# Patient Record
Sex: FEMALE | Race: WHITE | NOT HISPANIC OR LATINO | Employment: FULL TIME | URBAN - METROPOLITAN AREA
[De-identification: names, ages, dates, MRNs, and addresses within clinical notes are randomized per-mention and may not be internally consistent; named-entity substitution may affect disease eponyms.]

---

## 2019-06-04 ENCOUNTER — RECORDS - HEALTHEAST (OUTPATIENT)
Dept: LAB | Facility: CLINIC | Age: 54
End: 2019-06-04

## 2019-06-04 LAB
25(OH)D3 SERPL-MCNC: 36.1 NG/ML (ref 30–80)
ANION GAP SERPL CALCULATED.3IONS-SCNC: 7 MMOL/L (ref 5–18)
BUN SERPL-MCNC: 15 MG/DL (ref 8–22)
CALCIUM SERPL-MCNC: 8.9 MG/DL (ref 8.5–10.5)
CHLORIDE BLD-SCNC: 110 MMOL/L (ref 98–107)
CO2 SERPL-SCNC: 25 MMOL/L (ref 22–31)
CREAT SERPL-MCNC: 0.93 MG/DL (ref 0.6–1.1)
GFR SERPL CREATININE-BSD FRML MDRD: >60 ML/MIN/1.73M2
GLUCOSE BLD-MCNC: 200 MG/DL (ref 70–125)
POTASSIUM BLD-SCNC: 3.9 MMOL/L (ref 3.5–5)
SODIUM SERPL-SCNC: 142 MMOL/L (ref 136–145)
TSH SERPL DL<=0.005 MIU/L-ACNC: 1.97 UIU/ML (ref 0.3–5)

## 2019-07-18 ENCOUNTER — RECORDS - HEALTHEAST (OUTPATIENT)
Dept: LAB | Facility: CLINIC | Age: 54
End: 2019-07-18

## 2019-07-18 LAB
ALBUMIN SERPL-MCNC: 3.4 G/DL (ref 3.5–5)
ALP SERPL-CCNC: 82 U/L (ref 45–120)
ALT SERPL W P-5'-P-CCNC: <9 U/L (ref 0–45)
AST SERPL W P-5'-P-CCNC: 11 U/L (ref 0–40)
BASOPHILS # BLD AUTO: 0.1 THOU/UL (ref 0–0.2)
BASOPHILS NFR BLD AUTO: 1 % (ref 0–2)
BILIRUB DIRECT SERPL-MCNC: 0.1 MG/DL
BILIRUB SERPL-MCNC: 0.3 MG/DL (ref 0–1)
CHOLEST SERPL-MCNC: 119 MG/DL
EOSINOPHIL # BLD AUTO: 0.2 THOU/UL (ref 0–0.4)
EOSINOPHIL NFR BLD AUTO: 3 % (ref 0–6)
ERYTHROCYTE [DISTWIDTH] IN BLOOD BY AUTOMATED COUNT: 14.4 % (ref 11–14.5)
FASTING STATUS PATIENT QL REPORTED: NORMAL
HCT VFR BLD AUTO: 36.2 % (ref 35–47)
HDLC SERPL-MCNC: 53 MG/DL
HGB BLD-MCNC: 11 G/DL (ref 12–16)
LDLC SERPL CALC-MCNC: 45 MG/DL
LYMPHOCYTES # BLD AUTO: 2.3 THOU/UL (ref 0.8–4.4)
LYMPHOCYTES NFR BLD AUTO: 36 % (ref 20–40)
MCH RBC QN AUTO: 25.7 PG (ref 27–34)
MCHC RBC AUTO-ENTMCNC: 30.4 G/DL (ref 32–36)
MCV RBC AUTO: 85 FL (ref 80–100)
MONOCYTES # BLD AUTO: 0.5 THOU/UL (ref 0–0.9)
MONOCYTES NFR BLD AUTO: 7 % (ref 2–10)
NEUTROPHILS # BLD AUTO: 3.3 THOU/UL (ref 2–7.7)
NEUTROPHILS NFR BLD AUTO: 53 % (ref 50–70)
PLATELET # BLD AUTO: 226 THOU/UL (ref 140–440)
PMV BLD AUTO: 9.6 FL (ref 8.5–12.5)
PROT SERPL-MCNC: 6.5 G/DL (ref 6–8)
RBC # BLD AUTO: 4.28 MILL/UL (ref 3.8–5.4)
TRIGL SERPL-MCNC: 106 MG/DL
WBC: 6.3 THOU/UL (ref 4–11)

## 2019-07-19 LAB — HBA1C MFR BLD: 8.9 % (ref 4.2–6.1)

## 2019-08-30 ENCOUNTER — RECORDS - HEALTHEAST (OUTPATIENT)
Dept: LAB | Facility: CLINIC | Age: 54
End: 2019-08-30

## 2019-08-30 LAB
ANION GAP SERPL CALCULATED.3IONS-SCNC: 9 MMOL/L (ref 5–18)
BUN SERPL-MCNC: 14 MG/DL (ref 8–22)
CALCIUM SERPL-MCNC: 9.2 MG/DL (ref 8.5–10.5)
CHLORIDE BLD-SCNC: 105 MMOL/L (ref 98–107)
CO2 SERPL-SCNC: 27 MMOL/L (ref 22–31)
CREAT SERPL-MCNC: 0.96 MG/DL (ref 0.6–1.1)
GFR SERPL CREATININE-BSD FRML MDRD: 60 ML/MIN/1.73M2
GLUCOSE BLD-MCNC: 170 MG/DL (ref 70–125)
POTASSIUM BLD-SCNC: 3.9 MMOL/L (ref 3.5–5)
SODIUM SERPL-SCNC: 141 MMOL/L (ref 136–145)

## 2019-09-14 ENCOUNTER — RECORDS - HEALTHEAST (OUTPATIENT)
Dept: LAB | Facility: CLINIC | Age: 54
End: 2019-09-14

## 2019-09-14 LAB
ANION GAP SERPL CALCULATED.3IONS-SCNC: 6 MMOL/L (ref 5–18)
BUN SERPL-MCNC: 13 MG/DL (ref 8–22)
CALCIUM SERPL-MCNC: 8.9 MG/DL (ref 8.5–10.5)
CHLORIDE BLD-SCNC: 107 MMOL/L (ref 98–107)
CO2 SERPL-SCNC: 26 MMOL/L (ref 22–31)
CREAT SERPL-MCNC: 1.05 MG/DL (ref 0.6–1.1)
GFR SERPL CREATININE-BSD FRML MDRD: 54 ML/MIN/1.73M2
GLUCOSE BLD-MCNC: 221 MG/DL (ref 70–125)
POTASSIUM BLD-SCNC: 3.8 MMOL/L (ref 3.5–5)
SODIUM SERPL-SCNC: 139 MMOL/L (ref 136–145)

## 2021-10-23 ENCOUNTER — LAB REQUISITION (OUTPATIENT)
Dept: LAB | Facility: CLINIC | Age: 56
End: 2021-10-23
Payer: MEDICARE

## 2021-10-23 DIAGNOSIS — E78.2 MIXED HYPERLIPIDEMIA: ICD-10-CM

## 2021-10-23 DIAGNOSIS — E11.65 TYPE 2 DIABETES MELLITUS WITH HYPERGLYCEMIA (H): ICD-10-CM

## 2021-10-23 DIAGNOSIS — E55.9 VITAMIN D DEFICIENCY, UNSPECIFIED: ICD-10-CM

## 2021-10-23 DIAGNOSIS — I10 ESSENTIAL (PRIMARY) HYPERTENSION: ICD-10-CM

## 2021-10-23 DIAGNOSIS — E03.9 HYPOTHYROIDISM, UNSPECIFIED: ICD-10-CM

## 2021-10-25 LAB
ALBUMIN SERPL-MCNC: 3.6 G/DL (ref 3.5–5)
ALP SERPL-CCNC: 74 U/L (ref 45–120)
ALT SERPL W P-5'-P-CCNC: <9 U/L (ref 0–45)
ANION GAP SERPL CALCULATED.3IONS-SCNC: 10 MMOL/L (ref 5–18)
AST SERPL W P-5'-P-CCNC: 16 U/L (ref 0–40)
BILIRUB SERPL-MCNC: 0.6 MG/DL (ref 0–1)
BUN SERPL-MCNC: 14 MG/DL (ref 8–22)
CALCIUM SERPL-MCNC: 9.9 MG/DL (ref 8.5–10.5)
CHLORIDE BLD-SCNC: 101 MMOL/L (ref 98–107)
CHOLEST SERPL-MCNC: 128 MG/DL
CO2 SERPL-SCNC: 30 MMOL/L (ref 22–31)
CREAT SERPL-MCNC: 0.86 MG/DL (ref 0.6–1.1)
ERYTHROCYTE [DISTWIDTH] IN BLOOD BY AUTOMATED COUNT: 13.6 % (ref 10–15)
FASTING STATUS PATIENT QL REPORTED: NORMAL
GFR SERPL CREATININE-BSD FRML MDRD: 75 ML/MIN/1.73M2
GLUCOSE BLD-MCNC: 131 MG/DL (ref 70–125)
HBA1C MFR BLD: 9.5 %
HCT VFR BLD AUTO: 38 % (ref 35–47)
HDLC SERPL-MCNC: 53 MG/DL
HGB BLD-MCNC: 12 G/DL (ref 11.7–15.7)
LDLC SERPL CALC-MCNC: 61 MG/DL
MAGNESIUM SERPL-MCNC: 2.2 MG/DL (ref 1.8–2.6)
MCH RBC QN AUTO: 26.7 PG (ref 26.5–33)
MCHC RBC AUTO-ENTMCNC: 31.6 G/DL (ref 31.5–36.5)
MCV RBC AUTO: 84 FL (ref 78–100)
PLATELET # BLD AUTO: 249 10E3/UL (ref 150–450)
POTASSIUM BLD-SCNC: 4.3 MMOL/L (ref 3.5–5)
PROT SERPL-MCNC: 6.7 G/DL (ref 6–8)
RBC # BLD AUTO: 4.5 10E6/UL (ref 3.8–5.2)
SODIUM SERPL-SCNC: 141 MMOL/L (ref 136–145)
TRIGL SERPL-MCNC: 72 MG/DL
TSH SERPL DL<=0.005 MIU/L-ACNC: 3.54 UIU/ML (ref 0.3–5)
WBC # BLD AUTO: 8.4 10E3/UL (ref 4–11)

## 2021-10-25 PROCEDURE — 83735 ASSAY OF MAGNESIUM: CPT | Mod: ORL | Performed by: PHYSICIAN ASSISTANT

## 2021-10-25 PROCEDURE — 36415 COLL VENOUS BLD VENIPUNCTURE: CPT | Mod: ORL | Performed by: PHYSICIAN ASSISTANT

## 2021-10-25 PROCEDURE — 80053 COMPREHEN METABOLIC PANEL: CPT | Mod: ORL | Performed by: PHYSICIAN ASSISTANT

## 2021-10-25 PROCEDURE — 80061 LIPID PANEL: CPT | Mod: ORL | Performed by: PHYSICIAN ASSISTANT

## 2021-10-25 PROCEDURE — 85027 COMPLETE CBC AUTOMATED: CPT | Mod: ORL | Performed by: PHYSICIAN ASSISTANT

## 2021-10-25 PROCEDURE — 84443 ASSAY THYROID STIM HORMONE: CPT | Mod: ORL | Performed by: PHYSICIAN ASSISTANT

## 2021-10-25 PROCEDURE — 83036 HEMOGLOBIN GLYCOSYLATED A1C: CPT | Mod: ORL | Performed by: PHYSICIAN ASSISTANT

## 2021-10-25 PROCEDURE — 82306 VITAMIN D 25 HYDROXY: CPT | Mod: ORL | Performed by: PHYSICIAN ASSISTANT

## 2021-10-26 LAB — DEPRECATED CALCIDIOL+CALCIFEROL SERPL-MC: 42 UG/L (ref 30–80)

## 2021-11-12 ENCOUNTER — TRANSFERRED RECORDS (OUTPATIENT)
Dept: HEALTH INFORMATION MANAGEMENT | Facility: CLINIC | Age: 56
End: 2021-11-12
Payer: MEDICARE

## 2021-11-22 ENCOUNTER — HOSPITAL ENCOUNTER (INPATIENT)
Facility: HOSPITAL | Age: 56
LOS: 9 days | Discharge: NURSING, REHAB OR LONG-TERM CARE | DRG: 603 | End: 2021-12-01
Attending: EMERGENCY MEDICINE | Admitting: FAMILY MEDICINE
Payer: MEDICARE

## 2021-11-22 ENCOUNTER — MEDICAL CORRESPONDENCE (OUTPATIENT)
Dept: HEALTH INFORMATION MANAGEMENT | Facility: CLINIC | Age: 56
End: 2021-11-22

## 2021-11-22 DIAGNOSIS — Z78.9 FAILURE OF OUTPATIENT TREATMENT: ICD-10-CM

## 2021-11-22 DIAGNOSIS — L03.115 CELLULITIS OF RIGHT LOWER EXTREMITY: ICD-10-CM

## 2021-11-22 DIAGNOSIS — Z79.4 TYPE 2 DIABETES MELLITUS WITH HYPERGLYCEMIA, WITH LONG-TERM CURRENT USE OF INSULIN (H): Primary | ICD-10-CM

## 2021-11-22 DIAGNOSIS — E11.65 TYPE 2 DIABETES MELLITUS WITH HYPERGLYCEMIA, WITH LONG-TERM CURRENT USE OF INSULIN (H): Primary | ICD-10-CM

## 2021-11-22 LAB
ALBUMIN SERPL-MCNC: 4.1 G/DL (ref 3.5–5)
ALP SERPL-CCNC: 87 U/L (ref 45–120)
ALT SERPL W P-5'-P-CCNC: <9 U/L (ref 0–45)
ANION GAP SERPL CALCULATED.3IONS-SCNC: 8 MMOL/L (ref 5–18)
AST SERPL W P-5'-P-CCNC: 12 U/L (ref 0–40)
BASOPHILS # BLD AUTO: 0.1 10E3/UL (ref 0–0.2)
BASOPHILS NFR BLD AUTO: 1 %
BILIRUB DIRECT SERPL-MCNC: 0.3 MG/DL
BILIRUB SERPL-MCNC: 0.6 MG/DL (ref 0–1)
BUN SERPL-MCNC: 20 MG/DL (ref 8–22)
C REACTIVE PROTEIN LHE: 1.3 MG/DL (ref 0–0.8)
CALCIUM SERPL-MCNC: 9.2 MG/DL (ref 8.5–10.5)
CHLORIDE BLD-SCNC: 94 MMOL/L (ref 98–107)
CO2 SERPL-SCNC: 33 MMOL/L (ref 22–31)
CREAT SERPL-MCNC: 1.06 MG/DL (ref 0.6–1.1)
EOSINOPHIL # BLD AUTO: 0.1 10E3/UL (ref 0–0.7)
EOSINOPHIL NFR BLD AUTO: 1 %
ERYTHROCYTE [DISTWIDTH] IN BLOOD BY AUTOMATED COUNT: 13.4 % (ref 10–15)
GFR SERPL CREATININE-BSD FRML MDRD: 58 ML/MIN/1.73M2
GLUCOSE BLD-MCNC: 268 MG/DL (ref 70–125)
GLUCOSE BLDC GLUCOMTR-MCNC: 156 MG/DL (ref 70–99)
HCT VFR BLD AUTO: 40.8 % (ref 35–47)
HGB BLD-MCNC: 12.9 G/DL (ref 11.7–15.7)
IMM GRANULOCYTES # BLD: 0 10E3/UL
IMM GRANULOCYTES NFR BLD: 0 %
INR PPP: 2.31 (ref 0.9–1.15)
LACTATE SERPL-SCNC: 1.6 MMOL/L (ref 0.7–2)
LYMPHOCYTES # BLD AUTO: 2.5 10E3/UL (ref 0.8–5.3)
LYMPHOCYTES NFR BLD AUTO: 29 %
MAGNESIUM SERPL-MCNC: 1.9 MG/DL (ref 1.8–2.6)
MCH RBC QN AUTO: 26.2 PG (ref 26.5–33)
MCHC RBC AUTO-ENTMCNC: 31.6 G/DL (ref 31.5–36.5)
MCV RBC AUTO: 83 FL (ref 78–100)
MONOCYTES # BLD AUTO: 0.5 10E3/UL (ref 0–1.3)
MONOCYTES NFR BLD AUTO: 6 %
NEUTROPHILS # BLD AUTO: 5.3 10E3/UL (ref 1.6–8.3)
NEUTROPHILS NFR BLD AUTO: 63 %
NRBC # BLD AUTO: 0 10E3/UL
NRBC BLD AUTO-RTO: 0 /100
PLATELET # BLD AUTO: 285 10E3/UL (ref 150–450)
POTASSIUM BLD-SCNC: 4 MMOL/L (ref 3.5–5)
PROCALCITONIN SERPL-MCNC: <0.02 NG/ML (ref 0–0.49)
PROT SERPL-MCNC: 7.4 G/DL (ref 6–8)
RBC # BLD AUTO: 4.92 10E6/UL (ref 3.8–5.2)
SARS-COV-2 RNA RESP QL NAA+PROBE: NEGATIVE
SODIUM SERPL-SCNC: 135 MMOL/L (ref 136–145)
WBC # BLD AUTO: 8.5 10E3/UL (ref 4–11)

## 2021-11-22 PROCEDURE — 87040 BLOOD CULTURE FOR BACTERIA: CPT | Performed by: EMERGENCY MEDICINE

## 2021-11-22 PROCEDURE — 85610 PROTHROMBIN TIME: CPT | Performed by: EMERGENCY MEDICINE

## 2021-11-22 PROCEDURE — 86141 C-REACTIVE PROTEIN HS: CPT | Performed by: EMERGENCY MEDICINE

## 2021-11-22 PROCEDURE — 258N000003 HC RX IP 258 OP 636: Performed by: EMERGENCY MEDICINE

## 2021-11-22 PROCEDURE — 250N000013 HC RX MED GY IP 250 OP 250 PS 637: Performed by: FAMILY MEDICINE

## 2021-11-22 PROCEDURE — 99285 EMERGENCY DEPT VISIT HI MDM: CPT | Mod: 25

## 2021-11-22 PROCEDURE — 96365 THER/PROPH/DIAG IV INF INIT: CPT

## 2021-11-22 PROCEDURE — 87205 SMEAR GRAM STAIN: CPT | Performed by: FAMILY MEDICINE

## 2021-11-22 PROCEDURE — 250N000012 HC RX MED GY IP 250 OP 636 PS 637: Performed by: FAMILY MEDICINE

## 2021-11-22 PROCEDURE — 83735 ASSAY OF MAGNESIUM: CPT | Performed by: EMERGENCY MEDICINE

## 2021-11-22 PROCEDURE — 80053 COMPREHEN METABOLIC PANEL: CPT | Performed by: EMERGENCY MEDICINE

## 2021-11-22 PROCEDURE — 99223 1ST HOSP IP/OBS HIGH 75: CPT | Performed by: FAMILY MEDICINE

## 2021-11-22 PROCEDURE — 82248 BILIRUBIN DIRECT: CPT | Performed by: EMERGENCY MEDICINE

## 2021-11-22 PROCEDURE — 96366 THER/PROPH/DIAG IV INF ADDON: CPT

## 2021-11-22 PROCEDURE — 93005 ELECTROCARDIOGRAM TRACING: CPT | Performed by: EMERGENCY MEDICINE

## 2021-11-22 PROCEDURE — 87635 SARS-COV-2 COVID-19 AMP PRB: CPT | Performed by: EMERGENCY MEDICINE

## 2021-11-22 PROCEDURE — 250N000011 HC RX IP 250 OP 636: Performed by: EMERGENCY MEDICINE

## 2021-11-22 PROCEDURE — 250N000011 HC RX IP 250 OP 636: Performed by: FAMILY MEDICINE

## 2021-11-22 PROCEDURE — 258N000003 HC RX IP 258 OP 636: Performed by: FAMILY MEDICINE

## 2021-11-22 PROCEDURE — 96361 HYDRATE IV INFUSION ADD-ON: CPT

## 2021-11-22 PROCEDURE — 36415 COLL VENOUS BLD VENIPUNCTURE: CPT | Performed by: EMERGENCY MEDICINE

## 2021-11-22 PROCEDURE — 84145 PROCALCITONIN (PCT): CPT | Performed by: EMERGENCY MEDICINE

## 2021-11-22 PROCEDURE — C9803 HOPD COVID-19 SPEC COLLECT: HCPCS

## 2021-11-22 PROCEDURE — 96367 TX/PROPH/DG ADDL SEQ IV INF: CPT

## 2021-11-22 PROCEDURE — 85025 COMPLETE CBC W/AUTO DIFF WBC: CPT | Performed by: EMERGENCY MEDICINE

## 2021-11-22 PROCEDURE — 120N000001 HC R&B MED SURG/OB

## 2021-11-22 PROCEDURE — 83605 ASSAY OF LACTIC ACID: CPT | Performed by: EMERGENCY MEDICINE

## 2021-11-22 RX ORDER — DEXTROSE MONOHYDRATE 25 G/50ML
25-50 INJECTION, SOLUTION INTRAVENOUS
Status: DISCONTINUED | OUTPATIENT
Start: 2021-11-22 | End: 2021-12-01 | Stop reason: HOSPADM

## 2021-11-22 RX ORDER — CEFDINIR 300 MG/1
300 CAPSULE ORAL 2 TIMES DAILY
Status: ON HOLD | COMMUNITY
Start: 2021-11-17 | End: 2021-12-01

## 2021-11-22 RX ORDER — TORSEMIDE 20 MG/1
20 TABLET ORAL DAILY
Status: ON HOLD | COMMUNITY
Start: 2021-11-20 | End: 2021-12-01

## 2021-11-22 RX ORDER — INSULIN ASPART 100 [IU]/ML
1-85 INJECTION, SOLUTION INTRAVENOUS; SUBCUTANEOUS
Status: ON HOLD | COMMUNITY
End: 2021-12-01

## 2021-11-22 RX ORDER — BUPROPION HYDROCHLORIDE 150 MG/1
150 TABLET, EXTENDED RELEASE ORAL DAILY
Status: DISCONTINUED | OUTPATIENT
Start: 2021-11-23 | End: 2021-12-01 | Stop reason: HOSPADM

## 2021-11-22 RX ORDER — PANTOPRAZOLE SODIUM 40 MG/1
40 TABLET, DELAYED RELEASE ORAL
Status: DISCONTINUED | OUTPATIENT
Start: 2021-11-23 | End: 2021-12-01 | Stop reason: HOSPADM

## 2021-11-22 RX ORDER — MORPHINE SULFATE 15 MG/1
7.5 TABLET ORAL EVERY 8 HOURS PRN
Status: DISCONTINUED | OUTPATIENT
Start: 2021-11-22 | End: 2021-12-01 | Stop reason: HOSPADM

## 2021-11-22 RX ORDER — MORPHINE SULFATE 30 MG/1
30 TABLET, FILM COATED, EXTENDED RELEASE ORAL
COMMUNITY
End: 2022-08-18

## 2021-11-22 RX ORDER — PIPERACILLIN SODIUM, TAZOBACTAM SODIUM 3; .375 G/15ML; G/15ML
3.38 INJECTION, POWDER, LYOPHILIZED, FOR SOLUTION INTRAVENOUS ONCE
Status: COMPLETED | OUTPATIENT
Start: 2021-11-22 | End: 2021-11-22

## 2021-11-22 RX ORDER — ASPIRIN 81 MG/1
81 TABLET, CHEWABLE ORAL DAILY
Status: DISCONTINUED | OUTPATIENT
Start: 2021-11-23 | End: 2021-12-01 | Stop reason: HOSPADM

## 2021-11-22 RX ORDER — ASPIRIN 81 MG/1
81 TABLET, CHEWABLE ORAL DAILY
COMMUNITY

## 2021-11-22 RX ORDER — ONDANSETRON 2 MG/ML
4 INJECTION INTRAMUSCULAR; INTRAVENOUS EVERY 6 HOURS PRN
Status: DISCONTINUED | OUTPATIENT
Start: 2021-11-22 | End: 2021-12-01 | Stop reason: HOSPADM

## 2021-11-22 RX ORDER — NICOTINE POLACRILEX 4 MG
15-30 LOZENGE BUCCAL
Status: DISCONTINUED | OUTPATIENT
Start: 2021-11-22 | End: 2021-12-01 | Stop reason: HOSPADM

## 2021-11-22 RX ORDER — NALOXONE HYDROCHLORIDE 0.4 MG/ML
0.4 INJECTION, SOLUTION INTRAMUSCULAR; INTRAVENOUS; SUBCUTANEOUS
Status: DISCONTINUED | OUTPATIENT
Start: 2021-11-22 | End: 2021-12-01 | Stop reason: HOSPADM

## 2021-11-22 RX ORDER — MORPHINE SULFATE 30 MG/1
30 TABLET, FILM COATED, EXTENDED RELEASE ORAL
Status: DISCONTINUED | OUTPATIENT
Start: 2021-11-22 | End: 2021-12-01 | Stop reason: HOSPADM

## 2021-11-22 RX ORDER — CALCIUM CITRATE/VITAMIN D3 200MG-6.25
1 TABLET ORAL DAILY
COMMUNITY

## 2021-11-22 RX ORDER — BISACODYL 10 MG
10 SUPPOSITORY, RECTAL RECTAL DAILY PRN
COMMUNITY

## 2021-11-22 RX ORDER — BUPROPION HYDROCHLORIDE 150 MG/1
150 TABLET, EXTENDED RELEASE ORAL DAILY
COMMUNITY

## 2021-11-22 RX ORDER — VENLAFAXINE HYDROCHLORIDE 150 MG/1
150 CAPSULE, EXTENDED RELEASE ORAL DAILY
Status: DISCONTINUED | OUTPATIENT
Start: 2021-11-23 | End: 2021-12-01 | Stop reason: HOSPADM

## 2021-11-22 RX ORDER — LOPERAMIDE HYDROCHLORIDE 2 MG/1
2 TABLET ORAL 3 TIMES DAILY PRN
COMMUNITY
End: 2022-08-30

## 2021-11-22 RX ORDER — POLYETHYLENE GLYCOL 3350 17 G/17G
1 POWDER, FOR SOLUTION ORAL EVERY OTHER DAY
COMMUNITY

## 2021-11-22 RX ORDER — HYDROXYZINE HYDROCHLORIDE 25 MG/1
50 TABLET, FILM COATED ORAL EVERY 8 HOURS PRN
Status: DISCONTINUED | OUTPATIENT
Start: 2021-11-22 | End: 2021-12-01 | Stop reason: HOSPADM

## 2021-11-22 RX ORDER — TORSEMIDE 20 MG/1
20 TABLET ORAL DAILY
Status: DISCONTINUED | OUTPATIENT
Start: 2021-11-23 | End: 2021-12-01 | Stop reason: HOSPADM

## 2021-11-22 RX ORDER — LISINOPRIL 2.5 MG/1
2.5 TABLET ORAL DAILY
Status: DISCONTINUED | OUTPATIENT
Start: 2021-11-23 | End: 2021-12-01 | Stop reason: HOSPADM

## 2021-11-22 RX ORDER — BENZOCAINE/MENTHOL 6 MG-10 MG
LOZENGE MUCOUS MEMBRANE 2 TIMES DAILY PRN
COMMUNITY
End: 2022-08-30

## 2021-11-22 RX ORDER — LEVOTHYROXINE SODIUM 125 UG/1
187.5 TABLET ORAL SEE ADMIN INSTRUCTIONS
COMMUNITY

## 2021-11-22 RX ORDER — GINSENG 100 MG
CAPSULE ORAL EVERY 8 HOURS PRN
COMMUNITY

## 2021-11-22 RX ORDER — ASPIRIN 81 MG
200 TABLET, DELAYED RELEASE (ENTERIC COATED) ORAL 2 TIMES DAILY
COMMUNITY
End: 2022-08-30

## 2021-11-22 RX ORDER — LIDOCAINE 40 MG/G
CREAM TOPICAL
Status: DISCONTINUED | OUTPATIENT
Start: 2021-11-22 | End: 2021-12-01 | Stop reason: HOSPADM

## 2021-11-22 RX ORDER — POLYETHYLENE GLYCOL 3350 17 G/17G
17 POWDER, FOR SOLUTION ORAL EVERY OTHER DAY
Status: DISCONTINUED | OUTPATIENT
Start: 2021-11-23 | End: 2021-12-01 | Stop reason: HOSPADM

## 2021-11-22 RX ORDER — VENLAFAXINE HYDROCHLORIDE 150 MG/1
150 CAPSULE, EXTENDED RELEASE ORAL DAILY
COMMUNITY

## 2021-11-22 RX ORDER — CALCIUM CARBONATE 500 MG/1
2 TABLET, CHEWABLE ORAL
COMMUNITY

## 2021-11-22 RX ORDER — ROSUVASTATIN CALCIUM 10 MG/1
20 TABLET, COATED ORAL AT BEDTIME
Status: DISCONTINUED | OUTPATIENT
Start: 2021-11-22 | End: 2021-12-01 | Stop reason: HOSPADM

## 2021-11-22 RX ORDER — NALOXONE HYDROCHLORIDE 0.4 MG/ML
0.2 INJECTION, SOLUTION INTRAMUSCULAR; INTRAVENOUS; SUBCUTANEOUS
Status: DISCONTINUED | OUTPATIENT
Start: 2021-11-22 | End: 2021-12-01 | Stop reason: HOSPADM

## 2021-11-22 RX ORDER — LEVOTHYROXINE SODIUM 125 UG/1
125 TABLET ORAL SEE ADMIN INSTRUCTIONS
COMMUNITY

## 2021-11-22 RX ORDER — POLYETHYLENE GLYCOL 3350 17 G/17G
17 POWDER, FOR SOLUTION ORAL DAILY PRN
Status: DISCONTINUED | OUTPATIENT
Start: 2021-11-22 | End: 2021-12-01 | Stop reason: HOSPADM

## 2021-11-22 RX ORDER — ACETAMINOPHEN 325 MG/1
650 TABLET ORAL EVERY 4 HOURS PRN
COMMUNITY

## 2021-11-22 RX ORDER — ONDANSETRON 4 MG/1
4 TABLET, ORALLY DISINTEGRATING ORAL EVERY 6 HOURS PRN
Status: DISCONTINUED | OUTPATIENT
Start: 2021-11-22 | End: 2021-12-01 | Stop reason: HOSPADM

## 2021-11-22 RX ORDER — SODIUM CHLORIDE 9 MG/ML
INJECTION, SOLUTION INTRAVENOUS CONTINUOUS
Status: ACTIVE | OUTPATIENT
Start: 2021-11-22 | End: 2021-11-23

## 2021-11-22 RX ORDER — CLOTRIMAZOLE 1 %
CREAM (GRAM) TOPICAL 2 TIMES DAILY
COMMUNITY

## 2021-11-22 RX ORDER — ACETAMINOPHEN 325 MG/1
975 TABLET ORAL EVERY 8 HOURS
Status: DISCONTINUED | OUTPATIENT
Start: 2021-11-22 | End: 2021-12-01 | Stop reason: HOSPADM

## 2021-11-22 RX ORDER — LISINOPRIL 2.5 MG/1
2.5 TABLET ORAL DAILY
COMMUNITY

## 2021-11-22 RX ORDER — VENLAFAXINE HYDROCHLORIDE 75 MG/1
75 CAPSULE, EXTENDED RELEASE ORAL DAILY
Status: DISCONTINUED | OUTPATIENT
Start: 2021-11-23 | End: 2021-12-01 | Stop reason: HOSPADM

## 2021-11-22 RX ORDER — SENNOSIDES 8.6 MG
1 TABLET ORAL DAILY
COMMUNITY
End: 2022-08-30

## 2021-11-22 RX ORDER — MULTIVITAMIN WITH IRON
1 TABLET ORAL DAILY
COMMUNITY

## 2021-11-22 RX ORDER — MORPHINE SULFATE 15 MG/1
7.5 TABLET ORAL EVERY 8 HOURS PRN
COMMUNITY
End: 2022-08-18

## 2021-11-22 RX ORDER — PIPERACILLIN SODIUM, TAZOBACTAM SODIUM 3; .375 G/15ML; G/15ML
3.38 INJECTION, POWDER, LYOPHILIZED, FOR SOLUTION INTRAVENOUS EVERY 8 HOURS
Status: DISCONTINUED | OUTPATIENT
Start: 2021-11-22 | End: 2021-11-25

## 2021-11-22 RX ORDER — LORATADINE 10 MG/1
10 TABLET ORAL DAILY PRN
COMMUNITY

## 2021-11-22 RX ORDER — BACLOFEN 20 MG/1
20 TABLET ORAL 3 TIMES DAILY
Status: ON HOLD | COMMUNITY
End: 2022-08-18

## 2021-11-22 RX ORDER — VENLAFAXINE HYDROCHLORIDE 75 MG/1
75 CAPSULE, EXTENDED RELEASE ORAL DAILY
COMMUNITY

## 2021-11-22 RX ORDER — BACLOFEN 10 MG/1
10 TABLET ORAL 3 TIMES DAILY
Status: DISCONTINUED | OUTPATIENT
Start: 2021-11-22 | End: 2021-12-01 | Stop reason: HOSPADM

## 2021-11-22 RX ORDER — SUMATRIPTAN 50 MG/1
100 TABLET, FILM COATED ORAL EVERY 12 HOURS PRN
Status: DISCONTINUED | OUTPATIENT
Start: 2021-11-22 | End: 2021-12-01 | Stop reason: HOSPADM

## 2021-11-22 RX ORDER — HYDROXYZINE HYDROCHLORIDE 50 MG/1
50 TABLET, FILM COATED ORAL 2 TIMES DAILY
Status: ON HOLD | COMMUNITY
End: 2022-08-18

## 2021-11-22 RX ORDER — BISACODYL 10 MG
10 SUPPOSITORY, RECTAL RECTAL DAILY PRN
Status: DISCONTINUED | OUTPATIENT
Start: 2021-11-22 | End: 2021-12-01 | Stop reason: HOSPADM

## 2021-11-22 RX ORDER — ROSUVASTATIN CALCIUM 20 MG/1
20 TABLET, COATED ORAL AT BEDTIME
COMMUNITY

## 2021-11-22 RX ORDER — DOCUSATE SODIUM 100 MG/1
100 CAPSULE, LIQUID FILLED ORAL 2 TIMES DAILY
Status: DISCONTINUED | OUTPATIENT
Start: 2021-11-22 | End: 2021-12-01 | Stop reason: HOSPADM

## 2021-11-22 RX ORDER — SUMATRIPTAN 100 MG/1
100 TABLET, FILM COATED ORAL EVERY 12 HOURS PRN
COMMUNITY

## 2021-11-22 RX ORDER — NEOMYCIN/BACITRACIN/POLYMYXINB 3.5-400-5K
OINTMENT (GRAM) TOPICAL EVERY 6 HOURS PRN
COMMUNITY
End: 2022-08-30

## 2021-11-22 RX ADMIN — PIPERACILLIN AND TAZOBACTAM 3.38 G: 3; .375 INJECTION, POWDER, LYOPHILIZED, FOR SOLUTION INTRAVENOUS at 16:05

## 2021-11-22 RX ADMIN — BACLOFEN 10 MG: 10 TABLET ORAL at 21:59

## 2021-11-22 RX ADMIN — VANCOMYCIN HYDROCHLORIDE 1500 MG: 5 INJECTION, POWDER, LYOPHILIZED, FOR SOLUTION INTRAVENOUS at 16:55

## 2021-11-22 RX ADMIN — SODIUM CHLORIDE: 9 INJECTION, SOLUTION INTRAVENOUS at 21:06

## 2021-11-22 RX ADMIN — PIPERACILLIN SODIUM AND TAZOBACTAM SODIUM 3.38 G: 3; .375 INJECTION, POWDER, LYOPHILIZED, FOR SOLUTION INTRAVENOUS at 23:06

## 2021-11-22 RX ADMIN — RIVAROXABAN 15 MG: 15 TABLET, FILM COATED ORAL at 22:00

## 2021-11-22 RX ADMIN — SODIUM CHLORIDE 1224 ML: 9 INJECTION, SOLUTION INTRAVENOUS at 15:08

## 2021-11-22 RX ADMIN — VANCOMYCIN HYDROCHLORIDE 1500 MG: 5 INJECTION, POWDER, LYOPHILIZED, FOR SOLUTION INTRAVENOUS at 21:11

## 2021-11-22 RX ADMIN — MORPHINE SULFATE 30 MG: 30 TABLET, FILM COATED, EXTENDED RELEASE ORAL at 21:59

## 2021-11-22 RX ADMIN — ROSUVASTATIN CALCIUM 20 MG: 10 TABLET, FILM COATED ORAL at 22:00

## 2021-11-22 RX ADMIN — ACETAMINOPHEN 975 MG: 325 TABLET ORAL at 21:58

## 2021-11-22 RX ADMIN — INSULIN GLARGINE 30 UNITS: 100 INJECTION, SOLUTION SUBCUTANEOUS at 23:59

## 2021-11-22 RX ADMIN — DOCUSATE SODIUM 100 MG: 100 CAPSULE, LIQUID FILLED ORAL at 21:59

## 2021-11-22 RX ADMIN — INSULIN ASPART 2 UNITS: 100 INJECTION, SOLUTION INTRAVENOUS; SUBCUTANEOUS at 21:09

## 2021-11-22 ASSESSMENT — ENCOUNTER SYMPTOMS
NAUSEA: 0
CONSTIPATION: 1
CONFUSION: 0
WOUND: 1
JOINT SWELLING: 0
DIZZINESS: 0
MYALGIAS: 1
HEMATURIA: 0
ABDOMINAL PAIN: 0
SORE THROAT: 0
FEVER: 0
CHILLS: 0
VOMITING: 0
DIARRHEA: 0
SHORTNESS OF BREATH: 0
DYSURIA: 0

## 2021-11-22 ASSESSMENT — ACTIVITIES OF DAILY LIVING (ADL)
ADLS_ACUITY_SCORE: 8
ADLS_ACUITY_SCORE: 9
TOILETING_ISSUES: YES
ADLS_ACUITY_SCORE: 8
DIFFICULTY_EATING/SWALLOWING: NO
ADLS_ACUITY_SCORE: 12
DIFFICULTY_COMMUNICATING: NO
DRESSING/BATHING_DIFFICULTY: YES
ADLS_ACUITY_SCORE: 9
DRESSING/BATHING: BATHING DIFFICULTY, ASSISTANCE 1 PERSON
DEPENDENT_IADLS:: MEDICATION MANAGEMENT;MEAL PREPARATION;TRANSPORTATION
ADLS_ACUITY_SCORE: 9

## 2021-11-22 ASSESSMENT — MIFFLIN-ST. JEOR
SCORE: 1374.23
SCORE: 1370.6

## 2021-11-22 NOTE — ED TRIAGE NOTES
Pt arrives via [facility] from a nursing home due to cellulitis on her right lower leg. Pt can stand and take a couple steps with assist. Pt takes morphine regularly and last dose was around 11 am today 30 mg ER. Pt takes warfarin regularly. Pt is taking oral doxycycline and right leg continues to be painful, red and weepy. [place] recently transferred to [place] and is not happy with her care there

## 2021-11-22 NOTE — PHARMACY-ADMISSION MEDICATION HISTORY
Pharmacy Note - Admission Medication History    Pertinent Provider Information: Previously on doxycycline 100 mg BID 11/10/21-11/16/21, then switched to cefdinir.     ______________________________________________________________________    Prior To Admission (PTA) med list completed and updated in EMR.       PTA Med List   Medication Sig Note Last Dose     acetaminophen (TYLENOL) 325 MG tablet Take 650 mg by mouth every 4 hours as needed for mild pain       Ascorbic Acid (VITAMIN C GUMMIES PO) Take 1 chew tab by mouth daily  11/22/2021 at am     aspirin (ASA) 81 MG chewable tablet Take 81 mg by mouth daily  11/22/2021 at am     bacitracin 500 UNIT/GM OINT Apply topically every 8 hours as needed for wound care       baclofen (LIORESAL) 10 MG tablet Take 10 mg by mouth 3 times daily  11/22/2021 at x1     bisacodyl (DULCOLAX) 10 MG suppository Place 10 mg rectally daily as needed for constipation       buPROPion (WELLBUTRIN SR) 150 MG 12 hr tablet Take 150 mg by mouth daily  11/22/2021 at am     calcium carbonate (TUMS) 500 MG chewable tablet Take 2 chew tab by mouth every hour as needed for heartburn       calcium carbonate 600 mg-vitamin D 400 units (CALTRATE) 600-400 MG-UNIT per tablet Take 1 tablet by mouth daily  11/22/2021 at am     cefdinir (OMNICEF) 300 MG capsule Take 300 mg by mouth 2 times daily X 8 days 11/22/2021: Last scheduled dose is 11/24 pm. 11/22/2021 at am     Cholecalciferol (VITAMIN D3 GUMMIES PO) Take 1 chew tab by mouth daily  11/22/2021 at am     clotrimazole (LOTRIMIN) 1 % external cream Apply topically 2 times daily  Unknown at Unknown time     docusate sodium (COLACE) 100 MG tablet Take 100 mg by mouth 2 times daily  11/22/2021 at am     guaiFENesin (ROBITUSSIN) 20 mg/mL SOLN solution Take 10 mLs by mouth every 4 hours as needed for cough       hydrocortisone (CORTAID) 1 % external cream Apply topically 2 times daily as needed for rash  Unknown at Unknown time     hydrOXYzine (ATARAX) 50 MG  tablet Take 50 mg by mouth every 8 hours as needed for itching  11/11/2021     insulin aspart (NOVOLOG FLEXPEN) 100 UNIT/ML pen Inject 1-85 Units Subcutaneous 3 times daily (with meals) Sliding scale.       insulin degludec (TRESIBA) 100 UNIT/ML pen Inject 30 Units Subcutaneous At Bedtime  11/21/2021 at pm     levothyroxine (SYNTHROID/LEVOTHROID) 125 MCG tablet Take 125 mcg by mouth See Admin Instructions Daily on Tuesday, Wednesday, Thursday, Saturday, and Sunday 11/21/2021 at am     levothyroxine (SYNTHROID/LEVOTHROID) 125 MCG tablet Take 187.5 mcg by mouth See Admin Instructions On Mondays, Fridays 11/22/2021 at am     lisinopril (ZESTRIL) 2.5 MG tablet Take 2.5 mg by mouth daily  11/22/2021 at am     loperamide (IMODIUM A-D) 2 MG tablet Take 2 mg by mouth 3 times daily as needed for diarrhea       loratadine (CLARITIN) 10 MG tablet Take 10 mg by mouth daily as needed for allergies       magnesium 250 MG tablet Take 1 tablet by mouth daily  11/22/2021 at am     magnesium hydroxide (MILK OF MAGNESIA) 400 MG/5ML suspension Take 30 mLs by mouth daily as needed for constipation       menthol (COUGH DROP) 7 MG LOZG Take 1 lozenge by mouth every hour as needed for cough       morphine (MS CONTIN) 30 MG CR tablet Take 30 mg by mouth 3 times daily  11/22/2021 at 0800     morphine (MSIR) 15 MG IR tablet Take 7.5 mg by mouth every 8 hours as needed for pain  11/15/2021     Multiple Vitamins-Minerals (MULTIVITAMIN GUMMIES WOMENS) CHEW Take 1 chew tab by mouth daily  11/22/2021 at am     neomycin-bacitracin-polymyxin (NEOSPORIN) 5-400-5000 ointment Apply topically every 6 hours as needed (minor scrapes, cuts, burns)       omeprazole (PRILOSEC) 20 MG DR capsule Take 20 mg by mouth daily  11/22/2021 at am     polyethylene glycol (MIRALAX) 17 GM/Dose powder Take 1 capful by mouth every other day  11/21/2021 at am     rivaroxaban ANTICOAGULANT (XARELTO) 15 MG TABS tablet Take by mouth 2 times daily (with meals) 11/22/2021: X21  days (started on 11/15/21) 11/22/2021 at am     rosuvastatin (CRESTOR) 20 MG tablet Take 20 mg by mouth At Bedtime  11/21/2021 at pm     sennosides (SENOKOT) 8.6 MG tablet Take 1 tablet by mouth daily  11/22/2021 at am     SUMAtriptan (IMITREX) 100 MG tablet Take 100 mg by mouth every 12 hours as needed for migraine       torsemide (DEMADEX) 20 MG tablet Take 20 mg by mouth daily x3 days. 11/22/2021: Took last dose of schedule on 11/22/21. 11/22/2021 at am     venlafaxine (EFFEXOR-XR) 150 MG 24 hr capsule Take 150 mg by mouth daily Take with 75 mg capsule for a total dose of 225 mg.  11/22/2021 at am     venlafaxine (EFFEXOR-XR) 75 MG 24 hr capsule Take 75 mg by mouth daily Take with 150 mg capsule for a total dose of 225 mg.  11/22/2021 at am     Zinc Oxide-Petrolatum 20-80 % CREA Externally apply topically every 8 hours as needed (incontinence)         Information source(s): Facility ([facility]/) medication list/MAR  Method of interview communication: N/A    Summary of Changes to PTA Med List  New: all medications  Discontinued: none  Changed: none    Patient was asked about OTC/herbal products specifically.  PTA med list reflects this.    In the past week, patient estimated taking medication this percent of the time:  greater than 90%.    Allergies were reviewed, assessed, and updated with the patient.      Patient did not bring any medications to the hospital and can't retrieve from home. No multi-dose medications are available for use during hospital stay.     The information provided in this note is only as accurate as the sources available at the time of the update(s).    Thank you for the opportunity to participate in the care of this patient.    Karen Velasco [facility]  11/22/2021 5:59 PM

## 2021-11-22 NOTE — ED PROVIDER NOTES
Emergency Department Encounter     Evaluation Date & Time:   11/22/2021  3:55 PM    CHIEF COMPLAINT:  Cellulitis      Triage Note:Pt arrives via [facility] from a nursing home due to cellulitis on her right lower leg. Pt can stand and take a couple steps with assist. Pt takes morphine regularly and last dose was around 11 am today 30 mg ER. Pt takes warfarin regularly. Pt is taking oral doxycycline and right leg continues to be painful, red and weepy. [place] recently transferred to [place] and is not happy with her care there        ED COURSE & MEDICAL DECISION MAKING:     ED Course as of 11/22/21 2123 Mon Nov 22, 2021   1626 Call from outpatient primary clinic regarding pt's recent course.  Started on Doxy 11/9, received IM rocephin at [facility] 11/12, continued on doxy.  Outpatient Ultrasound showed DVT right leg, started on Xarelto.  Pt changed to cefdinir on 11/19/21 and moved to SNF for wound care.  Pt sent in for worsening cellulitis findings with need for hospitalization.     1655 Labs overall reassuring.  Nevertheless, pt would benefit from IV antibx for further evaluation and care as she's failing outpatient therapy at this point.     Pt here for failure of outpatient therapy regarding cellulitis of right lower leg that has been ongoing for the past 13 days, initially put on doxy only, then changed to cefdinir 2 days ago, but not improving and even worsening.  She does have a recent DVT of her right leg diagnosed during all of this as well and put on Xarelto. She has open wound to right lower leg from swelling that likely was entry point for infection.  Arrives tachy, but improved with IvF here in ED.  Started on Zosyn/Vanco for cellulitis after evaluation with triage physician. Will hospitalize.      4:08 PM I met with the patient, obtained history, performed an initial exam, and discussed options and plan for diagnostics and treatment here in the ED.   6:02 PM I spoke with Dr. Styles, hospitalist, who accepts  for hospitalization.  HR improved after IVF to around 100.      At the conclusion of the encounter I discussed the results of all the tests and the disposition. The questions were answered. The patient or family acknowledged understanding and was agreeable with the care plan.      MEDICATIONS GIVEN IN THE EMERGENCY DEPARTMENT:  Medications   lidocaine 1 % 0.1-1 mL (has no administration in time range)   lidocaine (LMX4) cream (has no administration in time range)   sodium chloride (PF) 0.9% PF flush 3 mL (3 mLs Intracatheter Given 11/22/21 2106)   sodium chloride (PF) 0.9% PF flush 3 mL (has no administration in time range)   melatonin tablet 1 mg (has no administration in time range)   Patient is already receiving anticoagulation with heparin, enoxaparin (LOVENOX), warfarin (COUMADIN)  or other anticoagulant medication (has no administration in time range)   sodium chloride 0.9% infusion ( Intravenous New Bag 11/22/21 2106)   acetaminophen (TYLENOL) tablet 975 mg (has no administration in time range)   docusate sodium (COLACE) capsule 100 mg (has no administration in time range)   polyethylene glycol (MIRALAX) Packet 17 g (has no administration in time range)   ondansetron (ZOFRAN-ODT) ODT tab 4 mg (has no administration in time range)     Or   ondansetron (ZOFRAN) injection 4 mg (has no administration in time range)   glucose gel 15-30 g (has no administration in time range)     Or   dextrose 50 % injection 25-50 mL (has no administration in time range)     Or   glucagon injection 1 mg (has no administration in time range)   insulin glargine (LANTUS PEN) injection 30 Units (has no administration in time range)   insulin aspart (NovoLOG) injection (RAPID ACTING) (2 Units Subcutaneous Given 11/22/21 2109)   insulin aspart (NovoLOG) injection (RAPID ACTING) (1 Units Subcutaneous Given 11/22/21 2108)   insulin aspart (NovoLOG) injection (RAPID ACTING) (1 Units Subcutaneous Not Given 11/22/21 2109)   aspirin (ASA)  chewable tablet 81 mg (has no administration in time range)   baclofen (LIORESAL) tablet 10 mg (has no administration in time range)   bisacodyl (DULCOLAX) Suppository 10 mg (has no administration in time range)   buPROPion (WELLBUTRIN SR) 12 hr tablet 150 mg (has no administration in time range)   calcium carbonate-vitamin D (OS-MALI with D) per tablet 1 tablet (has no administration in time range)   hydrOXYzine (ATARAX) tablet 50 mg (has no administration in time range)   levothyroxine (SYNTHROID/LEVOTHROID) tablet 125 mcg (has no administration in time range)   levothyroxine (SYNTHROID/LEVOTHROID) half-tab 187.5 mcg (has no administration in time range)   lisinopril (ZESTRIL) tablet 2.5 mg (has no administration in time range)   morphine (MS CONTIN) 12 hr tablet 30 mg (has no administration in time range)   morphine (MSIR) IR half-tab 7.5 mg (has no administration in time range)   polyethylene glycol (MIRALAX) Packet 17 g (has no administration in time range)   rosuvastatin (CRESTOR) tablet 20 mg (has no administration in time range)   torsemide (DEMADEX) tablet 20 mg (has no administration in time range)   venlafaxine (EFFEXOR-XR) 24 hr capsule 150 mg (has no administration in time range)   venlafaxine (EFFEXOR-XR) 24 hr capsule 75 mg (has no administration in time range)   SUMAtriptan (IMITREX) tablet 100 mg (has no administration in time range)   rivaroxaban ANTICOAGULANT (XARELTO) tablet 15 mg (has no administration in time range)   piperacillin-tazobactam (ZOSYN) 3.375 g vial to attach to  mL bag (has no administration in time range)   vancomycin (VANCOCIN) 1,500 mg in sodium chloride 0.9 % 250 mL intermittent infusion (1,500 mg Intravenous New Bag 11/22/21 2111)   naloxone (NARCAN) injection 0.2 mg (has no administration in time range)     Or   naloxone (NARCAN) injection 0.4 mg (has no administration in time range)     Or   naloxone (NARCAN) injection 0.2 mg (has no administration in time range)      Or   naloxone (NARCAN) injection 0.4 mg (has no administration in time range)   pantoprazole (PROTONIX) EC tablet 40 mg (has no administration in time range)   piperacillin-tazobactam (ZOSYN) 3.375 g vial to attach to  mL bag (0 g Intravenous Stopped 11/22/21 1654)   0.9% sodium chloride BOLUS (0 mL/kg × 81.6 kg Intravenous Stopped 11/22/21 1654)   vancomycin (VANCOCIN) 1,500 mg in sodium chloride 0.9 % 250 mL intermittent infusion (1,500 mg Intravenous New Bag 11/22/21 1655)       NEW PRESCRIPTIONS STARTED AT TODAY'S ED VISIT:  Current Discharge Medication List          HPI   The history is provided by the patient.        Tonya Valera is a 56 year old female with a pertinent history of diabetes (insulin) who presents to this ED via EMS for evaluation of cellulitis.    Patient presents with cellulitis of right lower extremity that started a week and a half ago, her leg has gotten bigger and worse. She reports the skin became swollen enough that it created a blister and popped on its own, creating an open wound. Patient reports she had an ultrasound that confirmed she has DVT in her right leg, she was put on Xarelto. Patient reports that she does not walk, she uses a wheel chair and occasionally a walker as she has balance issues. Patient denies a history of blood clots. She reports that a week and a half ago she was started on doxycycline, which she is still taking. She was at [facility] and given a shot for Rocephin, and notes that yesterday she was started on a new, unknown antibiotic. Patient denies fevers or diarrhea. She endorses constipation. Patient denies any other current complaints.    REVIEW OF SYSTEMS:  Review of Systems   Constitutional: Negative for chills and fever.   HENT: Negative for sore throat.    Eyes: Negative for visual disturbance.   Respiratory: Negative for shortness of breath.    Cardiovascular: Positive for leg swelling. Negative for chest pain.   Gastrointestinal: Positive for  "constipation. Negative for abdominal pain, diarrhea, nausea and vomiting.   Endocrine: Negative for polyuria.   Genitourinary: Negative for dysuria and hematuria.        - urinary changes     Musculoskeletal: Negative for joint swelling.   Skin: Positive for wound. Negative for rash.   Neurological: Negative for dizziness.   Psychiatric/Behavioral: Negative for confusion.   All other systems reviewed and are negative.        Medical History   No past medical history on file.    No past surgical history on file.    No family history on file.    Social History     Tobacco Use     Smoking status: Not on file     Smokeless tobacco: Not on file   Substance Use Topics     Alcohol use: Not on file     Drug use: Not on file       No current outpatient medications on file.      Physical Exam     Triage Vitals:  ED Triage Vitals [11/22/21 1447]   Enc Vitals Group      BP (!) 155/88      Pulse (!) 121      Resp 20      Temp 99.6  F (37.6  C)      Temp src Oral      SpO2 96 %      Weight 81.6 kg (180 lb)      Height 1.6 m (5' 3\")      Head Circumference       Peak Flow       Pain Score       Pain Loc       Pain Edu?       Excl. in GC?         Vitals:  /64 (BP Location: Left arm)   Pulse 92   Temp 98.7  F (37.1  C) (Oral)   Resp 16   Ht 1.6 m (5' 3\")   Wt 82 kg (180 lb 12.8 oz)   SpO2 94%   BMI 32.03 kg/m      PHYSICAL EXAM:   Physical Exam  Vitals and nursing note reviewed.   Constitutional:       General: She is not in acute distress.     Appearance: Normal appearance.   HENT:      Head: Normocephalic and atraumatic.      Nose: Nose normal.      Mouth/Throat:      Mouth: Mucous membranes are moist.   Eyes:      Extraocular Movements: Extraocular movements intact.   Cardiovascular:      Rate and Rhythm: Normal rate and regular rhythm.      Pulses: Normal pulses.           Radial pulses are 2+ on the right side and 2+ on the left side.        Dorsalis pedis pulses are 2+ on the right side and 2+ on the left side. "   Pulmonary:      Effort: Pulmonary effort is normal.   Musculoskeletal:      Right lower leg: Tenderness (minimal) present. Edema (and erythema extending from proximal foot to knee) present.   Skin:     Findings: Wound (Open weeping wound medial ankle) present. No abscess.   Neurological:      General: No focal deficit present.      Mental Status: She is alert. Mental status is at baseline.      Comments: Fluent speech  Intact neurovascular status   Psychiatric:         Mood and Affect: Mood normal.         Behavior: Behavior normal.           Results     LAB:  All pertinent labs reviewed and interpreted  Labs Ordered and Resulted from Time of ED Arrival to Time of ED Departure   CRP INFLAMMATION - Abnormal       Result Value    CRP 1.3 (*)    BASIC METABOLIC PANEL - Abnormal    Sodium 135 (*)     Potassium 4.0      Chloride 94 (*)     Carbon Dioxide (CO2) 33 (*)     Anion Gap 8      Urea Nitrogen 20      Creatinine 1.06      Calcium 9.2      Glucose 268 (*)     GFR Estimate 58 (*)    INR - Abnormal    INR 2.31 (*)    CBC WITH PLATELETS AND DIFFERENTIAL - Abnormal    WBC Count 8.5      RBC Count 4.92      Hemoglobin 12.9      Hematocrit 40.8      MCV 83      MCH 26.2 (*)     MCHC 31.6      RDW 13.4      Platelet Count 285      % Neutrophils 63      % Lymphocytes 29      % Monocytes 6      % Eosinophils 1      % Basophils 1      % Immature Granulocytes 0      NRBCs per 100 WBC 0      Absolute Neutrophils 5.3      Absolute Lymphocytes 2.5      Absolute Monocytes 0.5      Absolute Eosinophils 0.1      Absolute Basophils 0.1      Absolute Immature Granulocytes 0.0      Absolute NRBCs 0.0     PROCALCITONIN - Normal    Procalcitonin <0.02     MAGNESIUM - Normal    Magnesium 1.9     HEPATIC FUNCTION PANEL - Normal    Bilirubin Total 0.6      Bilirubin Direct 0.3      Protein Total 7.4      Albumin 4.1      Alkaline Phosphatase 87      AST 12      ALT <9     LACTIC ACID WHOLE BLOOD - Normal    Lactic Acid 1.6     COVID-19  VIRUS (CORONAVIRUS) BY PCR - Normal    SARS CoV2 PCR Negative     GLUCOSE MONITOR NURSING POCT   GLUCOSE MONITOR NURSING POCT   BLOOD CULTURE   BLOOD CULTURE   AEROBIC BACTERIAL CULTURE ROUTINE       RADIOLOGY:  No orders to display                ECG:  Sinus tach, rate 101, no acute ischemia    I have independently reviewed and interpreted the EKG(s) documented above     PROCEDURES:  Procedures:  none      FINAL IMPRESSION:    ICD-10-CM    1. Cellulitis of right lower extremity  L03.115    2. Failure of outpatient treatment  Z78.9        0 minutes of critical care time      I, Nandini Leyva, am serving as a scribe to document services personally performed by Dr. Horacio Capellan, based on my observations and the provider's statements to me. I, Hroacio Capellan, DO attest that Nandini Leyva is acting in a scribe capacity, has observed my performance of the services and has documented them in accordance with my direction.      Horacio Capellan DO  Emergency Medicine  [facility] EMERGENCY DEPARTMENT  11/22/2021  4:16 PM        Horacio Capellan MD  11/22/21 212

## 2021-11-22 NOTE — PHARMACY-VANCOMYCIN DOSING SERVICE
Pharmacy Vancomycin Initial Note  Date of Service 2021  Patient's  1965  56 year old, female    Indication: Sepsis and Skin and Soft Tissue Infection    Current estimated CrCl = Estimated Creatinine Clearance: 73 mL/min (based on SCr of 0.86 mg/dL).    Creatinine for last 3 days  No results found for requested labs within last 72 hours.    Recent Vancomycin Level(s) for last 3 days  No results found for requested labs within last 72 hours.      Vancomycin IV Administrations (past 72 hours)      No vancomycin orders with administrations in past 72 hours.                Nephrotoxins and other renal medications (From now, onward)    Start     Dose/Rate Route Frequency Ordered Stop    21 1500  piperacillin-tazobactam (ZOSYN) 3.375 g vial to attach to  mL bag         3.375 g  over 30 Minutes Intravenous ONCE 21 1451            Contrast Orders - past 72 hours (72h ago, onward)            None              Plan:  1. Start vancomycin  1500 mg IV once.   2. Re-consult pharmacy if therapy to continue inpatient.    Candice Patel, [facility]

## 2021-11-22 NOTE — ED PROVIDER NOTES
"ED Triage Provider Note  [facility]  Encounter Date: Nov 22, 2021      The patient was seen in triage to expedite ED workup.     History:  Chief Complaint   Patient presents with     Cellulitis     Tonya Valera is a 56 year old female who presents to the ED by EMS with RLE cellulitis.     Per EMS, patient reports from an assisted living facility. Patient has been experiencing cellulitis for the past 1.5 weeks and is currently on doxycycline. Patient vitally stable. No COVID-19 symptoms. Patient is ambulatory with assistance. Patient is on Eliquis.    Patient has cellulitis on her right lower leg with associated redness. She reports she has already been on 2 different oral antibiotics and had a shot of rocephin. No cellulitis on her left leg. Reports a previous history of cellulitis, but not as bad. Reports current 5/10 leg pain and regularly takes morphine. She took 30 mg extended release morphine around 11 AM. She takes 15 mg morphine immediate release when she has breakthrough pain.    Reports x2 spinal surgeries in 2017, had E. 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 Reports she has been having issues with infection with her right leg since. Patient has been using unna boots since.     She was recently placed on warfarin. Prior to her warfarin prescription, she reports getting an ultrasound scan from the knee up and told she had 3 blood clots in her right leg. Patient does not believe she has blood clots as she claims they did not adequately scan her leg.    Patient is allergic to compazine, sulfa drugs, walnuts and pecans, latex sensitive.    I, Filipe Tanner, am serving as a scribe to document services personally performed by Marino Car MD, based on my observations and the provider's statements to me.  I, Marino Car MD, attest that Filipe Tanner is " "acting in a scribe capacity, has observed my performance of the services and has documented them in accordance with my direction.     Review of Systems:  Review of Systems   Musculoskeletal: Positive for myalgias (right leg pain).   Skin: Positive for rash (right leg).         Vitals:  BP (!) 155/88   Pulse (!) 121   Temp 99.6  F (37.6  C) (Oral)   Resp 20   Ht 1.6 m (5' 3\")   Wt 81.6 kg (180 lb)   SpO2 96%   BMI 31.89 kg/m      Brief Exam:  Sitting calmly on exam, very talkative, normal work of breathing & phonation, RLE with bright erythema to upper shin with about 3cm diameter ulceration to medial distal right foreleg (chronic per patient) with distal CMS intact to toes, no pain with ROM of joints    Medical Decision Making:  Patient arriving to the ED with problem as above. A medical screening exam was performed. Orders initiated from triage (blood, IVF, antibiotics, admission COVID swab) to expedite ED workup.    ED Course as of 11/22/21 1455   Mon Nov 22, 2021   1442 56yoF with no currently-accessible medical history in EMR of presenting per EMS from assisted living for evaluation of cellulitis. Reports she has been taking doxycycline for cellulitis but it is not getting better; thus came to the ED today. States she takes warfarin for recently-diagnosed DVT to RLE.    Vitals notable for HR 120s on presentation. Sitting calmly on exam, very talkative, normal work of breathing & phonation, RLE with bright erythema to upper shin with about 3cm diameter ulceration to medial distal right foreleg (chronic per patient) with distal CMS intact to toes, no pain with ROM of joints. 2+ pitting edema to mid thigh of RLE with minimal edema to LLE. Sepsis certainly on the differential with HR 120s with clear cellulitis; no hypotension to suggest shock at this time----15mL/kg IVF & IVF antibiotics ordered along with blood work from triage to expedite ED workup. No clinical concern for necrotizing fasciitis, septic " joint, compartment syndrome at this time.       The patient is appropriate to wait in triage until ED room available.    Marino Car MD  11/22/21  Emergency Medicine  [facility] EMERGENCY DEPARTMENT  [address] [place] [address] [phone]  Dept: [phone]     Marino Car MD  11/22/21 1735

## 2021-11-23 ENCOUNTER — APPOINTMENT (OUTPATIENT)
Dept: OCCUPATIONAL THERAPY | Facility: HOSPITAL | Age: 56
DRG: 603 | End: 2021-11-23
Attending: FAMILY MEDICINE
Payer: MEDICARE

## 2021-11-23 ENCOUNTER — APPOINTMENT (OUTPATIENT)
Dept: PHYSICAL THERAPY | Facility: HOSPITAL | Age: 56
DRG: 603 | End: 2021-11-23
Attending: FAMILY MEDICINE
Payer: MEDICARE

## 2021-11-23 LAB
ALBUMIN SERPL-MCNC: 3.2 G/DL (ref 3.5–5)
ALP SERPL-CCNC: 63 U/L (ref 45–120)
ALT SERPL W P-5'-P-CCNC: <9 U/L (ref 0–45)
ANION GAP SERPL CALCULATED.3IONS-SCNC: 7 MMOL/L (ref 5–18)
AST SERPL W P-5'-P-CCNC: 14 U/L (ref 0–40)
ATRIAL RATE - MUSE: 101 BPM
BASOPHILS # BLD AUTO: 0.1 10E3/UL (ref 0–0.2)
BASOPHILS NFR BLD AUTO: 1 %
BILIRUB SERPL-MCNC: 0.3 MG/DL (ref 0–1)
BUN SERPL-MCNC: 16 MG/DL (ref 8–22)
C REACTIVE PROTEIN LHE: 1 MG/DL (ref 0–0.8)
CALCIUM SERPL-MCNC: 8.5 MG/DL (ref 8.5–10.5)
CHLORIDE BLD-SCNC: 106 MMOL/L (ref 98–107)
CO2 SERPL-SCNC: 29 MMOL/L (ref 22–31)
CREAT SERPL-MCNC: 0.91 MG/DL (ref 0.6–1.1)
DIASTOLIC BLOOD PRESSURE - MUSE: NORMAL MMHG
EOSINOPHIL # BLD AUTO: 0.2 10E3/UL (ref 0–0.7)
EOSINOPHIL NFR BLD AUTO: 3 %
ERYTHROCYTE [DISTWIDTH] IN BLOOD BY AUTOMATED COUNT: 13.3 % (ref 10–15)
GFR SERPL CREATININE-BSD FRML MDRD: 70 ML/MIN/1.73M2
GLUCOSE BLD-MCNC: 121 MG/DL (ref 70–125)
GLUCOSE BLDC GLUCOMTR-MCNC: 144 MG/DL (ref 70–99)
GLUCOSE BLDC GLUCOMTR-MCNC: 166 MG/DL (ref 70–99)
GLUCOSE BLDC GLUCOMTR-MCNC: 295 MG/DL (ref 70–99)
GLUCOSE BLDC GLUCOMTR-MCNC: 76 MG/DL (ref 70–99)
GLUCOSE BLDC GLUCOMTR-MCNC: 92 MG/DL (ref 70–99)
HCT VFR BLD AUTO: 34.7 % (ref 35–47)
HGB BLD-MCNC: 10.6 G/DL (ref 11.7–15.7)
IMM GRANULOCYTES # BLD: 0 10E3/UL
IMM GRANULOCYTES NFR BLD: 1 %
INTERPRETATION ECG - MUSE: NORMAL
LYMPHOCYTES # BLD AUTO: 2.5 10E3/UL (ref 0.8–5.3)
LYMPHOCYTES NFR BLD AUTO: 39 %
MAGNESIUM SERPL-MCNC: 2.1 MG/DL (ref 1.8–2.6)
MCH RBC QN AUTO: 26.5 PG (ref 26.5–33)
MCHC RBC AUTO-ENTMCNC: 30.5 G/DL (ref 31.5–36.5)
MCV RBC AUTO: 87 FL (ref 78–100)
MONOCYTES # BLD AUTO: 0.5 10E3/UL (ref 0–1.3)
MONOCYTES NFR BLD AUTO: 8 %
NEUTROPHILS # BLD AUTO: 3.1 10E3/UL (ref 1.6–8.3)
NEUTROPHILS NFR BLD AUTO: 48 %
NRBC # BLD AUTO: 0 10E3/UL
NRBC BLD AUTO-RTO: 0 /100
P AXIS - MUSE: 34 DEGREES
PLATELET # BLD AUTO: 216 10E3/UL (ref 150–450)
POTASSIUM BLD-SCNC: 4.4 MMOL/L (ref 3.5–5)
PR INTERVAL - MUSE: 180 MS
PROT SERPL-MCNC: 6 G/DL (ref 6–8)
QRS DURATION - MUSE: 96 MS
QT - MUSE: 372 MS
QTC - MUSE: 482 MS
R AXIS - MUSE: -11 DEGREES
RBC # BLD AUTO: 4 10E6/UL (ref 3.8–5.2)
SODIUM SERPL-SCNC: 142 MMOL/L (ref 136–145)
SYSTOLIC BLOOD PRESSURE - MUSE: NORMAL MMHG
T AXIS - MUSE: 46 DEGREES
VENTRICULAR RATE- MUSE: 101 BPM
WBC # BLD AUTO: 6.4 10E3/UL (ref 4–11)

## 2021-11-23 PROCEDURE — 83735 ASSAY OF MAGNESIUM: CPT | Performed by: FAMILY MEDICINE

## 2021-11-23 PROCEDURE — 97110 THERAPEUTIC EXERCISES: CPT | Mod: GP

## 2021-11-23 PROCEDURE — 99232 SBSQ HOSP IP/OBS MODERATE 35: CPT | Performed by: STUDENT IN AN ORGANIZED HEALTH CARE EDUCATION/TRAINING PROGRAM

## 2021-11-23 PROCEDURE — 258N000003 HC RX IP 258 OP 636: Performed by: FAMILY MEDICINE

## 2021-11-23 PROCEDURE — 86141 C-REACTIVE PROTEIN HS: CPT | Performed by: FAMILY MEDICINE

## 2021-11-23 PROCEDURE — 80053 COMPREHEN METABOLIC PANEL: CPT | Performed by: FAMILY MEDICINE

## 2021-11-23 PROCEDURE — 97166 OT EVAL MOD COMPLEX 45 MIN: CPT | Mod: GO

## 2021-11-23 PROCEDURE — 36415 COLL VENOUS BLD VENIPUNCTURE: CPT | Performed by: FAMILY MEDICINE

## 2021-11-23 PROCEDURE — 97530 THERAPEUTIC ACTIVITIES: CPT | Mod: GP

## 2021-11-23 PROCEDURE — 97162 PT EVAL MOD COMPLEX 30 MIN: CPT | Mod: GP

## 2021-11-23 PROCEDURE — 120N000001 HC R&B MED SURG/OB

## 2021-11-23 PROCEDURE — 250N000013 HC RX MED GY IP 250 OP 250 PS 637: Performed by: FAMILY MEDICINE

## 2021-11-23 PROCEDURE — 250N000011 HC RX IP 250 OP 636: Performed by: FAMILY MEDICINE

## 2021-11-23 PROCEDURE — 85025 COMPLETE CBC W/AUTO DIFF WBC: CPT | Performed by: FAMILY MEDICINE

## 2021-11-23 PROCEDURE — 97535 SELF CARE MNGMENT TRAINING: CPT | Mod: GO

## 2021-11-23 RX ADMIN — ASPIRIN 81 MG CHEWABLE TABLET 81 MG: 81 TABLET CHEWABLE at 08:49

## 2021-11-23 RX ADMIN — DOCUSATE SODIUM 100 MG: 100 CAPSULE, LIQUID FILLED ORAL at 20:25

## 2021-11-23 RX ADMIN — Medication 1 TABLET: at 08:49

## 2021-11-23 RX ADMIN — MORPHINE SULFATE 30 MG: 30 TABLET, FILM COATED, EXTENDED RELEASE ORAL at 14:13

## 2021-11-23 RX ADMIN — LISINOPRIL 2.5 MG: 2.5 TABLET ORAL at 08:48

## 2021-11-23 RX ADMIN — ACETAMINOPHEN 975 MG: 325 TABLET ORAL at 20:25

## 2021-11-23 RX ADMIN — TORSEMIDE 20 MG: 20 TABLET ORAL at 08:50

## 2021-11-23 RX ADMIN — BACLOFEN 10 MG: 10 TABLET ORAL at 08:49

## 2021-11-23 RX ADMIN — ACETAMINOPHEN 975 MG: 325 TABLET ORAL at 11:37

## 2021-11-23 RX ADMIN — PIPERACILLIN SODIUM AND TAZOBACTAM SODIUM 3.38 G: 3; .375 INJECTION, POWDER, LYOPHILIZED, FOR SOLUTION INTRAVENOUS at 22:29

## 2021-11-23 RX ADMIN — DOCUSATE SODIUM 100 MG: 100 CAPSULE, LIQUID FILLED ORAL at 08:49

## 2021-11-23 RX ADMIN — INSULIN ASPART 2 UNITS: 100 INJECTION, SOLUTION INTRAVENOUS; SUBCUTANEOUS at 10:13

## 2021-11-23 RX ADMIN — MORPHINE SULFATE 30 MG: 30 TABLET, FILM COATED, EXTENDED RELEASE ORAL at 20:25

## 2021-11-23 RX ADMIN — VENLAFAXINE HYDROCHLORIDE 75 MG: 75 CAPSULE, EXTENDED RELEASE ORAL at 08:48

## 2021-11-23 RX ADMIN — ROSUVASTATIN CALCIUM 20 MG: 10 TABLET, FILM COATED ORAL at 20:25

## 2021-11-23 RX ADMIN — BUPROPION HYDROCHLORIDE 150 MG: 150 TABLET, FILM COATED, EXTENDED RELEASE ORAL at 08:48

## 2021-11-23 RX ADMIN — PANTOPRAZOLE SODIUM 40 MG: 40 TABLET, DELAYED RELEASE ORAL at 08:49

## 2021-11-23 RX ADMIN — RIVAROXABAN 15 MG: 15 TABLET, FILM COATED ORAL at 08:50

## 2021-11-23 RX ADMIN — VENLAFAXINE HYDROCHLORIDE 150 MG: 150 CAPSULE, EXTENDED RELEASE ORAL at 08:50

## 2021-11-23 RX ADMIN — LEVOTHYROXINE SODIUM 125 MCG: 0.03 TABLET ORAL at 05:13

## 2021-11-23 RX ADMIN — RIVAROXABAN 15 MG: 15 TABLET, FILM COATED ORAL at 16:46

## 2021-11-23 RX ADMIN — VANCOMYCIN HYDROCHLORIDE 1500 MG: 5 INJECTION, POWDER, LYOPHILIZED, FOR SOLUTION INTRAVENOUS at 20:25

## 2021-11-23 RX ADMIN — ACETAMINOPHEN 975 MG: 325 TABLET ORAL at 05:12

## 2021-11-23 RX ADMIN — BACLOFEN 10 MG: 10 TABLET ORAL at 20:25

## 2021-11-23 RX ADMIN — BACLOFEN 10 MG: 10 TABLET ORAL at 13:48

## 2021-11-23 RX ADMIN — POLYETHYLENE GLYCOL 3350 17 G: 17 POWDER, FOR SOLUTION ORAL at 08:47

## 2021-11-23 RX ADMIN — MORPHINE SULFATE 30 MG: 30 TABLET, FILM COATED, EXTENDED RELEASE ORAL at 08:49

## 2021-11-23 RX ADMIN — PIPERACILLIN SODIUM AND TAZOBACTAM SODIUM 3.38 G: 3; .375 INJECTION, POWDER, LYOPHILIZED, FOR SOLUTION INTRAVENOUS at 13:47

## 2021-11-23 RX ADMIN — PIPERACILLIN SODIUM AND TAZOBACTAM SODIUM 3.38 G: 3; .375 INJECTION, POWDER, LYOPHILIZED, FOR SOLUTION INTRAVENOUS at 05:12

## 2021-11-23 ASSESSMENT — ACTIVITIES OF DAILY LIVING (ADL)
ADLS_ACUITY_SCORE: 11
ADLS_ACUITY_SCORE: 13
ADLS_ACUITY_SCORE: 13
ADLS_ACUITY_SCORE: 12
ADLS_ACUITY_SCORE: 12
ADLS_ACUITY_SCORE: 11
ADLS_ACUITY_SCORE: 12
ADLS_ACUITY_SCORE: 11
ADLS_ACUITY_SCORE: 12
ADLS_ACUITY_SCORE: 11
ADLS_ACUITY_SCORE: 12
ADLS_ACUITY_SCORE: 11
ADLS_ACUITY_SCORE: 11
ADLS_ACUITY_SCORE: 12

## 2021-11-23 ASSESSMENT — MIFFLIN-ST. JEOR: SCORE: 1378.32

## 2021-11-23 NOTE — PROGRESS NOTES
Progress Note    Date of admission: 11/22/2021    Assessment/Plan    Moraima MONSALVE is a 56-year-old female who lives in a nursing home with pmhx of T2DM on insulin, hypothyroidism, GERD, hyperlipidemia, migraine, chronic pain,?  Spinal cord injury presents from SNF with right lower extremity cellulitis and right leg DVT after failing outpatient therapy.        RLE cellulitis, bilateral chronic venous insufficiency   -was started on doxy 11/9, received IM Rocephin at [facility] on 11/12 continue on Doxy then switched to cefdinir on 11/19 then moved to SNF for wound care.  -Likely needs extended course due to poorly controlled DM.   -has DVT on LE US.  -trend CRP, CBC  -continue  Vancomycin and zosyn,   - wound care      Right lower extremity DVT  - Continue  Xarelto   -Chronic venous insufficiency on bilateral legs     T2DM with hyperglycemia  -Glucose 268, a1c 9.5 10/25/21 not well controlled. Needs better control for wound healing  -lantus 30 untis at bedtime  -carbs 1:10  -medium sliding scale  -Glucometer  - hypoglycemia protocol  -DM diet       Elevated serum creatinine  -coming down , today os 0.91  --Renal dosing of vancomycin   -Monitor inputs and outputs  -Avoid nephrotoxic medications    Hx spinal cord injury  -sounds like spinal cord abscess with E coli per her story.  -has been in NH for 1 month, was living with family but strained relationship with one of her sons ended up with her in facility. Reports LE spasticity     Hypothyroidism  -continue home Synthroid    Hyperlipidemia  --continue statin    GERD  -continue PPI    Migraines  -continue sumatriptan.    DVT PPX : xarelto    Barriers to discharge: IV abx    Anticipated Discharge date  : 3-5 days      Subjective  States that she feels better today.  His pain on right lower extremity is 3-4 out of 10.  Cardiorespiratory distress noted.  management plan is explained to the patient.    Objective  Vital signs in last 24 hours  Temp:  [97.5  F (36.4  C)-99.6   F (37.6  C)] 97.5  F (36.4  C)  Pulse:  [] 72  Resp:  [14-20] 18  BP: (101-155)/(56-88) 112/57  SpO2:  [94 %-100 %] 100 %    Input and Output in 24 hrs     Intake/Output Summary (Last 24 hours) at 11/23/2021 1122  Last data filed at 11/23/2021 1100  Gross per 24 hour   Intake 1100 ml   Output 600 ml   Net 500 ml       Physical Exam:  GEN: Alert and oriented. Not in acute distress  HEENT: Atraumatic    Pupils- round and reactive to light bilaterally   Neck- supple, no JVP elevation, no lymphadenopathy or thyromegaly   Sclera- anicteric   Mucous membrane- moist and pink  CHEST: Clear to auscultation bilaterally  HEART: S1S2 regular. No murmurs, rubs or gallops  ABDOMEN: Soft. Non-tender, non-distended. No organomegaly. No guarding or rigidity. Bowel sounds- active  Extremities: right LE ulcer with yellowish scanty discharge , surrounding skin is hyperemic, and slightly tender. Both LE below knee have chronic skin changes of hyperpigmentation and induration.   CNS: No focal neurological deficit. No involuntary movements  SKIN: No skin rash, no cyanosis or clubbing      Pertinent Labs   Lab Results: Personally Reviewed    Recent Results (from the past 24 hour(s))   Procalcitonin    Collection Time: 11/22/21  3:00 PM   Result Value Ref Range    Procalcitonin <0.02 0.00 - 0.49 ng/mL   INR    Collection Time: 11/22/21  3:00 PM   Result Value Ref Range    INR 2.31 (H) 0.90 - 1.15   CRP inflammation    Collection Time: 11/22/21  3:01 PM   Result Value Ref Range    CRP 1.3 (H) 0.0-<0.8 mg/dL   Magnesium    Collection Time: 11/22/21  3:01 PM   Result Value Ref Range    Magnesium 1.9 1.8 - 2.6 mg/dL   Basic metabolic panel    Collection Time: 11/22/21  3:01 PM   Result Value Ref Range    Sodium 135 (L) 136 - 145 mmol/L    Potassium 4.0 3.5 - 5.0 mmol/L    Chloride 94 (L) 98 - 107 mmol/L    Carbon Dioxide (CO2) 33 (H) 22 - 31 mmol/L    Anion Gap 8 5 - 18 mmol/L    Urea Nitrogen 20 8 - 22 mg/dL    Creatinine 1.06 0.60 -  1.10 mg/dL    Calcium 9.2 8.5 - 10.5 mg/dL    Glucose 268 (H) 70 - 125 mg/dL    GFR Estimate 58 (L) >60 mL/min/1.73m2   Hepatic function panel    Collection Time: 11/22/21  3:01 PM   Result Value Ref Range    Bilirubin Total 0.6 0.0 - 1.0 mg/dL    Bilirubin Direct 0.3 <=0.5 mg/dL    Protein Total 7.4 6.0 - 8.0 g/dL    Albumin 4.1 3.5 - 5.0 g/dL    Alkaline Phosphatase 87 45 - 120 U/L    AST 12 0 - 40 U/L    ALT <9 0 - 45 U/L   Lactic acid whole blood    Collection Time: 11/22/21  3:01 PM   Result Value Ref Range    Lactic Acid 1.6 0.7 - 2.0 mmol/L   Blood Culture Peripheral Blood    Collection Time: 11/22/21  3:01 PM    Specimen: Peripheral Blood   Result Value Ref Range    Culture No growth after 12 hours    CBC with platelets and differential    Collection Time: 11/22/21  3:01 PM   Result Value Ref Range    WBC Count 8.5 4.0 - 11.0 10e3/uL    RBC Count 4.92 3.80 - 5.20 10e6/uL    Hemoglobin 12.9 11.7 - 15.7 g/dL    Hematocrit 40.8 35.0 - 47.0 %    MCV 83 78 - 100 fL    MCH 26.2 (L) 26.5 - 33.0 pg    MCHC 31.6 31.5 - 36.5 g/dL    RDW 13.4 10.0 - 15.0 %    Platelet Count 285 150 - 450 10e3/uL    % Neutrophils 63 %    % Lymphocytes 29 %    % Monocytes 6 %    % Eosinophils 1 %    % Basophils 1 %    % Immature Granulocytes 0 %    NRBCs per 100 WBC 0 <1 /100    Absolute Neutrophils 5.3 1.6 - 8.3 10e3/uL    Absolute Lymphocytes 2.5 0.8 - 5.3 10e3/uL    Absolute Monocytes 0.5 0.0 - 1.3 10e3/uL    Absolute Eosinophils 0.1 0.0 - 0.7 10e3/uL    Absolute Basophils 0.1 0.0 - 0.2 10e3/uL    Absolute Immature Granulocytes 0.0 <=0.0 10e3/uL    Absolute NRBCs 0.0 10e3/uL   Asymptomatic COVID-19 Virus (Coronavirus) by PCR Nasopharyngeal    Collection Time: 11/22/21  3:03 PM    Specimen: Nasopharyngeal; Swab   Result Value Ref Range    SARS CoV2 PCR Negative Negative   Blood Culture Peripheral Blood    Collection Time: 11/22/21  3:31 PM    Specimen: Peripheral Blood   Result Value Ref Range    Culture No growth after 12 hours    ECG  12-LEAD WITH MUSE ([facility])    Collection Time: 11/22/21  5:11 PM   Result Value Ref Range    Systolic Blood Pressure  mmHg    Diastolic Blood Pressure  mmHg    Ventricular Rate 101 BPM    Atrial Rate 101 BPM    MA Interval 180 ms    QRS Duration 96 ms     ms    QTc 482 ms    P Axis 34 degrees    R AXIS -11 degrees    T Axis 46 degrees    Interpretation ECG       Sinus tachycardia  Low voltage QRS  Inferior infarct , age undetermined  Abnormal ECG  No previous ECGs available  Confirmed by SEE ED PROVIDER NOTE FOR, ECG INTERPRETATION ([id number]),  JUAN ALBERTO BLANKENSHIP ([id number]) on 11/23/2021 7:00:20 AM     Swab Aerobic Bacterial Culture Routine with Gram Stain    Collection Time: 11/22/21  6:44 PM    Specimen: Leg, Right; Swab   Result Value Ref Range    Gram Stain Result No organisms seen     Gram Stain Result No white blood cells seen    Glucose by meter    Collection Time: 11/22/21  7:36 PM   Result Value Ref Range    GLUCOSE BY METER POCT 156 (H) 70 - 99 mg/dL   Glucose by meter    Collection Time: 11/23/21  2:13 AM   Result Value Ref Range    GLUCOSE BY METER POCT 166 (H) 70 - 99 mg/dL   Comprehensive metabolic panel    Collection Time: 11/23/21  7:14 AM   Result Value Ref Range    Sodium 142 136 - 145 mmol/L    Potassium 4.4 3.5 - 5.0 mmol/L    Chloride 106 98 - 107 mmol/L    Carbon Dioxide (CO2) 29 22 - 31 mmol/L    Anion Gap 7 5 - 18 mmol/L    Urea Nitrogen 16 8 - 22 mg/dL    Creatinine 0.91 0.60 - 1.10 mg/dL    Calcium 8.5 8.5 - 10.5 mg/dL    Glucose 121 70 - 125 mg/dL    Alkaline Phosphatase 63 45 - 120 U/L    AST 14 0 - 40 U/L    ALT <9 0 - 45 U/L    Protein Total 6.0 6.0 - 8.0 g/dL    Albumin 3.2 (L) 3.5 - 5.0 g/dL    Bilirubin Total 0.3 0.0 - 1.0 mg/dL    GFR Estimate 70 >60 mL/min/1.73m2   Magnesium    Collection Time: 11/23/21  7:14 AM   Result Value Ref Range    Magnesium 2.1 1.8 - 2.6 mg/dL   CRP inflammation    Collection Time: 11/23/21  7:14 AM   Result Value Ref Range    CRP 1.0 (H) 0.0-<0.8 mg/dL    CBC with platelets and differential    Collection Time: 11/23/21  7:14 AM   Result Value Ref Range    WBC Count 6.4 4.0 - 11.0 10e3/uL    RBC Count 4.00 3.80 - 5.20 10e6/uL    Hemoglobin 10.6 (L) 11.7 - 15.7 g/dL    Hematocrit 34.7 (L) 35.0 - 47.0 %    MCV 87 78 - 100 fL    MCH 26.5 26.5 - 33.0 pg    MCHC 30.5 (L) 31.5 - 36.5 g/dL    RDW 13.3 10.0 - 15.0 %    Platelet Count 216 150 - 450 10e3/uL    % Neutrophils 48 %    % Lymphocytes 39 %    % Monocytes 8 %    % Eosinophils 3 %    % Basophils 1 %    % Immature Granulocytes 1 %    NRBCs per 100 WBC 0 <1 /100    Absolute Neutrophils 3.1 1.6 - 8.3 10e3/uL    Absolute Lymphocytes 2.5 0.8 - 5.3 10e3/uL    Absolute Monocytes 0.5 0.0 - 1.3 10e3/uL    Absolute Eosinophils 0.2 0.0 - 0.7 10e3/uL    Absolute Basophils 0.1 0.0 - 0.2 10e3/uL    Absolute Immature Granulocytes 0.0 <=0.0 10e3/uL    Absolute NRBCs 0.0 10e3/uL   Glucose by meter    Collection Time: 11/23/21  7:56 AM   Result Value Ref Range    GLUCOSE BY METER POCT 92 70 - 99 mg/dL       Pertinent Radiology   Radiology Results reviewed      EKG Results reviewed       Advanced Care Planning      Joanie Nam MD   [facility]

## 2021-11-23 NOTE — PHARMACY-VANCOMYCIN DOSING SERVICE
"Pharmacy Vancomycin Initial Note  Date of Service 2021  Patient's  1965  56 year old, female    Indication: Skin and Soft Tissue Infection    Current estimated CrCl = Estimated Creatinine Clearance: 59.3 mL/min (based on SCr of 1.06 mg/dL).    Creatinine for last 3 days  2021:  3:01 PM Creatinine 1.06 mg/dL    Recent Vancomycin Level(s) for last 3 days  No results found for requested labs within last 72 hours.      Vancomycin IV Administrations (past 72 hours)                   vancomycin (VANCOCIN) 1,500 mg in sodium chloride 0.9 % 250 mL intermittent infusion (mg) 1,500 mg New Bag 21 1655                Nephrotoxins and other renal medications (From now, onward)    Start     Dose/Rate Route Frequency Ordered Stop    21 0900  torsemide (DEMADEX) tablet 20 mg         20 mg Oral DAILY 21 1913      11/22/21 2200  piperacillin-tazobactam (ZOSYN) 3.375 g vial to attach to  mL bag        Note to Pharmacy: For [facility], [facility] and [facility]: For Zosyn-naive patients, use the \"Zosyn initial dose + extended infusion\" order panel.    3.375 g  over 240 Minutes Intravenous EVERY 8 HOURS 21 19021 1930  lisinopril (ZESTRIL) tablet 2.5 mg         2.5 mg Oral DAILY 21 1913            Contrast Orders - past 72 hours (72h ago, onward)            None          [employer] Prediction of Planned Initial Vancomycin Regimen  Loading dose: N/A  Regimen: 1500 mg IV every 24 hours.  Start time: 21:00 on 2021  Exposure target: AUC24 (range)400-600 mg/L.hr   AUC24,ss: 449 mg/L.hr  Probability of AUC24 > 400: 64 %  Ctrough,ss: 12.5 mg/L  Probability of Ctrough,ss > 20: 11 %  Probability of nephrotoxicity (Lodise LOUIS ): 8 %          Plan:  1. Start vancomycin  1500 mg IV q24h.   2. Vancomycin monitoring method: AUC  3. Vancomycin therapeutic monitoring goal: 400-600 mg*h/L  4. Pharmacy will check vancomycin levels as appropriate in 1-3 Days.    5. Serum creatinine levels will " be ordered daily for the first week of therapy and at least twice weekly for subsequent weeks.      Renu Chacon RPH

## 2021-11-23 NOTE — ED NOTES
"[facility] ED Handoff Report    ED Chief Complaint: cellullits    ED Diagnosis:  (L03.115) Cellulitis of right lower extremity  Plan: antibiotics and fluids    (Z78.9) Failure of outpatient treatment  Plan: admission       PMH:  No past medical history on file.     Code Status:  No Order     Falls Risk: Yes Band: Applied    Current Living Situation/Residence: lives alone     Elimination Status: Continent: Yes     Activity Level: SBA w/ walker    Patients Preferred Language:  English     Needed: No    Vital Signs:  /82   Pulse 102   Temp 99.1  F (37.3  C) (Oral)   Resp 14   Ht 1.6 m (5' 3\")   Wt 81.6 kg (180 lb)   SpO2 96%   BMI 31.89 kg/m         Pain Score: 3/10    Is the Patient Confused:  No    Last Food or Drink: 11/22/21    Focused Assessment:    Call from outpatient primary clinic regarding pt's recent course.  Started on Doxy 11/9, received IM rocephin at [facility] 11/12, continued on doxy.  Outpatient Ultrasound showed DVT right leg, started on Xarelto.  Pt changed to cefdinir on 11/19/21 and moved to SNF for wound care.  Pt sent in for worsening cellulitis findings with need for hospitalization.      1655 Labs overall reassuring.  Nevertheless, pt would benefit from IV antibx for further evaluation and care as she's failing outpatient therapy at this point.         Tests Performed: Done: Labs and Imaging    Treatments Provided:  Fluids, antiboitcs    Family Dynamics/Concerns: No    Family Updated On Visitor Policy: No    Plan of Care Communicated to Family: No    Who Was Updated about Plan of Care: no one present    Belongings Checklist Done and Signed by Patient: Yes    Covid: asymptomatic , negative    Additional Information: none    RN: Cristina Fuentes   11/22/2021 6:06 PM         "

## 2021-11-23 NOTE — H&P
[facility]    History and Physical - Hospitalist Service       Date of Admission:  11/22/2021    Assessment & Plan      Patient is a 56-year-old female that lives in a nursing home that has a history of T2DM on insulin, hypothyroidism, GERD, hyperlipidemia, migraine, chronic pain,?  Spinal cord injury presents from SNF with right lower extremity cellulitis and right leg DVT after failing outpatient therapy.      RLE cellulitis  -was started on doxy 11/9, received IM Rocephin at [facility] on 11/12 continue on Doxy then switched to cefdinir on 11/19 then moved to SNF for wound care. Likely needs extended course due to poorly controlled DM.   -Found to have DVT on LE US.  -failed lottery of outpatient antibiotics  -trend CRP, CBC  -was started on Vancomycin and zosyn, continue for now (has sulfa allergy)  -gentle IVF for sirs tachycardia    Right lower extremity DVT  -Continue Xarelto per pharmacy. Patient is convinced she is on warfarin..INR is 2.3    T2DM with hyperglycemia  -Glucose 268, a1c 9.5 10/25/21 not well controlled. Needs better control for wound healing  -lantus 30 untis at bedtime  -carbs 1:10  -medium sliding scale  -DM diet    Elevated serum creatinine  -Serum creatinine 1.06, GFR 58, if chronic would be CKD 3  -Renally dose medications  -Vancomycin and Zosyn have a potential for GERMAINE    Hx spinal cord injury  -sounds like spinal cord abscess with E coli per her story.  -has been in NH for 1 month, was living with family but strained relationship with one of her sons ended up with her in facility. Reports LE spasticity    Hypothyroidism-continue home Synthroid  Hyperlipidemia-continue statin  GERD-continue PPI  Migraines-continue sumatriptan.    Follow-up care everywhere       Diet: Moderate Consistent Carb (60 g CHO per Meal) Diet  DVT Prophylaxis: DOAC  Townsend Catheter: Not present  Central Lines: None  Code Status: Full Code      Disposition Plan   Expected discharge: 11/24/2021       The patient's care was discussed with the Patient.    Fredrick Styles MD  [facility]  Securely message with the [employer] Web Console (learn more here)  Text page via [employer] Paging/Directory        ______________________________________________________________________    Chief Complaint     RT lower extremity cellulitis    History of Present Illness   Tonya Valera is a 56-year-old female that lives in a nursing home that has a history of T2DM on insulin, hypothyroidism, GERD, hyperlipidemia, migraine, chronic pain,?  Spinal cord injury presents from SNF with right lower extremity cellulitis and right leg DVT after failing outpatient therapy.  She reports a merry-go-round of antibiotics.  Says vancomycin is the fifth when she is taken for the cellulitis.  She reports having strained relationship with her son causing her to be living in assisted living for the past 1 month.  She says after a spinal cord injury she was in SNF then nursing home for about a year and a half.  Says this E. coli bacteremia with spinal abscess was back in 2017.  She says she has been on warfarin however pharmacy says Xarelto.  Patient reports that the right lower extremity was double the size about 1 week ago.  Does report there is an open ulcer.  Denies any fever or other prodromal symptoms.    Review of Systems    The 10 point Review of Systems is negative other than noted in the HPI or here.     Past Medical History      T2DM on insulin, hypothyroidism, GERD, hyperlipidemia, migraine, chronic pain,?  Spinal cord injury, ecoli bacteremia    Past Surgical History     Tonsillectomy    Social History     Denies alcohol, tobacco and drug use.  History and relationship with one of her sons causing her to live in nursing home for the past 1 month.    Family History     Not clinically significant    Prior to Admission Medications   Prior to Admission Medications   Prescriptions Last Dose Informant Patient  Reported? Taking?   Ascorbic Acid (VITAMIN C GUMMIES PO) 11/22/2021 at am  Yes Yes   Sig: Take 1 chew tab by mouth daily   Cholecalciferol (VITAMIN D3 GUMMIES PO) 11/22/2021 at am  Yes Yes   Sig: Take 1 chew tab by mouth daily   Multiple Vitamins-Minerals (MULTIVITAMIN GUMMIES WOMENS) CHEW 11/22/2021 at am  Yes Yes   Sig: Take 1 chew tab by mouth daily   SUMAtriptan (IMITREX) 100 MG tablet   Yes Yes   Sig: Take 100 mg by mouth every 12 hours as needed for migraine   Zinc Oxide-Petrolatum 20-80 % CREA   Yes Yes   Sig: Externally apply topically every 8 hours as needed (incontinence)   acetaminophen (TYLENOL) 325 MG tablet   Yes Yes   Sig: Take 650 mg by mouth every 4 hours as needed for mild pain   aspirin (ASA) 81 MG chewable tablet 11/22/2021 at am  Yes Yes   Sig: Take 81 mg by mouth daily   bacitracin 500 UNIT/GM OINT   Yes Yes   Sig: Apply topically every 8 hours as needed for wound care   baclofen (LIORESAL) 10 MG tablet 11/22/2021 at x1  Yes Yes   Sig: Take 10 mg by mouth 3 times daily   bisacodyl (DULCOLAX) 10 MG suppository   Yes Yes   Sig: Place 10 mg rectally daily as needed for constipation   buPROPion (WELLBUTRIN SR) 150 MG 12 hr tablet 11/22/2021 at am  Yes Yes   Sig: Take 150 mg by mouth daily   calcium carbonate (TUMS) 500 MG chewable tablet   Yes Yes   Sig: Take 2 chew tab by mouth every hour as needed for heartburn   calcium carbonate 600 mg-vitamin D 400 units (CALTRATE) 600-400 MG-UNIT per tablet 11/22/2021 at am  Yes Yes   Sig: Take 1 tablet by mouth daily   cefdinir (OMNICEF) 300 MG capsule 11/22/2021 at am  Yes Yes   Sig: Take 300 mg by mouth 2 times daily X 8 days   clotrimazole (LOTRIMIN) 1 % external cream Unknown at Unknown time  Yes Yes   Sig: Apply topically 2 times daily   docusate sodium (COLACE) 100 MG tablet 11/22/2021 at am  Yes Yes   Sig: Take 100 mg by mouth 2 times daily   guaiFENesin (ROBITUSSIN) 20 mg/mL SOLN solution   Yes Yes   Sig: Take 10 mLs by mouth every 4 hours as needed  for cough   hydrOXYzine (ATARAX) 50 MG tablet 11/11/2021  Yes Yes   Sig: Take 50 mg by mouth every 8 hours as needed for itching   hydrocortisone (CORTAID) 1 % external cream Unknown at Unknown time  Yes Yes   Sig: Apply topically 2 times daily as needed for rash   insulin aspart (NOVOLOG FLEXPEN) 100 UNIT/ML pen   Yes Yes   Sig: Inject 1-85 Units Subcutaneous 3 times daily (with meals) Sliding scale.   insulin degludec (TRESIBA) 100 UNIT/ML pen 11/21/2021 at pm  Yes Yes   Sig: Inject 30 Units Subcutaneous At Bedtime   levothyroxine (SYNTHROID/LEVOTHROID) 125 MCG tablet 11/21/2021 at am  Yes Yes   Sig: Take 125 mcg by mouth See Admin Instructions Daily on Tuesday, Wednesday, Thursday, Saturday, and Sunday   levothyroxine (SYNTHROID/LEVOTHROID) 125 MCG tablet 11/22/2021 at am  Yes Yes   Sig: Take 187.5 mcg by mouth See Admin Instructions On Mondays, Fridays   lisinopril (ZESTRIL) 2.5 MG tablet 11/22/2021 at am  Yes Yes   Sig: Take 2.5 mg by mouth daily   loperamide (IMODIUM A-D) 2 MG tablet   Yes Yes   Sig: Take 2 mg by mouth 3 times daily as needed for diarrhea   loratadine (CLARITIN) 10 MG tablet   Yes Yes   Sig: Take 10 mg by mouth daily as needed for allergies   magnesium 250 MG tablet 11/22/2021 at am  Yes Yes   Sig: Take 1 tablet by mouth daily   magnesium hydroxide (MILK OF MAGNESIA) 400 MG/5ML suspension   Yes Yes   Sig: Take 30 mLs by mouth daily as needed for constipation   menthol (COUGH DROP) 7 MG LOZG   Yes Yes   Sig: Take 1 lozenge by mouth every hour as needed for cough   morphine (MS CONTIN) 30 MG CR tablet 11/22/2021 at 0800  Yes Yes   Sig: Take 30 mg by mouth 3 times daily   morphine (MSIR) 15 MG IR tablet 11/15/2021  Yes Yes   Sig: Take 7.5 mg by mouth every 8 hours as needed for pain   neomycin-bacitracin-polymyxin (NEOSPORIN) 5-400-5000 ointment   Yes Yes   Sig: Apply topically every 6 hours as needed (minor scrapes, cuts, burns)   omeprazole (PRILOSEC) 20 MG DR capsule 11/22/2021 at am  Yes Yes    Sig: Take 20 mg by mouth daily   polyethylene glycol (MIRALAX) 17 GM/Dose powder 11/21/2021 at am  Yes Yes   Sig: Take 1 capful by mouth every other day   rivaroxaban ANTICOAGULANT (XARELTO) 15 MG TABS tablet 11/22/2021 at am  Yes Yes   Sig: Take by mouth 2 times daily (with meals)   rosuvastatin (CRESTOR) 20 MG tablet 11/21/2021 at pm  Yes Yes   Sig: Take 20 mg by mouth At Bedtime   sennosides (SENOKOT) 8.6 MG tablet 11/22/2021 at am  Yes Yes   Sig: Take 1 tablet by mouth daily   torsemide (DEMADEX) 20 MG tablet 11/22/2021 at am  Yes Yes   Sig: Take 20 mg by mouth daily x3 days.   venlafaxine (EFFEXOR-XR) 150 MG 24 hr capsule 11/22/2021 at am  Yes Yes   Sig: Take 150 mg by mouth daily Take with 75 mg capsule for a total dose of 225 mg.   venlafaxine (EFFEXOR-XR) 75 MG 24 hr capsule 11/22/2021 at am  Yes Yes   Sig: Take 75 mg by mouth daily Take with 150 mg capsule for a total dose of 225 mg.      Facility-Administered Medications: None     Allergies   Allergies   Allergen Reactions     Compazine [Prochlorperazine]      Latex      Latex sensitive      Metformin      Statins [Hmg-Coa-R Inhibitors]      Sulfa Drugs Itching       Physical Exam   Vital Signs: Temp: 98.7  F (37.1  C) Temp src: Oral BP: 119/64 Pulse: 92   Resp: 16 SpO2: 94 % O2 Device: None (Room air)    Weight: 180 lbs 12.8 oz    Physical Examination:   General appearance - alert, well appearing, and in no distress  Mental status - alert, oriented to person, place, and time, normal mood, behavior, speech, dress, motor activity, and thought processes  HEENT - sclera anicteric, left eye normal, right eye normal, nares normal and patent, no erythema, discharge or polyps and sinuses normal and nontender, mucous membranes moist, pharynx normal without lesions, dental hygiene good and tongue normal  Neck - supple, no significant adenopathy  Respiratory - clear to auscultation, no wheezes, rales or rhonchi, symmetric air entry  Cardiac - normal rate, regular  rhythm, normal S1, S2, no murmurs  Abdomen - soft, nontender, nondistended,   Neurological - alert, oriented, normal speech, no focal findings or movement disorder noted  Musculoskeletal - no joint tenderness, deformity or swelling, full range of motion without pain  Extremities - peripheral pulses normal, no pedal edema, no clubbing or cyanosis, no pedal edema  , feet normal, good pulses, normal color, temperature and sensation  Skin - RLE ulcer approx 3-4cm in diameter on the medial leg, redness warmth and swelling. Has edema 5cm above the right knee    Data   Data reviewed today: I reviewed all medications, new labs and imaging results over the last 24 hours.     Recent Labs   Lab 11/22/21  1936 11/22/21  1501 11/22/21  1500   WBC  --  8.5  --    HGB  --  12.9  --    MCV  --  83  --    PLT  --  285  --    INR  --   --  2.31*   NA  --  135*  --    POTASSIUM  --  4.0  --    CHLORIDE  --  94*  --    CO2  --  33*  --    BUN  --  20  --    CR  --  1.06  --    ANIONGAP  --  8  --    MALI  --  9.2  --    * 268*  --    ALBUMIN  --  4.1  --    PROTTOTAL  --  7.4  --    BILITOTAL  --  0.6  --    ALKPHOS  --  87  --    ALT  --  <9  --    AST  --  12  --      No results found for this or any previous visit (from the past 24 hour(s)).

## 2021-11-23 NOTE — PROGRESS NOTES
Right leg red/swollen/painful. Pt requesting scheduled MS Contin for pain. IV Vanco and Zosyn infusing. VSS, will continue to monitor.

## 2021-11-23 NOTE — CONSULTS
Care Management Initial Consult    General Information  Assessment completed with: Patient, pt  Type of CM/SW Visit: Initial Assessment    Primary Care Provider verified and updated as needed: Yes   Readmission within the last 30 days: no previous admission in last 30 days   Return Category: Progression of disease  Reason for Consult: discharge planning  Advance Care Planning: Advance Care Planning Reviewed: no concerns identified  declined       Communication Assessment  Patient's communication style: spoken language (English or Bilingual)    Hearing Difficulty or Deaf: no   Wear Glasses or Blind: no    Cognitive  Cognitive/Neuro/Behavioral: WDL                      Living Environment:   People in home: alone     Current living Arrangements: assisted living  Name of Facility: [facility]   Able to return to prior arrangements: yes       Family/Social Support:  Care provided by: self,other (see comments) (staff)  Provides care for: no one  Marital Status: Single  Facility resident(s)/Staff          Description of Support System: Supportive    Support Assessment: Adequate family and caregiver support    Current Resources:   Patient receiving home care services: No     Community Resources: DME  Equipment currently used at home: walker, standard,wheelchair, manual  Supplies currently used at home: None    Employment/Financial:  Employment Status:          Financial Concerns: No concerns identified   Referral to Financial Counselor: No       Lifestyle & Psychosocial Needs:  Social Determinants of Health     Tobacco Use: Not on file   Alcohol Use: Not on file   Financial Resource Strain: Not on file   Food Insecurity: Not on file   Transportation Needs: Not on file   Physical Activity: Not on file   Stress: Not on file   Social Connections: Not on file   Intimate Partner Violence: Not on file   Depression: Not on file   Housing Stability: Not on file       Functional Status:  Prior to admission patient needed  assistance:   Dependent ADLs:: Ambulation-walker  Dependent IADLs:: Medication Management,Meal Preparation,Transportation  Assesssment of Functional Status: Not at baseline with ADL Functioning,Not at baseline with mobility,Not at  functional baseline    Mental Health Status:  Mental Health Status: No Current Concerns       Chemical Dependency Status:                Values/Beliefs:  Spiritual, Cultural Beliefs, Judaism Practices, Values that affect care:                 Additional Information:  LIZA assessed[facility], will need transport at discharge, no other svcs except for assisted living.       Rossi Dacosta RN

## 2021-11-23 NOTE — PLAN OF CARE
Problem: Adult Inpatient Plan of Care  Goal: Optimal Comfort and Wellbeing  Outcome: Improving     Problem: Skin or Soft Tissue Infection  Goal: Infection Symptom Resolution  Outcome: Improving     Problem: Adult Inpatient Plan of Care  Goal: Absence of Hospital-Acquired Illness or Injury  Intervention: Identify and Manage Fall Risk  Recent Flowsheet Documentation  Taken 11/23/2021 0520 by Eve Esteban, RN  Safety Promotion/Fall Prevention:   activity supervised   lighting adjusted   mobility aid in reach   nonskid shoes/slippers when out of bed   safety round/check completed  Taken 11/23/2021 0000 by Eve Esteban, RN  Safety Promotion/Fall Prevention:   activity supervised   lighting adjusted   mobility aid in reach   nonskid shoes/slippers when out of bed   safety round/check completed   Patient alert and oriented, afebrile, able to use bed side commode.

## 2021-11-23 NOTE — PROGRESS NOTES
11/23/21 1300   Quick Adds   Type of Visit Initial PT Evaluation   Living Environment   People in home alone   Current Living Arrangements assisted living   Home Accessibility   (no steps )   Living Environment Comments Pt has assist with ADLs and gets 3meals/day.  Pt uses the WC mostly and does limited walking.  Assisted with driving.     Self-Care   Usual Activity Tolerance good   Current Activity Tolerance moderate   Equipment Currently Used at Home walker, rolling;wheelchair, manual   Disability/Function   Hearing Difficulty or Deaf no   Wear Glasses or Blind no   Mobility Management Pt uses a WC mostly and a FWW.    Dressing/Bathing Difficulty yes   Dressing/Bathing bathing difficulty, assistance 1 person   Toileting issues yes   Doing Errands Independently Difficulty (such as shopping) no   Fall history within last six months yes   Number of times patient has fallen within last six months 2   Change in Functional Status Since Onset of Current Illness/Injury yes   General Information   Onset of Illness/Injury or Date of Surgery 11/22/21   Referring Physician Dr Fredrick Styles   Patient/Family Therapy Goals Statement (PT) To go home.    Pertinent History of Current Problem (include personal factors and/or comorbidities that impact the POC) Pt was admitted cellulitis of R LE.     Weight-Bearing Status - LLE weight-bearing as tolerated   Weight-Bearing Status - RLE weight-bearing as tolerated   Edema General Information   General Comments/Previous Edema Treatment/Edema Equipment Some edema R LE.    Cognition   Orientation Status (Cognition) oriented x 4   Pain Assessment   Patient Currently in Pain   (Pt has pain in the right leg and does have muscle spasms. )   Posture    Posture Comments Cues for posture with the pt using  the FWW.     Range of Motion (ROM)   ROM Comment bilat LE AROM WFL with bilat HF limited by weakness.    Strength   Strength Comments able to WB for gait.    Bed Mobility   Comment (Bed  Mobility) sit>supine with min A x 1 with cues for technique.     Transfers   Transfer Safety Comments Min A x1 with FWW x 2 reps. Toilet transfers with  min A  x1 with increased time needed.   (Commode transfer w min A x 1)   Gait/Stairs (Locomotion)   [place] Level (Gait) minimum assist (75% patient effort);1 person to manage equipment   Assistive Device (Gait) walker, front-wheeled   Distance in Feet (Required for LE Total Joints) 10' x2   Pattern (Gait) step-through   Deviations/Abnormal Patterns (Gait) heaven decreased;stride length decreased   Comment (Gait/Stairs) Pt was on the commode and wanted to use the bathroom.  Pt needed assist x 1 with the FWW and needed cues for direction and safety.  She did stand at the sink with min A x1    Balance   Balance Comments Min A x 1 with FWW   Sensory Examination   Sensory Perception WNL   Clinical Impression   Criteria for Skilled Therapeutic Intervention yes, treatment indicated   PT Diagnosis (PT) impaired mobility   Influenced by the following impairments bed mobility, transfers, gait    Functional limitations due to impairments weakness, increased R LE edama. dec activity mariia.    Clinical Presentation Evolving/Changing   Clinical Presentation Rationale Pt presents medically diagnosed.     Clinical Decision Making (Complexity) moderate complexity   Therapy Frequency (PT) Daily   Predicted Duration of Therapy Intervention (days/wks) 7   Planned Therapy Interventions (PT) bed mobility training;gait training;strengthening;transfer training   Anticipated Equipment Needs at Discharge (PT) walker, rolling;wheelchair   Risk & Benefits of therapy have been explained evaluation/treatment results reviewed;care plan/treatment goals reviewed;risks/benefits reviewed;patient;participants voiced agreement with care plan   PT Discharge Planning    PT Discharge Recommendation (DC Rec) home with home care physical therapy  (back to her [facility] with PT there, )   PT Rationale for DC  Rec Pt does need assist with transfers and mobility yet.  Home PT is recommended at dc.    Total Evaluation Time   Total Evaluation Time (Minutes) 15

## 2021-11-23 NOTE — PROGRESS NOTES
Chart reviewed. PT recommendations home therapy. Met with pt to introduce care management role, discuss therapies recommendations and assist with discharge plan. Pt agrees to discharge back to [facility] with therapies. Transport TBD; son or medical w/c transport.    1530 Spoke with Perla, Director of Nursing at [facility], updated on PT recommendations of home therapy. Patient currently open with [facility] for skilled nursing (for wound care). Referral made to [facility] for resumption, adding skilled OT/PT. ORLANDO Duncan at [facility] notes that she would need verbal report and progress notes faxed to [facility] [phone] for review to determine if able to meet pt's needs.  H&P, progress note faxed.

## 2021-11-23 NOTE — PROGRESS NOTES
11/23/21 1520   Quick Adds   Type of Visit Initial Occupational Therapy Evaluation   Living Environment   Living Environment Comments see PT note 11/23, has been at LTC x1 month   Self-Care   Current Activity Tolerance fair   Activity/Exercise/Self-Care Comment see PT note 11/23   Instrumental Activities of Daily Living (IADL)   IADL Comments A w/ drsg, meds, bathing,    Disability/Function   Hearing Difficulty or Deaf no   Wear Glasses or Blind no   Concentrating, Remembering or Making Decisions Difficulty no   Difficulty Communicating no   Mobility Management w/c, FWW   Dressing/Bathing Difficulty yes   Dressing/Bathing bathing difficulty, assistance 1 person;dressing difficulty, assistance 1 person   Toileting issues yes   Doing Errands Independently Difficulty (such as shopping) no   Fall history within last six months yes   Number of times patient has fallen within last six months 2   Change in Functional Status Since Onset of Current Illness/Injury yes   General Information   Onset of Illness/Injury or Date of Surgery 11/22/21   Patient/Family Therapy Goal Statement (OT) none stated   Existing Precautions/Restrictions fall   Cognitive Status Examination   Affect/Mental Status (Cognitive) WFL  (grossly)   Sensory   Sensory Quick Adds   (c/o numbness intermittently hands, feet)   Range of Motion Comprehensive   General Range of Motion no range of motion deficits identified   Strength Comprehensive (MMT)   General Manual Muscle Testing (MMT) Assessment no strength deficits identified  (for ADLs)   Coordination   Upper Extremity Coordination No deficits were identified   Bed Mobility   Bed Mobility supine-sit   Supine-Sit [place] (Bed Mobility) supervision   Transfers   Transfers toilet transfer   Toilet Transfer   Type (Toilet Transfer) stand-sit;sit-stand   [place] Level (Toilet Transfer) contact guard   Assistive Device (Toilet Transfer) grab bars/safety frame;walker, front-wheeled   Balance    Balance Assessment standing balance: static   Standing Balance: Static fair balance   Toileting   [place] Level (Toileting) minimum assist (75% patient effort)   Position (Toileting) unsupported sitting   Clinical Impression   Criteria for Skilled Therapeutic Interventions Met (OT) yes   OT Diagnosis decreased ADLs   OT Problem List-Impairments impacting ADL balance;mobility   Assessment of Occupational Performance 3-5 Performance Deficits   Identified Performance Deficits drsg, toileting, g/h, trsfs,    Planned Therapy Interventions (OT) ADL retraining;transfer training   Clinical Decision Making Complexity (OT) moderate complexity   Therapy Frequency (OT) Daily   Predicted Duration of Therapy 6 days   Anticipated Equipment Needs Upon Discharge (OT)   (con. to asess)   Risk & Benefits of therapy have been explained patient   OT Discharge Planning    OT Discharge Recommendation (DC Rec) home with home care occupational therapy  ( is willing to return to previous facility, [facility]  in future)   OT Rationale for DC Rec rec. 24 hr assist with ADLs and mobility   Total Evaluation Time (Minutes)   Total Evaluation Time (Minutes) 5

## 2021-11-23 NOTE — CONSULTS
DIABETES CARE  Situation:  Consulted by Provider for Diabetes Education  Moraima MONSALVE is a 56-year-old female who lives in a nursing home with hx of T2DM on insulin, hypothyroidism, GERD, hyperlipidemia, migraine, chronic pain,?  Spinal cord injury presents from SNF with right lower extremity cellulitis and right leg DVT after failing outpatient therapy.    Background:  PCP: [facility] physician listed on face sheet but patient hasn't seen her for a long time; Presently seeing physician at the facility she lives at  Social: Lives alone at AL facility    Diabetes History:   Since she moved to where she is living in October, the staff there do the BG monitoring and give the insulin. She was aware of the Tresiba dose but not sure about how much Novolog is given at a meal    Meds for BG Management PTA:  30 units of Tresiba at hs  1-85 units of Novolog at a meal, sliding scale    Current Inpatient Meds for BG Management:  30 units of Lantus at hs  1:10 I:C ratio at meals, Novolog to carb grams  Novolog correction scale: 1/50 > 140    Labs:  Hemoglobin A1C: 9.5 10/25               GFR: 70   Blood Glucose POC: Results for FRANCHESKA ALEXANDER (MRN [id number]) as of 11/23/2021 12:28   Ref. Range 11/22/2021 19:36 11/23/2021 02:13 11/23/2021 07:56   GLUCOSE BY METER POCT Latest Ref Range: 70 - 99 mg/dL 156 (H) 166 (H) 92       Diet Order:  60 gram CHO    Weight: 82 kg    BMI: 32.03    DM EDUCATION/COUNSELING:  Barriers to Learning and/or DM Self-Management: None but not happy with place she is living    Current Education and/or visit with Patient and/or caregiver  Educated/reviewed diabetes basics, hyperglycemia, long term complications, treatment.  Educated/reviewed hypoglycemia: sx, causes, treatment. Aware of sx and treatment.  She has had more lately, insulin doses not being adjusted like she would like  Explained normal/goals of blood sugar control, A1C  Staff at AL do the monitoring and giving of the insulin. I did talk to  "her about a CGMS that she may like instead of all the poking.  I will see if one of the continuous sensors are paid by her insurance. Since she has Medicare, she will need to go through her provider. She states her medical worker can help her obtain.   Educated on normal pancreas function, how her basal insulin and mealtime insulin should imitate that.    Patient wants to move to where her mom is in [facility]. She is working with her workers to help her with this, CADI.    Written handouts given on all education provided.     Assessment:  At this time, her AL staff and physician are in charge of her insulin doses. The meals are made there as well.    Recommendations:  1. Assess BG pattern daily and adjust doses as needed.  2. Will return to her AL for now after discharge I believe where her insulin/BGM will be done by staff    Refer to \"Guidelines for Insulin Initiation and Care in Hospitalized Adults\"  link in Diabetes Management Order set for dosing guidelines    Hospital BG goals for blood glucose levels are < 180 for improved health outcomes.    Thank you,     Basilia Cloud RN, Certified Diabetes Care and     [address] [address]  [email or web address]   Office: [phone]  Pager: [phone]                                            "

## 2021-11-24 ENCOUNTER — APPOINTMENT (OUTPATIENT)
Dept: PHYSICAL THERAPY | Facility: HOSPITAL | Age: 56
DRG: 603 | End: 2021-11-24
Payer: MEDICARE

## 2021-11-24 ENCOUNTER — APPOINTMENT (OUTPATIENT)
Dept: OCCUPATIONAL THERAPY | Facility: HOSPITAL | Age: 56
DRG: 603 | End: 2021-11-24
Payer: MEDICARE

## 2021-11-24 LAB
CREAT SERPL-MCNC: 1.18 MG/DL (ref 0.6–1.1)
GFR SERPL CREATININE-BSD FRML MDRD: 51 ML/MIN/1.73M2
GLUCOSE BLDC GLUCOMTR-MCNC: 111 MG/DL (ref 70–99)
GLUCOSE BLDC GLUCOMTR-MCNC: 138 MG/DL (ref 70–99)
GLUCOSE BLDC GLUCOMTR-MCNC: 228 MG/DL (ref 70–99)
GLUCOSE BLDC GLUCOMTR-MCNC: 259 MG/DL (ref 70–99)
MAGNESIUM SERPL-MCNC: 1.9 MG/DL (ref 1.8–2.6)
POTASSIUM BLD-SCNC: 3.9 MMOL/L (ref 3.5–5)
VANCOMYCIN SERPL-MCNC: 14.3 MG/L

## 2021-11-24 PROCEDURE — 83735 ASSAY OF MAGNESIUM: CPT | Performed by: STUDENT IN AN ORGANIZED HEALTH CARE EDUCATION/TRAINING PROGRAM

## 2021-11-24 PROCEDURE — 82565 ASSAY OF CREATININE: CPT | Performed by: STUDENT IN AN ORGANIZED HEALTH CARE EDUCATION/TRAINING PROGRAM

## 2021-11-24 PROCEDURE — 97535 SELF CARE MNGMENT TRAINING: CPT | Mod: GO

## 2021-11-24 PROCEDURE — 258N000003 HC RX IP 258 OP 636: Performed by: FAMILY MEDICINE

## 2021-11-24 PROCEDURE — 99222 1ST HOSP IP/OBS MODERATE 55: CPT | Performed by: INTERNAL MEDICINE

## 2021-11-24 PROCEDURE — G0463 HOSPITAL OUTPT CLINIC VISIT: HCPCS

## 2021-11-24 PROCEDURE — 250N000011 HC RX IP 250 OP 636: Performed by: FAMILY MEDICINE

## 2021-11-24 PROCEDURE — 97116 GAIT TRAINING THERAPY: CPT | Mod: GP

## 2021-11-24 PROCEDURE — 97530 THERAPEUTIC ACTIVITIES: CPT | Mod: GP

## 2021-11-24 PROCEDURE — 97110 THERAPEUTIC EXERCISES: CPT | Mod: GP

## 2021-11-24 PROCEDURE — 36415 COLL VENOUS BLD VENIPUNCTURE: CPT | Performed by: STUDENT IN AN ORGANIZED HEALTH CARE EDUCATION/TRAINING PROGRAM

## 2021-11-24 PROCEDURE — 80202 ASSAY OF VANCOMYCIN: CPT | Performed by: STUDENT IN AN ORGANIZED HEALTH CARE EDUCATION/TRAINING PROGRAM

## 2021-11-24 PROCEDURE — 120N000001 HC R&B MED SURG/OB

## 2021-11-24 PROCEDURE — 84132 ASSAY OF SERUM POTASSIUM: CPT | Performed by: STUDENT IN AN ORGANIZED HEALTH CARE EDUCATION/TRAINING PROGRAM

## 2021-11-24 PROCEDURE — 99232 SBSQ HOSP IP/OBS MODERATE 35: CPT | Performed by: STUDENT IN AN ORGANIZED HEALTH CARE EDUCATION/TRAINING PROGRAM

## 2021-11-24 PROCEDURE — 250N000013 HC RX MED GY IP 250 OP 250 PS 637: Performed by: FAMILY MEDICINE

## 2021-11-24 RX ADMIN — ROSUVASTATIN CALCIUM 20 MG: 10 TABLET, FILM COATED ORAL at 20:08

## 2021-11-24 RX ADMIN — INSULIN ASPART 5 UNITS: 100 INJECTION, SOLUTION INTRAVENOUS; SUBCUTANEOUS at 08:24

## 2021-11-24 RX ADMIN — ACETAMINOPHEN 975 MG: 325 TABLET ORAL at 13:25

## 2021-11-24 RX ADMIN — ACETAMINOPHEN 975 MG: 325 TABLET ORAL at 03:07

## 2021-11-24 RX ADMIN — ACETAMINOPHEN 975 MG: 325 TABLET ORAL at 20:08

## 2021-11-24 RX ADMIN — PIPERACILLIN SODIUM AND TAZOBACTAM SODIUM 3.38 G: 3; .375 INJECTION, POWDER, LYOPHILIZED, FOR SOLUTION INTRAVENOUS at 13:25

## 2021-11-24 RX ADMIN — BACLOFEN 10 MG: 10 TABLET ORAL at 13:25

## 2021-11-24 RX ADMIN — PIPERACILLIN SODIUM AND TAZOBACTAM SODIUM 3.38 G: 3; .375 INJECTION, POWDER, LYOPHILIZED, FOR SOLUTION INTRAVENOUS at 06:15

## 2021-11-24 RX ADMIN — MORPHINE SULFATE 30 MG: 30 TABLET, FILM COATED, EXTENDED RELEASE ORAL at 20:09

## 2021-11-24 RX ADMIN — LEVOTHYROXINE SODIUM 125 MCG: 0.03 TABLET ORAL at 06:14

## 2021-11-24 RX ADMIN — ASPIRIN 81 MG CHEWABLE TABLET 81 MG: 81 TABLET CHEWABLE at 08:18

## 2021-11-24 RX ADMIN — PANTOPRAZOLE SODIUM 40 MG: 40 TABLET, DELAYED RELEASE ORAL at 08:18

## 2021-11-24 RX ADMIN — VENLAFAXINE HYDROCHLORIDE 150 MG: 150 CAPSULE, EXTENDED RELEASE ORAL at 08:18

## 2021-11-24 RX ADMIN — TORSEMIDE 20 MG: 20 TABLET ORAL at 08:17

## 2021-11-24 RX ADMIN — PIPERACILLIN SODIUM AND TAZOBACTAM SODIUM 3.38 G: 3; .375 INJECTION, POWDER, LYOPHILIZED, FOR SOLUTION INTRAVENOUS at 21:48

## 2021-11-24 RX ADMIN — BUPROPION HYDROCHLORIDE 150 MG: 150 TABLET, FILM COATED, EXTENDED RELEASE ORAL at 08:18

## 2021-11-24 RX ADMIN — MORPHINE SULFATE 30 MG: 30 TABLET, FILM COATED, EXTENDED RELEASE ORAL at 08:18

## 2021-11-24 RX ADMIN — ONDANSETRON 4 MG: 2 INJECTION INTRAMUSCULAR; INTRAVENOUS at 23:38

## 2021-11-24 RX ADMIN — RIVAROXABAN 15 MG: 15 TABLET, FILM COATED ORAL at 08:18

## 2021-11-24 RX ADMIN — VENLAFAXINE HYDROCHLORIDE 75 MG: 75 CAPSULE, EXTENDED RELEASE ORAL at 08:18

## 2021-11-24 RX ADMIN — RIVAROXABAN 15 MG: 15 TABLET, FILM COATED ORAL at 17:24

## 2021-11-24 RX ADMIN — BACLOFEN 10 MG: 10 TABLET ORAL at 20:09

## 2021-11-24 RX ADMIN — DOCUSATE SODIUM 100 MG: 100 CAPSULE, LIQUID FILLED ORAL at 20:08

## 2021-11-24 RX ADMIN — INSULIN ASPART 1 UNITS: 100 INJECTION, SOLUTION INTRAVENOUS; SUBCUTANEOUS at 13:29

## 2021-11-24 RX ADMIN — BACLOFEN 10 MG: 10 TABLET ORAL at 08:17

## 2021-11-24 RX ADMIN — MORPHINE SULFATE 30 MG: 30 TABLET, FILM COATED, EXTENDED RELEASE ORAL at 13:25

## 2021-11-24 RX ADMIN — Medication 1 TABLET: at 08:18

## 2021-11-24 RX ADMIN — LISINOPRIL 2.5 MG: 2.5 TABLET ORAL at 08:18

## 2021-11-24 RX ADMIN — DOCUSATE SODIUM 100 MG: 100 CAPSULE, LIQUID FILLED ORAL at 08:18

## 2021-11-24 RX ADMIN — Medication 7.5 MG: at 16:09

## 2021-11-24 RX ADMIN — VANCOMYCIN HYDROCHLORIDE 1500 MG: 5 INJECTION, POWDER, LYOPHILIZED, FOR SOLUTION INTRAVENOUS at 21:48

## 2021-11-24 ASSESSMENT — ACTIVITIES OF DAILY LIVING (ADL)
ADLS_ACUITY_SCORE: 12

## 2021-11-24 NOTE — PLAN OF CARE
Pt is alert and oriented. Stand by assist to the bathroom.  Pain controlled.  Wounds were open to air this am. Pt took off dressings.  Woc RN seen pt today dressing applied per woc RN.  VSS. Low grade temp today. 99.3 99.0.

## 2021-11-24 NOTE — PLAN OF CARE
Problem: Pain Chronic (Persistent)  Goal: Acceptable Pain Control and Functional Ability  Outcome: Improving  Intervention: Develop Pain Management Plan  Recent Flowsheet Documentation  Taken 11/24/2021 0307 by Maria Luisa Mccall RN  Pain Management Interventions: medication (see MAR)     Problem: Skin or Soft Tissue Infection  Goal: Infection Symptom Resolution  Outcome: Improving     Pt up watching tv or on phone until around 0330 before falling asleep. Denied pain start of night. When given scheduled tylenol pt reported pain increased to 5/10 but likely from sitting on toilet having BM- scheduled tylenol effective. IV abx infusing. Dressing intact right leg- wound care orders not specifying what the wound care is. IV infiltrated shortly after 0600 zosyn started, removed and new IV placed right forearm.

## 2021-11-24 NOTE — PLAN OF CARE
Problem: Hyperglycemia  Goal: Blood Glucose Level Within Targeted Range  Outcome: Improving   Blood sugar before dinner 76. Pt still eating her lunch at dinner time, and eating dinner at bedtime, unable to accurately account for carb coverage. HS blood sugar 144. Pt declined lantus in worry that blood sugar will drop in middle of night.     Problem: OT General Care Plan  Goal: Toilet Transfer/Toileting (OT)  Description: Toilet Transfer/Toileting (OT)  Outcome: Improving   Transferring SBA with walker to bathroom.    -IV abx.

## 2021-11-24 NOTE — CONSULTS
Wound Ostomy  WOC Assessment       Allergies:  Compazine [Prochlorperazine]  Latex  Metformin  Statins [Hmg-Coa-R Inhibitors]  Sulfa Drugs    Diagnosis:   Patient Active Problem List    Diagnosis Date Noted     Cellulitis of right lower extremity 11/22/2021     Priority: Medium     Failure of outpatient treatment 11/22/2021     Priority: Medium       Height:  [unfilled]    Weight:  [unfilled]    Labs:  Recent Labs   Lab Test 11/23/21  0714   CRP 1.0*   HGB 10.6*   ALBUMIN 3.2*       Chaitanya:  Chaitanya Score: 18    Specialty Bed:       Wound culture obtained: No    Edema:  Yes:  Localized    Anatomic Site/Laterality: R medial ankle    Reason for ongoing care:   Wound assessment and plan of care    Encounter Type:  Initial Wound Type:   Non-PU Ulcer: Venous     Tissue Damage:  Breakdown of skin    Associated conditions/Related trauma: Patient with DM2, venous insufficiency.     Assessment:    Length: 2cm    Width: 1cm    Tunneling/Undermining: No    Wound Bed: 100% Agranular    Exudate: Yes Serous Small    Periwound Skin: Intact    Treatment Plan: Silicone foam       Nursing care provided was dressing change.    Discussed plan of care with patient.    Outcomes and treatment recommendations are to promote skin integrity, contain exudate and promote wound healing.    Actions taken by WOC RN: 2 minutes of education and WOC Discharge recommendations entered.    Planned Follow Up: Weekly.    Plan for next visit: Reassess wound(s) and Reassess skin integrity

## 2021-11-24 NOTE — CONSULTS
Consultation - Infectious Disease  Tonya Valera,  1965, MRN [id number]    Admitting Dx: Cellulitis of right lower extremity [L03.115]  Failure of outpatient treatment [Z78.9]    PCP: Reed Simpson, [phone]   Code status:  Full Code       Extended Emergency Contact Information  Primary Emergency Contact: MARGOT MARTINEZ  Mobile Phone: [phone]  Relation: Son       ASSESSMENT   1. Right lower extremity cellulitis. Most lower extremity cellulitis is due to beta-hemolytic strep. She reports improvement now on broad spectrum antibiotics. Skin wound not looking like abscess -- gram stain with no PMNs -- growing corynebacterium (skin aleksandar) and gram negative pat of uncertain significance. Low CRP suggesting infection may not be the main  at this point -- venous stasis, DVT.   2. R leg DVT  3. H/o E coli spinal epidural abscess requiring surgery x2 2017 -- records reportedly in [facility] but not linked yet via CareEverywhere.   4. Sulfa allergy.    Principal Problem:    Cellulitis of right lower extremity  Active Problems:    Failure of outpatient treatment       PLAN   Can keep on current antibiotics for now with close monitoring of renal function. Likely de-escalate soon.   Vancomycin goal trough 10-15.  Elevated leg -- controlling swelling can help symptoms.   Expect oral antibiotics once improving more.     Jean Morillo MD  [facility]  [phone] [facility] paging  ______________________________________________________________________        Reason For Consult: Cellulitis of right lower extremity       HPI    We have been requested by Dr. Nam to evaluate Tonya Valera who is a 56 year old year old female for the above. She lives in a nursing home that has a history of T2DM on insulin, hypothyroidism, GERD, hyperlipidemia, migraine, chronic pain,?  Spinal cord injury due to E coli epidural abscess 2017 requiring 2 surgeries, has residual lower  ext weakness, walks with walker and uses wheelchair, presented 11/22 from SNF with right lower extremity cellulitis and right leg DVT after failing outpatient therapy.  Per notes she was treated with 11/9, then IM ceftriaxone 11/12 and [facility]? same day, switched to cefdinir 11/19. DVT diagnosis was 11/12/21. She states that her other records are at [facility] -- but I was unable to link via careeverywhere.     States leg is better compared to admission. Less redness, swelling, and pain. Denies any fever with this.        Medical History  See above Surgical History  Spine surgeries Social History  Reviewed, and she lives at [facility].    Allergies  Allergies   Allergen Reactions     Compazine [Prochlorperazine]      Latex      Latex sensitive      Metformin      Statins [Hmg-Coa-R Inhibitors]      Sulfa Drugs Itching    Family History  family history not pertinent to presenting problem.    Psychosocial Needs  Social History     Social History Narrative     Not on file     Additional psychosocial needs reviewed per nursing assessment.       Prior to Admission Medications   Medications Prior to Admission   Medication Sig Dispense Refill Last Dose     acetaminophen (TYLENOL) 325 MG tablet Take 650 mg by mouth every 4 hours as needed for mild pain        Ascorbic Acid (VITAMIN C GUMMIES PO) Take 1 chew tab by mouth daily   11/22/2021 at am     aspirin (ASA) 81 MG chewable tablet Take 81 mg by mouth daily   11/22/2021 at am     bacitracin 500 UNIT/GM OINT Apply topically every 8 hours as needed for wound care        baclofen (LIORESAL) 10 MG tablet Take 10 mg by mouth 3 times daily   11/22/2021 at x1     bisacodyl (DULCOLAX) 10 MG suppository Place 10 mg rectally daily as needed for constipation        buPROPion (WELLBUTRIN SR) 150 MG 12 hr tablet Take 150 mg by mouth daily   11/22/2021 at am     calcium carbonate (TUMS) 500 MG chewable tablet Take 2 chew tab by mouth every hour as needed for  heartburn        calcium carbonate 600 mg-vitamin D 400 units (CALTRATE) 600-400 MG-UNIT per tablet Take 1 tablet by mouth daily   11/22/2021 at am     cefdinir (OMNICEF) 300 MG capsule Take 300 mg by mouth 2 times daily X 8 days   11/22/2021 at am     Cholecalciferol (VITAMIN D3 GUMMIES PO) Take 1 chew tab by mouth daily   11/22/2021 at am     clotrimazole (LOTRIMIN) 1 % external cream Apply topically 2 times daily   Unknown at Unknown time     docusate sodium (COLACE) 100 MG tablet Take 100 mg by mouth 2 times daily   11/22/2021 at am     guaiFENesin (ROBITUSSIN) 20 mg/mL SOLN solution Take 10 mLs by mouth every 4 hours as needed for cough        hydrocortisone (CORTAID) 1 % external cream Apply topically 2 times daily as needed for rash   Unknown at Unknown time     hydrOXYzine (ATARAX) 50 MG tablet Take 50 mg by mouth every 8 hours as needed for itching   11/11/2021     insulin aspart (NOVOLOG FLEXPEN) 100 UNIT/ML pen Inject 1-85 Units Subcutaneous 3 times daily (with meals) Sliding scale.        insulin degludec (TRESIBA) 100 UNIT/ML pen Inject 30 Units Subcutaneous At Bedtime   11/21/2021 at pm     levothyroxine (SYNTHROID/LEVOTHROID) 125 MCG tablet Take 125 mcg by mouth See Admin Instructions Daily on Tuesday, Wednesday, Thursday, Saturday, and Sunday 11/21/2021 at am     levothyroxine (SYNTHROID/LEVOTHROID) 125 MCG tablet Take 187.5 mcg by mouth See Admin Instructions On Mondays, Fridays 11/22/2021 at am     lisinopril (ZESTRIL) 2.5 MG tablet Take 2.5 mg by mouth daily   11/22/2021 at am     loperamide (IMODIUM A-D) 2 MG tablet Take 2 mg by mouth 3 times daily as needed for diarrhea        loratadine (CLARITIN) 10 MG tablet Take 10 mg by mouth daily as needed for allergies        magnesium 250 MG tablet Take 1 tablet by mouth daily   11/22/2021 at am     magnesium hydroxide (MILK OF MAGNESIA) 400 MG/5ML suspension Take 30 mLs by mouth daily as needed for constipation        menthol (COUGH DROP) 7 MG LOZG  Take 1 lozenge by mouth every hour as needed for cough        morphine (MS CONTIN) 30 MG CR tablet Take 30 mg by mouth 3 times daily   11/22/2021 at 0800     morphine (MSIR) 15 MG IR tablet Take 7.5 mg by mouth every 8 hours as needed for pain   11/15/2021     Multiple Vitamins-Minerals (MULTIVITAMIN GUMMIES WOMENS) CHEW Take 1 chew tab by mouth daily   11/22/2021 at am     neomycin-bacitracin-polymyxin (NEOSPORIN) 5-400-5000 ointment Apply topically every 6 hours as needed (minor scrapes, cuts, burns)        omeprazole (PRILOSEC) 20 MG DR capsule Take 20 mg by mouth daily   11/22/2021 at am     polyethylene glycol (MIRALAX) 17 GM/Dose powder Take 1 capful by mouth every other day   11/21/2021 at am     rivaroxaban ANTICOAGULANT (XARELTO) 15 MG TABS tablet Take by mouth 2 times daily (with meals)   11/22/2021 at am     rosuvastatin (CRESTOR) 20 MG tablet Take 20 mg by mouth At Bedtime   11/21/2021 at pm     sennosides (SENOKOT) 8.6 MG tablet Take 1 tablet by mouth daily   11/22/2021 at am     SUMAtriptan (IMITREX) 100 MG tablet Take 100 mg by mouth every 12 hours as needed for migraine        torsemide (DEMADEX) 20 MG tablet Take 20 mg by mouth daily x3 days.   11/22/2021 at am     venlafaxine (EFFEXOR-XR) 150 MG 24 hr capsule Take 150 mg by mouth daily Take with 75 mg capsule for a total dose of 225 mg.   11/22/2021 at am     venlafaxine (EFFEXOR-XR) 75 MG 24 hr capsule Take 75 mg by mouth daily Take with 150 mg capsule for a total dose of 225 mg.   11/22/2021 at am     Zinc Oxide-Petrolatum 20-80 % CREA Externally apply topically every 8 hours as needed (incontinence)             Anti-infectives: pip/tazo 11/22-  Vancomycin 11/22-  Cultures: 11/22 blood negative to date  11/22 wound gram stain no PMNs, no organisms. Culture with GNR, diphtheroid  Imaging: none here          Review of Systems:  All other systems negative in detail except what is noted above. Physical Exam:  Temp:  [97.6  F (36.4  C)-99.3  F (37.4   C)] 99  F (37.2  C)  Pulse:  [78-99] 80  Resp:  [18-20] 18  BP: (122-167)/(62-83) 135/76  SpO2:  [90 %-95 %] 93 %    GENERAL:  In no acute distress, is alert and oriented to person, place, time.  EYES: No conjunctival injection, pupils equally reactive to light, extra-ocular movement intact  HEAD, EARS, NOSE, MOUTH, AND THROAT: Nontraumatic, mouth without oral ulcers.   RESPIRATORY: Clear breath sounds to auscultation bilaterally.   CARDIOVASCULAR: Regular rate and rhythm, normal S1 and S2, no murmurs, rubs, or gallops.  ABDOMEN: Soft, nontender, no masses, no organomegaly.  Normal bowel sounds.  MUSCULOSKELETAL: No synovitis.  LYMPHATIC: No inguinal lymphadenopathy.  SKIN/HAIR/NAILS: Right leg/shin with erythema, warmth, tenderness. There is medial superficial skin lesion looks like open bulla. Tattoo laterally. No abscesses.   NEUROLOGIC: some weakness in legs       Pertinent Labs         Recent Labs   Lab 11/23/21  0714 11/22/21  1501    135*   CO2 29 33*   BUN 16 20       Lab Results   Component Value Date    ALT <9 11/23/2021    AST 14 11/23/2021    ALKPHOS 63 11/23/2021          Lab Results   Component Value Date    CRP 1.0 (H) 11/23/2021    CRP 1.3 (H) 11/22/2021          Pertinent Radiology  Radiology Results: Reviewed  No results found.

## 2021-11-24 NOTE — PROGRESS NOTES
Progress Note    Date of admission: 11/22/2021    Assessment/Plan    Moraima MONSALVE is a 56-year-old female who lives in a nursing home with pmhx of T2DM on insulin, hypothyroidism, GERD, hyperlipidemia, migraine, chronic pain,?  Spinal cord injury presents from SNF with right lower extremity cellulitis and right leg DVT after failing outpatient therapy.        RLE cellulitis, bilateral chronic venous insufficiency   -was started on doxy 11/9, received IM Rocephin at [facility] on 11/12 continue on Doxy then switched to cefdinir on 11/19 then moved to SNF for wound care.  -Likely needs extended course due to poorly controlled DM.   -has DVT on LE US.(unable to see result in the EPIC)  -trend CRP, CBC  -continue  Vancomycin and zosyn,   - wound care   - ID consult     Right lower extremity DVT  - Continue  Xarelto   -Chronic venous insufficiency on bilateral legs     T2DM with hyperglycemia  -a1c 9.5 10/25/21 not well controlled.  -  Needs better control for wound healing  -lantus 33 untis at bedtime  -carbs 1:10  -medium  sliding scale  -Glucometer  - hypoglycemia protocol  -DM diet  - diabetic educator    Elevated serum creatinine  - today is 1.18, will trend creatinine  --Renal dosing of vancomycin   -Monitor inputs and outputs  -Avoid nephrotoxic medications    Hx spinal cord injury  -sounds like spinal cord abscess with E coli per her story.  -has been in NH for 1 month, was living with family but strained relationship with one of her sons ended up with in facility. Reports LE spasticity     Hypothyroidism  -continue home Synthroid    Hyperlipidemia  --continue statin    GERD  -continue PPI    Migraines  -continue sumatriptan.    DVT PPX : xarelto    Barriers to discharge: IV abx    Anticipated Discharge date  : 3-5 days      Subjective  States that she feels better today.  His pain on right lower extremity is 3-4 out of 10.  No Cardiorespiratory distress noted.  management plan is explained to the  patient.    Objective  Vital signs in last 24 hours  Temp:  [97.6  F (36.4  C)-99.3  F (37.4  C)] 98.7  F (37.1  C)  Pulse:  [78-84] 82  Resp:  [18] 18  BP: (120-135)/(62-76) 120/70  SpO2:  [90 %-95 %] 93 %    Input and Output in 24 hrs     Intake/Output Summary (Last 24 hours) at 11/23/2021 1122  Last data filed at 11/23/2021 1100  Gross per 24 hour   Intake 1100 ml   Output 600 ml   Net 500 ml       Physical Exam:  GEN: Alert and oriented. Not in acute distress  HEENT: Atraumatic    Pupils- round and reactive to light bilaterally   Neck- supple, no JVP elevation, no lymphadenopathy or thyromegaly   Sclera- anicteric   Mucous membrane- moist and pink  CHEST: Clear to auscultation bilaterally  HEART: S1S2 regular. No murmurs, rubs or gallops  ABDOMEN: Soft. Non-tender, non-distended. No organomegaly. No guarding or rigidity. Bowel sounds- active  Extremities: right LE ulcer with clean dressing , surrounding skin is hyperemic, and slightly tender. Both LE below knee have chronic skin changes of hyperpigmentation and induration.   CNS: No focal neurological deficit. No involuntary movements  SKIN: No skin rash, no cyanosis or clubbing      Pertinent Labs   Lab Results: Personally Reviewed    Recent Results (from the past 24 hour(s))   Glucose by meter    Collection Time: 11/23/21  9:22 PM   Result Value Ref Range    GLUCOSE BY METER POCT 144 (H) 70 - 99 mg/dL   Glucose by meter    Collection Time: 11/24/21  7:57 AM   Result Value Ref Range    GLUCOSE BY METER POCT 259 (H) 70 - 99 mg/dL   Potassium    Collection Time: 11/24/21 10:29 AM   Result Value Ref Range    Potassium 3.9 3.5 - 5.0 mmol/L   Magnesium    Collection Time: 11/24/21 10:29 AM   Result Value Ref Range    Magnesium 1.9 1.8 - 2.6 mg/dL   Creatinine    Collection Time: 11/24/21 10:29 AM   Result Value Ref Range    Creatinine 1.18 (H) 0.60 - 1.10 mg/dL    GFR Estimate 51 (L) >60 mL/min/1.73m2   Glucose by meter    Collection Time: 11/24/21 12:42 PM   Result  Value Ref Range    GLUCOSE BY METER POCT 228 (H) 70 - 99 mg/dL       Pertinent Radiology   Radiology Results reviewed      EKG Results reviewed       Advanced Care Planning      Joanie Nam MD   [facility]

## 2021-11-25 LAB
CREAT SERPL-MCNC: 1.09 MG/DL (ref 0.6–1.1)
GFR SERPL CREATININE-BSD FRML MDRD: 56 ML/MIN/1.73M2
GLUCOSE BLDC GLUCOMTR-MCNC: 100 MG/DL (ref 70–99)
GLUCOSE BLDC GLUCOMTR-MCNC: 108 MG/DL (ref 70–99)
GLUCOSE BLDC GLUCOMTR-MCNC: 111 MG/DL (ref 70–99)
GLUCOSE BLDC GLUCOMTR-MCNC: 143 MG/DL (ref 70–99)
GLUCOSE BLDC GLUCOMTR-MCNC: 158 MG/DL (ref 70–99)
GLUCOSE BLDC GLUCOMTR-MCNC: 284 MG/DL (ref 70–99)
MAGNESIUM SERPL-MCNC: 1.9 MG/DL (ref 1.8–2.6)
POTASSIUM BLD-SCNC: 4 MMOL/L (ref 3.5–5)

## 2021-11-25 PROCEDURE — 99232 SBSQ HOSP IP/OBS MODERATE 35: CPT | Performed by: INTERNAL MEDICINE

## 2021-11-25 PROCEDURE — 83735 ASSAY OF MAGNESIUM: CPT | Performed by: STUDENT IN AN ORGANIZED HEALTH CARE EDUCATION/TRAINING PROGRAM

## 2021-11-25 PROCEDURE — 250N000013 HC RX MED GY IP 250 OP 250 PS 637: Performed by: FAMILY MEDICINE

## 2021-11-25 PROCEDURE — 99232 SBSQ HOSP IP/OBS MODERATE 35: CPT | Performed by: STUDENT IN AN ORGANIZED HEALTH CARE EDUCATION/TRAINING PROGRAM

## 2021-11-25 PROCEDURE — 120N000001 HC R&B MED SURG/OB

## 2021-11-25 PROCEDURE — 250N000011 HC RX IP 250 OP 636: Performed by: INTERNAL MEDICINE

## 2021-11-25 PROCEDURE — 84132 ASSAY OF SERUM POTASSIUM: CPT | Performed by: STUDENT IN AN ORGANIZED HEALTH CARE EDUCATION/TRAINING PROGRAM

## 2021-11-25 PROCEDURE — 250N000011 HC RX IP 250 OP 636: Performed by: FAMILY MEDICINE

## 2021-11-25 PROCEDURE — 36415 COLL VENOUS BLD VENIPUNCTURE: CPT | Performed by: STUDENT IN AN ORGANIZED HEALTH CARE EDUCATION/TRAINING PROGRAM

## 2021-11-25 PROCEDURE — 82565 ASSAY OF CREATININE: CPT | Performed by: STUDENT IN AN ORGANIZED HEALTH CARE EDUCATION/TRAINING PROGRAM

## 2021-11-25 RX ORDER — CEFTRIAXONE 2 G/1
2 INJECTION, POWDER, FOR SOLUTION INTRAMUSCULAR; INTRAVENOUS EVERY 24 HOURS
Status: DISCONTINUED | OUTPATIENT
Start: 2021-11-25 | End: 2021-11-28

## 2021-11-25 RX ADMIN — LEVOTHYROXINE SODIUM 125 MCG: 0.03 TABLET ORAL at 05:57

## 2021-11-25 RX ADMIN — MORPHINE SULFATE 30 MG: 30 TABLET, FILM COATED, EXTENDED RELEASE ORAL at 13:56

## 2021-11-25 RX ADMIN — RIVAROXABAN 15 MG: 15 TABLET, FILM COATED ORAL at 08:28

## 2021-11-25 RX ADMIN — CEFTRIAXONE SODIUM 2 G: 2 INJECTION, POWDER, FOR SOLUTION INTRAMUSCULAR; INTRAVENOUS at 19:01

## 2021-11-25 RX ADMIN — ONDANSETRON 4 MG: 2 INJECTION INTRAMUSCULAR; INTRAVENOUS at 19:06

## 2021-11-25 RX ADMIN — ACETAMINOPHEN 975 MG: 325 TABLET ORAL at 11:34

## 2021-11-25 RX ADMIN — PANTOPRAZOLE SODIUM 40 MG: 40 TABLET, DELAYED RELEASE ORAL at 06:44

## 2021-11-25 RX ADMIN — MORPHINE SULFATE 30 MG: 30 TABLET, FILM COATED, EXTENDED RELEASE ORAL at 08:29

## 2021-11-25 RX ADMIN — PIPERACILLIN SODIUM AND TAZOBACTAM SODIUM 3.38 G: 3; .375 INJECTION, POWDER, LYOPHILIZED, FOR SOLUTION INTRAVENOUS at 13:56

## 2021-11-25 RX ADMIN — LISINOPRIL 2.5 MG: 2.5 TABLET ORAL at 08:29

## 2021-11-25 RX ADMIN — ONDANSETRON 4 MG: 2 INJECTION INTRAMUSCULAR; INTRAVENOUS at 11:34

## 2021-11-25 RX ADMIN — INSULIN ASPART 4 UNITS: 100 INJECTION, SOLUTION INTRAVENOUS; SUBCUTANEOUS at 08:29

## 2021-11-25 RX ADMIN — DOCUSATE SODIUM 100 MG: 100 CAPSULE, LIQUID FILLED ORAL at 08:28

## 2021-11-25 RX ADMIN — ROSUVASTATIN CALCIUM 20 MG: 10 TABLET, FILM COATED ORAL at 20:28

## 2021-11-25 RX ADMIN — ACETAMINOPHEN 975 MG: 325 TABLET ORAL at 19:01

## 2021-11-25 RX ADMIN — TORSEMIDE 20 MG: 20 TABLET ORAL at 08:29

## 2021-11-25 RX ADMIN — BACLOFEN 10 MG: 10 TABLET ORAL at 13:56

## 2021-11-25 RX ADMIN — POLYETHYLENE GLYCOL 3350 17 G: 17 POWDER, FOR SOLUTION ORAL at 08:28

## 2021-11-25 RX ADMIN — DOCUSATE SODIUM 100 MG: 100 CAPSULE, LIQUID FILLED ORAL at 19:01

## 2021-11-25 RX ADMIN — INSULIN ASPART 4 UNITS: 100 INJECTION, SOLUTION INTRAVENOUS; SUBCUTANEOUS at 17:37

## 2021-11-25 RX ADMIN — Medication 1 TABLET: at 08:28

## 2021-11-25 RX ADMIN — VENLAFAXINE HYDROCHLORIDE 150 MG: 150 CAPSULE, EXTENDED RELEASE ORAL at 08:29

## 2021-11-25 RX ADMIN — BACLOFEN 10 MG: 10 TABLET ORAL at 08:28

## 2021-11-25 RX ADMIN — BACLOFEN 10 MG: 10 TABLET ORAL at 20:28

## 2021-11-25 RX ADMIN — VENLAFAXINE HYDROCHLORIDE 75 MG: 75 CAPSULE, EXTENDED RELEASE ORAL at 08:28

## 2021-11-25 RX ADMIN — PIPERACILLIN SODIUM AND TAZOBACTAM SODIUM 3.38 G: 3; .375 INJECTION, POWDER, LYOPHILIZED, FOR SOLUTION INTRAVENOUS at 05:56

## 2021-11-25 RX ADMIN — ACETAMINOPHEN 975 MG: 325 TABLET ORAL at 03:23

## 2021-11-25 RX ADMIN — ASPIRIN 81 MG CHEWABLE TABLET 81 MG: 81 TABLET CHEWABLE at 08:29

## 2021-11-25 RX ADMIN — MORPHINE SULFATE 30 MG: 30 TABLET, FILM COATED, EXTENDED RELEASE ORAL at 20:32

## 2021-11-25 RX ADMIN — INSULIN ASPART 4 UNITS: 100 INJECTION, SOLUTION INTRAVENOUS; SUBCUTANEOUS at 13:52

## 2021-11-25 RX ADMIN — BUPROPION HYDROCHLORIDE 150 MG: 150 TABLET, FILM COATED, EXTENDED RELEASE ORAL at 08:28

## 2021-11-25 RX ADMIN — RIVAROXABAN 15 MG: 15 TABLET, FILM COATED ORAL at 17:38

## 2021-11-25 ASSESSMENT — ACTIVITIES OF DAILY LIVING (ADL)
ADLS_ACUITY_SCORE: 12

## 2021-11-25 NOTE — PHARMACY-VANCOMYCIN DOSING SERVICE
"Pharmacy Vancomycin Note  Date of Service 2021  Patient's  1965   56 year old, female    Indication: Skin and Soft Tissue Infection  Day of Therapy: 3   Current vancomycin regimen:  1500 mg IV q24h  Current vancomycin monitoring method: AUC  Current vancomycin therapeutic monitoring goal: 400-600 mg*h/L    Current estimated CrCl = Estimated Creatinine Clearance: 53.5 mL/min (A) (based on SCr of 1.18 mg/dL (H)).    Creatinine for last 3 days  2021:  3:01 PM Creatinine 1.06 mg/dL  2021:  7:14 AM Creatinine 0.91 mg/dL  2021: 10:29 AM Creatinine 1.18 mg/dL    Recent Vancomycin Levels (past 3 days)  2021:  7:18 PM Vancomycin 14.3 mg/L    Vancomycin IV Administrations (past 72 hours)                   vancomycin (VANCOCIN) 1,500 mg in sodium chloride 0.9 % 250 mL intermittent infusion (mg) 1,500 mg New Bag 21     1,500 mg New Bag 21     1,500 mg New Bag 21    vancomycin (VANCOCIN) 1,500 mg in sodium chloride 0.9 % 250 mL intermittent infusion (mg) 1,500 mg New Bag 21 165                Nephrotoxins and other renal medications (From now, onward)    Start     Dose/Rate Route Frequency Ordered Stop    21 0900  lisinopril (ZESTRIL) tablet 2.5 mg         2.5 mg Oral DAILY 21 0900  torsemide (DEMADEX) tablet 20 mg         20 mg Oral DAILY 21 220  piperacillin-tazobactam (ZOSYN) 3.375 g vial to attach to  mL bag        Note to Pharmacy: For [facility], [facility] and [facility]: For Zosyn-naive patients, use the \"Zosyn initial dose + extended infusion\" order panel.    3.375 g  over 240 Minutes Intravenous EVERY 8 HOURS 21  vancomycin (VANCOCIN) 1,500 mg in sodium chloride 0.9 % 250 mL intermittent infusion         1,500 mg  over 90 Minutes Intravenous EVERY 24 HOURS 21               Contrast Orders - past 72 hours (72h ago, onward)            None    "       Interpretation of levels and current regimen:  Vancomycin level is reflective of -600    Has serum creatinine changed greater than 50% in last 72 hours: No    Renal Function: Stable    InsightRX Prediction of Planned New Vancomycin Regimen  Regimen: 1500 mg IV every 24 hours.  Start time: 21:48 on 11/25/2021  Exposure target: AUC24 (range)400-600 mg/L.hr   AUC24,ss: 544 mg/L.hr  Probability of AUC24 > 400: 96 %  Ctrough,ss: 15.9 mg/L  Probability of Ctrough,ss > 20: 17 %  Probability of nephrotoxicity (Lodise LOUIS 2009): 11 %      Plan:  1. Continue Current Dose  2. Vancomycin monitoring method: AUC  3. Vancomycin therapeutic monitoring goal: 400-600 mg*h/L  4. Pharmacy will check vancomycin levels as appropriate in 3-5 Days.  5. Serum creatinine levels will be ordered daily for the first week of therapy and at least twice weekly for subsequent weeks.    Gail Martin, [facility]

## 2021-11-25 NOTE — PROGRESS NOTES
"INFECTIOUS DISEASE FOLLOW UP NOTE      ASSESSMENT:  1. Right lower extremity cellulitis. Most lower extremity cellulitis is due to beta-hemolytic strep. Improvement now on broad spectrum antibiotics. Skin wound not looking like abscess -- gram stain with no PMNs -- growing corynebacterium (skin aleksandar) and gram negative pat of uncertain significance. Low CRP suggesting infection may not be the main  at this point -- venous stasis, DVT.   2. R leg DVT  3. 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   4. Sulfa allergy.    PLAN:  Ceftriaxone  Elevate leg    Jean Morillo MD  [facility]  [phone] clinic  [employer] paging    ______________________________________________________________________    SUBJECTIVE / INTERVAL HISTORY: leg seems better. Tolerating antibiotics.     Reviewed records in careeverywhere under other chart Tonya Duke.     ROS: All other systems negative except as listed above.        OBJECTIVE:  /58 (BP Location: Left arm)   Pulse 80   Temp 98.5  F (36.9  C) (Oral)   Resp 18   Ht 1.6 m (5' 3\")   Wt 82.4 kg (181 lb 11.2 oz)   SpO2 90%   BMI 32.19 kg/m         Vital Signs  Temp: 98.5  F (36.9  C)  Temp src: Oral  Resp: 18  Pulse: 80  Pulse Rate Source: Monitor  BP: 109/58  BP Location: Left arm    Temp (24hrs), Av.3  F (36.8  C), Min:97.7  F (36.5  C), Max:98.7  F (37.1  C)      GEN: No acute distress.    RESPIRATORY:  Normal breathing pattern.   CARDIOVASCULAR:  Regular rate and rhythm.   ABDOMEN:  Soft, normal bowel sounds, non-tender  EXTREMITIES: R leg less red, still mildly tender around medial wound. Dry skin.   SKIN/HAIR/NAILS:  No rashes  IV: peripheral        Antibiotics:  vanc  Pip/tazo    Pertinent labs:    Recent Labs   Lab 21  0714 21  1501   WBC 6.4 8.5   HGB 10.6* 12.9   HCT 34.7* 40.8    285        Recent Labs   Lab " 11/23/21  0714 11/22/21  1501    135*   CO2 29 33*   BUN 16 20        Lab Results   Component Value Date    CRP 1.0 (H) 11/23/2021    CRP 1.3 (H) 11/22/2021         Lab Results   Component Value Date    ALT <9 11/23/2021    AST 14 11/23/2021    ALKPHOS 63 11/23/2021         MICROBIOLOGY DATA:  [unfilled]    RADIOLOGY:  No results found.

## 2021-11-25 NOTE — PROGRESS NOTES
Progress Note    Date of admission: 11/22/2021    Assessment/Plan    Moraima MONSALVE is a 56-year-old female who lives in a nursing home with pmhx of T2DM on insulin, hypothyroidism, GERD, hyperlipidemia, migraine, chronic pain,?  Spinal cord injury presents from SNF with right lower extremity cellulitis and right leg DVT after failing outpatient therapy.        RLE cellulitis, bilateral chronic venous insufficiency   -was started on doxy 11/9, received IM Rocephin at [facility] on 11/12 continue on Doxy then switched to cefdinir on 11/19 then moved to SNF for wound care.  -Likely needs extended course due to poorly controlled DM.   -has DVT on LE US.(unable to see result in the EPIC)  -trend CRP, CBC  -continue  Vancomycin and zosyn,   - wound care   - ID consult appreciated   - plan to change IV abx to oral if pt continues to improve     Right lower extremity DVT  - Continue  Xarelto   -Chronic venous insufficiency on bilateral legs     T2DM with hyperglycemia  - A1C 9.5 10/25/21 not well controlled.  - Needs better control for wound healing  -lantus 33 untis at bedtime  -carbs 1:10  -medium  sliding scale  -Glucometer  - hypoglycemia protocol  -DM diet  - diabetic educator    Elevated serum creatinine  - today is 1.09, improving  -Renal dosing of vancomycin   -Monitor inputs and outputs  -Avoid nephrotoxic medications    Hx spinal cord injury  -sounds like spinal cord abscess with E coli per her story.  -has been in NH for 1 month, was living with family but strained relationship with one of her sons ended up with in facility. Reports LE spasticity     Hypothyroidism  -continue home Synthroid    Hyperlipidemia  --continue statin    GERD  -continue PPI    Migraines  - stable     DVT PPX : xarelto    Barriers to discharge: IV abx    Anticipated Discharge date  : 3-5 days      Subjective  States that she feels better today.  His pain on right lower extremity is improving.  No Cardiorespiratory distress noted.  management  plan is explained to the patient.    Objective  Vital signs in last 24 hours  Temp:  [97.7  F (36.5  C)-98.7  F (37.1  C)] 98.5  F (36.9  C)  Pulse:  [80-99] 80  Resp:  [16-18] 18  BP: (109-156)/(58-91) 109/58  SpO2:  [90 %-94 %] 90 %    Input and Output in 24 hrs     Intake/Output Summary (Last 24 hours) at 11/23/2021 1122  Last data filed at 11/23/2021 1100  Gross per 24 hour   Intake 1100 ml   Output 600 ml   Net 500 ml       Physical Exam:  GEN: Alert and oriented. Not in acute distress  HEENT: Atraumatic    Pupils- round and reactive to light bilaterally   Neck- supple, no JVP elevation, no lymphadenopathy or thyromegaly   Sclera- anicteric   Mucous membrane- moist and pink  CHEST: Clear to auscultation bilaterally  HEART: S1S2 regular. No murmurs, rubs or gallops  ABDOMEN: Soft. Non-tender, non-distended. No organomegaly. No guarding or rigidity. Bowel sounds- active  Extremities: right LE ulcer with clean dressing , surrounding skin is hyperemic, and slightly tender. Both LE below knee have chronic skin changes of hyperpigmentation and induration.   CNS: No focal neurological deficit. No involuntary movements  SKIN: No skin rash, no cyanosis or clubbing      Pertinent Labs   Lab Results: Personally Reviewed    Recent Results (from the past 24 hour(s))   Glucose by meter    Collection Time: 11/24/21 12:42 PM   Result Value Ref Range    GLUCOSE BY METER POCT 228 (H) 70 - 99 mg/dL   Glucose by meter    Collection Time: 11/24/21  5:13 PM   Result Value Ref Range    GLUCOSE BY METER POCT 111 (H) 70 - 99 mg/dL   Vancomycin level    Collection Time: 11/24/21  7:18 PM   Result Value Ref Range    Vancomycin 14.3   mg/L   Glucose by meter    Collection Time: 11/24/21  8:47 PM   Result Value Ref Range    GLUCOSE BY METER POCT 138 (H) 70 - 99 mg/dL   Glucose by meter    Collection Time: 11/25/21  3:20 AM   Result Value Ref Range    GLUCOSE BY METER POCT 158 (H) 70 - 99 mg/dL   Glucose by meter    Collection Time:  11/25/21  7:50 AM   Result Value Ref Range    GLUCOSE BY METER POCT 284 (H) 70 - 99 mg/dL   Potassium    Collection Time: 11/25/21 10:03 AM   Result Value Ref Range    Potassium 4.0 3.5 - 5.0 mmol/L   Magnesium    Collection Time: 11/25/21 10:03 AM   Result Value Ref Range    Magnesium 1.9 1.8 - 2.6 mg/dL   Creatinine    Collection Time: 11/25/21 10:03 AM   Result Value Ref Range    Creatinine 1.09 0.60 - 1.10 mg/dL    GFR Estimate 56 (L) >60 mL/min/1.73m2       Pertinent Radiology   Radiology Results reviewed      EKG Results reviewed       Advanced Care Planning      Joanie Nam MD   [facility]

## 2021-11-25 NOTE — PLAN OF CARE
Problem: Skin or Soft Tissue Infection  Goal: Infection Symptom Resolution  Outcome: Improving   Wound care q3 days.     Problem: Pain Chronic (Persistent)  Goal: Acceptable Pain Control and Functional Ability  Outcome: Improving   PRN morphine given x1. Pain adequately controlled.    Problem: Hyperglycemia  Goal: Blood Glucose Level Within Targeted Range  Outcome: Improving   Blood sugars 111 and 138.  Did not eat dinner, no carb coverage given.  Very anxious about if her blood sugar were to drop.    Problem: Nausea and Vomiting  Goal: Fluid and Electrolyte Balance  Outcome: Improving   Did c/o nausea, declined any intervention. Did subside after a while.

## 2021-11-25 NOTE — PLAN OF CARE
2/10 pain in her lower extremities.  Controlled with her ms contin scheduled.  Complaints of intermittent nausea. Was given Zofran. Feels it is with motion. Was up to the bathroom when she had a  250 ml emesis. Has not had BM since 11/23. Did get lucia ax this am and colace. Suggested trying suppository. Pt thinking about it.

## 2021-11-26 ENCOUNTER — APPOINTMENT (OUTPATIENT)
Dept: PHYSICAL THERAPY | Facility: HOSPITAL | Age: 56
DRG: 603 | End: 2021-11-26
Payer: MEDICARE

## 2021-11-26 LAB
GLUCOSE BLDC GLUCOMTR-MCNC: 133 MG/DL (ref 70–99)
GLUCOSE BLDC GLUCOMTR-MCNC: 210 MG/DL (ref 70–99)
GLUCOSE BLDC GLUCOMTR-MCNC: 211 MG/DL (ref 70–99)
GLUCOSE BLDC GLUCOMTR-MCNC: 220 MG/DL (ref 70–99)
GLUCOSE BLDC GLUCOMTR-MCNC: 240 MG/DL (ref 70–99)
GLUCOSE BLDC GLUCOMTR-MCNC: 283 MG/DL (ref 70–99)
LIPASE SERPL-CCNC: <9 U/L (ref 0–52)
MAGNESIUM SERPL-MCNC: 1.9 MG/DL (ref 1.8–2.6)
POTASSIUM BLD-SCNC: 3.5 MMOL/L (ref 3.5–5)

## 2021-11-26 PROCEDURE — 250N000013 HC RX MED GY IP 250 OP 250 PS 637: Performed by: STUDENT IN AN ORGANIZED HEALTH CARE EDUCATION/TRAINING PROGRAM

## 2021-11-26 PROCEDURE — 250N000013 HC RX MED GY IP 250 OP 250 PS 637: Performed by: FAMILY MEDICINE

## 2021-11-26 PROCEDURE — 250N000011 HC RX IP 250 OP 636: Performed by: FAMILY MEDICINE

## 2021-11-26 PROCEDURE — 84132 ASSAY OF SERUM POTASSIUM: CPT | Performed by: STUDENT IN AN ORGANIZED HEALTH CARE EDUCATION/TRAINING PROGRAM

## 2021-11-26 PROCEDURE — 99231 SBSQ HOSP IP/OBS SF/LOW 25: CPT | Performed by: INTERNAL MEDICINE

## 2021-11-26 PROCEDURE — 99232 SBSQ HOSP IP/OBS MODERATE 35: CPT | Performed by: INTERNAL MEDICINE

## 2021-11-26 PROCEDURE — 36415 COLL VENOUS BLD VENIPUNCTURE: CPT | Performed by: INTERNAL MEDICINE

## 2021-11-26 PROCEDURE — 250N000011 HC RX IP 250 OP 636: Performed by: INTERNAL MEDICINE

## 2021-11-26 PROCEDURE — 97110 THERAPEUTIC EXERCISES: CPT | Mod: GP

## 2021-11-26 PROCEDURE — 83690 ASSAY OF LIPASE: CPT | Performed by: INTERNAL MEDICINE

## 2021-11-26 PROCEDURE — 120N000001 HC R&B MED SURG/OB

## 2021-11-26 PROCEDURE — 97116 GAIT TRAINING THERAPY: CPT | Mod: GP

## 2021-11-26 PROCEDURE — 83735 ASSAY OF MAGNESIUM: CPT | Performed by: STUDENT IN AN ORGANIZED HEALTH CARE EDUCATION/TRAINING PROGRAM

## 2021-11-26 RX ORDER — MECLIZINE HCL 12.5 MG 12.5 MG/1
12.5 TABLET ORAL 3 TIMES DAILY PRN
Status: DISCONTINUED | OUTPATIENT
Start: 2021-11-26 | End: 2021-12-01 | Stop reason: HOSPADM

## 2021-11-26 RX ORDER — PROMETHAZINE HYDROCHLORIDE 12.5 MG/1
12.5 TABLET ORAL ONCE
Status: COMPLETED | OUTPATIENT
Start: 2021-11-26 | End: 2021-11-26

## 2021-11-26 RX ADMIN — BACLOFEN 10 MG: 10 TABLET ORAL at 20:21

## 2021-11-26 RX ADMIN — RIVAROXABAN 15 MG: 15 TABLET, FILM COATED ORAL at 18:43

## 2021-11-26 RX ADMIN — MORPHINE SULFATE 30 MG: 30 TABLET, FILM COATED, EXTENDED RELEASE ORAL at 09:30

## 2021-11-26 RX ADMIN — PROMETHAZINE HYDROCHLORIDE 12.5 MG: 12.5 TABLET ORAL at 09:30

## 2021-11-26 RX ADMIN — INSULIN ASPART 4 UNITS: 100 INJECTION, SOLUTION INTRAVENOUS; SUBCUTANEOUS at 18:44

## 2021-11-26 RX ADMIN — DOCUSATE SODIUM 100 MG: 100 CAPSULE, LIQUID FILLED ORAL at 20:21

## 2021-11-26 RX ADMIN — ASPIRIN 81 MG CHEWABLE TABLET 81 MG: 81 TABLET CHEWABLE at 09:29

## 2021-11-26 RX ADMIN — BACLOFEN 10 MG: 10 TABLET ORAL at 09:29

## 2021-11-26 RX ADMIN — ROSUVASTATIN CALCIUM 20 MG: 10 TABLET, FILM COATED ORAL at 20:21

## 2021-11-26 RX ADMIN — BUPROPION HYDROCHLORIDE 150 MG: 150 TABLET, FILM COATED, EXTENDED RELEASE ORAL at 09:31

## 2021-11-26 RX ADMIN — BACLOFEN 10 MG: 10 TABLET ORAL at 14:19

## 2021-11-26 RX ADMIN — ACETAMINOPHEN 975 MG: 325 TABLET ORAL at 11:43

## 2021-11-26 RX ADMIN — MORPHINE SULFATE 30 MG: 30 TABLET, FILM COATED, EXTENDED RELEASE ORAL at 14:19

## 2021-11-26 RX ADMIN — LEVOTHYROXINE SODIUM 187.5 MCG: 125 TABLET ORAL at 09:29

## 2021-11-26 RX ADMIN — Medication 1 TABLET: at 09:29

## 2021-11-26 RX ADMIN — INSULIN ASPART 5 UNITS: 100 INJECTION, SOLUTION INTRAVENOUS; SUBCUTANEOUS at 14:20

## 2021-11-26 RX ADMIN — CEFTRIAXONE SODIUM 2 G: 2 INJECTION, POWDER, FOR SOLUTION INTRAMUSCULAR; INTRAVENOUS at 20:21

## 2021-11-26 RX ADMIN — DOCUSATE SODIUM 100 MG: 100 CAPSULE, LIQUID FILLED ORAL at 09:30

## 2021-11-26 RX ADMIN — LISINOPRIL 2.5 MG: 2.5 TABLET ORAL at 09:29

## 2021-11-26 RX ADMIN — VENLAFAXINE HYDROCHLORIDE 75 MG: 75 CAPSULE, EXTENDED RELEASE ORAL at 09:29

## 2021-11-26 RX ADMIN — PANTOPRAZOLE SODIUM 40 MG: 40 TABLET, DELAYED RELEASE ORAL at 08:04

## 2021-11-26 RX ADMIN — RIVAROXABAN 15 MG: 15 TABLET, FILM COATED ORAL at 09:29

## 2021-11-26 RX ADMIN — ACETAMINOPHEN 975 MG: 325 TABLET ORAL at 18:43

## 2021-11-26 RX ADMIN — MORPHINE SULFATE 30 MG: 30 TABLET, FILM COATED, EXTENDED RELEASE ORAL at 20:21

## 2021-11-26 RX ADMIN — VENLAFAXINE HYDROCHLORIDE 150 MG: 150 CAPSULE, EXTENDED RELEASE ORAL at 09:29

## 2021-11-26 RX ADMIN — ONDANSETRON 4 MG: 2 INJECTION INTRAMUSCULAR; INTRAVENOUS at 02:50

## 2021-11-26 ASSESSMENT — ACTIVITIES OF DAILY LIVING (ADL)
ADLS_ACUITY_SCORE: 12

## 2021-11-26 NOTE — PROGRESS NOTES
"INFECTIOUS DISEASE FOLLOW UP NOTE      ASSESSMENT:  1. Right lower extremity cellulitis. Most lower extremity cellulitis is due to beta-hemolytic strep. Improvement now on broad spectrum antibiotics. Skin wound not looking like abscess -- gram stain with no PMNs -- growing corynebacterium (skin aleksandar) and gram negative pat of uncertain significance. Low CRP suggesting infection may not be the main  at this point -- venous stasis, DVT. She is improving.   2. R leg DVT  3. 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   4. Sulfa allergy.    PLAN:  Ceftriaxone, plan complete course with cefdinir one more week, please switch to this tomorrow if she is tolerating oral intake.  Elevate leg    Jean Morillo MD  [facility]  [phone] clinic  [facility]    ______________________________________________________________________    SUBJECTIVE / INTERVAL HISTORY: nausea with vomiting. Leg improving. Working with PT.     Reviewed records in careeverywhere under other chart Tonya Duke.     ROS: All other systems negative except as listed above.        OBJECTIVE:  /70 (BP Location: Left arm, Patient Position: Semi-[place]rowne's)   Pulse 92   Temp 98.1  F (36.7  C) (Oral)   Resp 16   Ht 1.6 m (5' 3\")   Wt 82.4 kg (181 lb 11.2 oz)   SpO2 92%   BMI 32.19 kg/m         Vital Signs  Temp: 98.5  F (36.9  C)  Temp src: Oral  Resp: 18  Pulse: 80  Pulse Rate Source: Monitor  BP: 109/58  BP Location: Left arm    Temp (24hrs), Av.3  F (36.8  C), Min:97.7  F (36.5  C), Max:98.7  F (37.1  C)      GEN: No acute distress.    RESPIRATORY:  Normal breathing pattern.   CARDIOVASCULAR:  Regular rate and rhythm.   ABDOMEN:  Soft, normal bowel sounds, non-tender  EXTREMITIES: R leg less red, still mildly tender around medial wound. Dry skin.   SKIN/HAIR/NAILS:  No rashes  IV: " peripheral        Antibiotics:  Pip/tazo, vanc -->ceftriaxone 11/25-      Pertinent labs:    Recent Labs   Lab 11/23/21  0714 11/22/21  1501   WBC 6.4 8.5   HGB 10.6* 12.9   HCT 34.7* 40.8    285        Recent Labs   Lab 11/23/21  0714 11/22/21  1501    135*   CO2 29 33*   BUN 16 20        Lab Results   Component Value Date    CRP 1.0 (H) 11/23/2021    CRP 1.3 (H) 11/22/2021         Lab Results   Component Value Date    ALT <9 11/23/2021    AST 14 11/23/2021    ALKPHOS 63 11/23/2021         MICROBIOLOGY DATA:  [unfilled]    RADIOLOGY:  No results found.

## 2021-11-26 NOTE — PROVIDER NOTIFICATION
Pt had moderate-sized emesis - requesting other anti-emetic as PRN Zofran is still not due. Pt is allergic to Compazine.

## 2021-11-26 NOTE — PLAN OF CARE
Problem: Adult Inpatient Plan of Care  Goal: Absence of Hospital-Acquired Illness or Injury  Intervention: Prevent Skin Injury  Recent Flowsheet Documentation  Taken 11/26/2021 0100 by Eve Esteban RN  Body Position: position changed independently     Problem: Nausea and Vomiting  Goal: Fluid and Electrolyte Balance  Intervention: Prevent and Manage Nausea and Vomiting  Recent Flowsheet Documentation  Taken 11/26/2021 0240 by Eve Esteban, RN  Nausea/Vomiting Interventions: antiemetic   Around 0200 patient complain  of nausea, one time IV Zofran, symptoms improved. 0600 pt had one time emesis, requesting more medication, this medication was not due at the time. Not able to take her  am schedule medications due to nausea.

## 2021-11-26 NOTE — PLAN OF CARE
Continues to be nauseated. Ate yogurt parfait for dinner. Zofran given around 1900. Resting right now. No bowel movement.

## 2021-11-26 NOTE — PROGRESS NOTES
Hospitalist Progress Note  ADMIT DATE: 11/22/2021     FACILITY: [facility]    PCP: Reed Simpson, [phone]    Assessment/Plan    Tonya Valera is a 56 year old female who lives in a nursing home with pmhx of T2DM on insulin, hypothyroidism, GERD, hyperlipidemia, migraine, chronic pain,?  Spinal cord injury presents from SNF with right lower extremity cellulitis and right leg DVT after failing outpatient therapy.        RLE cellulitis, bilateral chronic venous insufficiency   -was started on doxy 11/9, received IM Rocephin at [facility] on 11/12 continue on Doxy then switched to cefdinir on 11/19 then moved to SNF for wound care.  -Likely needs extended course due to poorly controlled DM.   -has DVT on LE US.(unable to see result in the EPIC)  -trend CRP, CBC  -was on  Vancomycin and zosyn- id changed to Rocephin on 11/25  - wound care   - ID consult appreciated   - plan to change IV abx to oral if pt continues to improve    Nausea and vomiting  Dizziness/vertigo  -Zofran prn, protonix  -start Meclizine   -no fever and no abdominal pain  -if not improving as expected, might consider CT  -recent LFT wnl. Add Lipase.      Right lower extremity DVT  - Continue  Xarelto   -Chronic venous insufficiency on bilateral legs     T2DM with hyperglycemia  - A1C 9.5 10/25/21 not well controlled.  - Needs better control for wound healing  -lantus 33 untis at bedtime  -carbs 1:10  -medium  sliding scale  -Glucometer  - hypoglycemia protocol  -DM diet  - diabetic educator     Elevated serum creatinine  - cr 1.09, improving  -Monitor inputs and outputs  -Avoid nephrotoxic medications     Hx spinal cord injury  -sounds like spinal cord abscess with E coli per her story.  -has been in NH for 1 month, was living with family but strained relationship with one of her sons ended up with in facility. Reports LE spasticity     Hypothyroidism  -continue home Synthroid     Hyperlipidemia  --continue  statin     GERD  -continue PPI     Migraines  - stable      DVT PPX : xarelto     Barriers to discharge: IV abx     Anticipated Discharge date  : 2-4 days      Subjective  C/o vomiting x3, and dizziness/vertigo. No fever or abdominal pain.     Objective    Vital signs in last 24 hours  Temp:  [98.3  F (36.8  C)-98.5  F (36.9  C)] 98.3  F (36.8  C)  Pulse:  [80-90] 90  Resp:  [18] 18  BP: (109-139)/(58-76) 139/76  SpO2:  [90 %-93 %] 91 % [unfilled] O2 Device: None (Room air)    Weight:   [unfilled] Weight change:     Intake/Output last 3 shifts  I/O last 3 completed shifts:  In: 1630 [P.O.:1430; I.V.:200]  Out: -   Body mass index is 32.19 kg/m .    Physical Exam    Physical Exam  General appearance: not in acute distress  HEENT: PERRL, EOMI  Lungs: Clear breath sounds in bilateral lung fields  Cardiovascular: Regular rate and rhythm, normal S1-S2  Abdomen: Soft, non tender, no distension, normal bowel sound  Musculoskeletal: No joint swelling  Skin: right leg wound with surrounding cellulitis   Neurology: AAO ×3.  Cranial nerves II - XII normal.  Normal muscle strength in all four extremities.      Pertinent Labs   Lab Results: personally reviewed.   Results for FRANCHESKA ALEXANDER (MRN [id number]) as of 11/26/2021 07:24   Ref. Range 11/26/2021 02:04   GLUCOSE BY METER POCT Latest Ref Range: 70 - 99 mg/dL 133 (H)       Medications  Scheduled Meds:    acetaminophen  975 mg Oral Q8H     aspirin  81 mg Oral Daily     baclofen  10 mg Oral TID     buPROPion  150 mg Oral Daily     calcium carbonate-vitamin D  1 tablet Oral Daily     cefTRIAXone  2 g Intravenous Q24H     docusate sodium  100 mg Oral BID     influenza recomb quadrivalent PF  0.5 mL Intramuscular Prior to discharge     insulin aspart   Subcutaneous TID w/meals     insulin aspart  1-7 Units Subcutaneous TID AC     insulin aspart  1-5 Units Subcutaneous At Bedtime     insulin glargine  35 Units Subcutaneous At Bedtime     levothyroxine  187.5 mcg Oral Once per  day on Mon Fri     levothyroxine  125 mcg Oral Once per day on Sun Tue Wed Thu Sat     lisinopril  2.5 mg Oral Daily     morphine  30 mg Oral TID     pantoprazole  40 mg Oral QAM AC     polyethylene glycol  17 g Oral Every Other Day     promethazine  12.5 mg Oral Once     rivaroxaban ANTICOAGULANT  15 mg Oral BID w/meals     rosuvastatin  20 mg Oral At Bedtime     sodium chloride (PF)  3 mL Intracatheter Q8H     torsemide  20 mg Oral Daily     venlafaxine  150 mg Oral Daily     venlafaxine  75 mg Oral Daily     Continuous Infusions:    - MEDICATION INSTRUCTIONS -       PRN Meds:.bisacodyl, glucose **OR** dextrose **OR** glucagon, hydrOXYzine, lidocaine 4%, lidocaine (buffered or not buffered), melatonin, morphine, naloxone **OR** naloxone **OR** naloxone **OR** naloxone, ondansetron **OR** ondansetron, - MEDICATION INSTRUCTIONS -, polyethylene glycol, sodium chloride (PF), SUMAtriptan    Advanced Care Planning:  Discharge planning discussed with patient         [facility] Medicine Service  Jessica Carlisle

## 2021-11-26 NOTE — PROGRESS NOTES
"Care Management Follow Up    Length of Stay (days): 4        Patient plan of care discussed at interdisciplinary rounds: No    Expected Discharge Date:   11/28 or 11/29/21       Concerns to be Addressed / Barriers to Discharge: medical managment     Anticipated Discharge Disposition: return to assisted living    Anticipated Discharge Services:  Home care RN, PT, OT      Patient/family educated on Medicare website which has current facility and service quality ratings:  Previously done    Education Provided on the Discharge Plan:     Patient/Family in Agreement with the Plan:       Referrals Placed by CM/SW:       Additional Information:  Per CM note 11/23 \" PT recommendations home therapy. Met with pt to introduce care management role, discuss therapies recommendations and assist with discharge plan. Pt agrees to discharge back to [facility] with therapies.   () spoke with Perla, Director of Nursing at [facility], updated on PT recommendations of home therapy. Patient currently open with [facility] for skilled nursing (for wound care). Referral made to [facility] for resumption, adding skilled OT/PT. ORLANDO Duncan at [facility] notes that she would need verbal report and progress notes faxed to [facility] [phone] for review to determine if able to meet pt's needs.\"    3:50 PM Per Hospitalist progress note, potential discharge Sun 11/28. Called and left message for admissions, Peral ANDERSEN nurse @ [facility] [phone] to update re potential discharge Sun 11/28. Faxed updated referral as requested to [facility]. Requested return call to [phone].  "

## 2021-11-26 NOTE — PLAN OF CARE
Problem: Adult Inpatient Plan of Care  Goal: Optimal Comfort and Wellbeing  Outcome: Improving     Patient nauseous and drowsy today. Did not eat breakfast, however ate about 100% of lunch. Has been sleeping most of today.

## 2021-11-27 ENCOUNTER — APPOINTMENT (OUTPATIENT)
Dept: OCCUPATIONAL THERAPY | Facility: HOSPITAL | Age: 56
DRG: 603 | End: 2021-11-27
Payer: MEDICARE

## 2021-11-27 LAB
BACTERIA BLD CULT: NO GROWTH
BACTERIA BLD CULT: NO GROWTH
BACTERIA SPEC CULT: ABNORMAL
GLUCOSE BLDC GLUCOMTR-MCNC: 153 MG/DL (ref 70–99)
GLUCOSE BLDC GLUCOMTR-MCNC: 162 MG/DL (ref 70–99)
GLUCOSE BLDC GLUCOMTR-MCNC: 195 MG/DL (ref 70–99)
GLUCOSE BLDC GLUCOMTR-MCNC: 260 MG/DL (ref 70–99)
GRAM STAIN RESULT: ABNORMAL
GRAM STAIN RESULT: ABNORMAL
HOLD SPECIMEN: NORMAL
MAGNESIUM SERPL-MCNC: 1.9 MG/DL (ref 1.8–2.6)
POTASSIUM BLD-SCNC: 3.7 MMOL/L (ref 3.5–5)

## 2021-11-27 PROCEDURE — 97535 SELF CARE MNGMENT TRAINING: CPT | Mod: GO

## 2021-11-27 PROCEDURE — 250N000013 HC RX MED GY IP 250 OP 250 PS 637: Performed by: FAMILY MEDICINE

## 2021-11-27 PROCEDURE — 83735 ASSAY OF MAGNESIUM: CPT | Performed by: INTERNAL MEDICINE

## 2021-11-27 PROCEDURE — 120N000001 HC R&B MED SURG/OB

## 2021-11-27 PROCEDURE — 36415 COLL VENOUS BLD VENIPUNCTURE: CPT | Performed by: INTERNAL MEDICINE

## 2021-11-27 PROCEDURE — 99233 SBSQ HOSP IP/OBS HIGH 50: CPT | Performed by: INTERNAL MEDICINE

## 2021-11-27 PROCEDURE — 99232 SBSQ HOSP IP/OBS MODERATE 35: CPT | Performed by: INTERNAL MEDICINE

## 2021-11-27 PROCEDURE — 250N000011 HC RX IP 250 OP 636: Performed by: INTERNAL MEDICINE

## 2021-11-27 PROCEDURE — 250N000009 HC RX 250: Performed by: INTERNAL MEDICINE

## 2021-11-27 PROCEDURE — 84132 ASSAY OF SERUM POTASSIUM: CPT | Performed by: INTERNAL MEDICINE

## 2021-11-27 RX ORDER — SENNOSIDES 8.6 MG
8.6 TABLET ORAL 2 TIMES DAILY PRN
Status: DISCONTINUED | OUTPATIENT
Start: 2021-11-27 | End: 2021-12-01 | Stop reason: HOSPADM

## 2021-11-27 RX ORDER — DOXYCYCLINE 100 MG/10ML
100 INJECTION, POWDER, LYOPHILIZED, FOR SOLUTION INTRAVENOUS EVERY 12 HOURS
Status: DISCONTINUED | OUTPATIENT
Start: 2021-11-27 | End: 2021-11-29

## 2021-11-27 RX ADMIN — DOXYCYCLINE 100 MG: 100 INJECTION, POWDER, LYOPHILIZED, FOR SOLUTION INTRAVENOUS at 14:41

## 2021-11-27 RX ADMIN — RIVAROXABAN 15 MG: 15 TABLET, FILM COATED ORAL at 17:13

## 2021-11-27 RX ADMIN — MORPHINE SULFATE 30 MG: 30 TABLET, FILM COATED, EXTENDED RELEASE ORAL at 08:52

## 2021-11-27 RX ADMIN — Medication 7.5 MG: at 01:26

## 2021-11-27 RX ADMIN — VENLAFAXINE HYDROCHLORIDE 150 MG: 150 CAPSULE, EXTENDED RELEASE ORAL at 08:51

## 2021-11-27 RX ADMIN — ASPIRIN 81 MG CHEWABLE TABLET 81 MG: 81 TABLET CHEWABLE at 08:52

## 2021-11-27 RX ADMIN — BACLOFEN 10 MG: 10 TABLET ORAL at 14:41

## 2021-11-27 RX ADMIN — LISINOPRIL 2.5 MG: 2.5 TABLET ORAL at 08:52

## 2021-11-27 RX ADMIN — LEVOTHYROXINE SODIUM 125 MCG: 0.03 TABLET ORAL at 06:48

## 2021-11-27 RX ADMIN — CEFTRIAXONE SODIUM 2 G: 2 INJECTION, POWDER, FOR SOLUTION INTRAMUSCULAR; INTRAVENOUS at 20:24

## 2021-11-27 RX ADMIN — INSULIN ASPART 2 UNITS: 100 INJECTION, SOLUTION INTRAVENOUS; SUBCUTANEOUS at 10:59

## 2021-11-27 RX ADMIN — VENLAFAXINE HYDROCHLORIDE 75 MG: 75 CAPSULE, EXTENDED RELEASE ORAL at 08:51

## 2021-11-27 RX ADMIN — MORPHINE SULFATE 30 MG: 30 TABLET, FILM COATED, EXTENDED RELEASE ORAL at 20:24

## 2021-11-27 RX ADMIN — DOCUSATE SODIUM 100 MG: 100 CAPSULE, LIQUID FILLED ORAL at 20:24

## 2021-11-27 RX ADMIN — ACETAMINOPHEN 975 MG: 325 TABLET ORAL at 10:59

## 2021-11-27 RX ADMIN — POLYETHYLENE GLYCOL 3350 17 G: 17 POWDER, FOR SOLUTION ORAL at 08:52

## 2021-11-27 RX ADMIN — ROSUVASTATIN CALCIUM 20 MG: 10 TABLET, FILM COATED ORAL at 20:24

## 2021-11-27 RX ADMIN — BACLOFEN 10 MG: 10 TABLET ORAL at 20:24

## 2021-11-27 RX ADMIN — ACETAMINOPHEN 975 MG: 325 TABLET ORAL at 03:19

## 2021-11-27 RX ADMIN — MORPHINE SULFATE 30 MG: 30 TABLET, FILM COATED, EXTENDED RELEASE ORAL at 14:41

## 2021-11-27 RX ADMIN — RIVAROXABAN 15 MG: 15 TABLET, FILM COATED ORAL at 08:51

## 2021-11-27 RX ADMIN — INSULIN ASPART 6 UNITS: 100 INJECTION, SOLUTION INTRAVENOUS; SUBCUTANEOUS at 18:09

## 2021-11-27 RX ADMIN — DOCUSATE SODIUM 100 MG: 100 CAPSULE, LIQUID FILLED ORAL at 08:52

## 2021-11-27 RX ADMIN — BACLOFEN 10 MG: 10 TABLET ORAL at 08:51

## 2021-11-27 RX ADMIN — ACETAMINOPHEN 975 MG: 325 TABLET ORAL at 18:13

## 2021-11-27 RX ADMIN — PANTOPRAZOLE SODIUM 40 MG: 40 TABLET, DELAYED RELEASE ORAL at 06:49

## 2021-11-27 RX ADMIN — BUPROPION HYDROCHLORIDE 150 MG: 150 TABLET, FILM COATED, EXTENDED RELEASE ORAL at 08:52

## 2021-11-27 RX ADMIN — Medication 1 TABLET: at 08:52

## 2021-11-27 ASSESSMENT — ACTIVITIES OF DAILY LIVING (ADL)
ADLS_ACUITY_SCORE: 12

## 2021-11-27 NOTE — PROGRESS NOTES
"Care Management Follow Up    Length of Stay (days): 5        Patient plan of care discussed at interdisciplinary rounds: no    Expected Discharge Date:   11/29/21       Concerns to be Addressed / Barriers to Discharge: medical management, IV meds as ordered     Anticipated Discharge Disposition: pending      Additional Information:  11/27/2021 No return call from assisted living staff re: discharge planning, or acceptance. (see care note 11/26 for further details.) Noted patient now also receiving IV Doxycycline.    Per ID progress note 11/27/21, \" Continue Ceftriaxone and add doxycycline  Plan complete course with cefdinir and doxycycline one more week, please switch to PO antibiotics when she is tolerating oral intake.\" Unknown if assisted living can accommodate home infusion. No answer at [facility].   "

## 2021-11-27 NOTE — PLAN OF CARE
Problem: Adult Inpatient Plan of Care  Goal: Plan of Care Review  Outcome: Improving     Problem: Adult Inpatient Plan of Care  Goal: Optimal Comfort and Wellbeing  Outcome: Improving   Patient tolerating diet. Pain controlled with scheduled morphine. Assist of 1 with ambulation. RLE dressing intact. Patient refusing to take full dose of lantus at , MD updated and ok to give half dose (17units) per patient request.  Mirela Garcia RN

## 2021-11-27 NOTE — PROGRESS NOTES
Hospitalist Progress Note  ADMIT DATE: 11/22/2021     FACILITY: [facility]    PCP: Reed Simpson, [phone]    Assessment/Plan    Tonya Valera is a 56 year old female who lives in a nursing home with pmhx of T2DM on insulin, hypothyroidism, GERD, hyperlipidemia, migraine, chronic pain,?  Spinal cord injury presents from SNF with right lower extremity cellulitis and right leg DVT after failing outpatient therapy.        RLE cellulitis, bilateral chronic venous insufficiency   -was started on doxy 11/9, received IM Rocephin at [facility] on 11/12 continue on Doxy then switched to cefdinir on 11/19 then moved to SNF for wound care.  -Likely needs extended course due to poorly controlled DM.   -has DVT on LE US.(unable to see result in the EPIC)  -trend CRP, CBC  -was on  Vancomycin and zosyn- id changed to Rocephin on 11/25  - wound care   - ID consult appreciated   - plan to change IV abx to oral if pt continues to improve and no more vomiting (pt still vomit, 3 times a day)     Nausea and vomiting  Dizziness/vertigo  -Zofran prn, protonix  -start Meclizine   -no fever and no abdominal pain  -if not improving as expected, might consider CT  -recent LFT wnl. Add Lipase.   -Add senna for consitpation  -other ddx include gastroparesis; might consider reglan prn     Right lower extremity DVT  - Continue  Xarelto   -Chronic venous insufficiency on bilateral legs     T2DM with hyperglycemia  - A1C 9.5 10/25/21 not well controlled.  - Needs better control for wound healing  -lantus 35 untis at bedtime -patient is not compliant and only took 17 units at night, refusing to take as prescribed; Discussed with pt about the importance of compliance. Patient worried about hypoglycemia. She agrees to try 20 units at bedtime tonight.   -carbs 1:10  -medium  sliding scale  -Glucometer  - hypoglycemia protocol  -DM diet  - diabetic educator     Elevated serum creatinine  - cr 1.09, improving  -Monitor  inputs and outputs  -Avoid nephrotoxic medications     Hx spinal cord injury  -sounds like spinal cord abscess with E coli per her story.  -has been in NH for 1 month, was living with family but strained relationship with one of her sons ended up with in facility. Reports LE spasticity     Hypothyroidism  -continue home Synthroid     Hyperlipidemia  --continue statin     GERD  -continue PPI     Migraines  - stable      DVT PPX : xarelto     Barriers to discharge: IV abx, n/v     Anticipated Discharge date  : 1-3 days    Subjective  C/o n/v x3. No fever.     Objective    Vital signs in last 24 hours  Temp:  [98  F (36.7  C)-98.5  F (36.9  C)] 98  F (36.7  C)  Pulse:  [75-94] 90  Resp:  [16-18] 18  BP: (111-128)/(62-72) 118/72  SpO2:  [91 %-96 %] 94 % [unfilled] O2 Device: None (Room air)    Weight:   [unfilled] Weight change:     Intake/Output last 3 shifts  I/O last 3 completed shifts:  In: 540 [P.O.:540]  Out: -   Body mass index is 32.19 kg/m .    Physical Exam    Physical Exam  General appearance: not in acute distress  HEENT: PERRL, EOMI  Lungs: Clear breath sounds in bilateral lung fields  Cardiovascular: Regular rate and rhythm, normal S1-S2  Abdomen: Soft, non tender, no distension, normal bowel sound  Musculoskeletal: No joint swelling; right lower leg wound with cellulitis  Skin: No rash and no edema  Neurology: AAO ×3.  Cranial nerves II - XII normal.  Normal muscle strength in all four extremities.      Pertinent Labs   Lab Results: personally reviewed.   Results for FRANCHESKA ALEXANDER (MRN [id number]) as of 11/27/2021 10:34   Ref. Range 11/26/2021 20:55 11/27/2021 08:23 11/27/2021 09:16   Potassium Latest Ref Range: 3.5 - 5.0 mmol/L   3.7   Magnesium Latest Ref Range: 1.8 - 2.6 mg/dL   1.9   GLUCOSE BY METER POCT Latest Ref Range: 70 - 99 mg/dL 240 (H) 162 (H)        Medications  Scheduled Meds:    acetaminophen  975 mg Oral Q8H     aspirin  81 mg Oral Daily     baclofen  10 mg Oral TID     buPROPion  150  mg Oral Daily     calcium carbonate-vitamin D  1 tablet Oral Daily     cefTRIAXone  2 g Intravenous Q24H     docusate sodium  100 mg Oral BID     influenza recomb quadrivalent PF  0.5 mL Intramuscular Prior to discharge     insulin aspart   Subcutaneous TID w/meals     insulin aspart  1-7 Units Subcutaneous TID AC     insulin aspart  1-5 Units Subcutaneous At Bedtime     insulin glargine  35 Units Subcutaneous At Bedtime     levothyroxine  187.5 mcg Oral Once per day on Mon Fri     levothyroxine  125 mcg Oral Once per day on Sun Tue Wed Thu Sat     lisinopril  2.5 mg Oral Daily     morphine  30 mg Oral TID     pantoprazole  40 mg Oral QAM AC     polyethylene glycol  17 g Oral Every Other Day     rivaroxaban ANTICOAGULANT  15 mg Oral BID w/meals     rosuvastatin  20 mg Oral At Bedtime     sodium chloride (PF)  3 mL Intracatheter Q8H     torsemide  20 mg Oral Daily     venlafaxine  150 mg Oral Daily     venlafaxine  75 mg Oral Daily     Continuous Infusions:    - MEDICATION INSTRUCTIONS -       PRN Meds:.bisacodyl, glucose **OR** dextrose **OR** glucagon, hydrOXYzine, lidocaine 4%, lidocaine (buffered or not buffered), meclizine, melatonin, morphine, naloxone **OR** naloxone **OR** naloxone **OR** naloxone, ondansetron **OR** ondansetron, - MEDICATION INSTRUCTIONS -, polyethylene glycol, sodium chloride (PF), SUMAtriptan    Advanced Care Planning:  Discharge planning discussed with patient         [facility] Medicine Service  Jessica Carlisle

## 2021-11-27 NOTE — PROVIDER NOTIFICATION
Text page to Dr. Carlisle:   I saw you increased lantus to 40 units. However, she has been refusing to take the full dose of 35 units, only allowed PM shift nurse to administer half, 17 units. Please talk with her today.

## 2021-11-27 NOTE — PLAN OF CARE
Problem: Adult Inpatient Plan of Care  Goal: Optimal Comfort and Wellbeing  Outcome: Improving     Denies nausea today, ate breakfast and ice cream for lunch. IV doxycyline added by Infectious Disease doctor. Participated in therapy.

## 2021-11-27 NOTE — PROGRESS NOTES
"INFECTIOUS DISEASE FOLLOW UP NOTE      ASSESSMENT:  1. Cellulitis- active issue  Right lower extremity cellulitis. Polymicrobial infection with Ewingella americana and Corynebacterium  Most lower extremity cellulitis is due to beta-hemolytic strep. Improvement now on broad spectrum antibiotics. Skin wound not looking like abscess -- gram stain with no PMNs -- growing corynebacterium (skin aleksandar) and gram negative pat of uncertain significance. Low CRP suggesting infection may not be the main  at this point -- venous stasis, DVT. She is improving.   2. R leg DVT  3. 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   4. Sulfa allergy.    PLAN:    Continue Ceftriaxone and add doxycycline  Plan complete course with cefdinir and doxycycline one more week, please switch to PO antibiotics when she is tolerating oral intake.  Elevate leg  ID will sign off. Plan in place per Dr. Morillo. Please call with questions.  Patient new to me on 2021. Please see previous ID notes    Yane Cedeño MD  [facility]  [phone]        ______________________________________________________________________    SUBJECTIVE / INTERVAL HISTORY: overall better    Still has nausea    Previously:  nausea with vomiting. Leg improving. Working with PT.     Reviewed records in careeverywhere under other chart Tonya Duke.     ROS: All other systems negative except as listed above.        OBJECTIVE:  /72 (BP Location: Right arm)   Pulse 90   Temp 98  F (36.7  C) (Oral)   Resp 18   Ht 1.6 m (5' 3\")   Wt 82.4 kg (181 lb 11.2 oz)   SpO2 94%   BMI 32.19 kg/m         Vital Signs  Temp: 98.5  F (36.9  C)  Temp src: Oral  Resp: 18  Pulse: 80  Pulse Rate Source: Monitor  BP: 109/58  BP Location: Left arm    Temp (24hrs), Av.3  F (36.8  C), Min:97.7  F (36.5  C), Max:98.7  F (37.1  C)    Exam on 2021   GEN: No " acute distress. Lying in bed    RESPIRATORY:  Normal breathing pattern.   CARDIOVASCULAR:  Regular rate and rhythm.   ABDOMEN:  Soft, normal bowel sounds, non-tender  EXTREMITIES: R leg less red, faint erythema, decreased with skin wrinkling, still mildly tender around medial wound. Dry skin.   SKIN/HAIR/NAILS:  No rashes  IV: peripheral        Antibiotics:  Pip/tazo, vanc -->ceftriaxone 11/25-  Doxycycline 11/27/2021 --    Pertinent labs:    Recent Labs   Lab 11/23/21  0714 11/22/21  1501   WBC 6.4 8.5   HGB 10.6* 12.9   HCT 34.7* 40.8    285        Recent Labs   Lab 11/23/21  0714 11/22/21  1501    135*   CO2 29 33*   BUN 16 20        Lab Results   Component Value Date    CRP 1.0 (H) 11/23/2021    CRP 1.3 (H) 11/22/2021         Lab Results   Component Value Date    ALT <9 11/23/2021    AST 14 11/23/2021    ALKPHOS 63 11/23/2021         MICROBIOLOGY DATA:    Collected Updated Procedure Result Status    11/22/2021 1844 11/27/2021 1138 Swab Aerobic Bacterial Culture Routine with Gram Stain [[id number]]    (Abnormal)   Swab from Leg, Right    Final result Component Value   Culture 1+ Ewingella americana Abnormal     1+ Corynebacterium striatum Abnormal     Identification obtained by MALDI-TOF mass spectrometry research use only database. Test characteristics determined and verified by the Infectious Diseases Diagnostic Laboratory.   Susceptibilities not routinely done    1+ Normal aleksandar   Gram Stain Result No organisms seen    No white blood cells seen         Susceptibility     Ewingella americana     TUYET     Ampicillin >16 ug/mL Resistant     Ampicillin/ Sulbactam >16 ug/mL Resistant     Cefepime <=2 ug/mL Susceptible     Ceftazidime <=1 ug/mL Susceptible     Ceftriaxone <=1 ug/mL Susceptible     Ciprofloxacin  See below 1     Gentamicin <=1 ug/mL Susceptible     Levofloxacin <=0.25 ug/mL Susceptible     Meropenem <=1 ug/mL Susceptible     Piperacillin/Tazobactam <=4 ug/mL Susceptible     Tobramycin  <=1 ug/mL Susceptible     Trimethoprim/Sulfamethoxazole <=2/38 ug/mL Susceptible              1 Ciprofloxacin not resistant.  Due to test limitations, lab cannot provide TUYET to determine susceptibility.

## 2021-11-27 NOTE — PLAN OF CARE
"  Problem: Pain Chronic (Persistent)  Goal: Acceptable Pain Control and Functional Ability  Outcome: No Change  Intervention: Develop Pain Management Plan  Recent Flowsheet Documentation  Taken 11/27/2021 0319 by Sepideh Howard RN  Pain Management Interventions: (scheduled tylenol) medication (see MAR)  Taken 11/27/2021 0126 by Sepideh Howard RN  Pain Management Interventions: medication (see MAR)   She states she always has pain; \"everything hurts from the waist down all of the time.  The meds just help make it tolerable.\"  She had her scheduled 30mg of morphine at 8pm & then requested her 7.5mg prn at 0120.  Pain went from a 6 to a 3.  "

## 2021-11-28 LAB
GLUCOSE BLDC GLUCOMTR-MCNC: 137 MG/DL (ref 70–99)
GLUCOSE BLDC GLUCOMTR-MCNC: 228 MG/DL (ref 70–99)
GLUCOSE BLDC GLUCOMTR-MCNC: 252 MG/DL (ref 70–99)
GLUCOSE BLDC GLUCOMTR-MCNC: 270 MG/DL (ref 70–99)
MAGNESIUM SERPL-MCNC: 1.8 MG/DL (ref 1.8–2.6)
POTASSIUM BLD-SCNC: 4 MMOL/L (ref 3.5–5)

## 2021-11-28 PROCEDURE — 250N000009 HC RX 250: Performed by: INTERNAL MEDICINE

## 2021-11-28 PROCEDURE — 120N000001 HC R&B MED SURG/OB

## 2021-11-28 PROCEDURE — 250N000013 HC RX MED GY IP 250 OP 250 PS 637: Performed by: FAMILY MEDICINE

## 2021-11-28 PROCEDURE — 99232 SBSQ HOSP IP/OBS MODERATE 35: CPT | Performed by: INTERNAL MEDICINE

## 2021-11-28 PROCEDURE — 84132 ASSAY OF SERUM POTASSIUM: CPT | Performed by: INTERNAL MEDICINE

## 2021-11-28 PROCEDURE — 36415 COLL VENOUS BLD VENIPUNCTURE: CPT | Performed by: INTERNAL MEDICINE

## 2021-11-28 PROCEDURE — 83735 ASSAY OF MAGNESIUM: CPT | Performed by: INTERNAL MEDICINE

## 2021-11-28 PROCEDURE — 250N000013 HC RX MED GY IP 250 OP 250 PS 637: Performed by: INTERNAL MEDICINE

## 2021-11-28 RX ORDER — CEFDINIR 300 MG/1
300 CAPSULE ORAL EVERY 12 HOURS SCHEDULED
Status: DISCONTINUED | OUTPATIENT
Start: 2021-11-28 | End: 2021-11-29

## 2021-11-28 RX ADMIN — ACETAMINOPHEN 975 MG: 325 TABLET ORAL at 18:40

## 2021-11-28 RX ADMIN — DOXYCYCLINE 100 MG: 100 INJECTION, POWDER, LYOPHILIZED, FOR SOLUTION INTRAVENOUS at 02:07

## 2021-11-28 RX ADMIN — ASPIRIN 81 MG CHEWABLE TABLET 81 MG: 81 TABLET CHEWABLE at 09:00

## 2021-11-28 RX ADMIN — Medication 1 TABLET: at 08:59

## 2021-11-28 RX ADMIN — MORPHINE SULFATE 30 MG: 30 TABLET, FILM COATED, EXTENDED RELEASE ORAL at 20:36

## 2021-11-28 RX ADMIN — BUPROPION HYDROCHLORIDE 150 MG: 150 TABLET, FILM COATED, EXTENDED RELEASE ORAL at 08:59

## 2021-11-28 RX ADMIN — CEFDINIR 300 MG: 300 CAPSULE ORAL at 20:35

## 2021-11-28 RX ADMIN — PANTOPRAZOLE SODIUM 40 MG: 40 TABLET, DELAYED RELEASE ORAL at 06:31

## 2021-11-28 RX ADMIN — VENLAFAXINE HYDROCHLORIDE 150 MG: 150 CAPSULE, EXTENDED RELEASE ORAL at 08:59

## 2021-11-28 RX ADMIN — ACETAMINOPHEN 975 MG: 325 TABLET ORAL at 11:36

## 2021-11-28 RX ADMIN — BACLOFEN 10 MG: 10 TABLET ORAL at 20:35

## 2021-11-28 RX ADMIN — CEFDINIR 300 MG: 300 CAPSULE ORAL at 08:59

## 2021-11-28 RX ADMIN — BACLOFEN 10 MG: 10 TABLET ORAL at 13:52

## 2021-11-28 RX ADMIN — RIVAROXABAN 15 MG: 15 TABLET, FILM COATED ORAL at 17:18

## 2021-11-28 RX ADMIN — DOXYCYCLINE 100 MG: 100 INJECTION, POWDER, LYOPHILIZED, FOR SOLUTION INTRAVENOUS at 13:53

## 2021-11-28 RX ADMIN — LEVOTHYROXINE SODIUM 125 MCG: 0.03 TABLET ORAL at 05:50

## 2021-11-28 RX ADMIN — BACLOFEN 10 MG: 10 TABLET ORAL at 09:00

## 2021-11-28 RX ADMIN — LISINOPRIL 2.5 MG: 2.5 TABLET ORAL at 08:59

## 2021-11-28 RX ADMIN — ACETAMINOPHEN 975 MG: 325 TABLET ORAL at 02:06

## 2021-11-28 RX ADMIN — DOCUSATE SODIUM 100 MG: 100 CAPSULE, LIQUID FILLED ORAL at 20:35

## 2021-11-28 RX ADMIN — INSULIN ASPART 5 UNITS: 100 INJECTION, SOLUTION INTRAVENOUS; SUBCUTANEOUS at 19:51

## 2021-11-28 RX ADMIN — MORPHINE SULFATE 30 MG: 30 TABLET, FILM COATED, EXTENDED RELEASE ORAL at 13:52

## 2021-11-28 RX ADMIN — ROSUVASTATIN CALCIUM 20 MG: 10 TABLET, FILM COATED ORAL at 20:36

## 2021-11-28 RX ADMIN — MORPHINE SULFATE 30 MG: 30 TABLET, FILM COATED, EXTENDED RELEASE ORAL at 09:00

## 2021-11-28 RX ADMIN — VENLAFAXINE HYDROCHLORIDE 75 MG: 75 CAPSULE, EXTENDED RELEASE ORAL at 08:59

## 2021-11-28 RX ADMIN — RIVAROXABAN 15 MG: 15 TABLET, FILM COATED ORAL at 08:59

## 2021-11-28 RX ADMIN — INSULIN ASPART 5 UNITS: 100 INJECTION, SOLUTION INTRAVENOUS; SUBCUTANEOUS at 11:37

## 2021-11-28 RX ADMIN — DOCUSATE SODIUM 100 MG: 100 CAPSULE, LIQUID FILLED ORAL at 09:00

## 2021-11-28 ASSESSMENT — ACTIVITIES OF DAILY LIVING (ADL)
ADLS_ACUITY_SCORE: 12

## 2021-11-28 NOTE — PROGRESS NOTES
Hospitalist Progress Note  ADMIT DATE: 11/22/2021     FACILITY: [facility]    PCP: Reed Simpson, [phone]    Assessment/Plan    Tonya Valera is a 56 year old female who lives in a nursing home with pmhx of T2DM on insulin, hypothyroidism, GERD, hyperlipidemia, migraine, chronic pain,?  Spinal cord injury presents from SNF with right lower extremity cellulitis and right leg DVT after failing outpatient therapy.        RLE cellulitis, bilateral chronic venous insufficiency   -was started on doxy 11/9, received IM Rocephin at [facility] on 11/12 continue on Doxy then switched to cefdinir on 11/19 then moved to SNF for wound care.  -Likely needs extended course due to poorly controlled DM.   -has DVT on LE US.(unable to see result in the EPIC)  -trend CRP, CBC  -was on  Vancomycin and zosyn- id changed to Rocephin on 11/25; since n/v improved, switch to oral Cefdinir  - wound care   - ID consult appreciated        Nausea and vomiting  Dizziness/vertigo  -improving  -Zofran prn, protonix  -start Meclizine   -no fever and no abdominal pain  -if not improving as expected, might consider CT  -recent LFT wnl. Add Lipase.   -Add senna for consitpation  -other ddx include gastroparesis; might consider reglan prn if not improving as expected.      Right lower extremity DVT  - Continue  Xarelto   -Chronic venous insufficiency on bilateral legs     T2DM with hyperglycemia  - A1C 9.5 10/25/21   not well controlled.  - Needs better control for wound healing  -started on lantus 35 untis at bedtime -but patient is not compliant and only took 17 units at night, refusing to take as prescribed; Discussed with pt about the importance of compliance. Patient worried about hypoglycemia. She agrees to try 20 units at bedtime tonight.   -will increase Lantus to 22 units  -carbs 1:10  -medium  sliding scale  -Glucometer  - hypoglycemia protocol  -DM diet  - diabetic educator     Elevated serum  creatinine  - cr 1.09, improving  -Monitor inputs and outputs  -Avoid nephrotoxic medications     Hx spinal cord injury  -sounds like spinal cord abscess with E coli per her story.  -has been in NH for 1 month, was living with family but strained relationship with one of her sons ended up with in facility. Reports LE spasticity     Hypothyroidism  -continue home Synthroid     Hyperlipidemia  --continue statin     GERD  -continue PPI     Migraines  - stable      DVT PPX : xarelto     Barriers to discharge:Observe for tolerance and improvement after switching abx from iv to oral     Anticipated Discharge date  : 11/29    Subjective  C/o better with n/v, tolerating diet; right leg erythema and warmth.     Objective    Vital signs in last 24 hours  Temp:  [98.4  F (36.9  C)-98.8  F (37.1  C)] 98.8  F (37.1  C)  Pulse:  [77-91] 77  Resp:  [16-18] 18  BP: (108-116)/(60-67) 116/67  SpO2:  [92 %-95 %] 95 % [unfilled] O2 Device: None (Room air)    Weight:   [unfilled] Weight change:     Intake/Output last 3 shifts  I/O last 3 completed shifts:  In: 1280 [P.O.:1080; Other:200]  Out: 0   Body mass index is 32.19 kg/m .    Physical Exam    Physical Exam  General appearance: not in acute distress  HEENT: PERRL, EOMI  Lungs: Clear breath sounds in bilateral lung fields  Cardiovascular: Regular rate and rhythm, normal S1-S2  Abdomen: Soft, non tender, no distension, normal bowel sound  Musculoskeletal: No joint swelling; right lower leg wound with surrounding erythema   Skin: No rash and no edema  Neurology: AAO ×3.  Cranial nerves II - XII normal.  Normal muscle strength in all four extremities.      Pertinent Labs   Lab Results: personally reviewed.   Results for FRANCHESKA ALEXANDER (MRN [id number]) as of 11/28/2021 07:26   Ref. Range 11/27/2021 20:43   GLUCOSE BY METER POCT Latest Ref Range: 70 - 99 mg/dL 260 (H)       Medications  Scheduled Meds:    acetaminophen  975 mg Oral Q8H     aspirin  81 mg Oral Daily     baclofen  10 mg  Oral TID     buPROPion  150 mg Oral Daily     calcium carbonate-vitamin D  1 tablet Oral Daily     cefdinir  300 mg Oral Q12H ROBERTO     docusate sodium  100 mg Oral BID     doxycycline (VIBRAMYCIN) IV  100 mg Intravenous Q12H     influenza recomb quadrivalent PF  0.5 mL Intramuscular Prior to discharge     insulin aspart   Subcutaneous TID w/meals     insulin aspart  1-7 Units Subcutaneous TID AC     insulin aspart  1-5 Units Subcutaneous At Bedtime     insulin glargine  20 Units Subcutaneous At Bedtime     levothyroxine  187.5 mcg Oral Once per day on Mon Fri     levothyroxine  125 mcg Oral Once per day on Sun Tue Wed Thu Sat     lisinopril  2.5 mg Oral Daily     morphine  30 mg Oral TID     pantoprazole  40 mg Oral QAM AC     polyethylene glycol  17 g Oral Every Other Day     rivaroxaban ANTICOAGULANT  15 mg Oral BID w/meals     rosuvastatin  20 mg Oral At Bedtime     sodium chloride (PF)  3 mL Intracatheter Q8H     torsemide  20 mg Oral Daily     venlafaxine  150 mg Oral Daily     venlafaxine  75 mg Oral Daily     Continuous Infusions:    - MEDICATION INSTRUCTIONS -       PRN Meds:.bisacodyl, glucose **OR** dextrose **OR** glucagon, hydrOXYzine, lidocaine 4%, lidocaine (buffered or not buffered), meclizine, melatonin, morphine, naloxone **OR** naloxone **OR** naloxone **OR** naloxone, ondansetron **OR** ondansetron, - MEDICATION INSTRUCTIONS -, polyethylene glycol, sennosides, sodium chloride (PF), SUMAtriptan    Advanced Care Planning:  Discharge planning discussed with patient         [facility] Medicine Service  Jessica Carlisle

## 2021-11-28 NOTE — PLAN OF CARE
Problem: Pain Chronic (Persistent)  Goal: Acceptable Pain Control and Functional Ability  Outcome: Improving   Patient tolerating diet, no episode of n/v. Pain controlled with scheduled tylenol. Dressing to RLE intact. Call light within reach.

## 2021-11-28 NOTE — PLAN OF CARE
Patient tolerating diet. Pain controlled with scheduled morphine. IV abx infused per orders. , 260. Dressing to RLE intact.     Mirela Garcia RN

## 2021-11-28 NOTE — PLAN OF CARE
Problem: Adult Inpatient Plan of Care  Goal: Optimal Comfort and Wellbeing  Outcome: Improving     Right leg appears slightly more red today. Doxycycline IV antibiotic running now. Up with assist of 1 to toilet.

## 2021-11-29 ENCOUNTER — APPOINTMENT (OUTPATIENT)
Dept: OCCUPATIONAL THERAPY | Facility: HOSPITAL | Age: 56
DRG: 603 | End: 2021-11-29
Attending: INTERNAL MEDICINE
Payer: MEDICARE

## 2021-11-29 ENCOUNTER — APPOINTMENT (OUTPATIENT)
Dept: PHYSICAL THERAPY | Facility: HOSPITAL | Age: 56
DRG: 603 | End: 2021-11-29
Payer: MEDICARE

## 2021-11-29 LAB
ALBUMIN SERPL-MCNC: 3.5 G/DL (ref 3.5–5)
ALP SERPL-CCNC: 60 U/L (ref 45–120)
ALT SERPL W P-5'-P-CCNC: <9 U/L (ref 0–45)
ANION GAP SERPL CALCULATED.3IONS-SCNC: 8 MMOL/L (ref 5–18)
AST SERPL W P-5'-P-CCNC: 12 U/L (ref 0–40)
BASOPHILS # BLD AUTO: 0.1 10E3/UL (ref 0–0.2)
BASOPHILS NFR BLD AUTO: 1 %
BILIRUB SERPL-MCNC: 0.3 MG/DL (ref 0–1)
BUN SERPL-MCNC: 14 MG/DL (ref 8–22)
C REACTIVE PROTEIN LHE: 0.3 MG/DL (ref 0–0.8)
CALCIUM SERPL-MCNC: 9 MG/DL (ref 8.5–10.5)
CHLORIDE BLD-SCNC: 106 MMOL/L (ref 98–107)
CK SERPL-CCNC: 34 U/L (ref 30–190)
CO2 SERPL-SCNC: 23 MMOL/L (ref 22–31)
CREAT SERPL-MCNC: 0.78 MG/DL (ref 0.6–1.1)
EOSINOPHIL # BLD AUTO: 0.2 10E3/UL (ref 0–0.7)
EOSINOPHIL NFR BLD AUTO: 3 %
ERYTHROCYTE [DISTWIDTH] IN BLOOD BY AUTOMATED COUNT: 13.3 % (ref 10–15)
ERYTHROCYTE [SEDIMENTATION RATE] IN BLOOD BY WESTERGREN METHOD: 10 MM/HR (ref 0–20)
GFR SERPL CREATININE-BSD FRML MDRD: 85 ML/MIN/1.73M2
GLUCOSE BLD-MCNC: 126 MG/DL (ref 70–125)
GLUCOSE BLDC GLUCOMTR-MCNC: 110 MG/DL (ref 70–99)
GLUCOSE BLDC GLUCOMTR-MCNC: 122 MG/DL (ref 70–99)
GLUCOSE BLDC GLUCOMTR-MCNC: 172 MG/DL (ref 70–99)
GLUCOSE BLDC GLUCOMTR-MCNC: 180 MG/DL (ref 70–99)
GLUCOSE BLDC GLUCOMTR-MCNC: 223 MG/DL (ref 70–99)
HCT VFR BLD AUTO: 37.7 % (ref 35–47)
HGB BLD-MCNC: 12 G/DL (ref 11.7–15.7)
HOLD SPECIMEN: NORMAL
IMM GRANULOCYTES # BLD: 0 10E3/UL
IMM GRANULOCYTES NFR BLD: 0 %
LYMPHOCYTES # BLD AUTO: 3 10E3/UL (ref 0.8–5.3)
LYMPHOCYTES NFR BLD AUTO: 37 %
MAGNESIUM SERPL-MCNC: 1.7 MG/DL (ref 1.8–2.6)
MCH RBC QN AUTO: 26.8 PG (ref 26.5–33)
MCHC RBC AUTO-ENTMCNC: 31.8 G/DL (ref 31.5–36.5)
MCV RBC AUTO: 84 FL (ref 78–100)
MONOCYTES # BLD AUTO: 0.6 10E3/UL (ref 0–1.3)
MONOCYTES NFR BLD AUTO: 7 %
NEUTROPHILS # BLD AUTO: 4.2 10E3/UL (ref 1.6–8.3)
NEUTROPHILS NFR BLD AUTO: 52 %
NRBC # BLD AUTO: 0 10E3/UL
NRBC BLD AUTO-RTO: 0 /100
PLATELET # BLD AUTO: 264 10E3/UL (ref 150–450)
POTASSIUM BLD-SCNC: 3.8 MMOL/L (ref 3.5–5)
POTASSIUM BLD-SCNC: 3.8 MMOL/L (ref 3.5–5)
PROT SERPL-MCNC: 6.4 G/DL (ref 6–8)
RBC # BLD AUTO: 4.48 10E6/UL (ref 3.8–5.2)
SARS-COV-2 RNA RESP QL NAA+PROBE: NEGATIVE
SODIUM SERPL-SCNC: 137 MMOL/L (ref 136–145)
WBC # BLD AUTO: 8.1 10E3/UL (ref 4–11)

## 2021-11-29 PROCEDURE — 85652 RBC SED RATE AUTOMATED: CPT | Performed by: INTERNAL MEDICINE

## 2021-11-29 PROCEDURE — 97535 SELF CARE MNGMENT TRAINING: CPT | Mod: GO

## 2021-11-29 PROCEDURE — 84132 ASSAY OF SERUM POTASSIUM: CPT | Performed by: INTERNAL MEDICINE

## 2021-11-29 PROCEDURE — 99233 SBSQ HOSP IP/OBS HIGH 50: CPT | Performed by: INTERNAL MEDICINE

## 2021-11-29 PROCEDURE — 80053 COMPREHEN METABOLIC PANEL: CPT | Performed by: INTERNAL MEDICINE

## 2021-11-29 PROCEDURE — 250N000009 HC RX 250: Performed by: INTERNAL MEDICINE

## 2021-11-29 PROCEDURE — 36415 COLL VENOUS BLD VENIPUNCTURE: CPT | Performed by: INTERNAL MEDICINE

## 2021-11-29 PROCEDURE — 97110 THERAPEUTIC EXERCISES: CPT | Mod: GP

## 2021-11-29 PROCEDURE — 250N000013 HC RX MED GY IP 250 OP 250 PS 637: Performed by: INTERNAL MEDICINE

## 2021-11-29 PROCEDURE — 85025 COMPLETE CBC W/AUTO DIFF WBC: CPT | Performed by: INTERNAL MEDICINE

## 2021-11-29 PROCEDURE — 99231 SBSQ HOSP IP/OBS SF/LOW 25: CPT | Performed by: INTERNAL MEDICINE

## 2021-11-29 PROCEDURE — 87635 SARS-COV-2 COVID-19 AMP PRB: CPT | Performed by: INTERNAL MEDICINE

## 2021-11-29 PROCEDURE — 250N000013 HC RX MED GY IP 250 OP 250 PS 637: Performed by: FAMILY MEDICINE

## 2021-11-29 PROCEDURE — 97530 THERAPEUTIC ACTIVITIES: CPT | Mod: GP

## 2021-11-29 PROCEDURE — 97116 GAIT TRAINING THERAPY: CPT | Mod: GP

## 2021-11-29 PROCEDURE — 120N000001 HC R&B MED SURG/OB

## 2021-11-29 PROCEDURE — 83735 ASSAY OF MAGNESIUM: CPT | Performed by: INTERNAL MEDICINE

## 2021-11-29 PROCEDURE — 97166 OT EVAL MOD COMPLEX 45 MIN: CPT | Mod: GO

## 2021-11-29 PROCEDURE — 86141 C-REACTIVE PROTEIN HS: CPT | Performed by: INTERNAL MEDICINE

## 2021-11-29 PROCEDURE — 258N000003 HC RX IP 258 OP 636: Performed by: INTERNAL MEDICINE

## 2021-11-29 PROCEDURE — 250N000011 HC RX IP 250 OP 636: Performed by: INTERNAL MEDICINE

## 2021-11-29 PROCEDURE — 82550 ASSAY OF CK (CPK): CPT | Performed by: INTERNAL MEDICINE

## 2021-11-29 RX ORDER — MEROPENEM 1 G/1
1 INJECTION, POWDER, FOR SOLUTION INTRAVENOUS EVERY 8 HOURS
Status: DISCONTINUED | OUTPATIENT
Start: 2021-11-29 | End: 2021-11-30

## 2021-11-29 RX ADMIN — INSULIN ASPART 2 UNITS: 100 INJECTION, SOLUTION INTRAVENOUS; SUBCUTANEOUS at 13:14

## 2021-11-29 RX ADMIN — CEFDINIR 300 MG: 300 CAPSULE ORAL at 08:24

## 2021-11-29 RX ADMIN — LEVOTHYROXINE SODIUM 187.5 MCG: 125 TABLET ORAL at 05:28

## 2021-11-29 RX ADMIN — MORPHINE SULFATE 30 MG: 30 TABLET, FILM COATED, EXTENDED RELEASE ORAL at 08:25

## 2021-11-29 RX ADMIN — VENLAFAXINE HYDROCHLORIDE 150 MG: 150 CAPSULE, EXTENDED RELEASE ORAL at 08:25

## 2021-11-29 RX ADMIN — BACLOFEN 10 MG: 10 TABLET ORAL at 21:27

## 2021-11-29 RX ADMIN — VANCOMYCIN HYDROCHLORIDE 1500 MG: 5 INJECTION, POWDER, LYOPHILIZED, FOR SOLUTION INTRAVENOUS at 14:12

## 2021-11-29 RX ADMIN — ASPIRIN 81 MG CHEWABLE TABLET 81 MG: 81 TABLET CHEWABLE at 08:25

## 2021-11-29 RX ADMIN — LISINOPRIL 2.5 MG: 2.5 TABLET ORAL at 08:25

## 2021-11-29 RX ADMIN — DOCUSATE SODIUM 100 MG: 100 CAPSULE, LIQUID FILLED ORAL at 08:24

## 2021-11-29 RX ADMIN — BUPROPION HYDROCHLORIDE 150 MG: 150 TABLET, FILM COATED, EXTENDED RELEASE ORAL at 08:25

## 2021-11-29 RX ADMIN — MEROPENEM 1 G: 1 INJECTION, POWDER, FOR SOLUTION INTRAVENOUS at 12:09

## 2021-11-29 RX ADMIN — POLYETHYLENE GLYCOL 3350 17 G: 17 POWDER, FOR SOLUTION ORAL at 08:24

## 2021-11-29 RX ADMIN — PANTOPRAZOLE SODIUM 40 MG: 40 TABLET, DELAYED RELEASE ORAL at 06:59

## 2021-11-29 RX ADMIN — BACLOFEN 10 MG: 10 TABLET ORAL at 08:24

## 2021-11-29 RX ADMIN — BACLOFEN 10 MG: 10 TABLET ORAL at 14:12

## 2021-11-29 RX ADMIN — DOXYCYCLINE 100 MG: 100 INJECTION, POWDER, LYOPHILIZED, FOR SOLUTION INTRAVENOUS at 01:12

## 2021-11-29 RX ADMIN — MORPHINE SULFATE 30 MG: 30 TABLET, FILM COATED, EXTENDED RELEASE ORAL at 21:26

## 2021-11-29 RX ADMIN — DOCUSATE SODIUM 100 MG: 100 CAPSULE, LIQUID FILLED ORAL at 21:27

## 2021-11-29 RX ADMIN — Medication 7.5 MG: at 00:08

## 2021-11-29 RX ADMIN — INSULIN ASPART 6 UNITS: 100 INJECTION, SOLUTION INTRAVENOUS; SUBCUTANEOUS at 18:05

## 2021-11-29 RX ADMIN — HYDROXYZINE HYDROCHLORIDE 50 MG: 25 TABLET, FILM COATED ORAL at 00:09

## 2021-11-29 RX ADMIN — RIVAROXABAN 15 MG: 15 TABLET, FILM COATED ORAL at 18:17

## 2021-11-29 RX ADMIN — ACETAMINOPHEN 975 MG: 325 TABLET ORAL at 12:09

## 2021-11-29 RX ADMIN — ACETAMINOPHEN 975 MG: 325 TABLET ORAL at 18:01

## 2021-11-29 RX ADMIN — VENLAFAXINE HYDROCHLORIDE 75 MG: 75 CAPSULE, EXTENDED RELEASE ORAL at 08:25

## 2021-11-29 RX ADMIN — ACETAMINOPHEN 975 MG: 325 TABLET ORAL at 02:06

## 2021-11-29 RX ADMIN — INSULIN ASPART: 100 INJECTION, SOLUTION INTRAVENOUS; SUBCUTANEOUS at 10:04

## 2021-11-29 RX ADMIN — MORPHINE SULFATE 30 MG: 30 TABLET, FILM COATED, EXTENDED RELEASE ORAL at 14:12

## 2021-11-29 RX ADMIN — Medication 7.5 MG: at 12:15

## 2021-11-29 RX ADMIN — ROSUVASTATIN CALCIUM 20 MG: 10 TABLET, FILM COATED ORAL at 21:27

## 2021-11-29 RX ADMIN — RIVAROXABAN 15 MG: 15 TABLET, FILM COATED ORAL at 08:25

## 2021-11-29 RX ADMIN — MEROPENEM 1 G: 1 INJECTION, POWDER, FOR SOLUTION INTRAVENOUS at 18:01

## 2021-11-29 RX ADMIN — Medication 1 TABLET: at 08:25

## 2021-11-29 ASSESSMENT — ACTIVITIES OF DAILY LIVING (ADL)
ADLS_ACUITY_SCORE: 12

## 2021-11-29 NOTE — PLAN OF CARE
Problem: Adult Inpatient Plan of Care  Goal: Plan of Care Review  Outcome: Improving   Alert and oriented vitals stable. Ate 75 % for dinner insulin given as per order.  and 270. Up and ambulated to the bathroom with walker.   Problem: Adult Inpatient Plan of Care  Goal: Absence of Hospital-Acquired Illness or Injury  Intervention: Prevent Skin Injury  Recent Flowsheet Documentation  Taken 11/28/2021 1535 by Shaun Pineda, RN  Body Position: position changed independently   right leg reddened dressing DCI. Will continue to monitor..

## 2021-11-29 NOTE — PROGRESS NOTES
Care Management Follow Up    Length of Stay (days): 7    Expected Discharge Date:  2-3 days     Concerns to be Addressed:       Patient plan of care discussed at interdisciplinary rounds: Yes    Anticipated Discharge Disposition:  home     Anticipated Discharge Services:  [facility]  Anticipated Discharge DME:        Additional Information:  Pt may transition back to [facility]. CM continue to follow for discharge needs.       SAULO Chi

## 2021-11-29 NOTE — PLAN OF CARE
Alert and oriented. Pain controlled. RLE increased redness. Id consult. Changed back to iv antibiotics. Tolerating diet. No nausea. BM today.

## 2021-11-29 NOTE — CONSULTS
Please see consult note by Dr. Morillo dated 11/24/2021.   Discussed with hospitalist. Plan per ID progress note from 11/29/2021.

## 2021-11-29 NOTE — PLAN OF CARE
Problem: Adult Inpatient Plan of Care  Goal: Optimal Comfort and Wellbeing  Outcome: Improving     Problem: Pain Chronic (Persistent)  Goal: Acceptable Pain Control and Functional Ability  Outcome: Improving  Intervention: Develop Pain Management Plan  Recent Flowsheet Documentation  Taken 11/29/2021 0008 by John Robles RN  Pain Management Interventions: medication (see MAR)   Patient tolerating diet, pain controlled with scheduled tylenol. Dressing to RLE intact. Call light within reach.

## 2021-11-29 NOTE — PROGRESS NOTES
Hospitalist Progress Note  ADMIT DATE: 11/22/2021     FACILITY: [facility]    PCP: Reed Simpson, [phone]    Assessment/Plan    Tonya Valera is a 56 year old female who lives in a nursing home with pmhx of T2DM on insulin, hypothyroidism, GERD, hyperlipidemia, migraine, chronic pain,?  Spinal cord injury presents from SNF with right lower extremity cellulitis and right leg DVT after failing outpatient therapy.        RLE cellulitis, bilateral chronic venous insufficiency   -was started on doxy 11/9, received IM Rocephin at [facility] on 11/12 continue on Doxy then switched to cefdinir on 11/19 then moved to SNF for wound care.  -Likely needs extended course due to poorly controlled DM.   -has DVT on LE US.(unable to see result in the EPIC)  -trend CRP, CBC  -was on  Vancomycin and zosyn- id changed to Rocephin on 11/25; since n/v improved, switch to oral Cefdinir  - wound care   - It seems that the redness of the leg getting worse;  -request ID consult follow up        Nausea and vomiting  Constipation  -improving  -Zofran prn, protonix  -start Meclizine   -no fever and no abdominal pain  -recent LFT wnl. Add Lipase.   -Add senna for consitpation  -other ddx include gastroparesis; might consider reglan prn if not improving as expected.      Right lower extremity DVT  - Continue  Xarelto   -Chronic venous insufficiency on bilateral legs     T2DM with hyperglycemia  - A1C 9.5 10/25/21   not well controlled.  - Needs better control for wound healing  -started on lantus 35 untis at bedtime -but patient is not compliant and only took 17 units at night, refusing to take as prescribed; Discussed with pt about the importance of compliance. Patient worried about hypoglycemia. She agrees to try 20 units at bedtime tonight.   -will increase Lantus to 22 units  -carbs 1:10  -medium  sliding scale  -Glucometer  - hypoglycemia protocol  -DM diet  - diabetic educator     Elevated serum  creatinine  - cr improving  -Monitor inputs and outputs  -Avoid nephrotoxic medications    Hypomagnesia  -on replacement protocol    Hx spinal cord injury  -sounds like spinal cord abscess with E coli per her story.  -has been in NH for 1 month, was living with family but strained relationship with one of her sons ended up with in facility. Reports LE spasticity     Hypothyroidism  -continue home Synthroid     Hyperlipidemia  --continue statin     GERD  -continue PPI     Migraines  - stable      DVT PPX : xarelto     Barriers to discharge: cellulitis getting worse on oral antibiotics, might need to change back to iv abx     Anticipated Discharge date  : 2-3 days    Subjective  Worse right leg redness and warmth. No documented fever; passed BM and decreased n/v.     Objective    Vital signs in last 24 hours  Temp:  [98.3  F (36.8  C)-98.4  F (36.9  C)] 98.3  F (36.8  C)  Pulse:  [76-86] 84  Resp:  [16-18] 16  BP: (128-147)/(59-88) 147/88  SpO2:  [93 %-96 %] 96 % [unfilled] O2 Device: None (Room air)    Weight:   [unfilled] Weight change:     Intake/Output last 3 shifts  I/O last 3 completed shifts:  In: 730 [P.O.:730]  Out: -   Body mass index is 32.19 kg/m .    Physical Exam    Physical Exam  General appearance: not in acute distress  HEENT: PERRL, EOMI  Lungs: Clear breath sounds in bilateral lung fields  Cardiovascular: Regular rate and rhythm, normal S1-S2  Abdomen: Soft, non tender, no distension, normal bowel sound  Musculoskeletal: No joint swelling  Skin: right leg erythema and warmth+  Neurology: AAO ×3.  Cranial nerves II - XII normal.  Normal muscle strength in all four extremities.      Pertinent Labs   Lab Results: personally reviewed.   Results for FRANCHESKA ALEXANDER (MRN [id number]) as of 11/29/2021 10:01   Ref. Range 11/29/2021 07:17 11/29/2021 07:50 11/29/2021 07:52   Potassium Latest Ref Range: 3.5 - 5.0 mmol/L 3.8     Magnesium Latest Ref Range: 1.8 - 2.6 mg/dL 1.7 (L)     GLUCOSE BY METER POCT  Latest Ref Range: 70 - 99 mg/dL   110 (H)       Medications  Scheduled Meds:    acetaminophen  975 mg Oral Q8H     aspirin  81 mg Oral Daily     baclofen  10 mg Oral TID     buPROPion  150 mg Oral Daily     calcium carbonate-vitamin D  1 tablet Oral Daily     cefdinir  300 mg Oral Q12H ROBERTO     docusate sodium  100 mg Oral BID     doxycycline (VIBRAMYCIN) IV  100 mg Intravenous Q12H     influenza recomb quadrivalent PF  0.5 mL Intramuscular Prior to discharge     insulin aspart   Subcutaneous TID w/meals     insulin aspart  1-7 Units Subcutaneous TID AC     insulin aspart  1-5 Units Subcutaneous At Bedtime     insulin glargine  22 Units Subcutaneous At Bedtime     levothyroxine  187.5 mcg Oral Once per day on Mon Fri     levothyroxine  125 mcg Oral Once per day on Sun Tue Wed Thu Sat     lisinopril  2.5 mg Oral Daily     morphine  30 mg Oral TID     pantoprazole  40 mg Oral QAM AC     polyethylene glycol  17 g Oral Every Other Day     rivaroxaban ANTICOAGULANT  15 mg Oral BID w/meals     rosuvastatin  20 mg Oral At Bedtime     sodium chloride (PF)  3 mL Intracatheter Q8H     torsemide  20 mg Oral Daily     venlafaxine  150 mg Oral Daily     venlafaxine  75 mg Oral Daily     Continuous Infusions:    - MEDICATION INSTRUCTIONS -       PRN Meds:.bisacodyl, glucose **OR** dextrose **OR** glucagon, hydrOXYzine, lidocaine 4%, lidocaine (buffered or not buffered), meclizine, melatonin, morphine, naloxone **OR** naloxone **OR** naloxone **OR** naloxone, ondansetron **OR** ondansetron, - MEDICATION INSTRUCTIONS -, polyethylene glycol, sennosides, sodium chloride (PF), SUMAtriptan    Advanced Care Planning:  Discharge planning discussed with patient and ID        [facility] Medicine [facility]  Jessica Carlisle

## 2021-11-29 NOTE — PROGRESS NOTES
"   11/29/21 1300   Quick Adds   Quick Adds Edema   Type of Visit Initial Occupational Therapy Evaluation   Living Environment   People in home   ([facility] staff available)   Current Living Arrangements assisted living   General Information   Onset of Illness/Injury or Date of Surgery 11/22/21   Patient/Family Therapy Goal Statement (OT) none stated for lymph therapy   Edema General Information   Onset of Edema   (approx 2 weeks ago per pt-cellulitis)   Affected Body Part(s) Right LE   Edema Etiology Infection   Edema Precautions Acute DVT   Edema Precaution Comments RN stated MD stated \"light compression\" ok.   General Comments/Previous Edema Treatment/Edema Equipment Pt stated had \"Shruti boots\" before for weeping legs.    Edema Examination/Assessment   Skin Condition Dryness;Non-pitting;Temperature  (warm/red-celllulitis)   Skin Condition Comments wound on medial lower leg and RN placed new mepilex over it prior to therapist arrival.   Clinical Impression   Criteria for Skilled Therapeutic Interventions Met (OT) yes   OT Diagnosis increased swelling that could impair ADL   Edema: Patient Presentation Stage 1 Lymphedema  (pt mentioned elevating to reduce swelling)   Edema: Planned Interventions Education;Other (see comments)  (tubigrip)   Clinical Decision Making Complexity (OT) moderate complexity   Therapy Frequency (OT) Daily   Predicted Duration of Therapy 3 days  (for lymph)   Risk & Benefits of therapy have been explained care plan/treatment goals reviewed;patient   Comment-Clinical Impression Pt stated R LE was so much worse when admitted to hospital and currently, limb appears similar size to non-affected side.   OT Discharge Planning    OT Discharge Recommendation (DC Rec) Home with assist   OT Rationale for DC Rec pt may need additonal assist to don/doff tubigrip   Total Evaluation Time (Minutes)   Total Evaluation Time (Minutes) 5     "

## 2021-11-29 NOTE — PROGRESS NOTES
INFECTIOUS DISEASE FOLLOW UP NOTE      ASSESSMENT:  1. Cellulitis- active issue, slowly improving.  Not  Edema and erythema likely going to continue due to DVT.  Right lower extremity cellulitis. Polymicrobial infection with Ewingella americana and Corynebacterium  Most lower extremity cellulitis is due to beta-hemolytic strep. Improvement now on broad spectrum antibiotics. Skin wound not looking like abscess -- gram stain with no PMNs -- growing corynebacterium (skin aleksandar) and gram negative pat of uncertain significance. Low CRP suggesting infection may not be the main  at this point -- venous stasis, DVT.   Erythema present. Not improved per hospitalist  2. R leg DVT  3. 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   4. Sulfa allergy.    PLAN:    1. Switch to Meropenem and Vancomycin and monitor  2. Stopped ceftriaxone and doxycycline  3. Once there is clinical improvement, complete course with oral antibiotics: plan was for cefdinir and doxycycline one more week. PO antibiotics when she is tolerating oral intake.  4. Elevate leg-lymphedema PT consult placed  5. Check WBC, sed rate and CRP.  If normalized, likely just improving infection and swelling due to DVT  6. Discussed with hospitalist  7. Discussed with patient and patient's nurse  8. Patient new to me on 11/27/2021. Please see previous ID notes    Yane Cedeño MD  [facility]  [phone]                Total Time Spent 40 minutes with >50% of the time spent in counseling, education and care coordination.     ______________________________________________________________________    SUBJECTIVE / INTERVAL HISTORY:   Slight erythema, tenderness improved  Overall appears better though hospitalist concern was that it looked worse than yesterday        Previous note: overall better    Still has nausea    Previously:  nausea with  "vomiting. Leg improving. Working with PT.     Reviewed records in careeverywhere under other chart Tonya Duke.     ROS: All other systems negative except as listed above.        OBJECTIVE:  /85 (BP Location: Left arm)   Pulse 79   Temp 97.7  F (36.5  C) (Oral)   Resp 18   Ht 1.6 m (5' 3\")   Wt 82.4 kg (181 lb 11.2 oz)   SpO2 97%   BMI 32.19 kg/m         Vital Signs  Temp: 98.5  F (36.9  C)  Temp src: Oral  Resp: 18  Pulse: 80  Pulse Rate Source: Monitor  BP: 109/58  BP Location: Left arm    Temp (24hrs), Av.3  F (36.8  C), Min:97.7  F (36.5  C), Max:98.7  F (37.1  C)    Exam on  2021   GEN: Sitting in chair, interacting with iPhone  RESPIRATORY:  Normal breathing pattern.   CARDIOVASCULAR:  Regular rate and rhythm.   ABDOMEN:  Soft, normal bowel sounds, non-tender  EXTREMITIES: R leg less red, faint erythema, decreased with skin wrinkling, still mildly tender around medial wound. Dry skin.   Skin breakdown medially  Tattoo with slight induration on lateral aspect  SKIN/HAIR/NAILS: Erythema lower extremity  IV: peripheral  Photos taken with patient permission              Antibiotics:  Pip/tazo, vanc -->ceftriaxone -  Doxycycline 2021 --    Pertinent labs:    Recent Labs   Lab 21  0715 21  0714 21  1501   WBC 8.1 6.4 8.5   HGB 12.0 10.6* 12.9   HCT 37.7 34.7* 40.8    216 285        Recent Labs   Lab 21  0717 21  0714 21  1501    142 135*   CO2 23 29 33*   BUN 14 16 20        Lab Results   Component Value Date    CRP 0.3 2021    CRP 1.0 (H) 2021    CRP 1.3 (H) 2021         Lab Results   Component Value Date    ALT <9 2021    AST 12 2021    ALKPHOS 60 2021         MICROBIOLOGY DATA:    Collected Updated Procedure Result Status    2021 1844 2021 1138 Swab Aerobic Bacterial Culture Routine with Gram Stain [[id number]]    (Abnormal)   Swab from Leg, Right    Final result Component Value "   Culture 1+ Ewingella americana Abnormal     1+ Corynebacterium striatum Abnormal     Identification obtained by MALDI-TOF mass spectrometry research use only database. Test characteristics determined and verified by the Infectious Diseases Diagnostic Laboratory.   Susceptibilities not routinely done    1+ Normal aleksandar   Gram Stain Result No organisms seen    No white blood cells seen         Susceptibility     Ewingella americana     TUYET     Ampicillin >16 ug/mL Resistant     Ampicillin/ Sulbactam >16 ug/mL Resistant     Cefepime <=2 ug/mL Susceptible     Ceftazidime <=1 ug/mL Susceptible     Ceftriaxone <=1 ug/mL Susceptible     Ciprofloxacin  See below 1     Gentamicin <=1 ug/mL Susceptible     Levofloxacin <=0.25 ug/mL Susceptible     Meropenem <=1 ug/mL Susceptible     Piperacillin/Tazobactam <=4 ug/mL Susceptible     Tobramycin <=1 ug/mL Susceptible     Trimethoprim/Sulfamethoxazole <=2/38 ug/mL Susceptible              1 Ciprofloxacin not resistant.  Due to test limitations, lab cannot provide TUYET to determine susceptibility.

## 2021-11-30 ENCOUNTER — APPOINTMENT (OUTPATIENT)
Dept: OCCUPATIONAL THERAPY | Facility: HOSPITAL | Age: 56
DRG: 603 | End: 2021-11-30
Payer: MEDICARE

## 2021-11-30 PROBLEM — I82.4Y9 DEEP VEIN THROMBOSIS (DVT) OF PROXIMAL LOWER EXTREMITY (H): Status: ACTIVE | Noted: 2021-11-30

## 2021-11-30 PROBLEM — E11.65 TYPE 2 DIABETES MELLITUS WITH HYPERGLYCEMIA, WITH LONG-TERM CURRENT USE OF INSULIN (H): Status: ACTIVE | Noted: 2021-11-30

## 2021-11-30 PROBLEM — Z79.4 TYPE 2 DIABETES MELLITUS WITH HYPERGLYCEMIA, WITH LONG-TERM CURRENT USE OF INSULIN (H): Status: ACTIVE | Noted: 2021-11-30

## 2021-11-30 LAB
GLUCOSE BLDC GLUCOMTR-MCNC: 113 MG/DL (ref 70–99)
GLUCOSE BLDC GLUCOMTR-MCNC: 115 MG/DL (ref 70–99)
GLUCOSE BLDC GLUCOMTR-MCNC: 168 MG/DL (ref 70–99)
GLUCOSE BLDC GLUCOMTR-MCNC: 194 MG/DL (ref 70–99)
GLUCOSE BLDC GLUCOMTR-MCNC: 221 MG/DL (ref 70–99)
GLUCOSE BLDC GLUCOMTR-MCNC: 82 MG/DL (ref 70–99)
GLUCOSE BLDC GLUCOMTR-MCNC: 99 MG/DL (ref 70–99)
HOLD SPECIMEN: NORMAL
MAGNESIUM SERPL-MCNC: 1.7 MG/DL (ref 1.8–2.6)
POTASSIUM BLD-SCNC: 3.8 MMOL/L (ref 3.5–5)

## 2021-11-30 PROCEDURE — 97535 SELF CARE MNGMENT TRAINING: CPT | Mod: GO

## 2021-11-30 PROCEDURE — 258N000003 HC RX IP 258 OP 636: Performed by: INTERNAL MEDICINE

## 2021-11-30 PROCEDURE — 36415 COLL VENOUS BLD VENIPUNCTURE: CPT | Performed by: STUDENT IN AN ORGANIZED HEALTH CARE EDUCATION/TRAINING PROGRAM

## 2021-11-30 PROCEDURE — 120N000001 HC R&B MED SURG/OB

## 2021-11-30 PROCEDURE — 99232 SBSQ HOSP IP/OBS MODERATE 35: CPT | Performed by: INTERNAL MEDICINE

## 2021-11-30 PROCEDURE — 250N000013 HC RX MED GY IP 250 OP 250 PS 637: Performed by: INTERNAL MEDICINE

## 2021-11-30 PROCEDURE — 250N000013 HC RX MED GY IP 250 OP 250 PS 637: Performed by: FAMILY MEDICINE

## 2021-11-30 PROCEDURE — 250N000011 HC RX IP 250 OP 636: Performed by: INTERNAL MEDICINE

## 2021-11-30 PROCEDURE — 97110 THERAPEUTIC EXERCISES: CPT | Mod: GO

## 2021-11-30 PROCEDURE — 83735 ASSAY OF MAGNESIUM: CPT | Performed by: STUDENT IN AN ORGANIZED HEALTH CARE EDUCATION/TRAINING PROGRAM

## 2021-11-30 PROCEDURE — 250N000011 HC RX IP 250 OP 636: Performed by: FAMILY MEDICINE

## 2021-11-30 PROCEDURE — 84132 ASSAY OF SERUM POTASSIUM: CPT | Performed by: STUDENT IN AN ORGANIZED HEALTH CARE EDUCATION/TRAINING PROGRAM

## 2021-11-30 RX ORDER — DOXYCYCLINE 100 MG/1
100 CAPSULE ORAL EVERY 12 HOURS SCHEDULED
Status: DISCONTINUED | OUTPATIENT
Start: 2021-11-30 | End: 2021-12-01 | Stop reason: HOSPADM

## 2021-11-30 RX ORDER — CEFDINIR 300 MG/1
300 CAPSULE ORAL EVERY 12 HOURS SCHEDULED
Status: DISCONTINUED | OUTPATIENT
Start: 2021-11-30 | End: 2021-12-01 | Stop reason: HOSPADM

## 2021-11-30 RX ADMIN — LISINOPRIL 2.5 MG: 2.5 TABLET ORAL at 11:03

## 2021-11-30 RX ADMIN — RIVAROXABAN 15 MG: 15 TABLET, FILM COATED ORAL at 19:37

## 2021-11-30 RX ADMIN — ROSUVASTATIN CALCIUM 20 MG: 10 TABLET, FILM COATED ORAL at 21:50

## 2021-11-30 RX ADMIN — BACLOFEN 10 MG: 10 TABLET ORAL at 21:50

## 2021-11-30 RX ADMIN — VENLAFAXINE HYDROCHLORIDE 75 MG: 75 CAPSULE, EXTENDED RELEASE ORAL at 11:03

## 2021-11-30 RX ADMIN — Medication 7.5 MG: at 23:47

## 2021-11-30 RX ADMIN — INSULIN ASPART 5 UNITS: 100 INJECTION, SOLUTION INTRAVENOUS; SUBCUTANEOUS at 21:51

## 2021-11-30 RX ADMIN — HYDROXYZINE HYDROCHLORIDE 50 MG: 25 TABLET, FILM COATED ORAL at 09:53

## 2021-11-30 RX ADMIN — MEROPENEM 1 G: 1 INJECTION, POWDER, FOR SOLUTION INTRAVENOUS at 02:09

## 2021-11-30 RX ADMIN — ACETAMINOPHEN 975 MG: 325 TABLET ORAL at 19:37

## 2021-11-30 RX ADMIN — LEVOTHYROXINE SODIUM 125 MCG: 0.03 TABLET ORAL at 05:19

## 2021-11-30 RX ADMIN — BUPROPION HYDROCHLORIDE 150 MG: 150 TABLET, FILM COATED, EXTENDED RELEASE ORAL at 11:04

## 2021-11-30 RX ADMIN — ACETAMINOPHEN 975 MG: 325 TABLET ORAL at 02:08

## 2021-11-30 RX ADMIN — ONDANSETRON 4 MG: 2 INJECTION INTRAMUSCULAR; INTRAVENOUS at 09:33

## 2021-11-30 RX ADMIN — BACLOFEN 10 MG: 10 TABLET ORAL at 15:54

## 2021-11-30 RX ADMIN — RIVAROXABAN 15 MG: 15 TABLET, FILM COATED ORAL at 11:03

## 2021-11-30 RX ADMIN — ASPIRIN 81 MG CHEWABLE TABLET 81 MG: 81 TABLET CHEWABLE at 11:02

## 2021-11-30 RX ADMIN — MORPHINE SULFATE 30 MG: 30 TABLET, FILM COATED, EXTENDED RELEASE ORAL at 11:04

## 2021-11-30 RX ADMIN — BACLOFEN 10 MG: 10 TABLET ORAL at 11:03

## 2021-11-30 RX ADMIN — CEFDINIR 300 MG: 300 CAPSULE ORAL at 19:38

## 2021-11-30 RX ADMIN — VENLAFAXINE HYDROCHLORIDE 150 MG: 150 CAPSULE, EXTENDED RELEASE ORAL at 11:02

## 2021-11-30 RX ADMIN — ACETAMINOPHEN 975 MG: 325 TABLET ORAL at 11:04

## 2021-11-30 RX ADMIN — Medication 1 TABLET: at 11:02

## 2021-11-30 RX ADMIN — VANCOMYCIN HYDROCHLORIDE 1500 MG: 5 INJECTION, POWDER, LYOPHILIZED, FOR SOLUTION INTRAVENOUS at 05:20

## 2021-11-30 RX ADMIN — DOXYCYCLINE 100 MG: 100 CAPSULE ORAL at 19:38

## 2021-11-30 RX ADMIN — PANTOPRAZOLE SODIUM 40 MG: 40 TABLET, DELAYED RELEASE ORAL at 06:48

## 2021-11-30 RX ADMIN — MEROPENEM 1 G: 1 INJECTION, POWDER, FOR SOLUTION INTRAVENOUS at 11:10

## 2021-11-30 RX ADMIN — MORPHINE SULFATE 30 MG: 30 TABLET, FILM COATED, EXTENDED RELEASE ORAL at 19:37

## 2021-11-30 ASSESSMENT — ACTIVITIES OF DAILY LIVING (ADL)
ADLS_ACUITY_SCORE: 14

## 2021-11-30 NOTE — PLAN OF CARE
Lymphedema Discharge Summary    Reason for therapy discharge:    All goals and outcomes met, no further needs identified.    Progress towards therapy goal(s). See goals on Care Plan in [facility] electronic health record for goal details.  Goals met    Therapy recommendation(s):    Recommend continued wear of tubigrip on RLE. Instructed on wear schedule. Also provided information on where to obtain compression stockings after discharge.  Radha Mcghee MOT, OTR/L, CLT 11/30/2021 , 10:06 AM

## 2021-11-30 NOTE — PLAN OF CARE
Feeling nauseated today. Zofran x1. No emesis. Has not really had anything to eat today. Only fluids.  Stool x1. Voiding freely. Pain controlled. Seems more sleepy today. Did not sleep well last night per her report.  Bg this afternoon . Spot checked because she was drowsy and slightly confused when therapy woke her. She was 115 and was able to wake up having conversation. Just slow to wake.

## 2021-11-30 NOTE — PROGRESS NOTES
INFECTIOUS DISEASE FOLLOW UP NOTE      ASSESSMENT:  1. Cellulitis- active issue, slowly improving.  Edema and erythema likely going to continue due to DVT.  Right lower extremity cellulitis. Polymicrobial infection with Ewingella americana and Corynebacterium  Most lower extremity cellulitis is due to beta-hemolytic strep. Improvement now on broad spectrum antibiotics. Skin wound not looking like abscess -- gram stain with no PMNs -- growing corynebacterium (skin aleksandar) and gram negative pat of uncertain significance. Low CRP suggesting infection may not be the main  at this point -- venous stasis, DVT.   Erythema present. Not improved per hospitalist  2. R leg DVT  3. 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   4. Sulfa allergy.    PLAN:    1. Switched to Meropenem and Vancomycin on 11/29  2. Stopped vancomycin on 11/30/2021 and meropenem on 11/30/2021 and switch to PO Cefdinir and doxycycline x 7 days. Erythema due to DVT.   3. Monitor  4. Elevate leg-lymphedema PT consult placed- appreciate input  5. Checked WBC, sed rate and CRP- these have normalized, suggesting improving infection and swelling due to DVT  6. Discussed with patient and questions answered.   7. Patient new to me on 11/27/2021. Please see previous ID notes. ID will sign off. Please call with questions. Follow up with Primary.     Yane Cedeño MD  [facility]  [phone]    Photos from 11/29/2021                ______________________________________________________________________    SUBJECTIVE / INTERVAL HISTORY:   Slight erythema, tenderness improved. Leg compression  Nausea today. Improved. Eating.    Previous note:   Overall appears better though hospitalist concern was that it looked worse than yesterday        Previous note: overall better    Still has nausea    Previously:  nausea with vomiting. Leg improving.  "Working with PT.     Reviewed records in careeverywhere under other chart Tonya Duke.     ROS: All other systems negative except as listed above.        OBJECTIVE:  /80 (BP Location: Left arm)   Pulse 83   Temp 98.3  F (36.8  C) (Oral)   Resp 18   Ht 1.6 m (5' 3\")   Wt 82.4 kg (181 lb 11.2 oz)   SpO2 98%   BMI 32.19 kg/m         Vital Signs  Temp: 98.5  F (36.9  C)  Temp src: Oral  Resp: 18  Pulse: 80  Pulse Rate Source: Monitor  BP: 109/58  BP Location: Left arm    Temp (24hrs), Av.3  F (36.8  C), Min:97.7  F (36.5  C), Max:98.7  F (37.1  C)    Exam on 2021   GEN: sitting in bed, eating  RESPIRATORY:  Normal breathing pattern.   CARDIOVASCULAR:  Regular rate and rhythm.  previously  ABDOMEN:  Soft, normal bowel sounds, non-tender  EXTREMITIES: compression on leg  Previous note:  R leg less red, faint erythema, decreased with skin wrinkling, still mildly tender around medial wound. Dry skin.   Skin breakdown medially  Tattoo with slight induration on lateral aspect  SKIN/HAIR/NAILS: Erythema lower extremity  IV: peripheral  Photos taken with patient permission 2021              Antibiotics:  Pip/tazo, vanc -->ceftriaxone . Stopped  Meropenem stopped on 2021  Cefdinir 2021 --  Doxycycline 2021 --    Pertinent labs:    Recent Labs   Lab 21  0715   WBC 8.1   HGB 12.0   HCT 37.7           Recent Labs   Lab 21  0717      CO2 23   BUN 14        Lab Results   Component Value Date    CRP 0.3 2021    CRP 1.0 (H) 2021    CRP 1.3 (H) 2021         Lab Results   Component Value Date    ALT <9 2021    AST 12 2021    ALKPHOS 60 2021         MICROBIOLOGY DATA:    Collected Updated Procedure Result Status    2021 1844 2021 1138 Swab Aerobic Bacterial Culture Routine with Gram Stain [[id number]]    (Abnormal)   Swab from Leg, Right    Final result Component Value   Culture 1+ Ewingella americana Abnormal     " 1+ Corynebacterium striatum Abnormal     Identification obtained by MALDI-TOF mass spectrometry research use only database. Test characteristics determined and verified by the Infectious Diseases Diagnostic Laboratory.   Susceptibilities not routinely done    1+ Normal aleksandar   Gram Stain Result No organisms seen    No white blood cells seen         Susceptibility     Ewingella americana     TUYET     Ampicillin >16 ug/mL Resistant     Ampicillin/ Sulbactam >16 ug/mL Resistant     Cefepime <=2 ug/mL Susceptible     Ceftazidime <=1 ug/mL Susceptible     Ceftriaxone <=1 ug/mL Susceptible     Ciprofloxacin  See below 1     Gentamicin <=1 ug/mL Susceptible     Levofloxacin <=0.25 ug/mL Susceptible     Meropenem <=1 ug/mL Susceptible     Piperacillin/Tazobactam <=4 ug/mL Susceptible     Tobramycin <=1 ug/mL Susceptible     Trimethoprim/Sulfamethoxazole <=2/38 ug/mL Susceptible              1 Ciprofloxacin not resistant.  Due to test limitations, lab cannot provide TUYET to determine susceptibility.

## 2021-11-30 NOTE — PLAN OF CARE
Problem: Pain Chronic (Persistent)  Goal: Acceptable Pain Control and Functional Ability  Outcome: Improving     Problem: Hyperglycemia  Goal: Blood Glucose Level Within Targeted Range  Outcome: Improving   Pain managed with scheduled tylenol with reported effectiveness. No significant changes to RLE. Call light within reach, continue to monitor.

## 2021-11-30 NOTE — PROGRESS NOTES
Hospitalist Progress Note  ADMIT DATE: 11/22/2021     FACILITY: [facility]    PCP: Reed Simpson, [phone]    Assessment/Plan    Tonya Valera is a 56 year old female who lives in a nursing home with pmhx of T2DM on insulin, hypothyroidism, GERD, hyperlipidemia, migraine, chronic pain,?  Spinal cord injury presents from SNF with right lower extremity cellulitis and right leg DVT after failing outpatient therapy.        RLE cellulitis, bilateral chronic venous insufficiency   -was started on doxy 11/9, received IM Rocephin at [facility] on 11/12 continue on Doxy then switched to cefdinir on 11/19 then moved to SNF for wound care.  -Likely needs extended course due to poorly controlled DM.   -has DVT on LE US.(unable to see result in the EPIC)  -was on  Vancomycin and zosyn- id changed to Rocephin on 11/25; since n/v improved, switch to oral Cefdinir on 11/27, but redness worsening and thus changed to iv Meropenem and Vancomycin on 11/30  - wound care   -ID following      Nausea and vomiting  Constipation  -improving  -Zofran prn, protonix  -prnMeclizine   -no fever and no abdominal pain  -recent LFT wnl, lipase wnl   -senna for consitpation  -other ddx include gastroparesis     Right lower extremity DVT  - Continue  Xarelto   -Chronic venous insufficiency on bilateral legs     T2DM with hyperglycemia  - A1C 9.5 10/25/21  - Needs better control for wound healing  -started on lantus 35 untis at bedtime -but patient is not compliant and only took 17 units at night, refusing to take as prescribed; Discussed with pt about the importance of compliance. Patient worried about hypoglycemia. She agrees to try 20 units at bedtime tonight.   -increase Lantus to 22 units 11/29  -in better control today  -DM diet  - diabetic educator     Elevated serum creatinine  - cr improved  -Monitor inputs and outputs  -Avoid nephrotoxic medications     Hypomagnesia  -on replacement protocol     Hx spinal cord  injury  -sounds like spinal cord abscess with E coli per her story.  -has been in NH for 1 month, was living with family but strained relationship with one of her sons ended up with in facility. Reports LE spasticity     Hypothyroidism  -continue home Synthroid     Hyperlipidemia  --continue statin     GERD  -continue PPI     Migraines  - stable      DVT PPX : xarelto     Barriers to discharge: cellulitis getting worse on oral antibiotics, changed back to iv abx;      Anticipated Discharge date  : 1-2 days pending improvement and plan for outpt abx     Subjective  Feels similar to yesterday. No fever/chill, no n/v.    Objective    Vital signs in last 24 hours  Temp:  [97.3  F (36.3  C)-99.4  F (37.4  C)] 99.4  F (37.4  C)  Pulse:  [] 87  Resp:  [18] 18  BP: (116-129)/(56-85) 129/71  SpO2:  [95 %-98 %] 98 % [unfilled] O2 Device: None (Room air)    Weight:   [unfilled] Weight change:     Intake/Output last 3 shifts  I/O last 3 completed shifts:  In: 940 [P.O.:840; Other:100]  Out: -   Body mass index is 32.19 kg/m .    Physical Exam    Physical Exam  General appearance: not in acute distress  HEENT: PERRL, EOMI  Lungs: Clear breath sounds in bilateral lung fields  Cardiovascular: Regular rate and rhythm, normal S1-S2  Abdomen: Soft, non tender, no distension, normal bowel sound  Musculoskeletal: No joint swelling  Skin: right leg redness better than yesterday  Neurology: AAO ×3.  Cranial nerves II - XII normal.  Normal muscle strength in all four extremities.      Pertinent Labs   Lab Results: personally reviewed.   Results for FRANCHESKA ALEXANDER (MRN [id number]) as of 11/30/2021 09:23   Ref. Range 11/30/2021 07:38 11/30/2021 08:03   Potassium Latest Ref Range: 3.5 - 5.0 mmol/L  3.8   Magnesium Latest Ref Range: 1.8 - 2.6 mg/dL  1.7 (L)   GLUCOSE BY METER POCT Latest Ref Range: 70 - 99 mg/dL 113 (H)        Medications  Scheduled Meds:    acetaminophen  975 mg Oral Q8H     aspirin  81 mg Oral Daily     baclofen  10  mg Oral TID     buPROPion  150 mg Oral Daily     calcium carbonate-vitamin D  1 tablet Oral Daily     docusate sodium  100 mg Oral BID     influenza recomb quadrivalent PF  0.5 mL Intramuscular Prior to discharge     insulin aspart   Subcutaneous TID w/meals     insulin aspart  1-7 Units Subcutaneous TID AC     insulin aspart  1-5 Units Subcutaneous At Bedtime     insulin glargine  22 Units Subcutaneous At Bedtime     levothyroxine  187.5 mcg Oral Once per day on Mon Fri     levothyroxine  125 mcg Oral Once per day on Sun Tue Wed Thu Sat     lisinopril  2.5 mg Oral Daily     meropenem  1 g Intravenous Q8H     morphine  30 mg Oral TID     pantoprazole  40 mg Oral QAM AC     polyethylene glycol  17 g Oral Every Other Day     rivaroxaban ANTICOAGULANT  15 mg Oral BID w/meals     rosuvastatin  20 mg Oral At Bedtime     sodium chloride (PF)  3 mL Intracatheter Q8H     torsemide  20 mg Oral Daily     vancomycin  1,500 mg Intravenous Q18H     venlafaxine  150 mg Oral Daily     venlafaxine  75 mg Oral Daily     Continuous Infusions:    - MEDICATION INSTRUCTIONS -       PRN Meds:.bisacodyl, glucose **OR** dextrose **OR** glucagon, hydrOXYzine, lidocaine 4%, lidocaine (buffered or not buffered), meclizine, melatonin, morphine, naloxone **OR** naloxone **OR** naloxone **OR** naloxone, ondansetron **OR** ondansetron, - MEDICATION INSTRUCTIONS -, polyethylene glycol, sennosides, sodium chloride (PF), SUMAtriptan    Advanced Care Planning:  Discharge planning discussed with patient     [facility]  Jessica Carlisle

## 2021-11-30 NOTE — PLAN OF CARE
Problem: Adult Inpatient Plan of Care  Goal: Absence of Hospital-Acquired Illness or Injury  Intervention: Prevent Skin Injury  Recent Flowsheet Documentation  Taken 11/29/2021 2030 by Glenny Sosa RN  Body Position: position changed independently  Taken 11/29/2021 1630 by Glenny Sosa, RN  Body Position: position changed independently     Problem: Pain Chronic (Persistent)  Goal: Acceptable Pain Control and Functional Ability  Outcome: Improving     Problem: Hyperglycemia  Goal: Blood Glucose Level Within Targeted Range  Outcome: Improving   No significant changes in pt's general condition this susy. Denies pain. Right lower cellulitis with no increase swelling or redness. No drainage noted. Will cont to monitor. Covid test done this susy and results came back negative. Will cont to monitor

## 2021-12-01 ENCOUNTER — APPOINTMENT (OUTPATIENT)
Dept: PHYSICAL THERAPY | Facility: HOSPITAL | Age: 56
DRG: 603 | End: 2021-12-01
Payer: MEDICARE

## 2021-12-01 VITALS
DIASTOLIC BLOOD PRESSURE: 64 MMHG | HEART RATE: 75 BPM | SYSTOLIC BLOOD PRESSURE: 128 MMHG | OXYGEN SATURATION: 95 % | WEIGHT: 177.5 LBS | HEIGHT: 63 IN | TEMPERATURE: 98.3 F | RESPIRATION RATE: 18 BRPM | BODY MASS INDEX: 31.45 KG/M2

## 2021-12-01 LAB
GLUCOSE BLDC GLUCOMTR-MCNC: 111 MG/DL (ref 70–99)
GLUCOSE BLDC GLUCOMTR-MCNC: 329 MG/DL (ref 70–99)
MAGNESIUM SERPL-MCNC: 1.7 MG/DL (ref 1.8–2.6)
POTASSIUM BLD-SCNC: 4 MMOL/L (ref 3.5–5)

## 2021-12-01 PROCEDURE — 99239 HOSP IP/OBS DSCHRG MGMT >30: CPT | Performed by: INTERNAL MEDICINE

## 2021-12-01 PROCEDURE — G0463 HOSPITAL OUTPT CLINIC VISIT: HCPCS

## 2021-12-01 PROCEDURE — 83735 ASSAY OF MAGNESIUM: CPT | Performed by: INTERNAL MEDICINE

## 2021-12-01 PROCEDURE — 36415 COLL VENOUS BLD VENIPUNCTURE: CPT | Performed by: INTERNAL MEDICINE

## 2021-12-01 PROCEDURE — 250N000013 HC RX MED GY IP 250 OP 250 PS 637: Performed by: FAMILY MEDICINE

## 2021-12-01 PROCEDURE — G0008 ADMIN INFLUENZA VIRUS VAC: HCPCS | Performed by: STUDENT IN AN ORGANIZED HEALTH CARE EDUCATION/TRAINING PROGRAM

## 2021-12-01 PROCEDURE — 90682 RIV4 VACC RECOMBINANT DNA IM: CPT | Performed by: STUDENT IN AN ORGANIZED HEALTH CARE EDUCATION/TRAINING PROGRAM

## 2021-12-01 PROCEDURE — 250N000011 HC RX IP 250 OP 636: Performed by: STUDENT IN AN ORGANIZED HEALTH CARE EDUCATION/TRAINING PROGRAM

## 2021-12-01 PROCEDURE — 250N000013 HC RX MED GY IP 250 OP 250 PS 637: Performed by: INTERNAL MEDICINE

## 2021-12-01 PROCEDURE — 84132 ASSAY OF SERUM POTASSIUM: CPT | Performed by: INTERNAL MEDICINE

## 2021-12-01 RX ORDER — CEFDINIR 300 MG/1
300 CAPSULE ORAL EVERY 12 HOURS
Qty: 12 CAPSULE | Refills: 0 | Status: SHIPPED | OUTPATIENT
Start: 2021-12-01 | End: 2021-12-07

## 2021-12-01 RX ORDER — DOXYCYCLINE 100 MG/1
100 CAPSULE ORAL EVERY 12 HOURS
Qty: 12 CAPSULE | Refills: 0 | Status: SHIPPED | OUTPATIENT
Start: 2021-12-01 | End: 2021-12-07

## 2021-12-01 RX ADMIN — LEVOTHYROXINE SODIUM 125 MCG: 0.03 TABLET ORAL at 05:51

## 2021-12-01 RX ADMIN — VENLAFAXINE HYDROCHLORIDE 75 MG: 75 CAPSULE, EXTENDED RELEASE ORAL at 08:31

## 2021-12-01 RX ADMIN — DOCUSATE SODIUM 100 MG: 100 CAPSULE, LIQUID FILLED ORAL at 08:30

## 2021-12-01 RX ADMIN — TORSEMIDE 20 MG: 20 TABLET ORAL at 08:31

## 2021-12-01 RX ADMIN — BACLOFEN 10 MG: 10 TABLET ORAL at 08:31

## 2021-12-01 RX ADMIN — INSULIN ASPART 3 UNITS: 100 INJECTION, SOLUTION INTRAVENOUS; SUBCUTANEOUS at 09:38

## 2021-12-01 RX ADMIN — Medication 1 TABLET: at 08:31

## 2021-12-01 RX ADMIN — ACETAMINOPHEN 975 MG: 325 TABLET ORAL at 11:31

## 2021-12-01 RX ADMIN — VENLAFAXINE HYDROCHLORIDE 150 MG: 150 CAPSULE, EXTENDED RELEASE ORAL at 08:32

## 2021-12-01 RX ADMIN — BUPROPION HYDROCHLORIDE 150 MG: 150 TABLET, FILM COATED, EXTENDED RELEASE ORAL at 08:31

## 2021-12-01 RX ADMIN — PANTOPRAZOLE SODIUM 40 MG: 40 TABLET, DELAYED RELEASE ORAL at 08:32

## 2021-12-01 RX ADMIN — CEFDINIR 300 MG: 300 CAPSULE ORAL at 08:32

## 2021-12-01 RX ADMIN — LISINOPRIL 2.5 MG: 2.5 TABLET ORAL at 08:32

## 2021-12-01 RX ADMIN — DOXYCYCLINE 100 MG: 100 CAPSULE ORAL at 08:31

## 2021-12-01 RX ADMIN — POLYETHYLENE GLYCOL 3350 17 G: 17 POWDER, FOR SOLUTION ORAL at 08:33

## 2021-12-01 RX ADMIN — INFLUENZA A VIRUS A/WISCONSIN/588/2019 (H1N1) RECOMBINANT HEMAGGLUTININ ANTIGEN, INFLUENZA A VIRUS A/TASMANIA/503/2020 (H3N2) RECOMBINANT HEMAGGLUTININ ANTIGEN, INFLUENZA B VIRUS B/WASHINGTON/02/2019 RECOMBINANT HEMAGGLUTININ ANTIGEN, AND INFLUENZA B VIRUS B/PHUKET/3073/2013 RECOMBINANT HEMAGGLUTININ ANTIGEN 0.5 ML: 45; 45; 45; 45 INJECTION INTRAMUSCULAR at 11:32

## 2021-12-01 RX ADMIN — ACETAMINOPHEN 975 MG: 325 TABLET ORAL at 04:02

## 2021-12-01 RX ADMIN — BACLOFEN 10 MG: 10 TABLET ORAL at 13:50

## 2021-12-01 RX ADMIN — MORPHINE SULFATE 30 MG: 30 TABLET, FILM COATED, EXTENDED RELEASE ORAL at 13:50

## 2021-12-01 RX ADMIN — ASPIRIN 81 MG CHEWABLE TABLET 81 MG: 81 TABLET CHEWABLE at 08:30

## 2021-12-01 RX ADMIN — HYDROXYZINE HYDROCHLORIDE 50 MG: 25 TABLET, FILM COATED ORAL at 04:06

## 2021-12-01 RX ADMIN — MORPHINE SULFATE 30 MG: 30 TABLET, FILM COATED, EXTENDED RELEASE ORAL at 08:32

## 2021-12-01 RX ADMIN — RIVAROXABAN 15 MG: 15 TABLET, FILM COATED ORAL at 08:30

## 2021-12-01 ASSESSMENT — ACTIVITIES OF DAILY LIVING (ADL)
ADLS_ACUITY_SCORE: 14

## 2021-12-01 ASSESSMENT — MIFFLIN-ST. JEOR: SCORE: 1359.26

## 2021-12-01 NOTE — PLAN OF CARE
Physical Therapy Discharge Summary    Reason for therapy discharge:    Discharged to [facility].    Progress towards therapy goal(s). See goals on Care Plan in [facility] electronic health record for goal details.  Goals partially met.  Barriers to achieving goals:   discharge from facility.    Therapy recommendation(s):    Further recommended therapy is related to documented deficits, and is necessary to maximize functional independence in order for patient to return to prior level of function.      Nilda March DPT 12/1/2021

## 2021-12-01 NOTE — PLAN OF CARE
Occupational Therapy Discharge Summary    Reason for therapy discharge:    Discharged to assisted living    Progress towards therapy goal(s). See goals on Care Plan in [facility] electronic health record for goal details.  Goals partially met.  Barriers to achieving goals:   discharge from facility.    Therapy recommendation(s):    No further therapy is recommended.

## 2021-12-01 NOTE — PLAN OF CARE
Pt has slept most of shift today but is agreeable to discharge back to assisted living facility today.  Left lower leg wound appears to be healing.  VSS.  Awaiting transport per son this afternoon.

## 2021-12-01 NOTE — PLAN OF CARE
Pt discharge to [facility] at 1545. AVS copies printed and given to patient. All questions answered. Belongings returned. Pt's son to transport. Pt stable at time of discharge.

## 2021-12-01 NOTE — PLAN OF CARE
Problem: Adult Inpatient Plan of Care  Goal: Absence of Hospital-Acquired Illness or Injury  Intervention: Identify and Manage Fall Risk  Recent Flowsheet Documentation  Taken 11/30/2021 4799 by Tito Hwang RN  Safety Promotion/Fall Prevention:   activity supervised   clutter free environment maintained   room door open    Problem: Skin or Soft Tissue Infection  Goal: Infection Symptom Resolution  Outcome: Improving    Problem: Pain Chronic (Persistent)  Goal: Acceptable Pain Control and Functional Ability  Outcome: Improving     No acute events overnight.  Patient up until 0430.  Denies any further nausea.  Did requested PRN MS IR 7.5 mg at 2345, effective.  VSS.

## 2021-12-01 NOTE — PROGRESS NOTES
Wound Ostomy  WOC Assessment       Allergies:  Compazine [Prochlorperazine]  Latex  Metformin  Statins [Hmg-Coa-R Inhibitors]  Sulfa Drugs    Diagnosis:   Patient Active Problem List    Diagnosis Date Noted     Type 2 diabetes mellitus with hyperglycemia, with long-term current use of insulin (H) 11/30/2021     Priority: Medium     Deep vein thrombosis (DVT) of proximal lower extremity (H) 11/30/2021     Priority: Medium     Cellulitis of right lower extremity 11/22/2021     Priority: Medium     Failure of outpatient treatment 11/22/2021     Priority: Medium       Height:  [unfilled]    Weight:  [unfilled]    Labs:  Recent Labs   Lab Test 11/23/21  0714   CRP 1.0*   HGB 10.6*   ALBUMIN 3.2*       Chaitanya:  Chaitanya Score: 18    Specialty Bed:       Wound culture obtained: No    Edema:  Yes:  Localized    Anatomic Site/Laterality: R medial ankle    Reason for ongoing care:   Wound assessment and plan of care    Encounter Type:  Subsequent Encounter Wound Type:   Non-PU Ulcer: Venous     Tissue Damage:  Breakdown of skin    Associated conditions/Related trauma: Patient with DM2, venous insufficiency.     Assessment:    Adherent scab 1 x 1cm    Tunneling/Undermining: No    Continue lotion to skin - patient has eucerin at bedside       Nursing care provided was NA.    Discussed plan of care with nurse and patient.    Outcomes and treatment recommendations are to promote skin integrity.    Actions taken by WOC RN: 2 minutes of education.    Planned Follow Up: None - Signed off.
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
## 2021-12-01 NOTE — PROGRESS NOTES
9:00am- [facility] spoke with ORLANDO Duncan at [facility] and updated that pt ready for discharge today. Perla requested updated progress notes and current med list with med changes to be faxed for review to determine if pt able to return today.     Information faxed.     11:00am-  left  for ORLANDO Duncan for follow up on pt return to [facility] today .     Spoke with ORLANDO Duncan and confirmed that pt can d.c back today. Requested time of arrival before 4pm.     Orders faxed/ discharge summary faxed.   HC orders faxed .      spoke with pt son Noe and confirmed that he will transport pt today at 3:30pm.     LAMBERTO, RN notified.   SAULO Rosario

## 2021-12-01 NOTE — PLAN OF CARE
Problem: Skin or Soft Tissue Infection  Goal: Infection Symptom Resolution  Outcome: Improving   Mepilex CDI. Stocking in place until bedtime.     Problem: Pain Chronic (Persistent)  Goal: Acceptable Pain Control and Functional Ability  Outcome: Improving   C/o pain, scheduled medications utilized.     Problem: Hyperglycemia  Goal: Blood Glucose Level Within Targeted Range  Outcome: Improving   168 and 221.    Problem: Nausea and Vomiting  Goal: Fluid and Electrolyte Balance  Outcome: Improving   Some nausea, but improved quickly at beginning of shift.     -PO abx.

## 2021-12-07 ENCOUNTER — DOCUMENTATION ONLY (OUTPATIENT)
Dept: OTHER | Facility: CLINIC | Age: 56
End: 2021-12-07
Payer: MEDICARE

## 2022-01-31 ENCOUNTER — APPOINTMENT (OUTPATIENT)
Dept: CT IMAGING | Facility: HOSPITAL | Age: 57
End: 2022-01-31
Attending: EMERGENCY MEDICINE
Payer: MEDICARE

## 2022-01-31 LAB
ALBUMIN SERPL-MCNC: 4.2 G/DL (ref 3.5–5)
ALP SERPL-CCNC: 85 U/L (ref 45–120)
ALT SERPL W P-5'-P-CCNC: <9 U/L (ref 0–45)
ANION GAP SERPL CALCULATED.3IONS-SCNC: 13 MMOL/L (ref 5–18)
AST SERPL W P-5'-P-CCNC: 11 U/L (ref 0–40)
BASE EXCESS BLDV CALC-SCNC: 7.6 MMOL/L
BASOPHILS # BLD AUTO: 0.1 10E3/UL (ref 0–0.2)
BASOPHILS NFR BLD AUTO: 1 %
BILIRUB SERPL-MCNC: 0.3 MG/DL (ref 0–1)
BUN SERPL-MCNC: 17 MG/DL (ref 8–22)
CALCIUM SERPL-MCNC: 9.5 MG/DL (ref 8.5–10.5)
CHLORIDE BLD-SCNC: 99 MMOL/L (ref 98–107)
CO2 SERPL-SCNC: 28 MMOL/L (ref 22–31)
CREAT SERPL-MCNC: 1.17 MG/DL (ref 0.6–1.1)
EOSINOPHIL # BLD AUTO: 0.1 10E3/UL (ref 0–0.7)
EOSINOPHIL NFR BLD AUTO: 1 %
ERYTHROCYTE [DISTWIDTH] IN BLOOD BY AUTOMATED COUNT: 13.3 % (ref 10–15)
GFR SERPL CREATININE-BSD FRML MDRD: 54 ML/MIN/1.73M2
GLUCOSE BLD-MCNC: 156 MG/DL (ref 70–125)
HCO3 BLDV-SCNC: 29 MMOL/L (ref 24–30)
HCT VFR BLD AUTO: 41.6 % (ref 35–47)
HGB BLD-MCNC: 13.1 G/DL (ref 11.7–15.7)
IMM GRANULOCYTES # BLD: 0.1 10E3/UL
IMM GRANULOCYTES NFR BLD: 0 %
LACTATE SERPL-SCNC: 0.6 MMOL/L (ref 0.7–2)
LIPASE SERPL-CCNC: 10 U/L (ref 0–52)
LYMPHOCYTES # BLD AUTO: 2.9 10E3/UL (ref 0.8–5.3)
LYMPHOCYTES NFR BLD AUTO: 25 %
MCH RBC QN AUTO: 26.1 PG (ref 26.5–33)
MCHC RBC AUTO-ENTMCNC: 31.5 G/DL (ref 31.5–36.5)
MCV RBC AUTO: 83 FL (ref 78–100)
MONOCYTES # BLD AUTO: 0.5 10E3/UL (ref 0–1.3)
MONOCYTES NFR BLD AUTO: 5 %
NEUTROPHILS # BLD AUTO: 7.7 10E3/UL (ref 1.6–8.3)
NEUTROPHILS NFR BLD AUTO: 68 %
NRBC # BLD AUTO: 0 10E3/UL
NRBC BLD AUTO-RTO: 0 /100
OXYHGB MFR BLDV: 53.7 % (ref 70–75)
PCO2 BLDV: 59 MM HG (ref 35–50)
PH BLDV: 7.36 [PH] (ref 7.35–7.45)
PLATELET # BLD AUTO: 281 10E3/UL (ref 150–450)
PO2 BLDV: 32 MM HG (ref 25–47)
POTASSIUM BLD-SCNC: 3.7 MMOL/L (ref 3.5–5)
PROT SERPL-MCNC: 7.9 G/DL (ref 6–8)
RBC # BLD AUTO: 5.02 10E6/UL (ref 3.8–5.2)
SAO2 % BLDV: 54.5 % (ref 70–75)
SODIUM SERPL-SCNC: 140 MMOL/L (ref 136–145)
WBC # BLD AUTO: 11.3 10E3/UL (ref 4–11)

## 2022-01-31 PROCEDURE — 36415 COLL VENOUS BLD VENIPUNCTURE: CPT | Performed by: EMERGENCY MEDICINE

## 2022-01-31 PROCEDURE — 80053 COMPREHEN METABOLIC PANEL: CPT | Performed by: EMERGENCY MEDICINE

## 2022-01-31 PROCEDURE — 99285 EMERGENCY DEPT VISIT HI MDM: CPT | Mod: 25

## 2022-01-31 PROCEDURE — 96374 THER/PROPH/DIAG INJ IV PUSH: CPT | Mod: 59

## 2022-01-31 PROCEDURE — 250N000011 HC RX IP 250 OP 636: Performed by: EMERGENCY MEDICINE

## 2022-01-31 PROCEDURE — 85025 COMPLETE CBC W/AUTO DIFF WBC: CPT | Performed by: EMERGENCY MEDICINE

## 2022-01-31 PROCEDURE — 258N000003 HC RX IP 258 OP 636: Performed by: EMERGENCY MEDICINE

## 2022-01-31 PROCEDURE — 74177 CT ABD & PELVIS W/CONTRAST: CPT

## 2022-01-31 PROCEDURE — 82805 BLOOD GASES W/O2 SATURATION: CPT | Performed by: EMERGENCY MEDICINE

## 2022-01-31 PROCEDURE — 96361 HYDRATE IV INFUSION ADD-ON: CPT

## 2022-01-31 PROCEDURE — 83605 ASSAY OF LACTIC ACID: CPT | Performed by: EMERGENCY MEDICINE

## 2022-01-31 PROCEDURE — 83690 ASSAY OF LIPASE: CPT | Performed by: EMERGENCY MEDICINE

## 2022-01-31 RX ORDER — ONDANSETRON 2 MG/ML
4 INJECTION INTRAMUSCULAR; INTRAVENOUS ONCE
Status: COMPLETED | OUTPATIENT
Start: 2022-01-31 | End: 2022-01-31

## 2022-01-31 RX ORDER — ONDANSETRON 4 MG/1
4 TABLET, FILM COATED ORAL EVERY 8 HOURS PRN
Qty: 12 TABLET | Refills: 0 | Status: SHIPPED | OUTPATIENT
Start: 2022-01-31 | End: 2022-08-30

## 2022-01-31 RX ORDER — IOPAMIDOL 755 MG/ML
75 INJECTION, SOLUTION INTRAVASCULAR ONCE
Status: COMPLETED | OUTPATIENT
Start: 2022-01-31 | End: 2022-01-31

## 2022-01-31 RX ADMIN — SODIUM CHLORIDE 1000 ML: 9 INJECTION, SOLUTION INTRAVENOUS at 22:58

## 2022-01-31 RX ADMIN — ONDANSETRON 4 MG: 2 INJECTION INTRAMUSCULAR; INTRAVENOUS at 20:57

## 2022-01-31 RX ADMIN — IOPAMIDOL 75 ML: 755 INJECTION, SOLUTION INTRAVENOUS at 23:09

## 2022-02-01 ENCOUNTER — HOSPITAL ENCOUNTER (EMERGENCY)
Facility: HOSPITAL | Age: 57
Discharge: HOME | End: 2022-02-01
Attending: EMERGENCY MEDICINE | Admitting: EMERGENCY MEDICINE
Payer: MEDICARE

## 2022-02-01 VITALS
TEMPERATURE: 99.4 F | HEART RATE: 101 BPM | RESPIRATION RATE: 20 BRPM | OXYGEN SATURATION: 96 % | BODY MASS INDEX: 35.43 KG/M2 | SYSTOLIC BLOOD PRESSURE: 166 MMHG | WEIGHT: 200 LBS | DIASTOLIC BLOOD PRESSURE: 83 MMHG

## 2022-02-01 DIAGNOSIS — R11.2 NON-INTRACTABLE VOMITING WITH NAUSEA, UNSPECIFIED VOMITING TYPE: ICD-10-CM

## 2022-02-01 DIAGNOSIS — K80.20 CALCULUS OF GALLBLADDER WITHOUT CHOLECYSTITIS WITHOUT OBSTRUCTION: ICD-10-CM

## 2022-02-01 LAB — GLUCOSE BLDC GLUCOMTR-MCNC: 127 MG/DL (ref 70–99)

## 2022-02-01 PROCEDURE — 250N000013 HC RX MED GY IP 250 OP 250 PS 637: Performed by: EMERGENCY MEDICINE

## 2022-02-01 RX ORDER — BUPROPION HYDROCHLORIDE 150 MG/1
150 TABLET, EXTENDED RELEASE ORAL ONCE
Status: COMPLETED | OUTPATIENT
Start: 2022-02-01 | End: 2022-02-01

## 2022-02-01 RX ORDER — FAMOTIDINE 10 MG
10 TABLET ORAL ONCE
Status: COMPLETED | OUTPATIENT
Start: 2022-02-01 | End: 2022-02-01

## 2022-02-01 RX ORDER — BACLOFEN 10 MG/1
20 TABLET ORAL 3 TIMES DAILY PRN
Status: DISCONTINUED | OUTPATIENT
Start: 2022-02-01 | End: 2022-02-01 | Stop reason: HOSPADM

## 2022-02-01 RX ORDER — VENLAFAXINE HYDROCHLORIDE 75 MG/1
75 CAPSULE, EXTENDED RELEASE ORAL ONCE
Status: COMPLETED | OUTPATIENT
Start: 2022-02-01 | End: 2022-02-01

## 2022-02-01 RX ORDER — MORPHINE SULFATE 15 MG/1
7.5 TABLET ORAL EVERY 8 HOURS PRN
Status: DISCONTINUED | OUTPATIENT
Start: 2022-02-01 | End: 2022-02-01 | Stop reason: HOSPADM

## 2022-02-01 RX ORDER — ASPIRIN 81 MG/1
81 TABLET, CHEWABLE ORAL ONCE
Status: COMPLETED | OUTPATIENT
Start: 2022-02-01 | End: 2022-02-01

## 2022-02-01 RX ORDER — PANTOPRAZOLE SODIUM 20 MG/1
40 TABLET, DELAYED RELEASE ORAL ONCE
Status: COMPLETED | OUTPATIENT
Start: 2022-02-01 | End: 2022-02-01

## 2022-02-01 RX ORDER — NICOTINE POLACRILEX 4 MG
15-30 LOZENGE BUCCAL
Status: DISCONTINUED | OUTPATIENT
Start: 2022-02-01 | End: 2022-02-01 | Stop reason: HOSPADM

## 2022-02-01 RX ORDER — DOXYCYCLINE 100 MG/1
100 CAPSULE ORAL ONCE
Status: COMPLETED | OUTPATIENT
Start: 2022-02-01 | End: 2022-02-01

## 2022-02-01 RX ORDER — DEXTROSE MONOHYDRATE 25 G/50ML
25-50 INJECTION, SOLUTION INTRAVENOUS
Status: DISCONTINUED | OUTPATIENT
Start: 2022-02-01 | End: 2022-02-01 | Stop reason: HOSPADM

## 2022-02-01 RX ORDER — MORPHINE SULFATE 15 MG/1
30 TABLET, FILM COATED, EXTENDED RELEASE ORAL EVERY 8 HOURS
Status: DISCONTINUED | OUTPATIENT
Start: 2022-02-01 | End: 2022-02-01 | Stop reason: HOSPADM

## 2022-02-01 RX ORDER — LEVOTHYROXINE SODIUM 125 UG/1
125 TABLET ORAL ONCE
Status: COMPLETED | OUTPATIENT
Start: 2022-02-01 | End: 2022-02-01

## 2022-02-01 RX ORDER — LISINOPRIL 2.5 MG/1
2.5 TABLET ORAL ONCE
Status: COMPLETED | OUTPATIENT
Start: 2022-02-01 | End: 2022-02-01

## 2022-02-01 RX ORDER — VENLAFAXINE HYDROCHLORIDE 150 MG/1
150 CAPSULE, EXTENDED RELEASE ORAL ONCE
Status: COMPLETED | OUTPATIENT
Start: 2022-02-01 | End: 2022-02-01

## 2022-02-01 RX ADMIN — VENLAFAXINE HYDROCHLORIDE 150 MG: 150 CAPSULE, EXTENDED RELEASE ORAL at 09:39

## 2022-02-01 RX ADMIN — PANTOPRAZOLE SODIUM 40 MG: 20 TABLET, DELAYED RELEASE ORAL at 09:11

## 2022-02-01 RX ADMIN — BUPROPION HYDROCHLORIDE 150 MG: 150 TABLET, FILM COATED, EXTENDED RELEASE ORAL at 09:31

## 2022-02-01 RX ADMIN — LEVOTHYROXINE SODIUM 125 MCG: 125 TABLET ORAL at 09:31

## 2022-02-01 RX ADMIN — LISINOPRIL 2.5 MG: 2.5 TABLET ORAL at 09:31

## 2022-02-01 RX ADMIN — MORPHINE SULFATE 30 MG: 15 TABLET, EXTENDED RELEASE ORAL at 09:39

## 2022-02-01 RX ADMIN — DOXYCYCLINE 100 MG: 100 CAPSULE ORAL at 09:39

## 2022-02-01 RX ADMIN — FAMOTIDINE 10 MG: 10 TABLET ORAL at 08:34

## 2022-02-01 RX ADMIN — VENLAFAXINE HYDROCHLORIDE 75 MG: 75 CAPSULE, EXTENDED RELEASE ORAL at 09:39

## 2022-02-01 RX ADMIN — MAGNESIUM OXIDE TAB 400 MG (241.3 MG ELEMENTAL MG) 200 MG: 400 (241.3 MG) TAB at 09:31

## 2022-02-01 RX ADMIN — ASPIRIN 81 MG CHEWABLE TABLET 81 MG: 81 TABLET CHEWABLE at 09:12

## 2022-02-01 NOTE — ED NOTES
1/31/2022  Tonya Valera 1965     [facility] Crisis Assessment    Patient was assessed: remote  Patient location: [facility] ED    LMHP discussed case with Dr Muniz and is was mutually agreed that pt was in need of care management not a DEC crisis assessment regarding discharging back to assissted living facility. DEC assessment will be canceled and provider will reach out to care management later this morning. Marycarmen Mahoney, CARA, LICSW

## 2022-02-01 NOTE — ED NOTES
Dinah Argueta , care manager left vocera message to call this rn for update on plan for pt assessement for asst living placement.

## 2022-02-01 NOTE — ED NOTES
[facility] notified for request for wheelchair transport to [facility] Asst Olga Martinez

## 2022-02-01 NOTE — ED TRIAGE NOTES
Pt coming from assisted living with reports of nausea/vomiting. Blood sugar for ambulance crew was 180. Pt has IV, received IV fluids en route, and is feeling improved after Zofran administration. Pt denies pain.

## 2022-02-01 NOTE — ED NOTES
Spoke with pt regarding her care facility and her dislike of the care she receives.    Received permission from patient to call the following.  Called and left message for her Medical Coordinator Veto Cadena at [phone].    Called and spoke with pt's son Noe at [phone].  Noe does not feel pt's current facility is a problem.  Pt's son feels pt could live on her own if she had PT/OT services.      Pt given [facility] pamphlet to reach out and arrange changes she is seeking.

## 2022-02-01 NOTE — DISCHARGE INSTRUCTIONS
Continue to work with your nursing home director about your care there, and with the resources that you were given if you would like to change assisted living care facilities to make that happen for yourself.  Otherwise, speak with your son as well about changing facilities because he can help you as well using the resources that you were given.  
Awake/Alert/Cooperative
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
## 2022-02-01 NOTE — ED NOTES
Perla from [facility], updated on plan of care, records faxed to facililty.
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
## 2022-02-01 NOTE — ED NOTES
Perla MORENO at [facility] ([facility]) [phone] notified of plan of care.
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
## 2022-02-01 NOTE — ED NOTES
Pt moved to this room so she had a place to lay down while waiting for care management consult in the morning. Pt discharged and not holding for inpatient bed, care management only at this time.
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
## 2022-02-04 ENCOUNTER — MEDICAL CORRESPONDENCE (OUTPATIENT)
Dept: HEALTH INFORMATION MANAGEMENT | Facility: CLINIC | Age: 57
End: 2022-02-04
Payer: MEDICARE

## 2022-04-29 ENCOUNTER — APPOINTMENT (OUTPATIENT)
Dept: ULTRASOUND IMAGING | Facility: HOSPITAL | Age: 57
End: 2022-04-29
Attending: EMERGENCY MEDICINE
Payer: MEDICARE

## 2022-04-29 ENCOUNTER — HOSPITAL ENCOUNTER (EMERGENCY)
Facility: HOSPITAL | Age: 57
Discharge: HOME | End: 2022-04-29
Attending: EMERGENCY MEDICINE | Admitting: EMERGENCY MEDICINE
Payer: MEDICARE

## 2022-04-29 VITALS
WEIGHT: 198 LBS | OXYGEN SATURATION: 96 % | DIASTOLIC BLOOD PRESSURE: 54 MMHG | TEMPERATURE: 98.6 F | HEIGHT: 62 IN | HEART RATE: 101 BPM | BODY MASS INDEX: 36.44 KG/M2 | RESPIRATION RATE: 18 BRPM | SYSTOLIC BLOOD PRESSURE: 111 MMHG

## 2022-04-29 DIAGNOSIS — M79.3 LIPODERMATOSCLEROSIS OF BOTH LOWER EXTREMITIES: ICD-10-CM

## 2022-04-29 DIAGNOSIS — L03.90 CELLULITIS, UNSPECIFIED CELLULITIS SITE: ICD-10-CM

## 2022-04-29 LAB
ANION GAP SERPL CALCULATED.3IONS-SCNC: 12 MMOL/L (ref 5–18)
APTT PPP: 45 SECONDS (ref 22–38)
BUN SERPL-MCNC: 13 MG/DL (ref 8–22)
C REACTIVE PROTEIN LHE: 16.3 MG/DL (ref 0–0.8)
CALCIUM SERPL-MCNC: 9 MG/DL (ref 8.5–10.5)
CHLORIDE BLD-SCNC: 103 MMOL/L (ref 98–107)
CO2 SERPL-SCNC: 24 MMOL/L (ref 22–31)
CREAT SERPL-MCNC: 1.06 MG/DL (ref 0.6–1.1)
ERYTHROCYTE [DISTWIDTH] IN BLOOD BY AUTOMATED COUNT: 14.1 % (ref 10–15)
GFR SERPL CREATININE-BSD FRML MDRD: 61 ML/MIN/1.73M2
GLUCOSE BLD-MCNC: 91 MG/DL (ref 70–125)
GLUCOSE BLDC GLUCOMTR-MCNC: 115 MG/DL (ref 70–99)
HCT VFR BLD AUTO: 33.2 % (ref 35–47)
HGB BLD-MCNC: 10.6 G/DL (ref 11.7–15.7)
INR PPP: 1.54 (ref 0.85–1.15)
MCH RBC QN AUTO: 25.9 PG (ref 26.5–33)
MCHC RBC AUTO-ENTMCNC: 31.9 G/DL (ref 31.5–36.5)
MCV RBC AUTO: 81 FL (ref 78–100)
PLATELET # BLD AUTO: 283 10E3/UL (ref 150–450)
POTASSIUM BLD-SCNC: 3.4 MMOL/L (ref 3.5–5)
RBC # BLD AUTO: 4.1 10E6/UL (ref 3.8–5.2)
SODIUM SERPL-SCNC: 139 MMOL/L (ref 136–145)
WBC # BLD AUTO: 7.8 10E3/UL (ref 4–11)

## 2022-04-29 PROCEDURE — 99284 EMERGENCY DEPT VISIT MOD MDM: CPT | Mod: 25

## 2022-04-29 PROCEDURE — 85610 PROTHROMBIN TIME: CPT | Performed by: EMERGENCY MEDICINE

## 2022-04-29 PROCEDURE — 93970 EXTREMITY STUDY: CPT

## 2022-04-29 PROCEDURE — 85730 THROMBOPLASTIN TIME PARTIAL: CPT | Performed by: EMERGENCY MEDICINE

## 2022-04-29 PROCEDURE — 80048 BASIC METABOLIC PNL TOTAL CA: CPT | Performed by: EMERGENCY MEDICINE

## 2022-04-29 PROCEDURE — 86140 C-REACTIVE PROTEIN: CPT | Performed by: EMERGENCY MEDICINE

## 2022-04-29 PROCEDURE — 250N000013 HC RX MED GY IP 250 OP 250 PS 637: Performed by: EMERGENCY MEDICINE

## 2022-04-29 PROCEDURE — 85027 COMPLETE CBC AUTOMATED: CPT | Performed by: EMERGENCY MEDICINE

## 2022-04-29 PROCEDURE — 36415 COLL VENOUS BLD VENIPUNCTURE: CPT | Performed by: EMERGENCY MEDICINE

## 2022-04-29 RX ORDER — DOXYCYCLINE 100 MG/1
100 CAPSULE ORAL ONCE
Status: DISCONTINUED | OUTPATIENT
Start: 2022-04-29 | End: 2022-04-29

## 2022-04-29 RX ORDER — DOXYCYCLINE 100 MG/1
100 CAPSULE ORAL 2 TIMES DAILY
Qty: 20 CAPSULE | Refills: 0 | Status: SHIPPED | OUTPATIENT
Start: 2022-04-29 | End: 2022-05-09

## 2022-04-29 RX ORDER — DOXYCYCLINE 100 MG/1
100 CAPSULE ORAL ONCE
Status: COMPLETED | OUTPATIENT
Start: 2022-04-29 | End: 2022-04-29

## 2022-04-29 RX ADMIN — DOXYCYCLINE 100 MG: 100 CAPSULE ORAL at 15:54

## 2022-04-29 ASSESSMENT — ENCOUNTER SYMPTOMS
JOINT SWELLING: 0
CONFUSION: 0
SORE THROAT: 0
FEVER: 0
DIARRHEA: 0
VOMITING: 0
HEMATURIA: 0
CHILLS: 0
NAUSEA: 0
DIZZINESS: 0
ABDOMINAL PAIN: 0
DYSURIA: 0
SHORTNESS OF BREATH: 0

## 2022-04-29 NOTE — ED PROVIDER NOTES
Emergency Department Encounter     Evaluation Date & Time:   No admission date for patient encounter.    CHIEF COMPLAINT:  Cellulitis      Triage Note:  Presents to ED via [employer]. Pt lives at assisted living. Has bilat leg cellulitis for a long time. Was started on new med yesterday and sent in today. Has weeping on legs. Seen by Dr. Ordonez in triage.           ED COURSE & MEDICAL DECISION MAKING:     ED Course as of 04/29/22 1704   Fri Apr 29, 2022   1538 WBC 7.8.  Hgb similar to previous.  CRP elevated, nonspecific.  Pt nontoxic, afebrile.       Pt here with ongoing redness/drainage from b/l LE legs that is chronic, but worse along left leg for the past 48 hours, so she was sent in from assisted living.  Pt started on cefdinir just yesterday. She has no systemic complaints, afebrile, nontoxic.  Legs look more likel venous insufficiency with lipodermatosclerosis, but certainly could have overlying cellulitis, so we will cover with addition of doxycycline to cover for mrsa with her cefdinir.  Pt appropriate for discharge.  US from triage neg.  Labs reviewed as well.  Pt encouraged to elevate legs whenever possible, which she admits to not really doing and to try compression stockings.  Pt will closely follow with assisted living primary provider for ongoing management.  No indication for hospitalization or IV antibx.  Pt will now be on cefdinir/doxy.     3:29 PM I introduced myself to the patient, performed my physical exam, and discussed results.  3:40 PM Patient to be discharged by ED RN.    At the conclusion of the encounter I discussed the results of all the tests and the disposition. The questions were answered. The patient or family acknowledged understanding and was agreeable with the care plan.      MEDICATIONS GIVEN IN THE EMERGENCY DEPARTMENT:  Medications   doxycycline hyclate (VIBRAMYCIN) capsule 100 mg (100 mg Oral Given 4/29/22 9246)       NEW PRESCRIPTIONS STARTED AT TODAY'S ED VISIT:  New  Prescriptions    DOXYCYCLINE HYCLATE (VIBRAMYCIN) 100 MG CAPSULE    Take 1 capsule (100 mg) by mouth 2 times daily for 10 days       HPI   The history is provided by the patient. No  was used.      Tonya Valera is a 56 year old female with a pertinent history of diabetes mellitus type II, DVT, cellulitis of right lower extremity, and PVD who presents to this ED by ambulance for evaluation of leg swelling.    The patient reports 2 days of increased bilateral leg swelling, redness, and weeping. She has previously been told she has cellulitis and venous insufficiency. She also reports her legs itch really bad. She has had similar symptoms in the past, but they were only in her right leg and now she has swelling and weeping from both legs. She denies any fever, nausea, vomiting, or abdominal pain.     She is currently taking a low dose antibiotic (cefdenir) she started yesterday. She also takes a water pill daily. She reports she has not been elevating her legs as much as she should. She does not wear compression stockings. She currently lives in an assisted living facility.    She denies any other complaints at this time.    REVIEW OF SYSTEMS:  Review of Systems   Constitutional: Negative for chills and fever.   HENT: Negative for sore throat.    Eyes: Negative for visual disturbance.   Respiratory: Negative for shortness of breath.    Cardiovascular: Positive for leg swelling (with redness and weeping). Negative for chest pain.   Gastrointestinal: Negative for abdominal pain, diarrhea, nausea and vomiting.   Endocrine: Negative for polyuria.   Genitourinary: Negative for dysuria and hematuria.        - urinary changes     Musculoskeletal: Negative for joint swelling.   Skin: Negative for rash.   Neurological: Negative for dizziness.   Psychiatric/Behavioral: Negative for confusion.   All other systems reviewed and are negative.        Medical History   No past medical history on file.    No past  surgical history on file.    No family history on file.    Social History     Tobacco Use     Smoking status: Former Smoker     Types: Cigarettes     Smokeless tobacco: Never Used       doxycycline hyclate (VIBRAMYCIN) 100 MG capsule  acetaminophen (TYLENOL) 325 MG tablet  Ascorbic Acid (VITAMIN C GUMMIES PO)  aspirin (ASA) 81 MG chewable tablet  bacitracin 500 UNIT/GM OINT  baclofen (LIORESAL) 10 MG tablet  bisacodyl (DULCOLAX) 10 MG suppository  buPROPion (WELLBUTRIN SR) 150 MG 12 hr tablet  calcium carbonate (TUMS) 500 MG chewable tablet  calcium carbonate 600 mg-vitamin D 400 units (CALTRATE) 600-400 MG-UNIT per tablet  Cholecalciferol (VITAMIN D3 GUMMIES PO)  clotrimazole (LOTRIMIN) 1 % external cream  docusate sodium (COLACE) 100 MG tablet  guaiFENesin (ROBITUSSIN) 20 mg/mL SOLN solution  hydrocortisone (CORTAID) 1 % external cream  hydrOXYzine (ATARAX) 50 MG tablet  insulin aspart (NOVOLOG PEN) 100 UNIT/ML pen  insulin degludec (TRESIBA) 100 UNIT/ML pen  levothyroxine (SYNTHROID/LEVOTHROID) 125 MCG tablet  levothyroxine (SYNTHROID/LEVOTHROID) 125 MCG tablet  lisinopril (ZESTRIL) 2.5 MG tablet  loperamide (IMODIUM A-D) 2 MG tablet  loratadine (CLARITIN) 10 MG tablet  magnesium 250 MG tablet  magnesium hydroxide (MILK OF MAGNESIA) 400 MG/5ML suspension  menthol (COUGH DROP) 7 MG LOZG  morphine (MS CONTIN) 30 MG CR tablet  morphine (MSIR) 15 MG IR tablet  Multiple Vitamins-Minerals (MULTIVITAMIN GUMMIES WOMENS) CHEW  neomycin-bacitracin-polymyxin (NEOSPORIN) 5-400-5000 ointment  omeprazole (PRILOSEC) 20 MG DR capsule  ondansetron (ZOFRAN) 4 MG tablet  polyethylene glycol (MIRALAX) 17 GM/Dose powder  rivaroxaban ANTICOAGULANT (XARELTO) 15 MG TABS tablet  rosuvastatin (CRESTOR) 20 MG tablet  sennosides (SENOKOT) 8.6 MG tablet  SUMAtriptan (IMITREX) 100 MG tablet  venlafaxine (EFFEXOR-XR) 150 MG 24 hr capsule  venlafaxine (EFFEXOR-XR) 75 MG 24 hr capsule  Zinc Oxide-Petrolatum 20-80 % CREA        Physical Exam  "    Vitals:  /54   Pulse 101   Temp 98.6  F (37  C) (Oral)   Resp 18   Ht 1.575 m (5' 2\")   Wt 89.8 kg (198 lb)   SpO2 96%   BMI 36.21 kg/m      PHYSICAL EXAM:   Physical Exam  Vitals and nursing note reviewed.   Constitutional:       General: She is not in acute distress.     Appearance: She is obese. She is ill-appearing.   HENT:      Head: Normocephalic and atraumatic.      Nose: Nose normal.      Mouth/Throat:      Mouth: Mucous membranes are moist.   Eyes:      Pupils: Pupils are equal, round, and reactive to light.   Cardiovascular:      Rate and Rhythm: Normal rate and regular rhythm.      Pulses: Normal pulses.           Radial pulses are 2+ on the right side and 2+ on the left side.        Dorsalis pedis pulses are 2+ on the right side and 2+ on the left side.      Comments: Chronic b/l LE edema with chronic skin changes including erythema and some serious drainage from left leg.  Findings most consistent with venous insufficiency and lipodermatosclerosis, but possible overlying cellulitis  Pulmonary:      Effort: Pulmonary effort is normal. No respiratory distress.      Breath sounds: Normal breath sounds.   Abdominal:      Palpations: Abdomen is soft.      Tenderness: There is no abdominal tenderness.   Musculoskeletal:      Cervical back: Full passive range of motion without pain and neck supple.      Comments: No calf tenderness or swelling b/l   Skin:     General: Skin is warm.      Findings: No rash.      Comments: Findings to b/l LE as documented in cardiovascular   Neurological:      General: No focal deficit present.      Mental Status: She is alert. Mental status is at baseline.      Comments: Fluent speech, no acute lateralizing deficits   Psychiatric:         Mood and Affect: Mood normal.         Behavior: Behavior normal.           Results     LAB:  All pertinent labs reviewed and interpreted  Labs Ordered and Resulted from Time of ED Arrival to Time of ED Departure   CBC WITH " PLATELETS - Abnormal       Result Value    WBC Count 7.8      RBC Count 4.10      Hemoglobin 10.6 (*)     Hematocrit 33.2 (*)     MCV 81      MCH 25.9 (*)     MCHC 31.9      RDW 14.1      Platelet Count 283     BASIC METABOLIC PANEL - Abnormal    Sodium 139      Potassium 3.4 (*)     Chloride 103      Carbon Dioxide (CO2) 24      Anion Gap 12      Urea Nitrogen 13      Creatinine 1.06      Calcium 9.0      Glucose 91      GFR Estimate 61     CRP INFLAMMATION - Abnormal    CRP 16.3 (*)    INR - Abnormal    INR 1.54 (*)    PARTIAL THROMBOPLASTIN TIME - Abnormal    aPTT 45 (*)    GLUCOSE BY METER - Abnormal    GLUCOSE BY METER POCT 115 (*)    GLUCOSE MONITOR NURSING POCT       RADIOLOGY:  US Lower Extremity Venous Duplex Bilateral   Final Result   IMPRESSION:      No deep venous thrombosis in the bilateral lower extremities.                   ECG:  none    PROCEDURES:  Procedures:  none      FINAL IMPRESSION:    ICD-10-CM    1. Lipodermatosclerosis of both lower extremities  I83.11     I83.12    2. Cellulitis, unspecified cellulitis site  L03.90        0 minutes of critical care time      I, Tan Leonard, am serving as a scribe to document services personally performed by Dr. Horacio Capellan, based on my observations and the provider's statements to me. I, Horacio Capellan, DO attest that Tan Leonard is acting in a scribe capacity, has observed my performance of the services and has documented them in accordance with my direction.      Horacio Capellan, DO  Emergency Medicine  [facility] EMERGENCY DEPARTMENT  4/29/2022  3:39 PM        Horacio Capellan MD  04/29/22 1702       Horacio Capellan MD  04/29/22 1701

## 2022-04-29 NOTE — DISCHARGE INSTRUCTIONS
Take antibiotic doxycycline as directed until gone with next dose tomorrow. You should also continue/complete previous cefdinir antibiotic.  Elevate legs whenever possible.  Follow up with primary provider at facility.  I suspect a lot of this is related to venous insufficiency, but there could be additional secondary infection, so antibiotic coverage broadened.  Ultrasound of legs was negative for blood clots.

## 2022-04-29 NOTE — ED PROVIDER NOTES
"ED Provider In Triage Note  [facility]  Encounter Date: Apr 29, 2022    Chief Complaint   Patient presents with     Cellulitis       Brief HPI:   Tonya Valera is a 56 year old female, history of chronic LE cellulitis, lower extremity DVT (on Xarelto), DM, presenting to the Emergency Department from assisted living facility with a chief complaint of BL lower extremity cellulitis. Reports associated BL pain that she describes as feeling like a \"sunburn\". She was started on a new medication yesterday (Cefdinir) without improvement; had been on Doxycycline.    Brief Physical Exam:  /54   Pulse 101   Temp 98.6  F (37  C) (Oral)   Resp 18   Ht 1.575 m (5' 2\")   Wt 89.8 kg (198 lb)   SpO2 96%   BMI 36.21 kg/m    General: Non-toxic appearing  HEENT: Atraumatic  Resp: No respiratory distress; clear lungs  Cardiac: Borderline tachycardic rate with regular rhythm  Abdomen: soft, non-tender  EXT: There is blanching erythema to both lower extremities with swelling (L > R); petechial-like rash to BL upper thighs  Neuro: Alert, oriented, answers questions appropriately; cranial nerves grossly intact, no focal motor deficits  Psych: Behavior appropriate      Plan Initiated in Triage:  Blood work  Ultrasound, BL    PIT Dispo:   Room when able - possible admit    Aleida Ordonez MD on 4/29/2022 at 1:08 PM    Patient was evaluated by the Physician in Triage due to a limitation of available rooms in the Emergency Department. A plan of care was discussed based on the information obtained on the initial evaluation and patient was consuled to return back to the Emergency Department lobby after this initial evalutaiton until results were obtained or a room became available in the Emergency Department. Patient was counseled not to leave prior to receiving the results of their workup.        Aleida Ordonez MD  04/29/22 1315    "

## 2022-04-29 NOTE — ED TRIAGE NOTES
Presents to ED via "[employer] [facility]. Pt lives at assisted living. Has bilat leg cellulitis for a long time. Was started on new med yesterday and sent in today. Has weeping on legs. Seen by Dr. Ordonez in triage.     Triage Assessment     Row Name 04/29/22 1312       Triage Assessment (Adult)    Airway WDL WDL       Skin Circulation/Temperature WDL    Skin Circulation/Temperature WDL X;circulation

## 2022-08-15 ENCOUNTER — APPOINTMENT (OUTPATIENT)
Dept: CT IMAGING | Facility: HOSPITAL | Age: 57
DRG: 125 | End: 2022-08-15
Attending: EMERGENCY MEDICINE
Payer: MEDICARE

## 2022-08-15 ENCOUNTER — APPOINTMENT (OUTPATIENT)
Dept: RADIOLOGY | Facility: HOSPITAL | Age: 57
DRG: 125 | End: 2022-08-15
Attending: EMERGENCY MEDICINE
Payer: MEDICARE

## 2022-08-15 ENCOUNTER — HOSPITAL ENCOUNTER (INPATIENT)
Facility: HOSPITAL | Age: 57
LOS: 3 days | Discharge: NURSING, REHAB OR LONG-TERM CARE | DRG: 125 | End: 2022-08-18
Attending: EMERGENCY MEDICINE | Admitting: FAMILY MEDICINE
Payer: MEDICARE

## 2022-08-15 DIAGNOSIS — D50.8 IRON DEFICIENCY ANEMIA SECONDARY TO INADEQUATE DIETARY IRON INTAKE: ICD-10-CM

## 2022-08-15 DIAGNOSIS — M79.89 SWELLING OF LOWER EXTREMITY: ICD-10-CM

## 2022-08-15 DIAGNOSIS — G43.909 MIGRAINE WITHOUT STATUS MIGRAINOSUS, NOT INTRACTABLE, UNSPECIFIED MIGRAINE TYPE: ICD-10-CM

## 2022-08-15 DIAGNOSIS — M62.830 BACK MUSCLE SPASM: ICD-10-CM

## 2022-08-15 DIAGNOSIS — S00.83XA CONTUSION OF FACE, INITIAL ENCOUNTER: ICD-10-CM

## 2022-08-15 DIAGNOSIS — L29.9 ITCHING: Primary | ICD-10-CM

## 2022-08-15 DIAGNOSIS — R29.6 FALLS FREQUENTLY: ICD-10-CM

## 2022-08-15 LAB
ALBUMIN SERPL-MCNC: 3.6 G/DL (ref 3.5–5)
ALP SERPL-CCNC: 79 U/L (ref 45–120)
ALT SERPL W P-5'-P-CCNC: <9 U/L (ref 0–45)
ANION GAP SERPL CALCULATED.3IONS-SCNC: 7 MMOL/L (ref 5–18)
ANION GAP SERPL CALCULATED.3IONS-SCNC: 8 MMOL/L (ref 5–18)
APTT PPP: 30 SECONDS (ref 22–38)
AST SERPL W P-5'-P-CCNC: 20 U/L (ref 0–40)
BASOPHILS # BLD AUTO: 0.1 10E3/UL (ref 0–0.2)
BASOPHILS NFR BLD AUTO: 1 %
BILIRUB SERPL-MCNC: 0.5 MG/DL (ref 0–1)
BUN SERPL-MCNC: 15 MG/DL (ref 8–22)
BUN SERPL-MCNC: 15 MG/DL (ref 8–22)
CALCIUM SERPL-MCNC: 8.8 MG/DL (ref 8.5–10.5)
CALCIUM SERPL-MCNC: 8.8 MG/DL (ref 8.5–10.5)
CHLORIDE BLD-SCNC: 101 MMOL/L (ref 98–107)
CHLORIDE BLD-SCNC: 102 MMOL/L (ref 98–107)
CO2 SERPL-SCNC: 30 MMOL/L (ref 22–31)
CO2 SERPL-SCNC: 32 MMOL/L (ref 22–31)
CREAT SERPL-MCNC: 0.93 MG/DL (ref 0.6–1.1)
CREAT SERPL-MCNC: 0.94 MG/DL (ref 0.6–1.1)
EOSINOPHIL # BLD AUTO: 0.1 10E3/UL (ref 0–0.7)
EOSINOPHIL NFR BLD AUTO: 1 %
ERYTHROCYTE [DISTWIDTH] IN BLOOD BY AUTOMATED COUNT: 15.3 % (ref 10–15)
ETHANOL SERPL-MCNC: <10 MG/DL
GFR SERPL CREATININE-BSD FRML MDRD: 70 ML/MIN/1.73M2
GFR SERPL CREATININE-BSD FRML MDRD: 71 ML/MIN/1.73M2
GLUCOSE BLD-MCNC: 140 MG/DL (ref 70–125)
GLUCOSE BLD-MCNC: 143 MG/DL (ref 70–125)
GLUCOSE BLDC GLUCOMTR-MCNC: 278 MG/DL (ref 70–99)
HBA1C MFR BLD: 7.1 %
HCT VFR BLD AUTO: 29.5 % (ref 35–47)
HGB BLD-MCNC: 9.2 G/DL (ref 11.7–15.7)
HOLD SPECIMEN: NORMAL
IMM GRANULOCYTES # BLD: 0 10E3/UL
IMM GRANULOCYTES NFR BLD: 0 %
INR PPP: 1.29 (ref 0.85–1.15)
LYMPHOCYTES # BLD AUTO: 2.2 10E3/UL (ref 0.8–5.3)
LYMPHOCYTES NFR BLD AUTO: 25 %
MCH RBC QN AUTO: 24.1 PG (ref 26.5–33)
MCHC RBC AUTO-ENTMCNC: 31.2 G/DL (ref 31.5–36.5)
MCV RBC AUTO: 77 FL (ref 78–100)
MONOCYTES # BLD AUTO: 0.7 10E3/UL (ref 0–1.3)
MONOCYTES NFR BLD AUTO: 8 %
NEUTROPHILS # BLD AUTO: 5.7 10E3/UL (ref 1.6–8.3)
NEUTROPHILS NFR BLD AUTO: 65 %
NRBC # BLD AUTO: 0 10E3/UL
NRBC BLD AUTO-RTO: 0 /100
PLATELET # BLD AUTO: 244 10E3/UL (ref 150–450)
POTASSIUM BLD-SCNC: 3.1 MMOL/L (ref 3.5–5)
POTASSIUM BLD-SCNC: 3.1 MMOL/L (ref 3.5–5)
PROT SERPL-MCNC: 6.7 G/DL (ref 6–8)
RBC # BLD AUTO: 3.81 10E6/UL (ref 3.8–5.2)
SARS-COV-2 RNA RESP QL NAA+PROBE: NEGATIVE
SODIUM SERPL-SCNC: 139 MMOL/L (ref 136–145)
SODIUM SERPL-SCNC: 141 MMOL/L (ref 136–145)
WBC # BLD AUTO: 8.7 10E3/UL (ref 4–11)

## 2022-08-15 PROCEDURE — G1010 CDSM STANSON: HCPCS

## 2022-08-15 PROCEDURE — 73502 X-RAY EXAM HIP UNI 2-3 VIEWS: CPT

## 2022-08-15 PROCEDURE — 85025 COMPLETE CBC W/AUTO DIFF WBC: CPT | Performed by: EMERGENCY MEDICINE

## 2022-08-15 PROCEDURE — 85610 PROTHROMBIN TIME: CPT | Performed by: EMERGENCY MEDICINE

## 2022-08-15 PROCEDURE — U0003 INFECTIOUS AGENT DETECTION BY NUCLEIC ACID (DNA OR RNA); SEVERE ACUTE RESPIRATORY SYNDROME CORONAVIRUS 2 (SARS-COV-2) (CORONAVIRUS DISEASE [COVID-19]), AMPLIFIED PROBE TECHNIQUE, MAKING USE OF HIGH THROUGHPUT TECHNOLOGIES AS DESCRIBED BY CMS-2020-01-R: HCPCS | Performed by: EMERGENCY MEDICINE

## 2022-08-15 PROCEDURE — 85730 THROMBOPLASTIN TIME PARTIAL: CPT | Performed by: EMERGENCY MEDICINE

## 2022-08-15 PROCEDURE — 83036 HEMOGLOBIN GLYCOSYLATED A1C: CPT

## 2022-08-15 PROCEDURE — 80053 COMPREHEN METABOLIC PANEL: CPT | Performed by: EMERGENCY MEDICINE

## 2022-08-15 PROCEDURE — 120N000001 HC R&B MED SURG/OB

## 2022-08-15 PROCEDURE — 99285 EMERGENCY DEPT VISIT HI MDM: CPT | Mod: 25

## 2022-08-15 PROCEDURE — 82374 ASSAY BLOOD CARBON DIOXIDE: CPT | Performed by: EMERGENCY MEDICINE

## 2022-08-15 PROCEDURE — 82077 ASSAY SPEC XCP UR&BREATH IA: CPT | Performed by: EMERGENCY MEDICINE

## 2022-08-15 PROCEDURE — C9803 HOPD COVID-19 SPEC COLLECT: HCPCS

## 2022-08-15 PROCEDURE — 36415 COLL VENOUS BLD VENIPUNCTURE: CPT | Performed by: STUDENT IN AN ORGANIZED HEALTH CARE EDUCATION/TRAINING PROGRAM

## 2022-08-15 PROCEDURE — 250N000013 HC RX MED GY IP 250 OP 250 PS 637

## 2022-08-15 PROCEDURE — 36415 COLL VENOUS BLD VENIPUNCTURE: CPT | Performed by: EMERGENCY MEDICINE

## 2022-08-15 RX ORDER — POTASSIUM CHLORIDE 1500 MG/1
40 TABLET, EXTENDED RELEASE ORAL ONCE
Status: COMPLETED | OUTPATIENT
Start: 2022-08-15 | End: 2022-08-15

## 2022-08-15 RX ORDER — TOPIRAMATE 100 MG/1
100 CAPSULE, EXTENDED RELEASE ORAL DAILY
Status: ON HOLD | COMMUNITY
End: 2022-08-18

## 2022-08-15 RX ORDER — DOXYCYCLINE 100 MG/1
100 CAPSULE ORAL DAILY
COMMUNITY
End: 2022-09-06

## 2022-08-15 RX ORDER — VENLAFAXINE HYDROCHLORIDE 75 MG/1
75 CAPSULE, EXTENDED RELEASE ORAL DAILY
Status: DISCONTINUED | OUTPATIENT
Start: 2022-08-16 | End: 2022-08-18 | Stop reason: HOSPADM

## 2022-08-15 RX ORDER — BACLOFEN 10 MG/1
20 TABLET ORAL 3 TIMES DAILY
Status: DISCONTINUED | OUTPATIENT
Start: 2022-08-15 | End: 2022-08-16

## 2022-08-15 RX ORDER — ROSUVASTATIN CALCIUM 10 MG/1
20 TABLET, COATED ORAL AT BEDTIME
Status: DISCONTINUED | OUTPATIENT
Start: 2022-08-15 | End: 2022-08-18 | Stop reason: HOSPADM

## 2022-08-15 RX ORDER — NALOXONE HYDROCHLORIDE 0.4 MG/ML
0.4 INJECTION, SOLUTION INTRAMUSCULAR; INTRAVENOUS; SUBCUTANEOUS
Status: DISCONTINUED | OUTPATIENT
Start: 2022-08-15 | End: 2022-08-18 | Stop reason: HOSPADM

## 2022-08-15 RX ORDER — ACETAMINOPHEN 325 MG/1
650 TABLET ORAL EVERY 6 HOURS PRN
Status: DISCONTINUED | OUTPATIENT
Start: 2022-08-15 | End: 2022-08-18 | Stop reason: HOSPADM

## 2022-08-15 RX ORDER — TORSEMIDE 5 MG/1
10 TABLET ORAL DAILY
Status: DISCONTINUED | OUTPATIENT
Start: 2022-08-16 | End: 2022-08-17

## 2022-08-15 RX ORDER — ASPIRIN 81 MG/1
81 TABLET, CHEWABLE ORAL DAILY
Status: DISCONTINUED | OUTPATIENT
Start: 2022-08-16 | End: 2022-08-18 | Stop reason: HOSPADM

## 2022-08-15 RX ORDER — DEXTROSE MONOHYDRATE 25 G/50ML
25-50 INJECTION, SOLUTION INTRAVENOUS
Status: DISCONTINUED | OUTPATIENT
Start: 2022-08-15 | End: 2022-08-18 | Stop reason: HOSPADM

## 2022-08-15 RX ORDER — BISACODYL 5 MG
5 TABLET, DELAYED RELEASE (ENTERIC COATED) ORAL DAILY PRN
Status: DISCONTINUED | OUTPATIENT
Start: 2022-08-15 | End: 2022-08-18 | Stop reason: HOSPADM

## 2022-08-15 RX ORDER — POLYETHYLENE GLYCOL 3350 17 G/17G
17 POWDER, FOR SOLUTION ORAL DAILY PRN
Status: DISCONTINUED | OUTPATIENT
Start: 2022-08-15 | End: 2022-08-18 | Stop reason: HOSPADM

## 2022-08-15 RX ORDER — LIDOCAINE 40 MG/G
CREAM TOPICAL
Status: DISCONTINUED | OUTPATIENT
Start: 2022-08-15 | End: 2022-08-18 | Stop reason: HOSPADM

## 2022-08-15 RX ORDER — LIDOCAINE 40 MG/G
CREAM TOPICAL
Status: DISCONTINUED | OUTPATIENT
Start: 2022-08-15 | End: 2022-08-16

## 2022-08-15 RX ORDER — HYDROXYZINE HYDROCHLORIDE 50 MG/1
50 TABLET, FILM COATED ORAL EVERY 8 HOURS PRN
Status: ON HOLD | COMMUNITY
End: 2022-08-18

## 2022-08-15 RX ORDER — NICOTINE POLACRILEX 4 MG
15-30 LOZENGE BUCCAL
Status: DISCONTINUED | OUTPATIENT
Start: 2022-08-15 | End: 2022-08-18 | Stop reason: HOSPADM

## 2022-08-15 RX ORDER — SENNOSIDES 8.6 MG
2 TABLET ORAL 2 TIMES DAILY
COMMUNITY

## 2022-08-15 RX ORDER — NALOXONE HYDROCHLORIDE 0.4 MG/ML
0.2 INJECTION, SOLUTION INTRAMUSCULAR; INTRAVENOUS; SUBCUTANEOUS
Status: DISCONTINUED | OUTPATIENT
Start: 2022-08-15 | End: 2022-08-18 | Stop reason: HOSPADM

## 2022-08-15 RX ORDER — VENLAFAXINE HYDROCHLORIDE 150 MG/1
150 CAPSULE, EXTENDED RELEASE ORAL DAILY
Status: DISCONTINUED | OUTPATIENT
Start: 2022-08-16 | End: 2022-08-18 | Stop reason: HOSPADM

## 2022-08-15 RX ORDER — MORPHINE SULFATE 15 MG/1
30 TABLET, FILM COATED, EXTENDED RELEASE ORAL 3 TIMES DAILY
Status: DISCONTINUED | OUTPATIENT
Start: 2022-08-15 | End: 2022-08-18 | Stop reason: HOSPADM

## 2022-08-15 RX ORDER — ONDANSETRON 4 MG/1
4 TABLET, ORALLY DISINTEGRATING ORAL EVERY 6 HOURS PRN
Status: DISCONTINUED | OUTPATIENT
Start: 2022-08-15 | End: 2022-08-18 | Stop reason: HOSPADM

## 2022-08-15 RX ORDER — EMOLLIENT BASE
CREAM (GRAM) TOPICAL 2 TIMES DAILY PRN
COMMUNITY

## 2022-08-15 RX ORDER — BUPROPION HYDROCHLORIDE 150 MG/1
150 TABLET, EXTENDED RELEASE ORAL DAILY
Status: DISCONTINUED | OUTPATIENT
Start: 2022-08-16 | End: 2022-08-18 | Stop reason: HOSPADM

## 2022-08-15 RX ORDER — SENNOSIDES 8.6 MG
1 TABLET ORAL DAILY
Status: DISCONTINUED | OUTPATIENT
Start: 2022-08-16 | End: 2022-08-18 | Stop reason: HOSPADM

## 2022-08-15 RX ORDER — HYDROXYZINE HYDROCHLORIDE 25 MG/1
50 TABLET, FILM COATED ORAL 2 TIMES DAILY
Status: DISCONTINUED | OUTPATIENT
Start: 2022-08-15 | End: 2022-08-16

## 2022-08-15 RX ORDER — BISACODYL 5 MG
10 TABLET, DELAYED RELEASE (ENTERIC COATED) ORAL DAILY PRN
Status: DISCONTINUED | OUTPATIENT
Start: 2022-08-15 | End: 2022-08-18 | Stop reason: HOSPADM

## 2022-08-15 RX ORDER — DOCUSATE SODIUM 100 MG/1
200 CAPSULE, LIQUID FILLED ORAL 2 TIMES DAILY
Status: DISCONTINUED | OUTPATIENT
Start: 2022-08-15 | End: 2022-08-18 | Stop reason: HOSPADM

## 2022-08-15 RX ORDER — TORSEMIDE 10 MG/1
10 TABLET ORAL DAILY
Status: ON HOLD | COMMUNITY
End: 2022-08-18

## 2022-08-15 RX ORDER — LISINOPRIL 2.5 MG/1
2.5 TABLET ORAL DAILY
Status: DISCONTINUED | OUTPATIENT
Start: 2022-08-16 | End: 2022-08-18 | Stop reason: HOSPADM

## 2022-08-15 RX ORDER — ACETAMINOPHEN 650 MG/1
650 SUPPOSITORY RECTAL EVERY 6 HOURS PRN
Status: DISCONTINUED | OUTPATIENT
Start: 2022-08-15 | End: 2022-08-18 | Stop reason: HOSPADM

## 2022-08-15 RX ORDER — TOPIRAMATE 100 MG/1
100 CAPSULE, EXTENDED RELEASE ORAL DAILY
Status: DISCONTINUED | OUTPATIENT
Start: 2022-08-16 | End: 2022-08-16

## 2022-08-15 RX ORDER — MORPHINE SULFATE 15 MG/1
7.5 TABLET ORAL EVERY 8 HOURS PRN
Status: DISCONTINUED | OUTPATIENT
Start: 2022-08-15 | End: 2022-08-18 | Stop reason: HOSPADM

## 2022-08-15 RX ORDER — ONDANSETRON 2 MG/ML
4 INJECTION INTRAMUSCULAR; INTRAVENOUS EVERY 6 HOURS PRN
Status: DISCONTINUED | OUTPATIENT
Start: 2022-08-15 | End: 2022-08-18 | Stop reason: HOSPADM

## 2022-08-15 RX ADMIN — HYDROXYZINE HYDROCHLORIDE 50 MG: 25 TABLET, FILM COATED ORAL at 22:22

## 2022-08-15 RX ADMIN — DOCUSATE SODIUM 200 MG: 100 CAPSULE, LIQUID FILLED ORAL at 22:22

## 2022-08-15 RX ADMIN — MORPHINE SULFATE 30 MG: 30 TABLET, FILM COATED, EXTENDED RELEASE ORAL at 22:47

## 2022-08-15 RX ADMIN — BACLOFEN 20 MG: 10 TABLET ORAL at 23:49

## 2022-08-15 RX ADMIN — RIVAROXABAN 20 MG: 10 TABLET, FILM COATED ORAL at 23:50

## 2022-08-15 RX ADMIN — ROSUVASTATIN CALCIUM 20 MG: 10 TABLET, FILM COATED ORAL at 23:50

## 2022-08-15 RX ADMIN — POTASSIUM CHLORIDE 40 MEQ: 1500 TABLET, EXTENDED RELEASE ORAL at 22:22

## 2022-08-15 ASSESSMENT — ACTIVITIES OF DAILY LIVING (ADL)
ADLS_ACUITY_SCORE: 35
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
## 2022-08-15 NOTE — ED PROVIDER NOTES
EMERGENCY DEPARTMENT ENCOUNTER      NAME: Tonya Valera  AGE: 57 year old female  YOB: 1965  MRN: [id number]  EVALUATION DATE & TIME: 8/15/2022  4:34 PM    PCP: Annia Frias    ED PROVIDER: Kenrick Li D.O.      Chief Complaint   Patient presents with     Fall       FINAL IMPRESSION:  1. Contusion of face, initial encounter    2. Falls frequently        ED COURSE & MEDICAL DECISION MAKIN:32 PM I met with the patient to gather history and to perform my initial exam. I discussed the plan for care while in the Emergency Department.  7:56 PM I rechecked the patient and updated them on laboratory and imaging results.         Pertinent Labs & Imaging studies reviewed. (See chart for details)  57 year old female presents to the Emergency Department for evaluation of falls over the last 24 hours.  Patient had a large hematoma above her right eye, as well as other contusions on her body.  Imaging tonight was unremarkable.  However, with her frequent falls, I do not believe her assisted living situation is safe at this time, and believe the patient should be admitted for further management.  Discussed the patient with the [place] resident, who agreed to the admission.    At the conclusion of the encounter I discussed the results of all of the tests and the disposition. The questions were answered. The patient or family acknowledged understanding and was agreeable with the care plan.      HPI    Patient information was obtained from: Patient    Use of : N/A        Tonya Valera is a 57 year old female who presents with fall.    Patient reports that she has fallen 5 times in the past 24 hours, the last time occurred when transferring from her wheelchair. She says that she just lost her balance and fell. She hit her head during one of her falls but denies LOC.  Patient also reports that she had fallen a couple weeks back and hurt her hip, and it is still healing. Patient denies chest  pain, shortness of breath, nausea, vomiting, diarrhea. Patient doesn't smoke or use alcohol.       REVIEW OF SYSTEMS  Constitutional:  Denies fever, chills, weight loss or weakness  Eyes:  No pain, discharge, redness  HENT:  Denies sore throat, ear pain, congestion  Respiratory: No SOB, wheeze or cough  Cardiovascular:  No CP, palpitations  GI:  Denies abdominal pain, nausea, vomiting, diarrhea  : Denies dysuria, hematuria  Musculoskeletal:  Positive for left hip and bicep pain.   Skin:  Positive for bruising under left eyebrow.  Neurologic:  Denies headache, focal weakness or sensory changes  Lymph: Denies swollen nodes    All other systems negative unless noted in HPI.    PAST MEDICAL HISTORY:  No past medical history on file.    PAST SURGICAL HISTORY:  No past surgical history on file.      CURRENT MEDICATIONS:    Current Facility-Administered Medications   Medication     lidocaine (LMX4) cream     lidocaine 1 % 0.1-1 mL     sodium chloride (PF) 0.9% PF flush 3 mL     sodium chloride (PF) 0.9% PF flush 3 mL     Current Outpatient Medications   Medication     acetaminophen (TYLENOL) 325 MG tablet     Ascorbic Acid (VITAMIN C GUMMIES PO)     aspirin (ASA) 81 MG chewable tablet     bacitracin 500 UNIT/GM OINT     baclofen (LIORESAL) 20 MG tablet     bisacodyl (DULCOLAX) 10 MG suppository     buPROPion (WELLBUTRIN SR) 150 MG 12 hr tablet     calcium carbonate (TUMS) 500 MG chewable tablet     calcium carbonate 600 mg-vitamin D 400 units (CALTRATE) 600-400 MG-UNIT per tablet     cholecalciferol 50 MCG (2000 UT) tablet     clotrimazole (LOTRIMIN) 1 % external cream     docusate sodium (COLACE) 100 MG tablet     doxycycline hyclate (VIBRAMYCIN) 100 MG capsule     emollient (VANICREAM) external cream     guaiFENesin (ROBITUSSIN) 20 mg/mL SOLN solution     hydrocortisone (CORTAID) 1 % external cream     hydrOXYzine (ATARAX) 50 MG tablet     hydrOXYzine (ATARAX) 50 MG tablet     insulin aspart (NOVOLOG PEN) 100 UNIT/ML  "pen     insulin degludec (TRESIBA) 100 UNIT/ML pen     levothyroxine (SYNTHROID/LEVOTHROID) 125 MCG tablet     levothyroxine (SYNTHROID/LEVOTHROID) 125 MCG tablet     lisinopril (ZESTRIL) 2.5 MG tablet     loperamide (IMODIUM A-D) 2 MG tablet     loratadine (CLARITIN) 10 MG tablet     magnesium 250 MG tablet     magnesium hydroxide (MILK OF MAGNESIA) 400 MG/5ML suspension     menthol (COUGH DROP) 7 MG LOZG     morphine (MS CONTIN) 30 MG CR tablet     morphine (MSIR) 15 MG IR tablet     Multiple Vitamins-Minerals (MULTIVITAMIN GUMMIES WOMENS) CHEW     neomycin-bacitracin-polymyxin (NEOSPORIN) 5-400-5000 ointment     omeprazole (PRILOSEC) 20 MG DR capsule     ondansetron (ZOFRAN) 4 MG tablet     polyethylene glycol (MIRALAX) 17 GM/Dose powder     rivaroxaban ANTICOAGULANT (XARELTO) 20 MG TABS tablet     rosuvastatin (CRESTOR) 20 MG tablet     sennosides (SENOKOT) 8.6 MG tablet     sennosides (SENOKOT) 8.6 MG tablet     SUMAtriptan (IMITREX) 100 MG tablet     topiramate ER (QUDEXY XR) 100 MG 24 hr capsule     torsemide (DEMADEX) 10 MG tablet     venlafaxine (EFFEXOR-XR) 150 MG 24 hr capsule     venlafaxine (EFFEXOR-XR) 75 MG 24 hr capsule     Zinc Oxide-Petrolatum 20-80 % CREA         ALLERGIES:  Allergies   Allergen Reactions     Compazine [Prochlorperazine]      Latex      Latex sensitive      Metformin      Statins [Hmg-Coa-R Inhibitors]      Sulfa Drugs Itching       FAMILY HISTORY:  No family history on file.    SOCIAL HISTORY:  Social History     Socioeconomic History     Marital status:    Tobacco Use     Smoking status: Former Smoker     Types: Cigarettes     Smokeless tobacco: Never Used       VITALS:  Patient Vitals for the past 24 hrs:   BP Temp Temp src Pulse Resp SpO2 Height Weight   08/15/22 2107 105/60 -- -- 78 18 98 % -- --   08/15/22 1830 122/64 -- -- 105 16 96 % -- --   08/15/22 1642 123/69 98.4  F (36.9  C) Oral 102 16 97 % 1.6 m (5' 3\") 83.9 kg (185 lb)       PHYSICAL EXAM    VITAL SIGNS: BP " "105/60   Pulse 78   Temp 98.4  F (36.9  C) (Oral)   Resp 18   Ht 1.6 m (5' 3\")   Wt 83.9 kg (185 lb)   SpO2 98%   BMI 32.77 kg/m      General Appearance: Well-appearing, well-nourished, no acute distress   Head:  Normocephalic, without obvious abnormality, atraumatic  Eyes:  PERRL, conjunctiva/corneas clear, EOM's intact,  ENT:  Lips, mucosa, and tongue normal, membranes are moist without pallor  Neck:  Normal ROM, symmetrical, trachea midline    Cardio:  Regular rate and rhythm, no murmur, rub or gallop, 2+ pulses symmetric in all extremities  Pulm:  Clear to auscultation bilaterally, respirations unlabored,  Abdomen:  Soft, non-tender, no rebound or guarding.  Musculoskeletal: Lower left bicep swelling. Pain to palpations on left hip.   Integument:  Bruising under left eyebrow.   Neurologic:  Alert & oriented.  No focal deficits appreciated.  Ambulatory.  Psychiatric:  Affect normal, Judgment normal, Mood normal.      LABS  Results for orders placed or performed during the hospital encounter of 08/15/22 (from the past 24 hour(s))   [place] Draw    Narrative    The following orders were created for panel order [place] Draw.  Procedure                               Abnormality         Status                     ---------                               -----------         ------                     Extra Blue Top Tube[[id number]]                              Final result               Extra Red Top Tube[[id number]]                               Final result               Extra Green Top (Lithium...[[id number]]                      Final result               Extra Purple Top Tube[[id number]]                            Final result               Extra Blood Bank Purple ...[[id number]]                      Final result               Extra Green Top (Lithium...[[id number]]                      Final result                 Please view results for these tests on the individual orders.   Extra Blue Top Tube   Result Value " Ref Range    Hold Specimen JIC    Extra Red Top Tube   Result Value Ref Range    Hold Specimen JIC    Extra Green Top (Lithium Heparin) Tube   Result Value Ref Range    Hold Specimen JIC    Extra Purple Top Tube   Result Value Ref Range    Hold Specimen JIC    Extra Blood Bank Purple Top Tube   Result Value Ref Range    Hold Specimen JIC    Extra Green Top (Lithium Heparin) ON ICE   Result Value Ref Range    Hold Specimen JIC    CBC with platelets + differential    Narrative    The following orders were created for panel order CBC with platelets + differential.  Procedure                               Abnormality         Status                     ---------                               -----------         ------                     CBC with platelets and d...[[id number]]  Abnormal            Final result                 Please view results for these tests on the individual orders.   Basic metabolic panel   Result Value Ref Range    Sodium 141 136 - 145 mmol/L    Potassium 3.1 (L) 3.5 - 5.0 mmol/L    Chloride 102 98 - 107 mmol/L    Carbon Dioxide (CO2) 32 (H) 22 - 31 mmol/L    Anion Gap 7 5 - 18 mmol/L    Urea Nitrogen 15 8 - 22 mg/dL    Creatinine 0.94 0.60 - 1.10 mg/dL    Calcium 8.8 8.5 - 10.5 mg/dL    Glucose 143 (H) 70 - 125 mg/dL    GFR Estimate 70 >60 mL/min/1.73m2   CBC with platelets and differential   Result Value Ref Range    WBC Count 8.7 4.0 - 11.0 10e3/uL    RBC Count 3.81 3.80 - 5.20 10e6/uL    Hemoglobin 9.2 (L) 11.7 - 15.7 g/dL    Hematocrit 29.5 (L) 35.0 - 47.0 %    MCV 77 (L) 78 - 100 fL    MCH 24.1 (L) 26.5 - 33.0 pg    MCHC 31.2 (L) 31.5 - 36.5 g/dL    RDW 15.3 (H) 10.0 - 15.0 %    Platelet Count 244 150 - 450 10e3/uL    % Neutrophils 65 %    % Lymphocytes 25 %    % Monocytes 8 %    % Eosinophils 1 %    % Basophils 1 %    % Immature Granulocytes 0 %    NRBCs per 100 WBC 0 <1 /100    Absolute Neutrophils 5.7 1.6 - 8.3 10e3/uL    Absolute Lymphocytes 2.2 0.8 - 5.3 10e3/uL    Absolute Monocytes 0.7  0.0 - 1.3 10e3/uL    Absolute Eosinophils 0.1 0.0 - 0.7 10e3/uL    Absolute Basophils 0.1 0.0 - 0.2 10e3/uL    Absolute Immature Granulocytes 0.0 <=0.4 10e3/uL    Absolute NRBCs 0.0 10e3/uL   INR   Result Value Ref Range    INR 1.29 (H) 0.85 - 1.15   CBC with platelets differential *Canceled*    Narrative    The following orders were created for panel order CBC with platelets differential.  Procedure                               Abnormality         Status                     ---------                               -----------         ------                       Please view results for these tests on the individual orders.   Head CT w/o contrast    Narrative    EXAM: CT HEAD W/O CONTRAST, CT CERVICAL SPINE W/O CONTRAST, CT FACIAL BONES WITHOUT CONTRAST  LOCATION: [facility]  DATE/TIME: 8/15/2022 6:04 PM    INDICATION: Trauma  COMPARISON: None.  TECHNIQUE:   1) Routine CT Head without IV contrast. Multiplanar reformats. Dose reduction techniques were used.  2) Routine CT Facial Bones without IV contrast. Multiplanar reformats. Dose reduction techniques were used.  3) Routine CT Cervical Spine without IV contrast. Multiplanar reformats. Dose reduction techniques were used.    FINDINGS:  HEAD CT:   INTRACRANIAL CONTENTS: No intracranial hemorrhage, extraaxial collection, or mass effect.  No CT evidence of acute infarct. Normal parenchymal density for age. The ventricles and sulci are normal for age.     OSSEOUS STRUCTURES/SOFT TISSUES: Large right frontal/periorbital scalp hematoma. No skull fracture.     FACIAL BONE CT:  OSSEOUS STRUCTURES/SOFT TISSUES: Large right frontal/periorbital scalp hematoma. No facial bone fracture or malalignment. No evidence for dental trauma. Odontogenic disease.    ORBITAL CONTENTS: No acute abnormality.    SINUSES: No significant paranasal sinus mucosal disease.    CERVICAL SPINE CT:   VERTEBRA: Slight reversal of the normal cervical lordosis. Levocurvature may  be related to patient positioning. Normal vertebral body heights. No fracture or posttraumatic subluxation.     CANAL/FORAMINA: Mild cervical spondylosis, worse at C5-C6 where there is a large central protrusion. No high-grade canal stenosis.    PARASPINAL: Prevertebral soft tissues are unremarkable. Visualized lung fields are clear.      Impression    IMPRESSION:  HEAD CT:  1.  No acute intracranial abnormality.    FACIAL BONE CT:  1.  Large right frontal/periorbital scalp hematoma without acute facial fracture.    CERVICAL SPINE CT:  1.  No acute cervical spine fracture or traumatic malalignment.   XR Pelvis and Hip Left 2 Views    Narrative    EXAM: XR PELVIS AND HIP LEFT 2 VIEWS  LOCATION: [facility]  DATE/TIME: 8/15/2022 6:10 PM    INDICATION: Trauma, fall ankle pain.  COMPARISON: None.      Impression    IMPRESSION: There is no evidence of an acute left hip or pelvic fracture. No dislocation. Both hip joint spaces are maintained.    Mild degenerative changes and incongruity of the pubic symphysis is favored to be chronic.   CT Facial Bones without Contrast    Narrative    EXAM: CT HEAD W/O CONTRAST, CT CERVICAL SPINE W/O CONTRAST, CT FACIAL BONES WITHOUT CONTRAST  LOCATION: [facility]  DATE/TIME: 8/15/2022 6:04 PM    INDICATION: Trauma  COMPARISON: None.  TECHNIQUE:   1) Routine CT Head without IV contrast. Multiplanar reformats. Dose reduction techniques were used.  2) Routine CT Facial Bones without IV contrast. Multiplanar reformats. Dose reduction techniques were used.  3) Routine CT Cervical Spine without IV contrast. Multiplanar reformats. Dose reduction techniques were used.    FINDINGS:  HEAD CT:   INTRACRANIAL CONTENTS: No intracranial hemorrhage, extraaxial collection, or mass effect.  No CT evidence of acute infarct. Normal parenchymal density for age. The ventricles and sulci are normal for age.     OSSEOUS STRUCTURES/SOFT TISSUES: Large right  frontal/periorbital scalp hematoma. No skull fracture.     FACIAL BONE CT:  OSSEOUS STRUCTURES/SOFT TISSUES: Large right frontal/periorbital scalp hematoma. No facial bone fracture or malalignment. No evidence for dental trauma. Odontogenic disease.    ORBITAL CONTENTS: No acute abnormality.    SINUSES: No significant paranasal sinus mucosal disease.    CERVICAL SPINE CT:   VERTEBRA: Slight reversal of the normal cervical lordosis. Levocurvature may be related to patient positioning. Normal vertebral body heights. No fracture or posttraumatic subluxation.     CANAL/FORAMINA: Mild cervical spondylosis, worse at C5-C6 where there is a large central protrusion. No high-grade canal stenosis.    PARASPINAL: Prevertebral soft tissues are unremarkable. Visualized lung fields are clear.      Impression    IMPRESSION:  HEAD CT:  1.  No acute intracranial abnormality.    FACIAL BONE CT:  1.  Large right frontal/periorbital scalp hematoma without acute facial fracture.    CERVICAL SPINE CT:  1.  No acute cervical spine fracture or traumatic malalignment.   Cervical spine CT w/o contrast    Narrative    EXAM: CT HEAD W/O CONTRAST, CT CERVICAL SPINE W/O CONTRAST, CT FACIAL BONES WITHOUT CONTRAST  LOCATION: [facility]  DATE/TIME: 8/15/2022 6:04 PM    INDICATION: Trauma  COMPARISON: None.  TECHNIQUE:   1) Routine CT Head without IV contrast. Multiplanar reformats. Dose reduction techniques were used.  2) Routine CT Facial Bones without IV contrast. Multiplanar reformats. Dose reduction techniques were used.  3) Routine CT Cervical Spine without IV contrast. Multiplanar reformats. Dose reduction techniques were used.    FINDINGS:  HEAD CT:   INTRACRANIAL CONTENTS: No intracranial hemorrhage, extraaxial collection, or mass effect.  No CT evidence of acute infarct. Normal parenchymal density for age. The ventricles and sulci are normal for age.     OSSEOUS STRUCTURES/SOFT TISSUES: Large right  frontal/periorbital scalp hematoma. No skull fracture.     FACIAL BONE CT:  OSSEOUS STRUCTURES/SOFT TISSUES: Large right frontal/periorbital scalp hematoma. No facial bone fracture or malalignment. No evidence for dental trauma. Odontogenic disease.    ORBITAL CONTENTS: No acute abnormality.    SINUSES: No significant paranasal sinus mucosal disease.    CERVICAL SPINE CT:   VERTEBRA: Slight reversal of the normal cervical lordosis. Levocurvature may be related to patient positioning. Normal vertebral body heights. No fracture or posttraumatic subluxation.     CANAL/FORAMINA: Mild cervical spondylosis, worse at C5-C6 where there is a large central protrusion. No high-grade canal stenosis.    PARASPINAL: Prevertebral soft tissues are unremarkable. Visualized lung fields are clear.      Impression    IMPRESSION:  HEAD CT:  1.  No acute intracranial abnormality.    FACIAL BONE CT:  1.  Large right frontal/periorbital scalp hematoma without acute facial fracture.    CERVICAL SPINE CT:  1.  No acute cervical spine fracture or traumatic malalignment.         RADIOLOGY  Cervical spine CT w/o contrast   Final Result   IMPRESSION:   HEAD CT:   1.  No acute intracranial abnormality.      FACIAL BONE CT:   1.  Large right frontal/periorbital scalp hematoma without acute facial fracture.      CERVICAL SPINE CT:   1.  No acute cervical spine fracture or traumatic malalignment.      CT Facial Bones without Contrast   Final Result   IMPRESSION:   HEAD CT:   1.  No acute intracranial abnormality.      FACIAL BONE CT:   1.  Large right frontal/periorbital scalp hematoma without acute facial fracture.      CERVICAL SPINE CT:   1.  No acute cervical spine fracture or traumatic malalignment.      XR Pelvis and Hip Left 2 Views   Final Result   IMPRESSION: There is no evidence of an acute left hip or pelvic fracture. No dislocation. Both hip joint spaces are maintained.      Mild degenerative changes and incongruity of the pubic  symphysis is favored to be chronic.      Head CT w/o contrast   Final Result   IMPRESSION:   HEAD CT:   1.  No acute intracranial abnormality.      FACIAL BONE CT:   1.  Large right frontal/periorbital scalp hematoma without acute facial fracture.      CERVICAL SPINE CT:   1.  No acute cervical spine fracture or traumatic malalignment.             MEDICATIONS GIVEN IN THE EMERGENCY:  Medications   lidocaine 1 % 0.1-1 mL (has no administration in time range)   lidocaine (LMX4) cream (has no administration in time range)   sodium chloride (PF) 0.9% PF flush 3 mL (3 mLs Intracatheter Not Given 8/15/22 1907)   sodium chloride (PF) 0.9% PF flush 3 mL (has no administration in time range)       NEW PRESCRIPTIONS STARTED AT TODAY'S ER VISIT  New Prescriptions    No medications on file      I, Donte Quijano, am serving as a scribe to document services personally performed by Kenrick Li D.O. based on my observations and the provider's statements to me.  I, Kenrick Li D.O., attest that Donte Quijano is acting in a scribe capacity, has observed my performance of the services and has documented them in accordance with my direction.      Kenrick Li D.O.  Emergency Medicine  [facility] EMERGENCY DEPARTMENT  [facility] [facility] [id number] [phone]  Dept: [phone]       Kenrick Li,   08/15/22 2128

## 2022-08-16 ENCOUNTER — APPOINTMENT (OUTPATIENT)
Dept: OCCUPATIONAL THERAPY | Facility: HOSPITAL | Age: 57
DRG: 125 | End: 2022-08-16
Payer: MEDICARE

## 2022-08-16 ENCOUNTER — APPOINTMENT (OUTPATIENT)
Dept: CT IMAGING | Facility: HOSPITAL | Age: 57
DRG: 125 | End: 2022-08-16
Payer: MEDICARE

## 2022-08-16 LAB
AMPHETAMINES UR QL SCN: ABNORMAL
ANION GAP SERPL CALCULATED.3IONS-SCNC: 3 MMOL/L (ref 5–18)
BARBITURATES UR QL: ABNORMAL
BENZODIAZ UR QL: ABNORMAL
BUN SERPL-MCNC: 13 MG/DL (ref 8–22)
CALCIUM SERPL-MCNC: 8.3 MG/DL (ref 8.5–10.5)
CANNABINOIDS UR QL SCN: ABNORMAL
CHLORIDE BLD-SCNC: 104 MMOL/L (ref 98–107)
CO2 SERPL-SCNC: 34 MMOL/L (ref 22–31)
COCAINE UR QL: ABNORMAL
CREAT SERPL-MCNC: 0.97 MG/DL (ref 0.6–1.1)
CREAT UR-MCNC: 100 MG/DL
ERYTHROCYTE [DISTWIDTH] IN BLOOD BY AUTOMATED COUNT: 15.6 % (ref 10–15)
GFR SERPL CREATININE-BSD FRML MDRD: 67 ML/MIN/1.73M2
GLUCOSE BLD-MCNC: 170 MG/DL (ref 70–125)
GLUCOSE BLDC GLUCOMTR-MCNC: 169 MG/DL (ref 70–99)
GLUCOSE BLDC GLUCOMTR-MCNC: 169 MG/DL (ref 70–99)
GLUCOSE BLDC GLUCOMTR-MCNC: 174 MG/DL (ref 70–99)
GLUCOSE BLDC GLUCOMTR-MCNC: 89 MG/DL (ref 70–99)
HCT VFR BLD AUTO: 27.8 % (ref 35–47)
HGB BLD-MCNC: 8.4 G/DL (ref 11.7–15.7)
MCH RBC QN AUTO: 24 PG (ref 26.5–33)
MCHC RBC AUTO-ENTMCNC: 30.2 G/DL (ref 31.5–36.5)
MCV RBC AUTO: 79 FL (ref 78–100)
OPIATES UR QL SCN: ABNORMAL
OXYCODONE UR QL: ABNORMAL
PCP UR QL SCN: ABNORMAL
PLATELET # BLD AUTO: 202 10E3/UL (ref 150–450)
POTASSIUM BLD-SCNC: 3.8 MMOL/L (ref 3.5–5)
RBC # BLD AUTO: 3.5 10E6/UL (ref 3.8–5.2)
SODIUM SERPL-SCNC: 141 MMOL/L (ref 136–145)
WBC # BLD AUTO: 6 10E3/UL (ref 4–11)

## 2022-08-16 PROCEDURE — 36415 COLL VENOUS BLD VENIPUNCTURE: CPT

## 2022-08-16 PROCEDURE — 97535 SELF CARE MNGMENT TRAINING: CPT | Mod: GO

## 2022-08-16 PROCEDURE — 250N000013 HC RX MED GY IP 250 OP 250 PS 637

## 2022-08-16 PROCEDURE — 120N000001 HC R&B MED SURG/OB

## 2022-08-16 PROCEDURE — 82435 ASSAY OF BLOOD CHLORIDE: CPT

## 2022-08-16 PROCEDURE — 85027 COMPLETE CBC AUTOMATED: CPT

## 2022-08-16 PROCEDURE — 80307 DRUG TEST PRSMV CHEM ANLYZR: CPT | Performed by: STUDENT IN AN ORGANIZED HEALTH CARE EDUCATION/TRAINING PROGRAM

## 2022-08-16 PROCEDURE — 250N000013 HC RX MED GY IP 250 OP 250 PS 637: Performed by: STUDENT IN AN ORGANIZED HEALTH CARE EDUCATION/TRAINING PROGRAM

## 2022-08-16 PROCEDURE — 99222 1ST HOSP IP/OBS MODERATE 55: CPT | Mod: AI

## 2022-08-16 PROCEDURE — 250N000012 HC RX MED GY IP 250 OP 636 PS 637

## 2022-08-16 PROCEDURE — G1010 CDSM STANSON: HCPCS

## 2022-08-16 PROCEDURE — 97166 OT EVAL MOD COMPLEX 45 MIN: CPT | Mod: GO

## 2022-08-16 RX ORDER — TOPIRAMATE 25 MG/1
50 CAPSULE, EXTENDED RELEASE ORAL DAILY
Status: DISCONTINUED | OUTPATIENT
Start: 2022-08-17 | End: 2022-08-16 | Stop reason: RX

## 2022-08-16 RX ORDER — BACLOFEN 10 MG/1
10 TABLET ORAL 3 TIMES DAILY
Status: DISCONTINUED | OUTPATIENT
Start: 2022-08-16 | End: 2022-08-18 | Stop reason: HOSPADM

## 2022-08-16 RX ORDER — TOPIRAMATE 50 MG/1
50 CAPSULE, EXTENDED RELEASE ORAL DAILY
Status: DISCONTINUED | OUTPATIENT
Start: 2022-08-17 | End: 2022-08-18 | Stop reason: HOSPADM

## 2022-08-16 RX ORDER — TOPIRAMATE 25 MG/1
50 TABLET, FILM COATED ORAL DAILY
Status: DISCONTINUED | OUTPATIENT
Start: 2022-08-17 | End: 2022-08-16

## 2022-08-16 RX ORDER — HYDROXYZINE HYDROCHLORIDE 25 MG/1
25 TABLET, FILM COATED ORAL 2 TIMES DAILY
Status: DISCONTINUED | OUTPATIENT
Start: 2022-08-16 | End: 2022-08-17

## 2022-08-16 RX ADMIN — MORPHINE SULFATE 30 MG: 30 TABLET, FILM COATED, EXTENDED RELEASE ORAL at 10:47

## 2022-08-16 RX ADMIN — HYDROXYZINE HYDROCHLORIDE 50 MG: 25 TABLET, FILM COATED ORAL at 09:20

## 2022-08-16 RX ADMIN — LISINOPRIL 2.5 MG: 2.5 TABLET ORAL at 09:19

## 2022-08-16 RX ADMIN — BUPROPION HYDROCHLORIDE 150 MG: 150 TABLET, FILM COATED, EXTENDED RELEASE ORAL at 09:21

## 2022-08-16 RX ADMIN — INSULIN ASPART 2 UNITS: 100 INJECTION, SOLUTION INTRAVENOUS; SUBCUTANEOUS at 00:09

## 2022-08-16 RX ADMIN — MORPHINE SULFATE 30 MG: 30 TABLET, FILM COATED, EXTENDED RELEASE ORAL at 21:24

## 2022-08-16 RX ADMIN — BACLOFEN 20 MG: 10 TABLET ORAL at 09:19

## 2022-08-16 RX ADMIN — ASPIRIN 81 MG: 81 TABLET, CHEWABLE ORAL at 09:20

## 2022-08-16 RX ADMIN — POLYETHYLENE GLYCOL 3350 17 G: 17 POWDER, FOR SOLUTION ORAL at 18:41

## 2022-08-16 RX ADMIN — BACLOFEN 10 MG: 10 TABLET ORAL at 21:24

## 2022-08-16 RX ADMIN — ROSUVASTATIN CALCIUM 20 MG: 10 TABLET, FILM COATED ORAL at 21:21

## 2022-08-16 RX ADMIN — BACLOFEN 10 MG: 10 TABLET ORAL at 14:53

## 2022-08-16 RX ADMIN — RIVAROXABAN 20 MG: 10 TABLET, FILM COATED ORAL at 16:32

## 2022-08-16 RX ADMIN — TOPIRAMATE 100 MG: 100 CAPSULE, EXTENDED RELEASE ORAL at 09:21

## 2022-08-16 RX ADMIN — DOCUSATE SODIUM 200 MG: 100 CAPSULE, LIQUID FILLED ORAL at 21:22

## 2022-08-16 RX ADMIN — INSULIN DEGLUDEC INJECTION 25 UNITS: 100 INJECTION, SOLUTION SUBCUTANEOUS at 00:09

## 2022-08-16 RX ADMIN — SENNOSIDES 1 TABLET: 8.6 TABLET, FILM COATED ORAL at 09:22

## 2022-08-16 RX ADMIN — LEVOTHYROXINE SODIUM 125 MCG: 0.03 TABLET ORAL at 09:21

## 2022-08-16 RX ADMIN — VENLAFAXINE HYDROCHLORIDE 75 MG: 75 CAPSULE, EXTENDED RELEASE ORAL at 09:21

## 2022-08-16 RX ADMIN — VENLAFAXINE HYDROCHLORIDE 150 MG: 150 CAPSULE, EXTENDED RELEASE ORAL at 09:20

## 2022-08-16 RX ADMIN — OMEPRAZOLE 20 MG: 20 CAPSULE, DELAYED RELEASE ORAL at 09:20

## 2022-08-16 RX ADMIN — HYDROXYZINE HYDROCHLORIDE 25 MG: 25 TABLET ORAL at 21:21

## 2022-08-16 RX ADMIN — MORPHINE SULFATE 30 MG: 30 TABLET, FILM COATED, EXTENDED RELEASE ORAL at 14:53

## 2022-08-16 RX ADMIN — DOCUSATE SODIUM 200 MG: 100 CAPSULE, LIQUID FILLED ORAL at 09:21

## 2022-08-16 RX ADMIN — CALCIUM CARBONATE-VITAMIN D TAB 500 MG-200 UNIT 1 TABLET: 500-200 TAB at 09:20

## 2022-08-16 RX ADMIN — TORSEMIDE 10 MG: 5 TABLET ORAL at 09:21

## 2022-08-16 ASSESSMENT — ACTIVITIES OF DAILY LIVING (ADL)
WEAR_GLASSES_OR_BLIND: YES
ADLS_ACUITY_SCORE: 35
EQUIPMENT_CURRENTLY_USED_AT_HOME: WALKER, ROLLING
TOILETING_ISSUES: YES
DRESS: 1-->ASSISTANCE (EQUIPMENT/PERSON) NEEDED
NUMBER_OF_TIMES_PATIENT_HAS_FALLEN_WITHIN_LAST_SIX_MONTHS: 10
CONCENTRATING,_REMEMBERING_OR_MAKING_DECISIONS_DIFFICULTY: NO
ADLS_ACUITY_SCORE: 42
ADLS_ACUITY_SCORE: 46
TOILETING: 1-->ASSISTANCE (EQUIPMENT/PERSON) NEEDED
ADLS_ACUITY_SCORE: 35
ADLS_ACUITY_SCORE: 44
WALKING_OR_CLIMBING_STAIRS_DIFFICULTY: YES
ADLS_ACUITY_SCORE: 35
TRANSFERRING: 1-->ASSISTANCE (EQUIPMENT/PERSON) NEEDED
WALKING_OR_CLIMBING_STAIRS: AMBULATION DIFFICULTY, REQUIRES EQUIPMENT
TOILETING_ASSISTANCE: TOILETING DIFFICULTY, ASSISTANCE 1 PERSON
DRESS: 1-->ASSISTANCE (EQUIPMENT/PERSON) NEEDED (NOT DEVELOPMENTALLY APPROPRIATE)
IADL_COMMENTS: ASSIST FOR ALL IADLS
FALL_HISTORY_WITHIN_LAST_SIX_MONTHS: YES
ADLS_ACUITY_SCORE: 46
ADLS_ACUITY_SCORE: 35
ADLS_ACUITY_SCORE: 35
ADLS_ACUITY_SCORE: 46
DIFFICULTY_EATING/SWALLOWING: NO
TRANSFERRING: 1-->ASSISTANCE (EQUIPMENT/PERSON) NEEDED (NOT DEVELOPMENTALLY APPROPRIATE)
TOILETING: 1-->ASSISTANCE (EQUIPMENT/PERSON) NEEDED (NOT DEVELOPMENTALLY APPROPRIATE)
ADLS_ACUITY_SCORE: 35
DRESSING/BATHING: BATHING DIFFICULTY, ASSISTANCE 1 PERSON
DOING_ERRANDS_INDEPENDENTLY_DIFFICULTY: YES
ADLS_ACUITY_SCORE: 35
DEPENDENT_IADLS:: CLEANING;COOKING;LAUNDRY;SHOPPING;MEAL PREPARATION;MEDICATION MANAGEMENT;TRANSPORTATION
BATHING: 1-->ASSISTANCE NEEDED
CHANGE_IN_FUNCTIONAL_STATUS_SINCE_ONSET_OF_CURRENT_ILLNESS/INJURY: YES
DRESSING/BATHING_DIFFICULTY: YES

## 2022-08-16 NOTE — UTILIZATION REVIEW
Admission Status; Secondary Review Determination   Under the authority of the Utilization Management Committee, the utilization review process indicated a secondary review on Tonya Valera. The review outcome is based on review of the medical records, discussions with staff, and applying clinical experience noted on the date of the review.   (x) Inpatient Status Appropriate - This patient's medical care is consistent with medical management for inpatient care and reasonable inpatient medical practice.     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  Mechanical falls.  No acute illness/infection.  Believed related to oversedation/polypharmacy.  Several medications contributing.  Discussed with Dr. Hernandez.  Reducing 3 medications.  Repeat head CT needed as on anticoagulation.  Primary MD has been having difficult time reducing meds outpatient as patient not amenable however is falling.      At the time of admission with the information available to the attending physician more than 2 nights Hospital complex care was anticipated, based on patient risk of adverse outcome if treated as outpatient and complex care required. Inpatient admission is appropriate based on the Medicare guidelines.   The information on this document is developed by the utilization review team in order for the business office to ensure compliance. This only denotes the appropriateness of proper admission status and does not reflect the quality of care rendered.   The definitions of Inpatient Status and Observation Status used in making the determination above are those provided in the CMS Coverage Manual, Chapter 1 and Chapter 6, section 70.4.   Sincerely,   Radha Lawson MD  Utilization Review  Physician Advisor  [facility]

## 2022-08-16 NOTE — ED NOTES
Pt was able to transfer from bed to w/c and w/c to toilet with min assist of 1. No dizziness or lightheadedness. C/O vague all over body aches, stated it was nothing new

## 2022-08-16 NOTE — CONSULTS
Care Management Initial Consult    General Information  Assessment completed with: Patient, Care Team Member, Krishna Brush  Type of CM/SW Visit: Initial Assessment    Primary Care Provider verified and updated as needed: Yes   Readmission within the last 30 days:        Reason for Consult: discharge planning  Advance Care Planning:    [facility] has a POLST  General Information Comments: LM with nurse at [facility]    Communication Assessment  Patient's communication style: spoken language (English or Bilingual)        Cognitive  Cognitive/Neuro/Behavioral: arousability, mood/behavior, speech, .WDL except  Level of Consciousness: lethargic  Arousal Level: arouses to voice  Orientation: disoriented to, time  Mood/Behavior: flat affect, cooperative          Living Environment:   People in home: alone     Current living Arrangements: assisted living  Name of Facility: [facility]   Able to return to prior arrangements:  (If [facility] can provide assist of one.)     Family/Social Support:  Care provided by:    Provides care for: no one, unable/limited ability to care for self  Marital Status:   Children          Description of Support System: Supportive      Current Resources:   Patient receiving home care services: No     Community Resources:  (Assisted Living)  Equipment currently used at home: walker, rolling, wheelchair, manual  Supplies currently used at home:      Employment/Financial:  Employment Status: disabled        Financial Concerns: No concerns identified   Referral to Financial Worker: No     Lifestyle & Psychosocial Needs:  Social Determinants of Health     Tobacco Use: Medium Risk     Smoking Tobacco Use: Former Smoker     Smokeless Tobacco Use: Never Used   Alcohol Use: Not on file   Financial Resource Strain: Not on file   Food Insecurity: Not on file   Transportation Needs: Not on file   Physical Activity: Not on file   Stress: Not on file   Social Connections: Not on file    Intimate Partner Violence: Not on file   Depression: Not on file   Housing Stability: Not on file     Functional Status:  Prior to admission patient needed assistance:   Dependent ADLs:: Ambulation-walker, Bathing, Dressing, Wheelchair-with assist  Dependent IADLs:: Cleaning, Cooking, Laundry, Shopping, Meal Preparation, Medication Management, Transportation     Mental Health Status:  Mental Health Status:  (History of personality disorder)       Chemical Dependency Status:  Chemical Dependency Status: No Current Concerns           Values/Beliefs:  Spiritual, Cultural Beliefs, Cheondoism Practices, Values that affect care:            Values/Beliefs Comment: Ronaldo    Additional Information:  Met with patient at the bedside but she seemed drowsy and was uncertain about what services she receives at her assisted living (although she was able to correctly identify [facility] assisted living in [place] as her residence). Uncertain if she needs assist with transfers at baseline. She does use a walker or wheelchair for mobility. She appears to have assist with medication management, meals, bathing, housekeeping, laundry etc at the [facility] but left a message for nursing to call back ([phone], ask for [place] location). Spoke with [facility] care coordinator Trudi at [phone]. She will fax over a list of facilities in network for patient in case she needs transitional care.   Per therapy, if [facility] can provide assist of one patient can return to her NATASHA. However, if they cannot provide assist of one they would recommend TCU. Awaiting call from [facility] staff. Writer provided a list of local transitional care units (which includes the medicare.gov website) for patient and family to review.  12:39 PM:  Received a call from Perla at [facility] ([phone]). When patient is not lethargic/falling, patient is very independent (able to take herself to the bathroom). She usually confines  herself to her room and stays in her pajamas (unless she feels up to participating in activity). She was open to skilled nursing for wound care but they have signed off. Therapy has tried to see her but she refuses to see them. When she is lethargic, she needs assist with all activities of daily living. They would appreciate a possible stay at TCU for daily therapy. Patient is her own decision maker; her son Noe works full time. She does not have much contact with her son Justo. Per Perla, patient has been on the same medications at their facility with no changes noted.     Mirela Ruff RN

## 2022-08-16 NOTE — PROGRESS NOTES
Pt denies significant pain, nausea, and shortness of breath. Bruises on face, arms and chest from recent falls.  Somnolent early but more wakeful after 11am.  Cooperative with staff. Transferring to P4, RN to RN report called.

## 2022-08-16 NOTE — ED NOTES
57 year old female who presents with fall. Patient reports that she has fallen 2 times today when getting up from wheelchair.  She hit her head during 2nd fall,  but denies LOC.  Also fallen 3 times yesterday. Patient also reports that she had fallen a couple weeks back and hurt her hip, c/o pain when pressure applied. Patient denies chest pain, shortness of breath, nausea, vomiting, diarrhea.     Neuro: Oriented x4  IV/drain: PIV right arm   VSS: VSS on RA  Resp: Clear    Cardio: WDL.   GI/: Voiding adequately ,   Pain/Discomfort: Has chronic general body aches, states no new pain  Activity: Up with 1 assist. No significant increase in pain with standing  Skin: bruising, hematoma with swelling above right eye. Bruising to both arms and bicepts

## 2022-08-16 NOTE — PHARMACY-ADMISSION MEDICATION HISTORY
Pharmacy Note - Admission Medication History    Pertinent Provider Information: List per [facility] MAR   ______________________________________________________________________    Prior To Admission (PTA) med list completed and updated in EMR.       PTA Med List   Medication Sig Last Dose     acetaminophen (TYLENOL) 325 MG tablet Take 650 mg by mouth every 4 hours as needed for mild pain      Ascorbic Acid (VITAMIN C GUMMIES PO) Take 1 chew tab by mouth daily 8/15/2022 at 0800     aspirin (ASA) 81 MG chewable tablet Take 81 mg by mouth daily 8/15/2022 at 0800     bacitracin 500 UNIT/GM OINT Apply topically every 8 hours as needed for wound care      baclofen (LIORESAL) 20 MG tablet Take 20 mg by mouth 3 times daily 8/15/2022 at 0800     bisacodyl (DULCOLAX) 10 MG suppository Place 10 mg rectally daily as needed for constipation      buPROPion (WELLBUTRIN SR) 150 MG 12 hr tablet Take 150 mg by mouth daily 8/15/2022 at 0800     calcium carbonate (TUMS) 500 MG chewable tablet Take 2 chew tab by mouth every hour as needed for heartburn      calcium carbonate 600 mg-vitamin D 400 units (CALTRATE) 600-400 MG-UNIT per tablet Take 1 tablet by mouth daily 8/15/2022 at 0800     cholecalciferol 50 MCG (2000 UT) tablet Take 1 tablet by mouth every other day 8/15/2022 at 0800     clotrimazole (LOTRIMIN) 1 % external cream Apply topically 2 times daily 8/15/2022 at 0800     docusate sodium (COLACE) 100 MG tablet Take 200 mg by mouth 2 times daily 8/15/2022 at 0800     doxycycline hyclate (VIBRAMYCIN) 100 MG capsule Take 100 mg by mouth daily 8/15/2022 at 0800     emollient (VANICREAM) external cream Apply topically 2 times daily as needed for other (to legs for dry skin)      guaiFENesin (ROBITUSSIN) 20 mg/mL SOLN solution Take 10 mLs by mouth every 4 hours as needed for cough      hydrocortisone (CORTAID) 1 % external cream Apply topically 2 times daily as needed for rash      hydrOXYzine  (ATARAX) 50 MG tablet Take 50 mg by mouth every 8 hours as needed for itching (in addition to scheduled dosing)      hydrOXYzine (ATARAX) 50 MG tablet Take 50 mg by mouth 2 times daily 8/15/2022 at 0800     insulin aspart (NOVOLOG PEN) 100 UNIT/ML pen Inject 1-7 Units Subcutaneous 3 times daily (before meals) Correction Scale - MEDIUM INSULIN RESISTANCE DOSING   Do Not give Correction Insulin if Pre-Meal BG less than 140. For Pre-Meal  - 189 give 1 unit. For Pre-Meal  - 239 give 2 units. For Pre-Meal  - 289 give 3 units. For Pre-Meal  - 339 give 4 units. For Pre-Meal - 399 give 5 units. For Pre-Meal -449 give 6 units For Pre-Meal BG greater than or equal to 450 give 7 units. To be given with prandial insulin, and based on pre-meal blood glucose.  Notify provider if glucose greater than or equal to 350 mg/dL after administration of correction dose. If given at mealtime, administer within 30 minutes of start of meal 8/15/2022 at 1100     insulin degludec (TRESIBA) 100 UNIT/ML pen Inject 25 Units Subcutaneous daily (with dinner) 8/14/2022 at 1700     levothyroxine (SYNTHROID/LEVOTHROID) 125 MCG tablet Take 125 mcg by mouth See Admin Instructions Daily on Tuesday, Wednesday, Thursday, Saturday, and Sunday 8/14/2022 at 0730     levothyroxine (SYNTHROID/LEVOTHROID) 125 MCG tablet Take 187.5 mcg by mouth See Admin Instructions On Mondays, Fridays 8/15/2022 at 0730     lisinopril (ZESTRIL) 2.5 MG tablet Take 2.5 mg by mouth daily 8/15/2022 at 0800     loperamide (IMODIUM A-D) 2 MG tablet Take 2 mg by mouth 3 times daily as needed for diarrhea      loratadine (CLARITIN) 10 MG tablet Take 10 mg by mouth daily as needed for allergies      magnesium 250 MG tablet Take 1 tablet by mouth daily 8/15/2022 at 0800     magnesium hydroxide (MILK OF MAGNESIA) 400 MG/5ML suspension Take 30 mLs by mouth daily as needed for constipation      menthol (COUGH DROP) 7 MG LOZG Take 1 lozenge by mouth every  hour as needed for cough      morphine (MS CONTIN) 30 MG CR tablet Take 30 mg by mouth 3 times daily 8/15/2022 at 0800     morphine (MSIR) 15 MG IR tablet Take 7.5 mg by mouth every 8 hours as needed for pain      Multiple Vitamins-Minerals (MULTIVITAMIN GUMMIES WOMENS) CHEW Take 1 chew tab by mouth daily 8/15/2022 at 0800     neomycin-bacitracin-polymyxin (NEOSPORIN) 5-400-5000 ointment Apply topically every 6 hours as needed (minor scrapes, cuts, burns)      omeprazole (PRILOSEC) 20 MG DR capsule Take 20 mg by mouth daily before breakfast 8/15/2022 at 0730     ondansetron (ZOFRAN) 4 MG tablet Take 1 tablet (4 mg) by mouth every 8 hours as needed for nausea or vomiting (Patient taking differently: Take 4 mg by mouth daily as needed for nausea or vomiting)      polyethylene glycol (MIRALAX) 17 GM/Dose powder Take 1 capful by mouth every other day 8/15/2022 at 0800     rivaroxaban ANTICOAGULANT (XARELTO) 20 MG TABS tablet Take 20 mg by mouth daily (with dinner) 8/14/2022 at 1700     rosuvastatin (CRESTOR) 20 MG tablet Take 20 mg by mouth At Bedtime 8/14/2022 at 2100     sennosides (SENOKOT) 8.6 MG tablet Take 1 tablet by mouth daily as needed for constipation (in addition to scheduled dosing)      sennosides (SENOKOT) 8.6 MG tablet Take 1 tablet by mouth daily 8/15/2022 at 0800     SUMAtriptan (IMITREX) 100 MG tablet Take 100 mg by mouth every 12 hours as needed for migraine      topiramate ER (QUDEXY XR) 100 MG 24 hr capsule Take 100 mg by mouth daily 8/15/2022 at 0800     torsemide (DEMADEX) 10 MG tablet Take 10 mg by mouth daily 8/15/2022 at am     venlafaxine (EFFEXOR-XR) 150 MG 24 hr capsule Take 150 mg by mouth daily Take with 75 mg capsule for a total dose of 225 mg. 8/15/2022 at 0800     venlafaxine (EFFEXOR-XR) 75 MG 24 hr capsule Take 75 mg by mouth daily Take with 150 mg capsule for a total dose of 225 mg. 8/15/2022 at 0800     Zinc Oxide-Petrolatum 20-80 % CREA Externally apply topically every 8 hours as  needed (incontinence)        Information source(s): Facility (TCU/NH/) medication list/MAR  Method of interview communication: N/A    Summary of Changes to PTA Med List  New: Doxycycline, topiramate ER, vanicream  Discontinued: None  Changed: Cholecalciferol, docusate, hydroxyzine, ondansetron, rivaroxaban, senna, Tresiba    Patient was asked about OTC/herbal products specifically.  PTA med list reflects this.    In the past week, patient estimated taking medication this percent of the time:  greater than 90%.    Allergies were reviewed, assessed, and updated with the facility MAR.    Patient did not bring any medications to the hospital and can't retrieve from home. No multi-dose medications are available for use during hospital stay.     The information provided in this note is only as accurate as the sources available at the time of the update(s).    Thank you for the opportunity to participate in the care of this patient.    Dalila Rosario [facility]  8/15/2022 9:08 PM

## 2022-08-16 NOTE — PROGRESS NOTES
Primary Medicine Progress Note    Assessment/Plan  Active Problems:    Falls frequently    Contusion of face, initial encounter      Tonya Valera is a 57 year old female admitted on 8/15/2022. 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 Discussed care/medications with PCP who agrees with reducing meds as able.     Falls 2/2 polypharmacy  Patient has fallen 5 times in the past 24 hours, last one she he her face.  All of these falls sound mechanical in nature from tripping or leaning too far over in a messy room, and in the setting of being sedated from polypharmacy.  Denies any associated lightheadedness, dizziness, diaphoresis, chest pain preceding the falls. She reports a few months of increased falls and increased sleepiness. Lives at an assisted living facility[facility] in [place], where she gets help with her medications, ADLs. Exam with large bruise over right eye and superficial abrasion under right eye.  BMP, CBC unremarkable. Is on xarelto, initial head CT unremarkable; CT facial bones, c-spine, and pelvis/L hip xrays all normal. UDS unremarkable. Falls seem most likely caused by increased weakness and sedation related to polypharmacy. Discussed care and plan with PCP who agrees with reducing baclofen, topiramate, atarax as able. Does state that she would like to see the morphine reduced as well, but patient has been/continues to be resistant to this.  -PT/OT   -Repeat head CT 24 hours after first given anticoagulation, ordered for tonight  -Reduce baclofen to 10mg TID  -Reduce atarax to 25mg BID  -Reduce topiramate ER to 50mg daily  -Fall precautions  -Care management consult     Chronic pain   Opioid dependence  History of chronic pain that began with spinal abscess.  Discussed pain regimen with PCP who states she's tried to get patient to decrease before, but patient resistant to this. May consider pushing further on this if remains sedated despite reductions of other meds first.  -PTA morphine 30 mg PO TID  -PTA tylenol prn for mild to moderate pain  -PTA morphine 7.5 mg PO q8h prn for severe pain      Muscle spasms   History of muscle spasms following spinal abscess. Discussed with PCP who agreed with idea of seeing if we can reduce this to help her sedation/falls.  -Reduce baclofen to 10 mg TID; if having spasms, would just order 5mg prn baclofen     T2DM, insulin dependent  A1c 7.1 8/15/22. On PTA degludec 25 units with dinner and medium resistance sliding scale insulin. Fasting 170 this AM.  -Degludec 25 units at bedtime  -Medium resistance sliding scale insulin   -Carb consistent diet   -PTA lisinopril    Anemia, microcytic  Hgb 8.4. MCV borderline at 79. Baseline 10.6-13. No evidence of bleeding. Suspect PARTH.  -Ferittin  -CBC in AM     Hypokalemia, mild  K 3.1 on admission. Resolved to 3.8 today.  -Potassium replacement protocol     Major depressive disorder  Anxiety disorder  Borderline personality disorder  -PTA wellbutrin 150 mg daily   -PTA hydroxyzine 50 mg bid   -PTA venlafaxine 225 mg daily     Hypothyroidism: PTA levothyroxine   History of DVTs: PTA Xarelto 20 mg daily  Hyperlipidemia: PTA rosuvastatin 20 mg daily  GERD: PTA omeprazole 20 mg   PVD: PTA aspirin 81 mg daily   Constipation: PTA colace 200 mg bid, PTA Senokot 1 tablet daily  Migraines: Suspect topiramate ER may be adding to sedation/grogginess, discussed with PCP who agreed with attempting to downtitrate to 50mg daily  (PTA was 100mg daily).  Lymphedema: PTA torsemide 10 mg daily, stockings           Diet: Consistent Carbohydrate Diet Moderate Consistent Carb (60 g CHO per Meal) Diet    DVT Prophylaxis: PTA Xarelto   Townsend Catheter: Not present  Fluids: PO   Central Lines: None  Cardiac Monitoring:  "None  Code Status: Full Code    Disposition/Advanced Care Planning  Discharge Planning discussed with patient  Barriers to discharge: workup in progress  Anticipated discharge date: ~2 days  Disposition: Prior [facility]PC      Subjective:   Tired. Falls asleep several times during conversation but states \"i'm not oversedated with meds.\"   Denies dizziness/lightheadedness.    Discussed care with [facility] provider involved in Tonya's care, agreed with plan of decreasing baclofen, topiramate ER, and atarax as able. Also said it'd be very reasonable to tried decreasing morphine if able but that patient has been resistant to this in the past.      Objective    Vital signs in last 24 hours Temp:  [98.4  F (36.9  C)] 98.4  F (36.9  C)  Pulse:  [] 84  Resp:  [16-18] 16  BP: ()/(50-70) 110/70  SpO2:  [90 %-100 %] 98 %       Weight:   Vitals:    08/15/22 1642   Weight: 83.9 kg (185 lb)       Intake/Output last 3 shift No intake/output data recorded.    Intake/Output this shift:I/O this shift:  In: 100 [P.O.:100]  Out: -     PHYSICAL EXAM  GENERAL APPEARANCE: Cooperative; appears stated age. Falling asleep several times during conversation  LUNGS: Lungs CTA  HEART: RRR, soft systolic murmur. Radial pulses 2+. Brisk cap refill. Warm and well perfused  ABDOMEN: Overweight, non-distended, soft, no tenderness  EXTREMITIES: Grossly normal without deformity, trace edema b/l LEs  SKIN: no rashes, skin warm  NEURO: Oriented to time and place. 4+/5 strength throughout b/l LEs. 5/5 strength throughout b/l UEs    Pertinent Labs and Pertinent Radiology   Lab Results: personally reviewed.     Recent Labs   Lab 08/16/22 0337   WBC 6.0   HGB 8.4*   HCT 27.8*          Recent Labs   Lab 08/16/22  0337 08/15/22  1709    139  141   CO2 34* 30  32*   BUN 13 15  15   ALBUMIN  --  3.6   ALKPHOS  --  79   ALT  --  <9   AST  --  20       Radiology Results:   Results for orders placed or performed during the hospital encounter of " 08/15/22   Head CT w/o contrast    Impression    IMPRESSION:  HEAD CT:  1.  No acute intracranial abnormality.    FACIAL BONE CT:  1.  Large right frontal/periorbital scalp hematoma without acute facial fracture.    CERVICAL SPINE CT:  1.  No acute cervical spine fracture or traumatic malalignment.   CT Facial Bones without Contrast    Impression    IMPRESSION:  HEAD CT:  1.  No acute intracranial abnormality.    FACIAL BONE CT:  1.  Large right frontal/periorbital scalp hematoma without acute facial fracture.    CERVICAL SPINE CT:  1.  No acute cervical spine fracture or traumatic malalignment.   Cervical spine CT w/o contrast    Impression    IMPRESSION:  HEAD CT:  1.  No acute intracranial abnormality.    FACIAL BONE CT:  1.  Large right frontal/periorbital scalp hematoma without acute facial fracture.    CERVICAL SPINE CT:  1.  No acute cervical spine fracture or traumatic malalignment.   XR Pelvis and Hip Left 2 Views    Impression    IMPRESSION: There is no evidence of an acute left hip or pelvic fracture. No dislocation. Both hip joint spaces are maintained.    Mild degenerative changes and incongruity of the pubic symphysis is favored to be chronic.       Precepted patient with Dr. Hossein Hernandez MD  [facility]   Pager #: [phone]    Parts of this note were created with the assistance of voice recognition software.  The note was reviewed for accuracy.  However, errors in spelling, etc may have resulted.

## 2022-08-16 NOTE — PLAN OF CARE
"  Problem: Plan of Care - These are the overarching goals to be used throughout the patient stay.    Goal: Plan of Care Review/Shift Note  Description: The Plan of Care Review/Shift note should be completed every shift.  The Outcome Evaluation is a brief statement about your assessment that the patient is improving, declining, or no change.  This information will be displayed automatically on your shift note.  Outcome: Ongoing, Progressing     Problem: Plan of Care - These are the overarching goals to be used throughout the patient stay.    Goal: Patient-Specific Goal (Individualized)  Description: You can add care plan individualizations to a care plan. Examples of Individualization might be:  \"Parent requests to be called daily at 9am for status\", \"I have a hard time hearing out of my right ear\", or \"Do not touch me to wake me up as it startles me\".  Outcome: Ongoing, Progressing     Problem: Plan of Care - These are the overarching goals to be used throughout the patient stay.    Goal: Absence of Hospital-Acquired Illness or Injury  Intervention: Identify and Manage Fall Risk  Recent Flowsheet Documentation  Taken 8/16/2022 1400 by Horacio Frost, RN  Safety Promotion/Fall Prevention: activity supervised   Goal Outcome Evaluation:        Patient arrived on unit, HX of multiple falls, bruising legs, arms. Patient able to ambulate with walker. Contusion noted on face specific to eye. Patient alert oriented x 4.              "

## 2022-08-16 NOTE — H&P
[facility]    History and Physical - Hospitalist Service       Date of Admission:  8/15/2022    Assessment & Plan      Tonya Valera is a 57 year old female admitted on 8/15/2022. 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     Falls   Patient has fallen 5 times in the past 24 hours.  All of these falls sound mechanical in nature from tripping or leaning too far over.  Patient reports no lightheadedness, dizziness, diaphoresis, chest pain preceding the falls. She reports a few months of increased falls and increased sleepiness. Lives at an assisted living facility, [facility] in [place], where she gets help with her medications, bathing, and meals. VSS. Exam with large bruise over right eye and superficial abrasion under right eye.  BMP, CBC unremarkable.  Head CT, CT facial bones, CT cervical spine, x-ray pelvis and left hip with no acute findings.  Falls seem most likely caused by increased weakness and sleepiness possibly due to polypharmacy.   - PT/OT   - Care management consult     T2DM   Glucose 143 on admission. On PTA Tresiba 25 units with dinner and medium resistance sliding scale insulin.   - Insulin degludec (Tresiba) 25 units at bedtime  - Medium resistance sliding scale insulin   - Hgb A1C   - Carb consistent diet   - PTA lisinopril 2.5 mg daily     Hypokalemia, mild  K 3.1 on admission.   - Daily BMP  - Potassium replacement protocol    Major depressive disorder  Anxiety disorder  Borderline personality disorder  - PTA wellbutrin 150 mg daily   - PTA hydroxyzine 50 mg bid   - PTA venlafaxine 225 mg daily    Chronic pain   Opioid dependence  History of chronic pain that began with spinal abscess.   - PTA morphine 30 mg PO TID  - PTA tylenol prn for mild to moderate  "pain  - PTA morphine 7.5 mg PO q8h prn for severe pain     Muscle spasms   History of muscle spasms following spinal abscess.   - PTA baclofen 20 mg TID     Hypothyroidism  - PTA levothyroxine     History of DVT   - PTA Xarelto 20 mg daily    Hyperlipidemia   - PTA rosuvastatin 20 mg daily    GERD   - PTA omeprazole 20 mg     PVD   - PTA aspirin 81 mg daily     Constipation  - PTA colace 200 mg bid   - PTA Senokot 1 tablet daily    Migraines   - PTA topiramate 100 mg daily    Lymphedema   - PTA torsemide 10 mg daily       Diet: Consistent Carbohydrate Diet Moderate Consistent Carb (60 g CHO per Meal) Diet    DVT Prophylaxis: PTA Xarelto   Townsend Catheter: Not present  Fluids: PO   Central Lines: None  Cardiac Monitoring: None  Code Status: Full Code    Clinically Significant Risk Factors Present on Admission               # Coagulation Defect: home medication list includes an anticoagulant medication   # Hypertension: home medication list includes antihypertensive(s)    # Obesity: Estimated body mass index is 32.77 kg/m  as calculated from the following:    Height as of this encounter: 1.6 m (5' 3\").    Weight as of this encounter: 83.9 kg (185 lb).        Disposition Plan      Expected Discharge Date: 08/17/2022                The patient's care was discussed with the Attending Physician, Dr. Titus and Patient.    Kimberly Ruiz MD  Hospitalist Service  [facility]  Securely message with the Vocera Web Console (learn more here)  Text page via [employer] Paging/Directory       ______________________________________________________________________    Chief Complaint   Falls     History is obtained from the patient    History of Present Illness   Tonya Valera is a 57 year old female admitted on 8/15/2022. 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     Patient states that she has fallen 5 times within the past 24 hours.  Her most recent fall happened when she was reaching for something on the ground while sitting in a chair.  She states that she reached too far and fell forward, hitting her face on the ground.  She states that she has also tripped on things in her apartment or on her feet multiple times.  She states that she has been noticing increased falls for the past few months.  She also endorses increased sleepiness over the past few months.  Patient reports no dizziness, lightheadedness, chest pain, diaphoresis preceding the falls.    Patient has history of paraparesis following a spinal abscess a few years ago.  She uses a walker to get around her room and a wheelchair for long distances.  She has lived at [facility] assisted living in [place] for 1 year, where she gets help with medications, bathing, and meals.  Patient is unaware of any recent medication changes.    Review of Systems    The 10 point Review of Systems is negative other than noted in the HPI or here.     Past Medical History    I have reviewed this patient's medical history and updated it with pertinent information if needed.   Past Medical History:   Diagnosis Date     Anxiety      Borderline personality disorder (H)      Cellulitis      Chronic pain      DVT (deep venous thrombosis) (H)      Gastroesophageal reflux disease without esophagitis      HLD (hyperlipidemia)      Hypothyroidism      MDD (major depressive disorder)      Paraparesis of both lower limbs (H)     following spinal abscess     PVD (peripheral vascular disease) (H)      Spinal abscess (H)      T2DM (type 2 diabetes mellitus) (H)      Uncomplicated opioid dependence (H)         Past Surgical History   I have reviewed this patient's surgical history and updated it with pertinent information if needed.  History reviewed. No pertinent surgical history.      Social History   Patient lives in an assisted living facility called [facility] in [facility].  She has lived there for 1 year.  She receives help with medications, bathing, meals.  Patient states that she is her own healthcare decision maker.    Family History    Unable to obtain due to: family history unknown     Prior to Admission Medications   Prior to Admission Medications   Prescriptions Last Dose Informant Patient Reported? Taking?   Ascorbic Acid (VITAMIN C GUMMIES PO) 8/15/2022 at 0800  Yes Yes   Sig: Take 1 chew tab by mouth daily   Multiple Vitamins-Minerals (MULTIVITAMIN GUMMIES WOMENS) CHEW 8/15/2022 at 0800  Yes Yes   Sig: Take 1 chew tab by mouth daily   SUMAtriptan (IMITREX) 100 MG tablet   Yes Yes   Sig: Take 100 mg by mouth every 12 hours as needed for migraine   Zinc Oxide-Petrolatum 20-80 % CREA   Yes Yes   Sig: Externally apply topically every 8 hours as needed (incontinence)   acetaminophen (TYLENOL) 325 MG tablet   Yes Yes   Sig: Take 650 mg by mouth every 4 hours as needed for mild pain   aspirin (ASA) 81 MG chewable tablet 8/15/2022 at 0800  Yes Yes   Sig: Take 81 mg by mouth daily   bacitracin 500 UNIT/GM OINT   Yes Yes   Sig: Apply topically every 8 hours as needed for wound care   baclofen (LIORESAL) 20 MG tablet 8/15/2022 at 0800  Yes Yes   Sig: Take 20 mg by mouth 3 times daily   bisacodyl (DULCOLAX) 10 MG suppository   Yes Yes   Sig: Place 10 mg rectally daily as needed for constipation   buPROPion (WELLBUTRIN SR) 150 MG 12 hr tablet 8/15/2022 at 0800  Yes Yes   Sig: Take 150 mg by mouth daily   calcium carbonate (TUMS) 500 MG chewable tablet   Yes Yes   Sig: Take 2 chew tab by mouth every hour as needed for heartburn   calcium carbonate 600 mg-vitamin D 400 units (CALTRATE) 600-400 MG-UNIT per tablet 8/15/2022 at 0800  Yes Yes   Sig: Take 1 tablet by mouth daily   cholecalciferol 50 MCG (2000 UT) tablet 8/15/2022 at 0800  Yes Yes   Sig: Take 1 tablet by mouth every other  day   clotrimazole (LOTRIMIN) 1 % external cream 8/15/2022 at 0800  Yes Yes   Sig: Apply topically 2 times daily   docusate sodium (COLACE) 100 MG tablet 8/15/2022 at 0800  Yes Yes   Sig: Take 200 mg by mouth 2 times daily   doxycycline hyclate (VIBRAMYCIN) 100 MG capsule 8/15/2022 at 0800  Yes Yes   Sig: Take 100 mg by mouth daily   emollient (VANICREAM) external cream   Yes Yes   Sig: Apply topically 2 times daily as needed for other (to legs for dry skin)   guaiFENesin (ROBITUSSIN) 20 mg/mL SOLN solution   Yes Yes   Sig: Take 10 mLs by mouth every 4 hours as needed for cough   hydrOXYzine (ATARAX) 50 MG tablet 8/15/2022 at 0800  Yes Yes   Sig: Take 50 mg by mouth 2 times daily   hydrOXYzine (ATARAX) 50 MG tablet   Yes Yes   Sig: Take 50 mg by mouth every 8 hours as needed for itching (in addition to scheduled dosing)   hydrocortisone (CORTAID) 1 % external cream   Yes Yes   Sig: Apply topically 2 times daily as needed for rash   insulin aspart (NOVOLOG PEN) 100 UNIT/ML pen 8/15/2022 at 1100  No Yes   Sig: Inject 1-7 Units Subcutaneous 3 times daily (before meals) Correction Scale - MEDIUM INSULIN RESISTANCE DOSING   Do Not give Correction Insulin if Pre-Meal BG less than 140. For Pre-Meal  - 189 give 1 unit. For Pre-Meal  - 239 give 2 units. For Pre-Meal  - 289 give 3 units. For Pre-Meal  - 339 give 4 units. For Pre-Meal - 399 give 5 units. For Pre-Meal -449 give 6 units For Pre-Meal BG greater than or equal to 450 give 7 units. To be given with prandial insulin, and based on pre-meal blood glucose.  Notify provider if glucose greater than or equal to 350 mg/dL after administration of correction dose. If given at mealtime, administer within 30 minutes of start of meal   insulin degludec (TRESIBA) 100 UNIT/ML pen 8/14/2022 at 1700  Yes Yes   Sig: Inject 25 Units Subcutaneous daily (with dinner)   levothyroxine (SYNTHROID/LEVOTHROID) 125 MCG tablet 8/14/2022 at 0730  Yes Yes    Sig: Take 125 mcg by mouth See Admin Instructions Daily on Tuesday, Wednesday, Thursday, Saturday, and Sunday   levothyroxine (SYNTHROID/LEVOTHROID) 125 MCG tablet 8/15/2022 at 0730  Yes Yes   Sig: Take 187.5 mcg by mouth See Admin Instructions On Mondays, Fridays   lisinopril (ZESTRIL) 2.5 MG tablet 8/15/2022 at 0800  Yes Yes   Sig: Take 2.5 mg by mouth daily   loperamide (IMODIUM A-D) 2 MG tablet   Yes Yes   Sig: Take 2 mg by mouth 3 times daily as needed for diarrhea   loratadine (CLARITIN) 10 MG tablet   Yes Yes   Sig: Take 10 mg by mouth daily as needed for allergies   magnesium 250 MG tablet 8/15/2022 at 0800  Yes Yes   Sig: Take 1 tablet by mouth daily   magnesium hydroxide (MILK OF MAGNESIA) 400 MG/5ML suspension   Yes Yes   Sig: Take 30 mLs by mouth daily as needed for constipation   menthol (COUGH DROP) 7 MG LOZG   Yes Yes   Sig: Take 1 lozenge by mouth every hour as needed for cough   morphine (MS CONTIN) 30 MG CR tablet 8/15/2022 at 0800  Yes Yes   Sig: Take 30 mg by mouth 3 times daily   morphine (MSIR) 15 MG IR tablet   Yes Yes   Sig: Take 7.5 mg by mouth every 8 hours as needed for pain   neomycin-bacitracin-polymyxin (NEOSPORIN) 5-400-5000 ointment   Yes Yes   Sig: Apply topically every 6 hours as needed (minor scrapes, cuts, burns)   omeprazole (PRILOSEC) 20 MG DR capsule 8/15/2022 at 0730  Yes Yes   Sig: Take 20 mg by mouth daily before breakfast   ondansetron (ZOFRAN) 4 MG tablet   No Yes   Sig: Take 1 tablet (4 mg) by mouth every 8 hours as needed for nausea or vomiting   Patient taking differently: Take 4 mg by mouth daily as needed for nausea or vomiting   polyethylene glycol (MIRALAX) 17 GM/Dose powder 8/15/2022 at 0800  Yes Yes   Sig: Take 1 capful by mouth every other day   rivaroxaban ANTICOAGULANT (XARELTO) 20 MG TABS tablet 8/14/2022 at 1700  Yes Yes   Sig: Take 20 mg by mouth daily (with dinner)   rosuvastatin (CRESTOR) 20 MG tablet 8/14/2022 at 2100  Yes Yes   Sig: Take 20 mg by mouth  At Bedtime   sennosides (SENOKOT) 8.6 MG tablet 8/15/2022 at 0800  Yes Yes   Sig: Take 1 tablet by mouth daily   sennosides (SENOKOT) 8.6 MG tablet   Yes Yes   Sig: Take 1 tablet by mouth daily as needed for constipation (in addition to scheduled dosing)   topiramate ER (QUDEXY XR) 100 MG 24 hr capsule 8/15/2022 at 0800  Yes Yes   Sig: Take 100 mg by mouth daily   torsemide (DEMADEX) 10 MG tablet 8/15/2022 at am  Yes Yes   Sig: Take 10 mg by mouth daily   venlafaxine (EFFEXOR-XR) 150 MG 24 hr capsule 8/15/2022 at 0800  Yes Yes   Sig: Take 150 mg by mouth daily Take with 75 mg capsule for a total dose of 225 mg.   venlafaxine (EFFEXOR-XR) 75 MG 24 hr capsule 8/15/2022 at 0800  Yes Yes   Sig: Take 75 mg by mouth daily Take with 150 mg capsule for a total dose of 225 mg.      Facility-Administered Medications: None     Allergies   Allergies   Allergen Reactions     Compazine [Prochlorperazine]      Latex      Latex sensitive      Metformin      Statins [Hmg-Coa-R Inhibitors]      Sulfa Drugs Itching       Physical Exam   Vital Signs: Temp: 98.4  F (36.9  C) Temp src: Oral BP: 107/57 Pulse: 84   Resp: 18 SpO2: 96 % O2 Device: Nasal cannula Oxygen Delivery: 1 LPM  Weight: 185 lbs 0 oz    Constitutional: awake, alert, cooperative, no apparent distress, and appears stated age  Eyes: Lids and lashes normal, pupils equal, round and reactive to light, extra ocular muscles intact, sclera clear, conjunctiva normal  ENT: Normocephalic. Large bruise over right eye. Sinuses nontender on palpation, external ears without lesions, oral pharynx with moist mucous membranes, tonsils without erythema or exudates, gums normal and good dentition.  Respiratory: No increased work of breathing, good air exchange, clear to auscultation bilaterally, no crackles or wheezing  Cardiovascular: Normal apical impulse, regular rate and rhythm, normal S1 and S2, no S3 or S4, and no murmur noted  GI: No scars, normal bowel sounds, soft, non-distended,  non-tender, no masses palpated, no hepatosplenomegally  Skin: Large bruise above right eye, some bruising and a superficial abrasion below right eye. No bruising or rashes on extremities.   Musculoskeletal: Right lower extremity pitting edema to knees. There is no redness, warmth, or swelling of the joints.  Full range of motion noted.  Motor strength is 5 out of 5 all extremities bilaterally.  Tone is normal.  Neurologic: Awake, alert, oriented to name, place and time.  Cranial nerves II-XII are grossly intact.  Motor is 5 out of 5 bilaterally.   Neuropsychiatric: General: fidgeting and poor eye contact  Level of consciousness: alert / normal  Affect: normal  Orientation: oriented to self, place, time and situation  Memory and insight: normal and memory for past and recent events intact    Data   Data reviewed today: I reviewed all medications, new labs and imaging results over the last 24 hours.    Recent Labs   Lab 08/15/22  1709   WBC 8.7   HGB 9.2*   MCV 77*      INR 1.29*     141   POTASSIUM 3.1*  3.1*   CHLORIDE 101  102   CO2 30  32*   BUN 15  15   CR 0.93  0.94   ANIONGAP 8  7   MALI 8.8  8.8   *  143*   ALBUMIN 3.6   PROTTOTAL 6.7   BILITOTAL 0.5   ALKPHOS 79   ALT <9   AST 20     Recent Results (from the past 24 hour(s))   Head CT w/o contrast    Narrative    EXAM: CT HEAD W/O CONTRAST, CT CERVICAL SPINE W/O CONTRAST, CT FACIAL BONES WITHOUT CONTRAST  LOCATION: [facility]  DATE/TIME: 8/15/2022 6:04 PM    INDICATION: Trauma  COMPARISON: None.  TECHNIQUE:   1) Routine CT Head without IV contrast. Multiplanar reformats. Dose reduction techniques were used.  2) Routine CT Facial Bones without IV contrast. Multiplanar reformats. Dose reduction techniques were used.  3) Routine CT Cervical Spine without IV contrast. Multiplanar reformats. Dose reduction techniques were used.    FINDINGS:  HEAD CT:   INTRACRANIAL CONTENTS: No intracranial hemorrhage,  extraaxial collection, or mass effect.  No CT evidence of acute infarct. Normal parenchymal density for age. The ventricles and sulci are normal for age.     OSSEOUS STRUCTURES/SOFT TISSUES: Large right frontal/periorbital scalp hematoma. No skull fracture.     FACIAL BONE CT:  OSSEOUS STRUCTURES/SOFT TISSUES: Large right frontal/periorbital scalp hematoma. No facial bone fracture or malalignment. No evidence for dental trauma. Odontogenic disease.    ORBITAL CONTENTS: No acute abnormality.    SINUSES: No significant paranasal sinus mucosal disease.    CERVICAL SPINE CT:   VERTEBRA: Slight reversal of the normal cervical lordosis. Levocurvature may be related to patient positioning. Normal vertebral body heights. No fracture or posttraumatic subluxation.     CANAL/FORAMINA: Mild cervical spondylosis, worse at C5-C6 where there is a large central protrusion. No high-grade canal stenosis.    PARASPINAL: Prevertebral soft tissues are unremarkable. Visualized lung fields are clear.      Impression    IMPRESSION:  HEAD CT:  1.  No acute intracranial abnormality.    FACIAL BONE CT:  1.  Large right frontal/periorbital scalp hematoma without acute facial fracture.    CERVICAL SPINE CT:  1.  No acute cervical spine fracture or traumatic malalignment.   XR Pelvis and Hip Left 2 Views    Narrative    EXAM: XR PELVIS AND HIP LEFT 2 VIEWS  LOCATION: [facility]  DATE/TIME: 8/15/2022 6:10 PM    INDICATION: Trauma, fall ankle pain.  COMPARISON: None.      Impression    IMPRESSION: There is no evidence of an acute left hip or pelvic fracture. No dislocation. Both hip joint spaces are maintained.    Mild degenerative changes and incongruity of the pubic symphysis is favored to be chronic.   CT Facial Bones without Contrast    Narrative    EXAM: CT HEAD W/O CONTRAST, CT CERVICAL SPINE W/O CONTRAST, CT FACIAL BONES WITHOUT CONTRAST  LOCATION: [facility]  DATE/TIME: 8/15/2022 6:04  PM    INDICATION: Trauma  COMPARISON: None.  TECHNIQUE:   1) Routine CT Head without IV contrast. Multiplanar reformats. Dose reduction techniques were used.  2) Routine CT Facial Bones without IV contrast. Multiplanar reformats. Dose reduction techniques were used.  3) Routine CT Cervical Spine without IV contrast. Multiplanar reformats. Dose reduction techniques were used.    FINDINGS:  HEAD CT:   INTRACRANIAL CONTENTS: No intracranial hemorrhage, extraaxial collection, or mass effect.  No CT evidence of acute infarct. Normal parenchymal density for age. The ventricles and sulci are normal for age.     OSSEOUS STRUCTURES/SOFT TISSUES: Large right frontal/periorbital scalp hematoma. No skull fracture.     FACIAL BONE CT:  OSSEOUS STRUCTURES/SOFT TISSUES: Large right frontal/periorbital scalp hematoma. No facial bone fracture or malalignment. No evidence for dental trauma. Odontogenic disease.    ORBITAL CONTENTS: No acute abnormality.    SINUSES: No significant paranasal sinus mucosal disease.    CERVICAL SPINE CT:   VERTEBRA: Slight reversal of the normal cervical lordosis. Levocurvature may be related to patient positioning. Normal vertebral body heights. No fracture or posttraumatic subluxation.     CANAL/FORAMINA: Mild cervical spondylosis, worse at C5-C6 where there is a large central protrusion. No high-grade canal stenosis.    PARASPINAL: Prevertebral soft tissues are unremarkable. Visualized lung fields are clear.      Impression    IMPRESSION:  HEAD CT:  1.  No acute intracranial abnormality.    FACIAL BONE CT:  1.  Large right frontal/periorbital scalp hematoma without acute facial fracture.    CERVICAL SPINE CT:  1.  No acute cervical spine fracture or traumatic malalignment.   Cervical spine CT w/o contrast    Narrative    EXAM: CT HEAD W/O CONTRAST, CT CERVICAL SPINE W/O CONTRAST, CT FACIAL BONES WITHOUT CONTRAST  LOCATION: [facility]  DATE/TIME: 8/15/2022 6:04  PM    INDICATION: Trauma  COMPARISON: None.  TECHNIQUE:   1) Routine CT Head without IV contrast. Multiplanar reformats. Dose reduction techniques were used.  2) Routine CT Facial Bones without IV contrast. Multiplanar reformats. Dose reduction techniques were used.  3) Routine CT Cervical Spine without IV contrast. Multiplanar reformats. Dose reduction techniques were used.    FINDINGS:  HEAD CT:   INTRACRANIAL CONTENTS: No intracranial hemorrhage, extraaxial collection, or mass effect.  No CT evidence of acute infarct. Normal parenchymal density for age. The ventricles and sulci are normal for age.     OSSEOUS STRUCTURES/SOFT TISSUES: Large right frontal/periorbital scalp hematoma. No skull fracture.     FACIAL BONE CT:  OSSEOUS STRUCTURES/SOFT TISSUES: Large right frontal/periorbital scalp hematoma. No facial bone fracture or malalignment. No evidence for dental trauma. Odontogenic disease.    ORBITAL CONTENTS: No acute abnormality.    SINUSES: No significant paranasal sinus mucosal disease.    CERVICAL SPINE CT:   VERTEBRA: Slight reversal of the normal cervical lordosis. Levocurvature may be related to patient positioning. Normal vertebral body heights. No fracture or posttraumatic subluxation.     CANAL/FORAMINA: Mild cervical spondylosis, worse at C5-C6 where there is a large central protrusion. No high-grade canal stenosis.    PARASPINAL: Prevertebral soft tissues are unremarkable. Visualized lung fields are clear.      Impression    IMPRESSION:  HEAD CT:  1.  No acute intracranial abnormality.    FACIAL BONE CT:  1.  Large right frontal/periorbital scalp hematoma without acute facial fracture.    CERVICAL SPINE CT:  1.  No acute cervical spine fracture or traumatic malalignment.

## 2022-08-16 NOTE — PROGRESS NOTES
08/16/22 1035   Quick Adds   Type of Visit Initial Occupational Therapy Evaluation   Living Environment   People in Home facility resident   Current Living Arrangements assisted living   Living Environment Comments walk-in shower   Self-Care   Equipment Currently Used at Home walker, rolling;wheelchair, manual;shower chair;grab bar, toilet;grab bar, tub/shower   Fall history within last six months yes   Number of times patient has fallen within last six months 10  (5 in last 24 hours)   Activity/Exercise/Self-Care Comment ind for toileting and dressing; supervision for bathing   Instrumental Activities of Daily Living (IADL)   IADL Comments assist for all IADLs   General Information   Onset of Illness/Injury or Date of Surgery 08/15/22   Referring Physician Kimberly Ruiz MD   Patient/Family Therapy Goal Statement (OT) decrease pain   Additional Occupational Profile Info/Pertinent History of Current Problem admitted following increasing mechanical falls; PMH chronic pain and BPD   Existing Precautions/Restrictions fall   Cognitive Status Examination   Orientation Status orientation to person, place and time   Affect/Mental Status (Cognitive) WFL   Follows Commands WFL   Pain Assessment   Patient Currently in Pain Yes, see Vital Sign flowsheet   Range of Motion Comprehensive   General Range of Motion no range of motion deficits identified   Strength Comprehensive (MMT)   Comment, General Manual Muscle Testing (MMT) Assessment weakness BUE   Bed Mobility   Bed Mobility supine-sit   Supine-Sit [place] (Bed Mobility) supervision;verbal cues   Assistive Device (Bed Mobility) bed rails   Transfers   Transfers sit-stand transfer;toilet transfer   Sit-Stand Transfer   Sit-Stand [place] (Transfers) contact guard;verbal cues   Assistive Device (Sit-Stand Transfers) walker, front-wheeled   Toilet Transfer   Type (Toilet Transfer) sit-stand;stand-sit   [place] Level (Toilet Transfer) contact guard;verbal  cues   Assistive Device (Toilet Transfer) grab bars/safety frame;walker, front-wheeled   Balance   Balance Comments CGA and FWW for mobility, very slow paced   Activities of Daily Living   BADL Assessment/Intervention lower body dressing;toileting   Lower Body Dressing Assessment/Training   Comment, (Lower Body Dressing) would require CGA at minimum for transfer   [place] Level (Lower Body Dressing) not tested   Toileting   Assistive Devices (Toileting) grab bar, toilet   [place] Level (Toileting) contact guard assist;verbal cues   Clinical Impression   Criteria for Skilled Therapeutic Interventions Met (OT) Yes, treatment indicated   OT Diagnosis dec ADL indep   OT Problem List-Impairments impacting ADL problems related to;activity tolerance impaired;balance;pain;strength   Assessment of Occupational Performance 3-5 Performance Deficits   Identified Performance Deficits dressing, toileting, functional transfers, grooming   Planned Therapy Interventions (OT) ADL retraining;balance training;strengthening;transfer training;home program guidelines;progressive activity/exercise   Clinical Decision Making Complexity (OT) moderate complexity   Risk & Benefits of therapy have been explained evaluation/treatment results reviewed;participants included;patient   OT Discharge Planning   OT Discharge Recommendation (DC Rec) home with assist;home with home care occupational therapy;Transitional Care Facility   OT Rationale for DC Rec currently pt requires assist of 1 for ADLs and functional transfers. if this level of assist isn't available at [facility], other placement is recommended for continued therapy.   Total Evaluation Time (Minutes)   Total Evaluation Time (Minutes) 10   OT Goals   Therapy Frequency (OT) Daily   OT Predicted Duration/Target Date for Goal Attainment 08/23/22   OT Goals Lower Body Dressing;Toilet Transfer/Toileting;Hygiene/Grooming   OT: Hygiene/Grooming modified independent   OT: Lower Body Dressing  Modified independent   OT: Toilet Transfer/Toileting Modified independent

## 2022-08-17 ENCOUNTER — APPOINTMENT (OUTPATIENT)
Dept: PHYSICAL THERAPY | Facility: HOSPITAL | Age: 57
DRG: 125 | End: 2022-08-17
Payer: MEDICARE

## 2022-08-17 LAB
ERYTHROCYTE [DISTWIDTH] IN BLOOD BY AUTOMATED COUNT: 15.6 % (ref 10–15)
FERRITIN SERPL-MCNC: 50 NG/ML (ref 11–328)
GLUCOSE BLD-MCNC: 137 MG/DL (ref 70–125)
GLUCOSE BLDC GLUCOMTR-MCNC: 118 MG/DL (ref 70–99)
GLUCOSE BLDC GLUCOMTR-MCNC: 202 MG/DL (ref 70–99)
GLUCOSE BLDC GLUCOMTR-MCNC: 208 MG/DL (ref 70–99)
GLUCOSE BLDC GLUCOMTR-MCNC: 79 MG/DL (ref 70–99)
HCT VFR BLD AUTO: 28.5 % (ref 35–47)
HGB BLD-MCNC: 8.6 G/DL (ref 11.7–15.7)
MCH RBC QN AUTO: 24.2 PG (ref 26.5–33)
MCHC RBC AUTO-ENTMCNC: 30.2 G/DL (ref 31.5–36.5)
MCV RBC AUTO: 80 FL (ref 78–100)
PLATELET # BLD AUTO: 228 10E3/UL (ref 150–450)
POTASSIUM BLD-SCNC: 3.9 MMOL/L (ref 3.5–5)
RBC # BLD AUTO: 3.56 10E6/UL (ref 3.8–5.2)
WBC # BLD AUTO: 6.1 10E3/UL (ref 4–11)

## 2022-08-17 PROCEDURE — 97116 GAIT TRAINING THERAPY: CPT | Mod: GP

## 2022-08-17 PROCEDURE — 36415 COLL VENOUS BLD VENIPUNCTURE: CPT | Performed by: STUDENT IN AN ORGANIZED HEALTH CARE EDUCATION/TRAINING PROGRAM

## 2022-08-17 PROCEDURE — 82728 ASSAY OF FERRITIN: CPT | Performed by: STUDENT IN AN ORGANIZED HEALTH CARE EDUCATION/TRAINING PROGRAM

## 2022-08-17 PROCEDURE — 84132 ASSAY OF SERUM POTASSIUM: CPT

## 2022-08-17 PROCEDURE — 97162 PT EVAL MOD COMPLEX 30 MIN: CPT | Mod: GP

## 2022-08-17 PROCEDURE — 99232 SBSQ HOSP IP/OBS MODERATE 35: CPT | Mod: GC | Performed by: STUDENT IN AN ORGANIZED HEALTH CARE EDUCATION/TRAINING PROGRAM

## 2022-08-17 PROCEDURE — 97530 THERAPEUTIC ACTIVITIES: CPT | Mod: GP

## 2022-08-17 PROCEDURE — 82947 ASSAY GLUCOSE BLOOD QUANT: CPT | Performed by: STUDENT IN AN ORGANIZED HEALTH CARE EDUCATION/TRAINING PROGRAM

## 2022-08-17 PROCEDURE — 250N000013 HC RX MED GY IP 250 OP 250 PS 637: Performed by: STUDENT IN AN ORGANIZED HEALTH CARE EDUCATION/TRAINING PROGRAM

## 2022-08-17 PROCEDURE — 85027 COMPLETE CBC AUTOMATED: CPT | Performed by: STUDENT IN AN ORGANIZED HEALTH CARE EDUCATION/TRAINING PROGRAM

## 2022-08-17 PROCEDURE — 250N000013 HC RX MED GY IP 250 OP 250 PS 637

## 2022-08-17 PROCEDURE — 120N000001 HC R&B MED SURG/OB

## 2022-08-17 RX ORDER — TORSEMIDE 5 MG/1
10 TABLET ORAL DAILY PRN
Status: DISCONTINUED | OUTPATIENT
Start: 2022-08-17 | End: 2022-08-18 | Stop reason: HOSPADM

## 2022-08-17 RX ADMIN — BACLOFEN 10 MG: 10 TABLET ORAL at 08:48

## 2022-08-17 RX ADMIN — TOPIRAMATE 50 MG: 50 CAPSULE, EXTENDED RELEASE ORAL at 08:49

## 2022-08-17 RX ADMIN — DOCUSATE SODIUM 200 MG: 100 CAPSULE, LIQUID FILLED ORAL at 08:46

## 2022-08-17 RX ADMIN — OMEPRAZOLE 20 MG: 20 CAPSULE, DELAYED RELEASE ORAL at 06:42

## 2022-08-17 RX ADMIN — ACETAMINOPHEN 650 MG: 325 TABLET ORAL at 06:46

## 2022-08-17 RX ADMIN — TORSEMIDE 10 MG: 5 TABLET ORAL at 08:48

## 2022-08-17 RX ADMIN — BACLOFEN 10 MG: 10 TABLET ORAL at 19:42

## 2022-08-17 RX ADMIN — VENLAFAXINE HYDROCHLORIDE 150 MG: 150 CAPSULE, EXTENDED RELEASE ORAL at 08:48

## 2022-08-17 RX ADMIN — BUPROPION HYDROCHLORIDE 150 MG: 150 TABLET, FILM COATED, EXTENDED RELEASE ORAL at 08:46

## 2022-08-17 RX ADMIN — CALCIUM CARBONATE-VITAMIN D TAB 500 MG-200 UNIT 1 TABLET: 500-200 TAB at 08:49

## 2022-08-17 RX ADMIN — INSULIN ASPART 1 UNITS: 100 INJECTION, SOLUTION INTRAVENOUS; SUBCUTANEOUS at 21:05

## 2022-08-17 RX ADMIN — DOCUSATE SODIUM 200 MG: 100 CAPSULE, LIQUID FILLED ORAL at 19:42

## 2022-08-17 RX ADMIN — MORPHINE SULFATE 30 MG: 30 TABLET, FILM COATED, EXTENDED RELEASE ORAL at 08:49

## 2022-08-17 RX ADMIN — LEVOTHYROXINE SODIUM 125 MCG: 0.03 TABLET ORAL at 06:41

## 2022-08-17 RX ADMIN — BACLOFEN 10 MG: 10 TABLET ORAL at 13:41

## 2022-08-17 RX ADMIN — ASPIRIN 81 MG: 81 TABLET, CHEWABLE ORAL at 08:46

## 2022-08-17 RX ADMIN — RIVAROXABAN 20 MG: 10 TABLET, FILM COATED ORAL at 16:59

## 2022-08-17 RX ADMIN — MORPHINE SULFATE 30 MG: 30 TABLET, FILM COATED, EXTENDED RELEASE ORAL at 19:42

## 2022-08-17 RX ADMIN — SENNOSIDES 1 TABLET: 8.6 TABLET, FILM COATED ORAL at 08:48

## 2022-08-17 RX ADMIN — LISINOPRIL 2.5 MG: 2.5 TABLET ORAL at 08:52

## 2022-08-17 RX ADMIN — MORPHINE SULFATE 30 MG: 30 TABLET, FILM COATED, EXTENDED RELEASE ORAL at 13:42

## 2022-08-17 RX ADMIN — VENLAFAXINE HYDROCHLORIDE 75 MG: 75 CAPSULE, EXTENDED RELEASE ORAL at 08:48

## 2022-08-17 RX ADMIN — ROSUVASTATIN CALCIUM 20 MG: 10 TABLET, FILM COATED ORAL at 21:05

## 2022-08-17 RX ADMIN — HYDROXYZINE HYDROCHLORIDE 25 MG: 25 TABLET ORAL at 08:49

## 2022-08-17 ASSESSMENT — ACTIVITIES OF DAILY LIVING (ADL)
ADLS_ACUITY_SCORE: 46
ADLS_ACUITY_SCORE: 46
ADLS_ACUITY_SCORE: 47
ADLS_ACUITY_SCORE: 46
ADLS_ACUITY_SCORE: 47
ADLS_ACUITY_SCORE: 46
ADLS_ACUITY_SCORE: 47
ADLS_ACUITY_SCORE: 46

## 2022-08-17 NOTE — PROGRESS NOTES
Primary Medicine Progress Note    Assessment/Plan  Active Problems:    Falls frequently    Contusion of face, initial encounter      Tonya Valera is a 57 year old female admitted on 8/15/2022. 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 Discussed care/medications with PCP who agrees with reducing meds as able. Awaiting PT assessment.     Falls 2/2 polypharmacy  Patient has fallen 5 times in the past 24 hours, last one she he her face. Falls sound primarily mechanical in nature from tripping or leaning too far over in a messy room, and in the setting of being sedated from polypharmacy.  Denies any associated lightheadedness, dizziness, diaphoresis, chest pain preceding the falls. She reports a few months of increased falls and increased sleepiness. Lives at an assisted living facility[facility] in [place], where she gets help with her medications, ADLs. Has large bruise over right eye and superficial abrasion under right eye. Is on xarelto, initial and 24 hour repeat head CT unremarkable; CT facial bones, c-spine, and pelvis/L hip xrays all normal. UDS unremarkable. Falls seem most likely caused by increased weakness and sedation related to polypharmacy. Discussed care and plan with PCP who agrees with reducing baclofen, topiramate, atarax as able. Does state that she would like to see the morphine reduced as well, but patient has been/continues to be resistant to this.  -PT/OT   -Baclofen reduced to 10mg TID  -Discontinue atarax  -Topiramate ER reduced to 50mg daily  -Fall precautions  -Care management consult      Chronic pain   Opioid dependence  History of chronic pain that began with spinal abscess. Discussed pain regimen with PCP who states she's tried to get patient to  decrease before, but patient resistant to this. May consider pushing further on this if remains sedated despite reductions of other meds first.  -PTA morphine 30 mg PO TID  -PTA tylenol prn for mild to moderate pain  -PTA morphine 7.5 mg PO q8h prn for severe pain      Muscle spasms   History of muscle spasms following spinal abscess. Discussed with PCP who agreed with idea of seeing if we can reduce this to help her sedation/falls.  -PTA baclofen reduced to 10 mg TID; if having spasms, would just order 5mg prn baclofen     T2DM, insulin dependent  A1c 7.1 8/15/22. On PTA degludec 25 units with dinner and medium resistance sliding scale insulin. Fasting 137 this AM.  -Degludec 25 units at bedtime  -Medium resistance sliding scale insulin   -Carb consistent diet   -PTA lisinopril     Anemia, microcytic, stable  Hgb 8.4 -> 8.6. MCV borderline at 79. Baseline 10.6-13. No evidence of bleeding. Suspect PARTH.  -Ferittin     Major depressive disorder  Anxiety disorder  Borderline personality disorder  -PTA wellbutrin 150 mg daily   -PTA venlafaxine 225 mg daily  -Discontinue PTA hydroxyzine     Hypothyroidism: PTA levothyroxine   History of DVTs: PTA Xarelto 20 mg daily  Hyperlipidemia: PTA rosuvastatin 20 mg daily  GERD: PTA omeprazole 20 mg   PVD: PTA aspirin 81 mg daily   Constipation: PTA colace 200 mg bid, PTA Senokot 1 tablet daily  Migraines: Suspect topiramate ER may be adding to sedation/grogginess, discussed with PCP who agreed with attempting to downtitrate to 50mg daily  (PTA was 100mg daily).  Lymphedema: PTA torsemide 10 mg daily prn, stockings           Diet: Consistent Carbohydrate Diet Moderate Consistent Carb (60 g CHO per Meal) Diet    DVT Prophylaxis: PTA Xarelto   [place] Catheter: Not present  Fluids: PO   Central Lines: None  Cardiac Monitoring: None  Code Status: Full Code     Disposition/Advanced Care Planning  Discharge Planning discussed with patient  Barriers to discharge: workup in progress;  awaiting PT; titrating meds  Anticipated discharge date: 1 days  Disposition: Prior shelter    Subjective:   Still feeling a bit sleepy today but states she didn't sleep very well overnight, partly because she napped yesterday.   Is waiting for PT to come by.        Objective    Vital signs in last 24 hours Temp:  [97.6  F (36.4  C)-98.8  F (37.1  C)] 98.1  F (36.7  C)  Pulse:  [74-86] 86  Resp:  [16-20] 20  BP: ()/(46-70) 119/70  SpO2:  [92 %-98 %] 96 %       Weight:   Vitals:    08/15/22 1642   Weight: 83.9 kg (185 lb)       Intake/Output last 3 shift I/O last 3 completed shifts:  In: 650 [P.O.:650]  Out: 300 [Urine:300]    Intake/Output this shift:No intake/output data recorded.    PHYSICAL EXAM  GENERAL APPEARANCE: Cooperative; appears stated age, sleepy throughout conversation  LUNGS: Lungs CTA  HEART: RRR. Radial pulses 2+. Brisk cap refill. Warm and well perfused  ABDOMEN: Non-distended, soft, no tenderness  EXTREMITIES: Grossly normal without deformity, trace pitting edema in b/l LEs  SKIN: Maturing bruising over right face/forehead  NEURO: Oriented to time and place, sleepy    Pertinent Labs and Pertinent Radiology   Lab Results: personally reviewed.     Recent Labs   Lab 08/17/22  0706   WBC 6.1   HGB 8.6*   HCT 28.5*          Recent Labs   Lab 08/16/22  0337 08/15/22  1709    139  141   CO2 34* 30  32*   BUN 13 15  15   ALBUMIN  --  3.6   ALKPHOS  --  79   ALT  --  <9   AST  --  20       Radiology Results:   Results for orders placed or performed during the hospital encounter of 08/15/22   Head CT w/o contrast    Impression    IMPRESSION:  HEAD CT:  1.  No acute intracranial abnormality.    FACIAL BONE CT:  1.  Large right frontal/periorbital scalp hematoma without acute facial fracture.    CERVICAL SPINE CT:  1.  No acute cervical spine fracture or traumatic malalignment.   CT Facial Bones without Contrast    Impression    IMPRESSION:  HEAD CT:  1.  No acute intracranial  abnormality.    FACIAL BONE CT:  1.  Large right frontal/periorbital scalp hematoma without acute facial fracture.    CERVICAL SPINE CT:  1.  No acute cervical spine fracture or traumatic malalignment.   Cervical spine CT w/o contrast    Impression    IMPRESSION:  HEAD CT:  1.  No acute intracranial abnormality.    FACIAL BONE CT:  1.  Large right frontal/periorbital scalp hematoma without acute facial fracture.    CERVICAL SPINE CT:  1.  No acute cervical spine fracture or traumatic malalignment.   XR Pelvis and Hip Left 2 Views    Impression    IMPRESSION: There is no evidence of an acute left hip or pelvic fracture. No dislocation. Both hip joint spaces are maintained.    Mild degenerative changes and incongruity of the pubic symphysis is favored to be chronic.   CT Head w/o Contrast    Impression    IMPRESSION:  1.  No acute intracranial process; specifically no acute intracranial hemorrhage.  2.  Slight decrease in size of the right frontal/periorbital scalp hematoma with mild improved surrounding soft tissue swelling.         Precepted patient with Dr. Hossein Hernandez MD  [facility] Resident   Pager #: [phone]    Parts of this note were created with the assistance of voice recognition software.  The note was reviewed for accuracy.  However, errors in spelling, etc may have resulted.

## 2022-08-17 NOTE — PLAN OF CARE
"  Problem: Plan of Care - These are the overarching goals to be used throughout the patient stay.    Goal: Plan of Care Review/Shift Note  Description: The Plan of Care Review/Shift note should be completed every shift.  The Outcome Evaluation is a brief statement about your assessment that the patient is improving, declining, or no change.  This information will be displayed automatically on your shift note.  Outcome: Ongoing, Progressing     Problem: Plan of Care - These are the overarching goals to be used throughout the patient stay.    Goal: Patient-Specific Goal (Individualized)  Description: You can add care plan individualizations to a care plan. Examples of Individualization might be:  \"Parent requests to be called daily at 9am for status\", \"I have a hard time hearing out of my right ear\", or \"Do not touch me to wake me up as it startles me\".  Outcome: Ongoing, Progressing     Problem: Plan of Care - These are the overarching goals to be used throughout the patient stay.    Goal: Absence of Hospital-Acquired Illness or Injury  Outcome: Ongoing, Progressing     Problem: Mobility Impairment  Goal: Optimal Mobility  Outcome: Ongoing, Progressing   Goal Outcome Evaluation:  Pain managed by scheduled OxyContin which has been effective. Patient alert oriented x 4. Lymphedema wraps in place.                    "

## 2022-08-17 NOTE — PLAN OF CARE
Problem: Electrolyte Imbalance  Goal: Electrolyte Balance  Outcome: Ongoing, Progressing     Problem: Pain Chronic (Persistent)  Goal: Acceptable Pain Control and Functional Ability  Outcome: Ongoing, Progressing  Intervention: Manage Persistent Pain  Recent Flowsheet Documentation  Taken 8/16/2022 1630 by Albert Tse RN  Medication Review/Management: medications reviewed   Goal Outcome Evaluation:        Patient A and O times 4. Assist of 1 for tranfers and cares. Blood sugars WNL for patient this shift, 169 at HS, Tresiba given along with light snack. Patient had CT of her head with out contrast this shift.   Patient on K protocol, recheck in morning.

## 2022-08-17 NOTE — PROGRESS NOTES
"   08/17/22 1000   Quick Adds   Type of Visit Initial PT Evaluation   Living Environment   People in Home facility resident   Current Living Arrangements assisted living   Home Accessibility no concerns   Transportation Anticipated agency   Living Environment Comments [facility] [place]   Self-Care   Usual Activity Tolerance good   Current Activity Tolerance fair   Equipment Currently Used at Home walker, rolling;wheelchair, manual  (FWW)   Fall history within last six months yes   Number of times patient has fallen within last six months 5  (5 falls in the 24 hours PTA.)   Activity/Exercise/Self-Care Comment Pt uses both a FWW and wheelchair for mobility at baseline.   General Information   Onset of Illness/Injury or Date of Surgery 08/15/22   Referring Physician Ba Garcia MD   Patient/Family Therapy Goals Statement (PT) None stated   Pertinent History of Current Problem (include personal factors and/or comorbidities that impact the POC) Per chart, \"Tonya Valera is a 57 year old female admitted on 8/15/2022. 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   Posture    Posture Forward head position;Protracted shoulders   Range of Motion (ROM)   Range of Motion ROM is WFL   Strength (Manual Muscle Testing)   Strength (Manual Muscle Testing) strength is WFL   Bed Mobility   Bed Mobility supine-sit   Supine-Sit [place] (Bed Mobility) minimum assist (75% patient effort)   Impairments Contributing to " Impaired Bed Mobility decreased strength   Assistive Device (Bed Mobility) bed rails   Transfers   Transfers sit-stand transfer   Impairments Contributing to Impaired Transfers decreased strength   Sit-Stand Transfer   Sit-Stand [place] (Transfers) minimum assist (75% patient effort)   Assistive Device (Sit-Stand Transfers) walker, front-wheeled   Gait/Stairs (Locomotion)   [place] Level (Gait) contact guard   Assistive Device (Gait) walker, front-wheeled   Distance in Feet (Required for LE Total Joints) 25  (Eval first 5 ft then cueing following.)   Pattern (Gait) step-through   Deviations/Abnormal Patterns (Gait) [place] decreased;stride length decreased   Clinical Impression   Criteria for Skilled Therapeutic Intervention Yes, treatment indicated   PT Diagnosis (PT) Impaired functional mobility   Influenced by the following impairments Decreased strength, impaired balance   Functional limitations due to impairments Impaired transfers, impaired gait   Clinical Presentation (PT Evaluation Complexity) Evolving/Changing   Clinical Presentation Rationale Pt presents as medically diagnosed.   Clinical Decision Making (Complexity) moderate complexity   Planned Therapy Interventions (PT) balance training;gait training;home exercise program;patient/family education;strengthening;transfer training;home program guidelines   Anticipated Equipment Needs at Discharge (PT) walker, rolling  (FWW)   Risk & Benefits of therapy have been explained evaluation/treatment results reviewed;patient   PT Discharge Planning   PT Discharge Recommendation (DC Rec) (S)  Transitional Care Facility;home with home care physical therapy;home with assist   PT Rationale for DC Rec Pt currently requires assist of 1 for safety with all mobility. If returning to [facility], pt would need 24/7 supervision, assist of 1 for all mobility, and home PT to prevent recurrent falls. If this is not available, then recommend TCU for continued rehab.   Total  Evaluation Time   Total Evaluation Time (Minutes) 10   Physical Therapy Goals   PT Frequency Daily   PT Predicted Duration/Target Date for Goal Attainment 08/24/22   PT Goals Transfers;Gait   PT: Transfers Modified independent;Sit to/from stand;Assistive device   PT: Gait Modified independent;Assistive device;Rolling walker;150 feet

## 2022-08-17 NOTE — PLAN OF CARE
Problem: Mobility Impairment  Goal: Optimal Mobility  Outcome: Ongoing, Progressing  Intervention: Optimize Mobility  Recent Flowsheet Documentation  Taken 8/17/2022 0646 by Candice Gutiérrez RN  Activity Management: activity adjusted per tolerance     Problem: Pain Chronic (Persistent)  Goal: Acceptable Pain Control and Functional Ability  Outcome: Ongoing, Progressing  Intervention: Develop Pain Management Plan  Recent Flowsheet Documentation  Taken 8/17/2022 0646 by Candice Gutiérrez RN  Pain Management Interventions: medication (see MAR)   Goal Outcome Evaluation:  Pt awake most of the night.  Reports she is not a good sleeper.  No acute events.  Minimal pain overnight.  Using call light appropriately.

## 2022-08-18 ENCOUNTER — APPOINTMENT (OUTPATIENT)
Dept: PHYSICAL THERAPY | Facility: HOSPITAL | Age: 57
DRG: 125 | End: 2022-08-18
Payer: MEDICARE

## 2022-08-18 VITALS
HEIGHT: 63 IN | SYSTOLIC BLOOD PRESSURE: 105 MMHG | WEIGHT: 185 LBS | HEART RATE: 84 BPM | DIASTOLIC BLOOD PRESSURE: 66 MMHG | BODY MASS INDEX: 32.78 KG/M2 | TEMPERATURE: 98.4 F | OXYGEN SATURATION: 95 % | RESPIRATION RATE: 16 BRPM

## 2022-08-18 DIAGNOSIS — G89.29 OTHER CHRONIC PAIN: Primary | ICD-10-CM

## 2022-08-18 LAB
GLUCOSE BLDC GLUCOMTR-MCNC: 117 MG/DL (ref 70–99)
GLUCOSE BLDC GLUCOMTR-MCNC: 85 MG/DL (ref 70–99)
HOLD SPECIMEN: NORMAL
POTASSIUM BLD-SCNC: 3.8 MMOL/L (ref 3.5–5)

## 2022-08-18 PROCEDURE — 97530 THERAPEUTIC ACTIVITIES: CPT | Mod: GP

## 2022-08-18 PROCEDURE — 99238 HOSP IP/OBS DSCHRG MGMT 30/<: CPT | Mod: GC | Performed by: STUDENT IN AN ORGANIZED HEALTH CARE EDUCATION/TRAINING PROGRAM

## 2022-08-18 PROCEDURE — 36415 COLL VENOUS BLD VENIPUNCTURE: CPT | Performed by: STUDENT IN AN ORGANIZED HEALTH CARE EDUCATION/TRAINING PROGRAM

## 2022-08-18 PROCEDURE — 97116 GAIT TRAINING THERAPY: CPT | Mod: GP

## 2022-08-18 PROCEDURE — 97110 THERAPEUTIC EXERCISES: CPT | Mod: GP

## 2022-08-18 PROCEDURE — 250N000013 HC RX MED GY IP 250 OP 250 PS 637: Performed by: STUDENT IN AN ORGANIZED HEALTH CARE EDUCATION/TRAINING PROGRAM

## 2022-08-18 PROCEDURE — 84132 ASSAY OF SERUM POTASSIUM: CPT | Performed by: STUDENT IN AN ORGANIZED HEALTH CARE EDUCATION/TRAINING PROGRAM

## 2022-08-18 PROCEDURE — 250N000013 HC RX MED GY IP 250 OP 250 PS 637

## 2022-08-18 RX ORDER — BACLOFEN 20 MG/1
10 TABLET ORAL 3 TIMES DAILY
Qty: 45 TABLET | Refills: 0 | Status: SHIPPED | OUTPATIENT
Start: 2022-08-18 | End: 2022-08-18

## 2022-08-18 RX ORDER — TORSEMIDE 10 MG/1
10 TABLET ORAL DAILY PRN
Qty: 45 TABLET | Refills: 0 | Status: SHIPPED | OUTPATIENT
Start: 2022-08-18

## 2022-08-18 RX ORDER — FERROUS SULFATE 325(65) MG
325 TABLET ORAL EVERY OTHER DAY
Qty: 30 TABLET | Refills: 0 | Status: SHIPPED | OUTPATIENT
Start: 2022-08-18 | End: 2022-08-18

## 2022-08-18 RX ORDER — HYDROXYZINE HYDROCHLORIDE 50 MG/1
25 TABLET, FILM COATED ORAL EVERY 8 HOURS PRN
Qty: 30 TABLET | Refills: 0 | Status: SHIPPED | OUTPATIENT
Start: 2022-08-18 | End: 2022-08-18

## 2022-08-18 RX ORDER — BACLOFEN 20 MG/1
10 TABLET ORAL 3 TIMES DAILY
Qty: 30 TABLET | Refills: 0 | Status: SHIPPED | OUTPATIENT
Start: 2022-08-18 | End: 2022-08-18

## 2022-08-18 RX ORDER — TOPIRAMATE 50 MG/1
50 CAPSULE, EXTENDED RELEASE ORAL DAILY
Qty: 30 CAPSULE | Refills: 0 | Status: SHIPPED | OUTPATIENT
Start: 2022-08-18 | End: 2022-08-18

## 2022-08-18 RX ORDER — MORPHINE SULFATE 15 MG/1
7.5 TABLET ORAL EVERY 8 HOURS PRN
Qty: 20 TABLET | Refills: 0 | Status: SHIPPED | OUTPATIENT
Start: 2022-08-18 | End: 2022-08-25

## 2022-08-18 RX ORDER — BACLOFEN 20 MG/1
10 TABLET ORAL 3 TIMES DAILY
Qty: 45 TABLET | Refills: 0 | Status: SHIPPED | OUTPATIENT
Start: 2022-08-18

## 2022-08-18 RX ORDER — TORSEMIDE 10 MG/1
10 TABLET ORAL DAILY PRN
Qty: 30 TABLET | Refills: 0 | Status: SHIPPED | OUTPATIENT
Start: 2022-08-18 | End: 2022-08-18

## 2022-08-18 RX ORDER — TOPIRAMATE 50 MG/1
50 CAPSULE, EXTENDED RELEASE ORAL DAILY
Qty: 45 CAPSULE | Refills: 0 | Status: SHIPPED | OUTPATIENT
Start: 2022-08-18 | End: 2022-08-18

## 2022-08-18 RX ORDER — HYDROXYZINE HYDROCHLORIDE 50 MG/1
25 TABLET, FILM COATED ORAL EVERY 8 HOURS PRN
Qty: 45 TABLET | Refills: 0 | Status: SHIPPED | OUTPATIENT
Start: 2022-08-18 | End: 2022-08-18

## 2022-08-18 RX ORDER — TORSEMIDE 10 MG/1
10 TABLET ORAL DAILY PRN
Qty: 45 TABLET | Refills: 0 | Status: SHIPPED | OUTPATIENT
Start: 2022-08-18 | End: 2022-08-18

## 2022-08-18 RX ORDER — TOPIRAMATE 50 MG/1
50 CAPSULE, EXTENDED RELEASE ORAL DAILY
Qty: 45 CAPSULE | Refills: 0 | Status: SHIPPED | OUTPATIENT
Start: 2022-08-18

## 2022-08-18 RX ORDER — FERROUS SULFATE 325(65) MG
325 TABLET ORAL EVERY OTHER DAY
Qty: 45 TABLET | Refills: 0 | Status: SHIPPED | OUTPATIENT
Start: 2022-08-18 | End: 2022-08-18

## 2022-08-18 RX ORDER — HYDROXYZINE HYDROCHLORIDE 50 MG/1
25 TABLET, FILM COATED ORAL EVERY 8 HOURS PRN
Qty: 45 TABLET | Refills: 0 | Status: SHIPPED | OUTPATIENT
Start: 2022-08-18 | End: 2022-08-30

## 2022-08-18 RX ORDER — FERROUS SULFATE 325(65) MG
325 TABLET ORAL EVERY OTHER DAY
Qty: 45 TABLET | Refills: 0 | Status: SHIPPED | OUTPATIENT
Start: 2022-08-18

## 2022-08-18 RX ORDER — MORPHINE SULFATE 30 MG/1
30 TABLET, FILM COATED, EXTENDED RELEASE ORAL
Qty: 20 TABLET | Refills: 0 | Status: SHIPPED | OUTPATIENT
Start: 2022-08-18 | End: 2022-08-25

## 2022-08-18 RX ADMIN — TOPIRAMATE 50 MG: 50 CAPSULE, EXTENDED RELEASE ORAL at 08:15

## 2022-08-18 RX ADMIN — MORPHINE SULFATE 7.5 MG: 15 TABLET ORAL at 06:41

## 2022-08-18 RX ADMIN — BACLOFEN 10 MG: 10 TABLET ORAL at 14:23

## 2022-08-18 RX ADMIN — LEVOTHYROXINE SODIUM 125 MCG: 0.03 TABLET ORAL at 06:36

## 2022-08-18 RX ADMIN — LISINOPRIL 2.5 MG: 2.5 TABLET ORAL at 08:15

## 2022-08-18 RX ADMIN — CALCIUM CARBONATE-VITAMIN D TAB 500 MG-200 UNIT 1 TABLET: 500-200 TAB at 08:13

## 2022-08-18 RX ADMIN — ASPIRIN 81 MG: 81 TABLET, CHEWABLE ORAL at 08:14

## 2022-08-18 RX ADMIN — MORPHINE SULFATE 30 MG: 30 TABLET, FILM COATED, EXTENDED RELEASE ORAL at 08:22

## 2022-08-18 RX ADMIN — VENLAFAXINE HYDROCHLORIDE 75 MG: 75 CAPSULE, EXTENDED RELEASE ORAL at 08:15

## 2022-08-18 RX ADMIN — SENNOSIDES 1 TABLET: 8.6 TABLET, FILM COATED ORAL at 08:14

## 2022-08-18 RX ADMIN — OMEPRAZOLE 20 MG: 20 CAPSULE, DELAYED RELEASE ORAL at 06:37

## 2022-08-18 RX ADMIN — BACLOFEN 10 MG: 10 TABLET ORAL at 08:14

## 2022-08-18 RX ADMIN — BUPROPION HYDROCHLORIDE 150 MG: 150 TABLET, FILM COATED, EXTENDED RELEASE ORAL at 08:15

## 2022-08-18 RX ADMIN — MORPHINE SULFATE 30 MG: 30 TABLET, FILM COATED, EXTENDED RELEASE ORAL at 14:23

## 2022-08-18 RX ADMIN — ACETAMINOPHEN 650 MG: 325 TABLET ORAL at 06:41

## 2022-08-18 RX ADMIN — VENLAFAXINE HYDROCHLORIDE 150 MG: 150 CAPSULE, EXTENDED RELEASE ORAL at 08:14

## 2022-08-18 RX ADMIN — DOCUSATE SODIUM 200 MG: 100 CAPSULE, LIQUID FILLED ORAL at 08:14

## 2022-08-18 ASSESSMENT — ACTIVITIES OF DAILY LIVING (ADL)
ADLS_ACUITY_SCORE: 47
ADLS_ACUITY_SCORE: 46
ADLS_ACUITY_SCORE: 46

## 2022-08-18 NOTE — PLAN OF CARE
"  Problem: Plan of Care - These are the overarching goals to be used throughout the patient stay.    Goal: Plan of Care Review/Shift Note  Description: The Plan of Care Review/Shift note should be completed every shift.  The Outcome Evaluation is a brief statement about your assessment that the patient is improving, declining, or no change.  This information will be displayed automatically on your shift note.  Outcome: Ongoing, Progressing  Goal: Patient-Specific Goal (Individualized)  Description: You can add care plan individualizations to a care plan. Examples of Individualization might be:  \"Parent requests to be called daily at 9am for status\", \"I have a hard time hearing out of my right ear\", or \"Do not touch me to wake me up as it startles me\".  Outcome: Ongoing, Progressing  Goal: Absence of Hospital-Acquired Illness or Injury  Outcome: Ongoing, Progressing  Intervention: Identify and Manage Fall Risk  Recent Flowsheet Documentation  Taken 8/18/2022 0200 by Leah Rojas RN  Safety Promotion/Fall Prevention:   activity supervised   chair alarm on   bed alarm on  Intervention: Prevent Skin Injury  Recent Flowsheet Documentation  Taken 8/18/2022 0200 by Leah Rojas RN  Body Position: position changed independently  Intervention: Prevent and Manage VTE (Venous Thromboembolism) Risk  Recent Flowsheet Documentation  Taken 8/18/2022 0200 by Leah Rojas RN  Activity Management: activity adjusted per tolerance  Goal: Optimal Comfort and Wellbeing  Outcome: Ongoing, Progressing  Goal: Readiness for Transition of Care  Outcome: Ongoing, Progressing     Problem: Mobility Impairment  Goal: Optimal Mobility  Outcome: Ongoing, Progressing  Intervention: Optimize Mobility  Recent Flowsheet Documentation  Taken 8/18/2022 0200 by Leah Rojas RN  Assistive Device Utilized:   gait belt   walker  Activity Management: activity adjusted per tolerance  Positioning/Transfer Devices:   pillows   in use     Problem: " Electrolyte Imbalance  Goal: Electrolyte Balance  Outcome: Ongoing, Progressing     Problem: Pain Chronic (Persistent)  Goal: Acceptable Pain Control and Functional Ability  Outcome: Ongoing, Progressing  Intervention: Manage Persistent Pain  Recent Flowsheet Documentation  Taken 8/18/2022 0200 by eLah Rojas RN  Medication Review/Management: medications reviewed   Goal Outcome Evaluation:  Pt slept well throughout the shift, awoke this am with 8/10 right eye/face pain, prn tylenol and ms given. Pt has purewick in place and able to make her needs known.

## 2022-08-18 NOTE — PLAN OF CARE
Goal Outcome Evaluation:        Problem: Plan of Care - These are the overarching goals to be used throughout the patient stay.    Goal: Optimal Comfort and Wellbeing  Outcome: Adequate for Care Transition  Intervention: Monitor Pain and Promote Comfort  Recent Flowsheet Documentation  Taken 8/18/2022 0822 by Marycarmen Gore RN  Pain Management Interventions: quiet environment facilitated  Good pain control this shift 3/10. Does c/o numbness in feet up to knees and parts of both hands (chronic). Pleasant and cooperative. Up to chair with assist of 1 and walker. To discharge to [facility] today. Patient aware. Bed and chair alarms on for safety

## 2022-08-18 NOTE — PLAN OF CARE
Occupational Therapy Discharge Summary    Reason for therapy discharge:    Discharged to transitional care facility.    Progress towards therapy goal(s). See goals on Care Plan in [facility] electronic health record for goal details.  Goals partially met.  Barriers to achieving goals:   discharge from facility.    Therapy recommendation(s):    Continued therapy is recommended.  Rationale/Recommendations:  not at baseline for ADLs.

## 2022-08-18 NOTE — DISCHARGE SUMMARY
[facility]  DISCHARGE SUMMARY     Primary Care Physician: Annia Frias  Admission Date: 8/15/2022   Discharge Provider: Donte Hernandez MD Discharge Date: 8/18/2022   Diet: Orders Placed This Encounter      Consistent Carbohydrate Diet Moderate Consistent Carb (60 g CHO per Meal) Diet      Diet     Code Status: Full Code   Activity: Regular        Condition at Discharge: Good     REASON FOR PRESENTATION(See Admission Note for Details)   Falls    PRINCIPAL & ACTIVE DISCHARGE DIAGNOSES     Active Problems:    Falls frequently    Contusion of face, initial encounter      SIGNIFICANT FINDINGS (Imaging, labs):     Hgb 8.6, MCV 80, ferritin 50    Narrative & Impression   EXAM: CT HEAD W/O CONTRAST, CT CERVICAL SPINE W/O CONTRAST, CT FACIAL BONES WITHOUT CONTRAST  LOCATION: [facility]  DATE/TIME: 8/15/2022 6:04 PM     INDICATION: Trauma  COMPARISON: None.  TECHNIQUE:   1) Routine CT Head without IV contrast. Multiplanar reformats. Dose reduction techniques were used.  2) Routine CT Facial Bones without IV contrast. Multiplanar reformats. Dose reduction techniques were used.  3) Routine CT Cervical Spine without IV contrast. Multiplanar reformats. Dose reduction techniques were used.     FINDINGS:  HEAD CT:   INTRACRANIAL CONTENTS: No intracranial hemorrhage, extraaxial collection, or mass effect.  No CT evidence of acute infarct. Normal parenchymal density for age. The ventricles and sulci are normal for age.      OSSEOUS STRUCTURES/SOFT TISSUES: Large right frontal/periorbital scalp hematoma. No skull fracture.      FACIAL BONE CT:  OSSEOUS STRUCTURES/SOFT TISSUES: Large right frontal/periorbital scalp hematoma. No facial bone fracture or malalignment. No evidence for dental trauma. Odontogenic disease.     ORBITAL CONTENTS: No acute abnormality.     SINUSES: No significant paranasal sinus mucosal disease.     CERVICAL SPINE CT:   VERTEBRA: Slight reversal of the normal cervical  lordosis. Levocurvature may be related to patient positioning. Normal vertebral body heights. No fracture or posttraumatic subluxation.      CANAL/FORAMINA: Mild cervical spondylosis, worse at C5-C6 where there is a large central protrusion. No high-grade canal stenosis.     PARASPINAL: Prevertebral soft tissues are unremarkable. Visualized lung fields are clear.                                                                      IMPRESSION:  HEAD CT:  1.  No acute intracranial abnormality.     FACIAL BONE CT:  1.  Large right frontal/periorbital scalp hematoma without acute facial fracture.     CERVICAL SPINE CT:  1.  No acute cervical spine fracture or traumatic malalignment.     Narrative & Impression   EXAM: CT HEAD W/O CONTRAST  LOCATION: [facility]  DATE/TIME: 8/16/2022 7:06 PM     INDICATION: Head trauma on blood thinner. 24 hour repeat examination.  COMPARISON: CT head without contrast 08/15/2022.  TECHNIQUE: Routine CT Head without IV contrast. Multiplanar reformats. Dose reduction techniques were used.     FINDINGS:  INTRACRANIAL CONTENTS: No intracranial hemorrhage, extraaxial collection, or mass effect.  No CT evidence of acute infarct. Normal parenchymal attenuation. Normal ventricles and sulci.      VISUALIZED ORBITS/SINUSES/MASTOIDS: No intraorbital abnormality. No paranasal sinus mucosal disease. No middle ear or mastoid effusion.     BONES/SOFT TISSUES: Slight interval decrease in size of the right frontal/periorbital scalp hematoma with improved surrounding soft tissue swelling. No skull fracture.                                                                      IMPRESSION:  1.  No acute intracranial process; specifically no acute intracranial hemorrhage.  2.  Slight decrease in size of the right frontal/periorbital scalp hematoma with mild improved surrounding soft tissue swelling.       PENDING LABS     None    PROCEDURES ( this hospitalization only)       None    RECOMMENDATIONS TO OUTPATIENT PROVIDER FOR F/U VISIT     -Further reduce sedating meds as able  -Recheck CBC and retic count in a few weeks to assess response to iron    DISPOSITION     Skilled Nursing Facility    SUMMARY OF HOSPITAL COURSE:      Tonya Valera is a 57 year old female admitted on 8/15/2022. 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 Discussed care/medications with PCP who agrees with reducing meds as able. Overall much more awake and alert with reduction in sedating meds. Assessed by PT/OT, patient to discharge to TCU for further rehab.     Falls 2/2 polypharmacy  Patient has fallen 5 times in the past 24 hours, last one she he her face. Falls sound primarily mechanical in nature from tripping or leaning too far over in a messy room, and in the setting of being sedated from polypharmacy.  Denies any associated lightheadedness, dizziness, diaphoresis, chest pain preceding the falls. She reports a few months of increased falls and increased sleepiness. Lives at an assisted living facility, [facility] in [place], where she gets help with her medications, ADLs. Has large bruise over right eye and superficial abrasion under right eye. Is on xarelto, initial and 24 hour repeat head CT unremarkable; CT facial bones, c-spine, and pelvis/L hip xrays all normal. UDS unremarkable. Falls seem most likely caused by increased weakness and sedation related to polypharmacy. Discussed care and plan with PCP who agrees with reducing baclofen, topiramate, atarax as able. Does state that she would like to see the morphine reduced as well, but patient has been/continues to be resistant to this. Overall much more awake and alert with reduction in sedating meds.    -TCU at discharge for further rehab  -Reduced Baclofen to 10mg TID  -Discontinued scheduled atarax  -Reduced prn atarax to 25mg for itching  -Reduced Topiramate ER  to 50mg daily     Chronic pain syndrome with opioid dependence  History of chronic pain that began with spinal abscess. Discussed pain regimen with PCP who states she's tried to get patient to decrease before, but patient resistant to this.  -PTA morphine 30 mg PO TID  -PTA tylenol prn for mild to moderate pain  -PTA morphine 7.5 mg PO q8h prn for severe pain      Muscle spasms   History of muscle spasms following spinal abscess. Discussed with PCP who agreed with idea of seeing if we can reduce this to help her sedation/falls.  -PTA baclofen reduced to 10 mg TID  -In future if c/o increasing spasms, would recommend adding prn rather than further increasing scheduled dosing    Discharge Medications with Med changes:        Review of your medicines      START taking      Dose / Directions   ferrous sulfate 325 (65 Fe) MG tablet  Commonly known as: FEROSUL  Used for: Iron deficiency anemia secondary to inadequate dietary iron intake      Dose: 325 mg  Take 1 tablet (325 mg) by mouth every other day  Quantity: 30 tablet  Refills: 0        CONTINUE these medicines which may have CHANGED, or have new prescriptions. If we are uncertain of the size of tablets/capsules you have at home, strength may be listed as something that might have changed.      Dose / Directions   baclofen 20 MG tablet  Commonly known as: LIORESAL  This may have changed: how much to take  Used for: Back muscle spasm      Dose: 10 mg  Take 0.5 tablets (10 mg) by mouth 3 times daily  Quantity: 30 tablet  Refills: 0     hydrOXYzine 50 MG tablet  Commonly known as: ATARAX  This may have changed:     how much to take    reasons to take this    Another medication with the same name was removed. Continue taking this medication, and follow the directions you see here.  Used for: Itching       Dose: 25 mg  Take 0.5 tablets (25 mg) by mouth every 8 hours as needed for itching  Quantity: 30 tablet  Refills: 0     ondansetron 4 MG tablet  Commonly known as: ZOFRAN  This may have changed: when to take this      Dose: 4 mg  Take 1 tablet (4 mg) by mouth every 8 hours as needed for nausea or vomiting  Quantity: 12 tablet  Refills: 0     topiramate ER 50 MG 24 hr capsule  Commonly known as: QUDEXY XR  This may have changed:     medication strength    how much to take  Used for: Migraine without status migrainosus, not intractable, unspecified migraine type      Dose: 50 mg  Take 1 capsule (50 mg) by mouth daily  Quantity: 30 capsule  Refills: 0     torsemide 10 MG tablet  Commonly known as: DEMADEX  This may have changed:     when to take this    reasons to take this  Used for: Swelling of lower extremity      Dose: 10 mg  Take 1 tablet (10 mg) by mouth daily as needed (for LE swelling as needed)  Quantity: 30 tablet  Refills: 0        CONTINUE these medicines which have NOT CHANGED      Dose / Directions   acetaminophen 325 MG tablet  Commonly known as: TYLENOL      Dose: 650 mg  Take 650 mg by mouth every 4 hours as needed for mild pain  Refills: 0     aspirin 81 MG chewable tablet  Commonly known as: ASA      Dose: 81 mg  Take 81 mg by mouth daily  Refills: 0     bacitracin 500 UNIT/GM Oint      Apply topically every 8 hours as needed for wound care  Refills: 0     bisacodyl 10 MG suppository  Commonly known as: DULCOLAX      Dose: 10 mg  Place 10 mg rectally daily as needed for constipation  Refills: 0     buPROPion 150 MG 12 hr tablet  Commonly known as: WELLBUTRIN SR      Dose: 150 mg  Take 150 mg by mouth daily  Refills: 0     calcium carbonate 500 MG chewable tablet  Commonly known as: TUMS      Dose: 2 chew tab  Take 2 chew tab by mouth every hour as needed for heartburn  Refills: 0     calcium carbonate 600 mg-vitamin D 400 units 600-400 MG-UNIT per tablet  Commonly known as: CALTRATE      Dose: 1  tablet  Take 1 tablet by mouth daily  Refills: 0     cholecalciferol 50 MCG (2000 UT) tablet      Dose: 1 tablet  Take 1 tablet by mouth every other day  Refills: 0     clotrimazole 1 % external cream  Commonly known as: LOTRIMIN      Apply topically 2 times daily  Refills: 0     docusate sodium 100 MG tablet  Commonly known as: COLACE      Dose: 200 mg  Take 200 mg by mouth 2 times daily  Refills: 0     doxycycline hyclate 100 MG capsule  Commonly known as: VIBRAMYCIN      Dose: 100 mg  Take 100 mg by mouth daily  Refills: 0     emollient external cream      Apply topically 2 times daily as needed for other (to legs for dry skin)  Refills: 0     guaiFENesin 20 mg/mL Soln solution  Commonly known as: ROBITUSSIN      Dose: 10 mL  Take 10 mLs by mouth every 4 hours as needed for cough  Refills: 0     hydrocortisone 1 % external cream  Commonly known as: CORTAID      Apply topically 2 times daily as needed for rash  Refills: 0     insulin aspart 100 UNIT/ML pen  Commonly known as: NovoLOG PEN  Used for: Type 2 diabetes mellitus with hyperglycemia, with long-term current use of insulin (H)      Dose: 1-7 Units  Inject 1-7 Units Subcutaneous 3 times daily (before meals) Correction Scale - MEDIUM INSULIN RESISTANCE DOSING   Do Not give Correction Insulin if Pre-Meal BG less than 140. For Pre-Meal  - 189 give 1 unit. For Pre-Meal  - 239 give 2 units. For Pre-Meal  - 289 give 3 units. For Pre-Meal  - 339 give 4 units. For Pre-Meal - 399 give 5 units. For Pre-Meal -449 give 6 units For Pre-Meal BG greater than or equal to 450 give 7 units. To be given with prandial insulin, and based on pre-meal blood glucose.  Notify provider if glucose greater than or equal to 350 mg/dL after administration of correction dose. If given at mealtime, administer within 30 minutes of start of meal  Quantity: 6.3 mL  Refills: 2     insulin degludec 100 UNIT/ML pen  Commonly known as: TRESIBA      Dose: 25  Units  Inject 25 Units Subcutaneous daily (with dinner)  Refills: 0     * levothyroxine 125 MCG tablet  Commonly known as: SYNTHROID/LEVOTHROID      Dose: 125 mcg  Take 125 mcg by mouth See Admin Instructions Daily on Tuesday, Wednesday, Thursday, Saturday, and Sunday  Refills: 0     * levothyroxine 125 MCG tablet  Commonly known as: SYNTHROID/LEVOTHROID      Dose: 187.5 mcg  Take 187.5 mcg by mouth See Admin Instructions On Mondays, Fridays  Refills: 0     lisinopril 2.5 MG tablet  Commonly known as: ZESTRIL      Dose: 2.5 mg  Take 2.5 mg by mouth daily  Refills: 0     loperamide 2 MG tablet  Commonly known as: IMODIUM A-D      Dose: 2 mg  Take 2 mg by mouth 3 times daily as needed for diarrhea  Refills: 0     loratadine 10 MG tablet  Commonly known as: CLARITIN      Dose: 10 mg  Take 10 mg by mouth daily as needed for allergies  Refills: 0     magnesium 250 MG tablet      Dose: 1 tablet  Take 1 tablet by mouth daily  Refills: 0     magnesium hydroxide 400 MG/5ML suspension  Commonly known as: MILK OF MAGNESIA      Dose: 30 mL  Take 30 mLs by mouth daily as needed for constipation  Refills: 0     menthol 7 MG Lozg  Commonly known as: COUGH DROP      Dose: 1 lozenge  Take 1 lozenge by mouth every hour as needed for cough  Refills: 0     * morphine 30 MG CR tablet  Commonly known as: MS CONTIN      Dose: 30 mg  Take 30 mg by mouth 3 times daily  Refills: 0     * morphine 15 MG IR tablet  Commonly known as: MSIR      Dose: 7.5 mg  Take 7.5 mg by mouth every 8 hours as needed for pain  Refills: 0     Multivitamin Gummies Womens Chew      Dose: 1 chew tab  Take 1 chew tab by mouth daily  Refills: 0     neomycin-bacitracin-polymyxin 5-400-5000 ointment      Apply topically every 6 hours as needed (minor scrapes, cuts, burns)  Refills: 0     omeprazole 20 MG DR capsule  Commonly known as: priLOSEC      Dose: 20 mg  Take 20 mg by mouth daily before breakfast  Refills: 0     polyethylene glycol 17 GM/Dose powder  Commonly  known as: MIRALAX      Dose: 1 capful  Take 1 capful by mouth every other day  Refills: 0     rivaroxaban ANTICOAGULANT 20 MG Tabs tablet  Commonly known as: XARELTO      Dose: 20 mg  Take 20 mg by mouth daily (with dinner)  Refills: 0     rosuvastatin 20 MG tablet  Commonly known as: CRESTOR      Dose: 20 mg  Take 20 mg by mouth At Bedtime  Refills: 0     * sennosides 8.6 MG tablet  Commonly known as: SENOKOT      Dose: 1 tablet  Take 1 tablet by mouth daily  Refills: 0     * sennosides 8.6 MG tablet  Commonly known as: SENOKOT      Dose: 1 tablet  Take 1 tablet by mouth daily as needed for constipation (in addition to scheduled dosing)  Refills: 0     SUMAtriptan 100 MG tablet  Commonly known as: IMITREX      Dose: 100 mg  Take 100 mg by mouth every 12 hours as needed for migraine  Refills: 0     * venlafaxine 75 MG 24 hr capsule  Commonly known as: EFFEXOR XR      Dose: 75 mg  Take 75 mg by mouth daily Take with 150 mg capsule for a total dose of 225 mg.  Refills: 0     * venlafaxine 150 MG 24 hr capsule  Commonly known as: EFFEXOR XR      Dose: 150 mg  Take 150 mg by mouth daily Take with 75 mg capsule for a total dose of 225 mg.  Refills: 0     VITAMIN C GUMMIES PO      Dose: 1 chew tab  Take 1 chew tab by mouth daily  Refills: 0     Zinc Oxide-Petrolatum 20-80 % Crea      Externally apply topically every 8 hours as needed (incontinence)  Refills: 0         * This list has 8 medication(s) that are the same as other medications prescribed for you. Read the directions carefully, and ask your doctor or other care provider to review them with you.               Where to get your medicines      Some of these will need a paper prescription and others can be bought over the counter. Ask your nurse if you have questions.    Bring a paper prescription for each of these medications    baclofen 20 MG tablet    ferrous sulfate 325 (65 Fe) MG tablet    hydrOXYzine 50 MG tablet    topiramate ER 50 MG 24 hr  capsule    torsemide 10 MG tablet             Rationale for medication changes:      -Reduced baclofen, topiramate for sedation SE  -Discontinued BID atarax given no endorsed benefit and causing sedation  -Reduced prn atarax to 25mg as needed for issues with sedation  -Started oral iron every other day for PARTH  -Changed torsemide to just as needed given patient preference      Consults   PT/OT  SW      Immunizations given this encounter     Most Recent Immunizations   Administered Date(s) Administered     COVID-19,PF,Pfizer (12+ Yrs) 05/21/2021     Influenza Quad, Recombinant, pf(RIV4) (Flublok) 12/01/2021          Anticoagulation Information      Recent INR results:   Recent Labs   Lab 08/15/22  1709   INR 1.29*     Warfarin doses (if applicable) or name of other anticoagulant: Xarelto      Discharge Orders     Discharge Procedure Orders   General info for SNF   Order Comments: Length of Stay Estimate: Short Term Care: Estimated # of Days <30  Condition at Discharge: Improving  Level of care:skilled   Rehabilitation Potential: Good  Admission H&P remains valid and up-to-date: Yes  Recent Chemotherapy: N/A  Use Nursing Home Standing Orders: Yes     Mantoux instructions   Order Comments: Give two-step Mantoux (PPD) Per Facility Policy Yes     Follow Up and recommended labs and tests   Order Comments: Follow up with Nursing home physician.     Reason for your hospital stay   Order Comments: Polypharmacy with sedation leading to falls.     Activity - Up with nursing assistance     Order Specific Question Answer Comments   Is discharge order? Yes      Diet   Order Comments: Follow this diet upon discharge: Orders Placed This Encounter      Consistent Carbohydrate Diet Moderate Consistent Carb (60 g CHO per Meal) Diet     Order Specific Question Answer Comments   Is discharge order? Yes        Examination     Vital Signs in last 24 hours:   B/P: 116/56, T: 98, P: 81, R: 16    General: Awake, alert and oriented. No acute  distress.   Respiratory: No respiratory distress. Lungs CTA.  Cardiac: RRR, brisk cap refill. Warm and well perfused.  Abdominal: Abdomen is soft and non-tender.  Extremities: Grossly normal.  Skin: Warm and intact.   Neurological: The patient is fully oriented. Immensely more awake and alert today.      Donte Hernandez MD  [facility] Resident  Pager #: [phone]      Precepted patient with Dr. Hossein Garcia    CC:Annia Frias

## 2022-08-18 NOTE — PLAN OF CARE
Physical Therapy Discharge Summary    Reason for therapy discharge:    Discharged to transitional care facility.    Progress towards therapy goal(s). See goals on Care Plan in [facility] electronic health record for goal details.  Goals partially met.  Barriers to achieving goals:   discharge from facility.    Therapy recommendation(s):    Recommend continued therapies to improve overall strength, balance and mobility.       Kimberly Simpson, PT, DPT  8/18/2022

## 2022-08-18 NOTE — PROGRESS NOTES
Care Management Discharge Note    Discharge Date: 08/18/2022       Discharge Disposition: [facility] (TCU)     Discharge Services:  none    Discharge DME: per treatment team     Discharge Transportation: [facility]  at 4:15 PM    Private pay costs discussed: transportation costs    PAS Confirmation Code:  ([id number])  Patient/family educated on Medicare website which has current facility and service quality ratings: yes     Education Provided on the Discharge Plan: yes   Persons Notified of Discharge Plans: patient, family, facility  Patient/Family in Agreement with the Plan: yes     Handoff Referral Completed: Yes    Additional Information:  10:47 AM  TRACEY called Resident of [facility] and left a VM with the  requesting a call back to discuss Pt's discharge and possibility of returning to retirement. Upon chart review, it appears at this point Pt will likely have to go to TCU before returning to retirement due to Pt needing x1 assist and 24 hour supervision. SW sent referral to [facility] TCU.     SW received call from Dasha at [facility] stating facility can accept Pt today. TRACEY met with Pt and Pt agreeable to discharging to TCU. Pt asked SW if facility has a LTC unit. SW informed Pt that facility does have a LTC unit and that she can work with the facility if she is interested while at the TCU. Pt requested TRACEY to call and inform her son Noe [phone] of discharge plan.      Protonet  transport scheduled for 4:15 PM this afternoon. Pt and team updated.     12:49 PM  TRACEY received phone call from Pt's Maryana Brush [phone] requesting an update for discharge. TRACEY informed Trudi about Pt transitioning to TCU this afternoon. Trudi stated she plans to follow Pt's progression of care. Trudi offered additional contacts for Pt's NATASHA. Main: [phone]. Nurse Manager (Perla): [phone].     TRACEY called a spoke to Pt's son Noe [phone] about Pt's  discharge plan. TRACEY provided Noe the address and contact information for the facility. Noe reports that he plans to visit the Pt after work today.     TRACEY received phone call from [facility] nurse manager Perla. TRACEY updated Perla on discharge plan. Perla expressed relief as Pt has fallen at facility five times in the last month. Orders sent.    PAS completed. [id number]    TERRANCE Hurst

## 2022-08-18 NOTE — PLAN OF CARE
Problem: Mobility Impairment  Goal: Optimal Mobility  Outcome: Ongoing, Progressing     Problem: Pain Chronic (Persistent)  Goal: Acceptable Pain Control and Functional Ability  Intervention: Manage Persistent Pain  Recent Flowsheet Documentation  Taken 8/17/2022 1630 by Albert Tse RN  Medication Review/Management: medications reviewed   Goal Outcome Evaluation:        Patient A and O times 4, assist of 1 for tranfers and cares with gait belt and walker. Blood sugars 117 at dinner, no insulin give.  at HS, tresiba given along with 1 unit novolog. Peanut butter, crackers, OJ given as snack. Pain managed with scheduled MN Contin. No PRNs given or needed.

## 2022-08-22 ENCOUNTER — PATIENT OUTREACH (OUTPATIENT)
Dept: CARE COORDINATION | Facility: CLINIC | Age: 57
End: 2022-08-22

## 2022-08-22 ENCOUNTER — TRANSITIONAL CARE UNIT VISIT (OUTPATIENT)
Dept: GERIATRICS | Facility: CLINIC | Age: 57
End: 2022-08-22
Payer: MEDICARE

## 2022-08-22 VITALS
WEIGHT: 177.4 LBS | SYSTOLIC BLOOD PRESSURE: 129 MMHG | OXYGEN SATURATION: 98 % | HEIGHT: 63 IN | RESPIRATION RATE: 14 BRPM | TEMPERATURE: 97.6 F | DIASTOLIC BLOOD PRESSURE: 90 MMHG | HEART RATE: 83 BPM | BODY MASS INDEX: 31.43 KG/M2

## 2022-08-22 DIAGNOSIS — D50.0 IRON DEFICIENCY ANEMIA DUE TO CHRONIC BLOOD LOSS: ICD-10-CM

## 2022-08-22 DIAGNOSIS — F60.3 BORDERLINE PERSONALITY DISORDER (H): ICD-10-CM

## 2022-08-22 DIAGNOSIS — Z79.4 TYPE 2 DIABETES MELLITUS WITH HYPERGLYCEMIA, WITH LONG-TERM CURRENT USE OF INSULIN (H): ICD-10-CM

## 2022-08-22 DIAGNOSIS — G89.29 OTHER CHRONIC PAIN: ICD-10-CM

## 2022-08-22 DIAGNOSIS — E03.9 HYPOTHYROIDISM, UNSPECIFIED TYPE: ICD-10-CM

## 2022-08-22 DIAGNOSIS — G82.20 PARAPARESIS (H): ICD-10-CM

## 2022-08-22 DIAGNOSIS — F11.20 CONTINUOUS OPIOID DEPENDENCE (H): ICD-10-CM

## 2022-08-22 DIAGNOSIS — E11.65 TYPE 2 DIABETES MELLITUS WITH HYPERGLYCEMIA, WITH LONG-TERM CURRENT USE OF INSULIN (H): ICD-10-CM

## 2022-08-22 DIAGNOSIS — S00.83XA CONTUSION OF FACE, INITIAL ENCOUNTER: ICD-10-CM

## 2022-08-22 DIAGNOSIS — R29.6 FALLS FREQUENTLY: Primary | ICD-10-CM

## 2022-08-22 PROCEDURE — 99310 SBSQ NF CARE HIGH MDM 45: CPT | Performed by: NURSE PRACTITIONER

## 2022-08-22 NOTE — PROGRESS NOTES
[facility]    Background: Transitional Care Management program auto-identified and prompting a chart review by [facility] team.    Assessment: Upon chart review, [facility] Team member will cancel/close this episode of Transitional Care Management program due to reason below:    Patient discharged to TCU/ARU/LTACH and is not established within [facility].    Plan: Transitional Care Management episode closed per reason above.      LORETTA Mcadams  [phone]  [facility]    *Connected Care Resource Team does NOT follow patient ongoing. Referrals are identified based on internal discharge reports and the outreach is to ensure patient has an understanding of their discharge instructions.

## 2022-08-22 NOTE — LETTER
8/22/2022        RE: Tonya Valera  [address]  [facility] [address]        [facility] GERIATRICS  PRIMARY CARE PROVIDER AND CLINIC: Annia Frias MD, [facility] SERVICES [address] / STILL*  Chief Complaint   Patient presents with     Hospital F/U     TCU Admission      [facility] Record Number: [id number]  Place of Service where encounter took place: [facility] ([facility]) [[id number]]    Tonya Valera is a 57 year old (1965), admitted to the above facility from  [facility]. Hospital stay 8/15/22 through 8/18/22.  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 S/he was admitted to the hospital with several falls (5) in 24 hours, work up negative for trauma, does have contusion to face and around R eye. Falling thought to be due to over-sedation from polypharmacy (on baclofen, hydroxyzine, topiramate, wellbutrin, MS Contin and IR morphine, venlafaxine). S/he underwent thorough medication review, titration of baclofen, hydroxyzine, and topiramate in conjunction with her PCP. It is recommended she attempt to decrease morphine use. The hospital stay was uneventful and she was deemed appropriate for TCU.  She lives at assisted living facility ([facility] in [place]).    Follow up needed:  -continue titration down of baclofen, hydroxyzine, topiramate, and morphine as she is able to tolerate  -PCP on TCU discharge     Today:  Tonya reports she is feeling OK, having pain from bruises in L arm, R shoulder.  She reports she is constipated and would like scheduled senna increased.  She reports she would like to return to assisted living facility.     CODE STATUS/ADVANCE DIRECTIVES DISCUSSION:  Full Code   Prior - CPR/Full code   ALLERGIES: Compazine [prochlorperazine], Latex, Metformin, Statins [hmg-coa-r inhibitors], and Sulfa drugs  PAST MEDICAL HISTORY:  has a past medical history of Anxiety, Borderline personality disorder (H), Cellulitis, Chronic pain, DVT (deep venous thrombosis) (H), Gastroesophageal reflux disease without esophagitis, HLD (hyperlipidemia), Hypothyroidism, MDD (major depressive disorder), Paraparesis of both lower limbs (H), PVD (peripheral vascular disease) (H), Spinal abscess (H), T2DM (type 2 diabetes mellitus) (H), and Uncomplicated opioid dependence (H).  PAST SURGICAL HISTORY:  has no past surgical history on file.  FAMILY HISTORY: Family history is unknown by patient.  SOCIAL HISTORY:  reports that she has quit smoking. Her smoking use included cigarettes. She has never used smokeless tobacco.  Patient's living condition: lives in an assisted living facility    Post Discharge Medication Reconciliation Status: discharge medications reconciled and changed, per note/orders  Current Outpatient Medications   Medication Sig     Ascorbic Acid (VITAMIN C GUMMIES PO) Take 1 chew tab by mouth daily     aspirin (ASA) 81 MG chewable tablet Take 81 mg by mouth daily     bacitracin 500 UNIT/GM OINT Apply topically every 8 hours as needed for wound care     baclofen (LIORESAL) 20 MG tablet Take 0.5 tablets (10 mg) by mouth 3 times daily     bisacodyl (DULCOLAX) 10 MG suppository Place 10 mg rectally daily as needed for constipation     buPROPion (WELLBUTRIN SR) 150 MG 12 hr tablet Take 150 mg by mouth daily     calcium carbonate (TUMS) 500 MG chewable tablet Take 2 chew tab by mouth every hour as needed for heartburn     calcium carbonate 600 mg-vitamin D 400 units (CALTRATE) 600-400 MG-UNIT per tablet Take 1 tablet by mouth daily     cholecalciferol 50 MCG (2000 UT) tablet Take 1 tablet by mouth every other day     clotrimazole (LOTRIMIN) 1 % external cream Apply topically 2 times daily      docusate sodium (COLACE) 100 MG tablet Take 200 mg by mouth 2 times daily     doxycycline hyclate (VIBRAMYCIN) 100 MG capsule Take 100 mg by mouth daily     emollient (VANICREAM) external cream Apply topically 2 times daily as needed for other (to legs for dry skin)     ferrous sulfate (FEROSUL) 325 (65 Fe) MG tablet Take 1 tablet (325 mg) by mouth every other day     guaiFENesin (ROBITUSSIN) 20 mg/mL SOLN solution Take 10 mLs by mouth every 4 hours as needed for cough     hydrocortisone (CORTAID) 1 % external cream Apply topically 2 times daily as needed for rash     hydrOXYzine (ATARAX) 50 MG tablet Take 0.5 tablets (25 mg) by mouth every 8 hours as needed for itching     insulin aspart (NOVOLOG PEN) 100 UNIT/ML pen Inject 1-7 Units Subcutaneous 3 times daily (before meals) Correction Scale - MEDIUM INSULIN RESISTANCE DOSING   Do Not give Correction Insulin if Pre-Meal BG less than 140. For Pre-Meal  - 189 give 1 unit. For Pre-Meal  - 239 give 2 units. For Pre-Meal  - 289 give 3 units. For Pre-Meal  - 339 give 4 units. For Pre-Meal - 399 give 5 units. For Pre-Meal -449 give 6 units For Pre-Meal BG greater than or equal to 450 give 7 units. To be given with prandial insulin, and based on pre-meal blood glucose.  Notify provider if glucose greater than or equal to 350 mg/dL after administration of correction dose. If given at mealtime, administer within 30 minutes of start of meal     insulin degludec (TRESIBA) 100 UNIT/ML pen Inject 25 Units Subcutaneous daily (with dinner)     levothyroxine (SYNTHROID/LEVOTHROID) 125 MCG tablet Take 125 mcg by mouth See Admin Instructions Daily on Tuesday, Wednesday, Thursday, Saturday, and Sunday     levothyroxine (SYNTHROID/LEVOTHROID) 125 MCG tablet Take 187.5 mcg by mouth See Admin Instructions On Mondays, Fridays     lisinopril (ZESTRIL) 2.5 MG tablet Take 2.5 mg by mouth daily     loperamide (IMODIUM A-D) 2 MG tablet Take 2 mg by mouth  3 times daily as needed for diarrhea     loratadine (CLARITIN) 10 MG tablet Take 10 mg by mouth daily as needed for allergies     magnesium 250 MG tablet Take 1 tablet by mouth daily     magnesium hydroxide (MILK OF MAGNESIA) 400 MG/5ML suspension Take 30 mLs by mouth daily as needed for constipation     menthol (COUGH DROP) 7 MG LOZG Take 1 lozenge by mouth every hour as needed for cough     morphine (MS CONTIN) 30 MG CR tablet Take 1 tablet (30 mg) by mouth 3 times daily     morphine (MSIR) 15 MG IR tablet Take 0.5 tablets (7.5 mg) by mouth every 8 hours as needed for pain     Multiple Vitamins-Minerals (MULTIVITAMIN GUMMIES WOMENS) CHEW Take 1 chew tab by mouth daily     neomycin-bacitracin-polymyxin (NEOSPORIN) 5-400-5000 ointment Apply topically every 6 hours as needed (minor scrapes, cuts, burns)     omeprazole (PRILOSEC) 20 MG DR capsule Take 20 mg by mouth daily before breakfast     ondansetron (ZOFRAN) 4 MG tablet Take 1 tablet (4 mg) by mouth every 8 hours as needed for nausea or vomiting (Patient taking differently: Take 4 mg by mouth daily as needed for nausea or vomiting)     polyethylene glycol (MIRALAX) 17 GM/Dose powder Take 1 capful by mouth every other day     rivaroxaban ANTICOAGULANT (XARELTO) 20 MG TABS tablet Take 20 mg by mouth daily (with dinner)     rosuvastatin (CRESTOR) 20 MG tablet Take 20 mg by mouth At Bedtime     sennosides (SENOKOT) 8.6 MG tablet Take 1 tablet by mouth daily as needed for constipation (in addition to scheduled dosing)     sennosides (SENOKOT) 8.6 MG tablet Take 1 tablet by mouth daily     SUMAtriptan (IMITREX) 100 MG tablet Take 100 mg by mouth every 12 hours as needed for migraine     topiramate ER (QUDEXY XR) 50 MG 24 hr capsule Take 1 capsule (50 mg) by mouth daily     torsemide (DEMADEX) 10 MG tablet Take 1 tablet (10 mg) by mouth daily as needed (for LE swelling as needed)     venlafaxine (EFFEXOR-XR) 150 MG 24 hr capsule Take 150 mg by mouth daily Take with 75  "mg capsule for a total dose of 225 mg.     venlafaxine (EFFEXOR-XR) 75 MG 24 hr capsule Take 75 mg by mouth daily Take with 150 mg capsule for a total dose of 225 mg.     Zinc Oxide-Petrolatum 20-80 % CREA Externally apply topically every 8 hours as needed (incontinence)     acetaminophen (TYLENOL) 325 MG tablet Take 650 mg by mouth every 4 hours as needed for mild pain     No current facility-administered medications for this visit.      ROS:  10 point ROS of systems including Constitutional, Eyes, Respiratory, Cardiovascular, Gastroenterology, Genitourinary, Integumentary, Musculoskeletal, Psychiatric were all negative except for pertinent positives noted in my HPI.    Vitals:  BP (!) 129/90   Pulse 83   Temp 97.6  F (36.4  C)   Resp 14   Ht 1.6 m (5' 3\")   Wt 80.5 kg (177 lb 6.4 oz)   SpO2 98%   BMI 31.42 kg/m    Exam:  GENERAL APPEARANCE:  Alert, in no acute distress   HEAD:  Normal, normocephalic, atraumatic  EYE EXAM: normal external eye, conjunctiva, lids, HENRY  NECK EXAM: supple  CHEST/RESP:  respiratory effort normal, no respiratory distress, lung sounds CTA    CV:  Rate reg, rhythm reg, no murmur, no peripheral edema   M/S:   extremities normal, gait abnormal-unable to ambulate, muscle tone normal , digits and nails normal   SKIN EXAM:  Multiple bruises - L forearm, R shoulder, R face, large raised contusion on R forehead about 4 cm in diameter  NEUROLOGIC EXAM: Cranial nerves 2-12 are grossly normal.  No tremor, gross motor movement at baseline.   PSYCH:  at baseline mentation, alert and oriented , mood appropriate     Recent labs in EPIC reviewed by me today.      Assessment/Plan:  Falls frequently  Contusion of face, initial encounter  Paraparesis (H)  Patient with a series of falls at home, thought due to sedation and polypharmacy.  Resulted in facial contusion, multiple bruises.  Medications reviewed and decrease of baclofen, atarax, topiramate done. Patient reports she is feeling effects of " falling but otherwise has no new complaints of pain, spasms after decrease of medications.    -PT/OT to evaluate and treat, goal to improve mobility     Continuous opioid dependence (H)  Other chronic pain  Patient with chronic opioid use, on MS Contin 30 mg q8h and prn morphine 7.5 mg.  Stable today and without obvious symptoms of pain.   -consider reduction of MS Contin as mobility improves and bruises resolve.     Type 2 diabetes mellitus with hyperglycemia, with long-term current use of insulin (H)  Diagnosis with DM 2, last A1C 7.1%.  On degludec daily and aspart sliding scale tid with meals.    -monitor blood sugar and adjust insulin accordingly     Borderline personality disorder (H)  Per history, on wellbutrin, venlafaxine.  Mood stable per her report.    -monitor PHQ9    Iron deficiency anemia due to chronic blood loss  Baseline hemoglobin 9.2, most recent 8.6.  Likely due to falling with extensive bruising.  -check CBC this week      Hypothyroidism, unspecified type  Per history, on levothyroxine 125 mcg alternating with 187.5 mcg.  Last TSH 3.54 in 10/2021.  Without symptoms today.   -continue current plan      Orders:    Increase senna to 1 tab BID po for constipation     Weight bearing as tolerated     Change diet to regular per patient choice (refuses con carb diet)    Labs 8/24/22 to include:  CBC, mag, BMP     Total time spent with patient visit at the skilled nursing facility was 35 min including patient visit and review of past records. Greater than 50% of total time spent with counseling and coordinating care due to tenuous situation with patient at high risk for complications due to multiple medical comorbid conditions as well as polypharmacy, counseling regarding coordination of care and care planning in skilled nursing facility as well as expected length of stay, therapy services, and discharge planning, with patient's goal to return to assisted living facility  when goals have been met .      Electronically signed by:  RAMESH Barakat CNP         Sincerely,        RAMESH Barakat CNP

## 2022-08-22 NOTE — PROGRESS NOTES
[facility] GERIATRICS  PRIMARY CARE PROVIDER AND CLINIC: Annia Frias MD, [facility] PHYSICIANS SERVICES [address] / STILL*  Chief Complaint   Patient presents with     Hospital F/U     TCU Admission      [facility] Medical Record Number: [id number]  Place of Service where encounter took place: [facility] ([facility]) [[id number]]    Tonya Valera is a 57 year old (1965), admitted to the above facility from  [facility]. Hospital stay 8/15/22 through 8/18/22.  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 S/he was admitted to the hospital with several falls (5) in 24 hours, work up negative for trauma, does have contusion to face and around R eye. Falling thought to be due to over-sedation from polypharmacy (on baclofen, hydroxyzine, topiramate, wellbutrin, MS Contin and IR morphine, venlafaxine). S/he underwent thorough medication review, titration of baclofen, hydroxyzine, and topiramate in conjunction with her PCP. It is recommended she attempt to decrease morphine use. The hospital stay was uneventful and she was deemed appropriate for TCU.  She lives at assisted living facility ([facility] in [place]).    Follow up needed:  -continue titration down of baclofen, hydroxyzine, topiramate, and morphine as she is able to tolerate  -PCP on TCU discharge     Today:  Tonya reports she is feeling OK, having pain from bruises in L arm, R shoulder.  She reports she is constipated and would like scheduled senna increased.  She reports she would like to return to assisted living facility.     CODE STATUS/ADVANCE DIRECTIVES DISCUSSION: Full Code   Prior - CPR/Full code   ALLERGIES: Compazine [prochlorperazine], Latex, Metformin,  Statins [hmg-coa-r inhibitors], and Sulfa drugs  PAST MEDICAL HISTORY:  has a past medical history of Anxiety, Borderline personality disorder (H), Cellulitis, Chronic pain, DVT (deep venous thrombosis) (H), Gastroesophageal reflux disease without esophagitis, HLD (hyperlipidemia), Hypothyroidism, MDD (major depressive disorder), Paraparesis of both lower limbs (H), PVD (peripheral vascular disease) (H), Spinal abscess (H), T2DM (type 2 diabetes mellitus) (H), and Uncomplicated opioid dependence (H).  PAST SURGICAL HISTORY:  has no past surgical history on file.  FAMILY HISTORY: Family history is unknown by patient.  SOCIAL HISTORY:  reports that she has quit smoking. Her smoking use included cigarettes. She has never used smokeless tobacco.  Patient's living condition: lives in an assisted living facility    Post Discharge Medication Reconciliation Status: discharge medications reconciled and changed, per note/orders  Current Outpatient Medications   Medication Sig     Ascorbic Acid (VITAMIN C GUMMIES PO) Take 1 chew tab by mouth daily     aspirin (ASA) 81 MG chewable tablet Take 81 mg by mouth daily     bacitracin 500 UNIT/GM OINT Apply topically every 8 hours as needed for wound care     baclofen (LIORESAL) 20 MG tablet Take 0.5 tablets (10 mg) by mouth 3 times daily     bisacodyl (DULCOLAX) 10 MG suppository Place 10 mg rectally daily as needed for constipation     buPROPion (WELLBUTRIN SR) 150 MG 12 hr tablet Take 150 mg by mouth daily     calcium carbonate (TUMS) 500 MG chewable tablet Take 2 chew tab by mouth every hour as needed for heartburn     calcium carbonate 600 mg-vitamin D 400 units (CALTRATE) 600-400 MG-UNIT per tablet Take 1 tablet by mouth daily     cholecalciferol 50 MCG (2000 UT) tablet Take 1 tablet by mouth every other day     clotrimazole (LOTRIMIN) 1 % external cream Apply topically 2 times daily     docusate sodium (COLACE) 100 MG tablet Take 200 mg by mouth 2 times daily     doxycycline  hyclate (VIBRAMYCIN) 100 MG capsule Take 100 mg by mouth daily     emollient (VANICREAM) external cream Apply topically 2 times daily as needed for other (to legs for dry skin)     ferrous sulfate (FEROSUL) 325 (65 Fe) MG tablet Take 1 tablet (325 mg) by mouth every other day     guaiFENesin (ROBITUSSIN) 20 mg/mL SOLN solution Take 10 mLs by mouth every 4 hours as needed for cough     hydrocortisone (CORTAID) 1 % external cream Apply topically 2 times daily as needed for rash     hydrOXYzine (ATARAX) 50 MG tablet Take 0.5 tablets (25 mg) by mouth every 8 hours as needed for itching     insulin aspart (NOVOLOG PEN) 100 UNIT/ML pen Inject 1-7 Units Subcutaneous 3 times daily (before meals) Correction Scale - MEDIUM INSULIN RESISTANCE DOSING   Do Not give Correction Insulin if Pre-Meal BG less than 140. For Pre-Meal  - 189 give 1 unit. For Pre-Meal  - 239 give 2 units. For Pre-Meal  - 289 give 3 units. For Pre-Meal  - 339 give 4 units. For Pre-Meal - 399 give 5 units. For Pre-Meal -449 give 6 units For Pre-Meal BG greater than or equal to 450 give 7 units. To be given with prandial insulin, and based on pre-meal blood glucose.  Notify provider if glucose greater than or equal to 350 mg/dL after administration of correction dose. If given at mealtime, administer within 30 minutes of start of meal     insulin degludec (TRESIBA) 100 UNIT/ML pen Inject 25 Units Subcutaneous daily (with dinner)     levothyroxine (SYNTHROID/LEVOTHROID) 125 MCG tablet Take 125 mcg by mouth See Admin Instructions Daily on Tuesday, Wednesday, Thursday, Saturday, and Sunday     levothyroxine (SYNTHROID/LEVOTHROID) 125 MCG tablet Take 187.5 mcg by mouth See Admin Instructions On Mondays, Fridays     lisinopril (ZESTRIL) 2.5 MG tablet Take 2.5 mg by mouth daily     loperamide (IMODIUM A-D) 2 MG tablet Take 2 mg by mouth 3 times daily as needed for diarrhea     loratadine (CLARITIN) 10 MG tablet Take 10 mg by  mouth daily as needed for allergies     magnesium 250 MG tablet Take 1 tablet by mouth daily     magnesium hydroxide (MILK OF MAGNESIA) 400 MG/5ML suspension Take 30 mLs by mouth daily as needed for constipation     menthol (COUGH DROP) 7 MG LOZG Take 1 lozenge by mouth every hour as needed for cough     morphine (MS CONTIN) 30 MG CR tablet Take 1 tablet (30 mg) by mouth 3 times daily     morphine (MSIR) 15 MG IR tablet Take 0.5 tablets (7.5 mg) by mouth every 8 hours as needed for pain     Multiple Vitamins-Minerals (MULTIVITAMIN GUMMIES WOMENS) CHEW Take 1 chew tab by mouth daily     neomycin-bacitracin-polymyxin (NEOSPORIN) 5-400-5000 ointment Apply topically every 6 hours as needed (minor scrapes, cuts, burns)     omeprazole (PRILOSEC) 20 MG DR capsule Take 20 mg by mouth daily before breakfast     ondansetron (ZOFRAN) 4 MG tablet Take 1 tablet (4 mg) by mouth every 8 hours as needed for nausea or vomiting (Patient taking differently: Take 4 mg by mouth daily as needed for nausea or vomiting)     polyethylene glycol (MIRALAX) 17 GM/Dose powder Take 1 capful by mouth every other day     rivaroxaban ANTICOAGULANT (XARELTO) 20 MG TABS tablet Take 20 mg by mouth daily (with dinner)     rosuvastatin (CRESTOR) 20 MG tablet Take 20 mg by mouth At Bedtime     sennosides (SENOKOT) 8.6 MG tablet Take 1 tablet by mouth daily as needed for constipation (in addition to scheduled dosing)     sennosides (SENOKOT) 8.6 MG tablet Take 1 tablet by mouth daily     SUMAtriptan (IMITREX) 100 MG tablet Take 100 mg by mouth every 12 hours as needed for migraine     topiramate ER (QUDEXY XR) 50 MG 24 hr capsule Take 1 capsule (50 mg) by mouth daily     torsemide (DEMADEX) 10 MG tablet Take 1 tablet (10 mg) by mouth daily as needed (for LE swelling as needed)     venlafaxine (EFFEXOR-XR) 150 MG 24 hr capsule Take 150 mg by mouth daily Take with 75 mg capsule for a total dose of 225 mg.     venlafaxine (EFFEXOR-XR) 75 MG 24 hr capsule  "Take 75 mg by mouth daily Take with 150 mg capsule for a total dose of 225 mg.     Zinc Oxide-Petrolatum 20-80 % CREA Externally apply topically every 8 hours as needed (incontinence)     acetaminophen (TYLENOL) 325 MG tablet Take 650 mg by mouth every 4 hours as needed for mild pain     No current facility-administered medications for this visit.      ROS:  10 point ROS of systems including Constitutional, Eyes, Respiratory, Cardiovascular, Gastroenterology, Genitourinary, Integumentary, Musculoskeletal, Psychiatric were all negative except for pertinent positives noted in my HPI.    Vitals:  BP (!) 129/90   Pulse 83   Temp 97.6  F (36.4  C)   Resp 14   Ht 1.6 m (5' 3\")   Wt 80.5 kg (177 lb 6.4 oz)   SpO2 98%   BMI 31.42 kg/m    Exam:  GENERAL APPEARANCE:  Alert, in no acute distress   HEAD:  Normal, normocephalic, atraumatic  EYE EXAM: normal external eye, conjunctiva, lids, HENRY  NECK EXAM: supple  CHEST/RESP:  respiratory effort normal, no respiratory distress, lung sounds CTA    CV:  Rate reg, rhythm reg, no murmur, no peripheral edema   M/S:   extremities normal, gait abnormal-unable to ambulate, muscle tone normal , digits and nails normal   SKIN EXAM:  Multiple bruises - L forearm, R shoulder, R face, large raised contusion on R forehead about 4 cm in diameter  NEUROLOGIC EXAM: Cranial nerves 2-12 are grossly normal.  No tremor, gross motor movement at baseline.   PSYCH:  at baseline mentation, alert and oriented , mood appropriate     Recent labs in EPIC reviewed by me today.      Assessment/Plan:  Falls frequently  Contusion of face, initial encounter  Paraparesis (H)  Patient with a series of falls at home, thought due to sedation and polypharmacy.  Resulted in facial contusion, multiple bruises.  Medications reviewed and decrease of baclofen, atarax, topiramate done. Patient reports she is feeling effects of falling but otherwise has no new complaints of pain, spasms after decrease of medications. "    -PT/OT to evaluate and treat, goal to improve mobility     Continuous opioid dependence (H)  Other chronic pain  Patient with chronic opioid use, on MS Contin 30 mg q8h and prn morphine 7.5 mg.  Stable today and without obvious symptoms of pain.   -consider reduction of MS Contin as mobility improves and bruises resolve.     Type 2 diabetes mellitus with hyperglycemia, with long-term current use of insulin (H)  Diagnosis with DM 2, last A1C 7.1%.  On degludec daily and aspart sliding scale tid with meals.    -monitor blood sugar and adjust insulin accordingly     Borderline personality disorder (H)  Per history, on wellbutrin, venlafaxine.  Mood stable per her report.    -monitor PHQ9    Iron deficiency anemia due to chronic blood loss  Baseline hemoglobin 9.2, most recent 8.6.  Likely due to falling with extensive bruising.  -check CBC this week      Hypothyroidism, unspecified type  Per history, on levothyroxine 125 mcg alternating with 187.5 mcg.  Last TSH 3.54 in 10/2021.  Without symptoms today.   -continue current plan      Orders:    Increase senna to 1 tab BID po for constipation     Weight bearing as tolerated     Change diet to regular per patient choice (refuses con carb diet)    Labs 8/24/22 to include:  CBC, mag, BMP     Total time spent with patient visit at the skilled nursing facility was 35 min including patient visit and review of past records. Greater than 50% of total time spent with counseling and coordinating care due to tenuous situation with patient at high risk for complications due to multiple medical comorbid conditions as well as polypharmacy, counseling regarding coordination of care and care planning in skilled nursing facility as well as expected length of stay, therapy services, and discharge planning, with patient's goal to return to assisted living facility  when goals have been met .     Electronically signed by:  RAMESH Barakat CNP

## 2022-08-23 ENCOUNTER — LAB REQUISITION (OUTPATIENT)
Dept: LAB | Facility: CLINIC | Age: 57
End: 2022-08-23
Payer: MEDICARE

## 2022-08-23 DIAGNOSIS — M79.89 OTHER SPECIFIED SOFT TISSUE DISORDERS: ICD-10-CM

## 2022-08-24 LAB
ANION GAP SERPL CALCULATED.3IONS-SCNC: 8 MMOL/L (ref 7–15)
BUN SERPL-MCNC: 15.7 MG/DL (ref 6–20)
CALCIUM SERPL-MCNC: 9 MG/DL (ref 8.6–10)
CHLORIDE SERPL-SCNC: 103 MMOL/L (ref 98–107)
CREAT SERPL-MCNC: 0.86 MG/DL (ref 0.51–0.95)
DEPRECATED HCO3 PLAS-SCNC: 27 MMOL/L (ref 22–29)
ERYTHROCYTE [DISTWIDTH] IN BLOOD BY AUTOMATED COUNT: 16.4 % (ref 10–15)
GFR SERPL CREATININE-BSD FRML MDRD: 78 ML/MIN/1.73M2
GLUCOSE SERPL-MCNC: 266 MG/DL (ref 70–99)
HCT VFR BLD AUTO: 33.1 % (ref 35–47)
HGB BLD-MCNC: 9.9 G/DL (ref 11.7–15.7)
MAGNESIUM SERPL-MCNC: 2.2 MG/DL (ref 1.7–2.3)
MCH RBC QN AUTO: 24.4 PG (ref 26.5–33)
MCHC RBC AUTO-ENTMCNC: 29.9 G/DL (ref 31.5–36.5)
MCV RBC AUTO: 82 FL (ref 78–100)
PLATELET # BLD AUTO: 300 10E3/UL (ref 150–450)
POTASSIUM SERPL-SCNC: 4.3 MMOL/L (ref 3.4–5.3)
RBC # BLD AUTO: 4.05 10E6/UL (ref 3.8–5.2)
SODIUM SERPL-SCNC: 138 MMOL/L (ref 136–145)
WBC # BLD AUTO: 7.9 10E3/UL (ref 4–11)

## 2022-08-24 PROCEDURE — 36415 COLL VENOUS BLD VENIPUNCTURE: CPT | Mod: ORL | Performed by: NURSE PRACTITIONER

## 2022-08-24 PROCEDURE — 83735 ASSAY OF MAGNESIUM: CPT | Performed by: NURSE PRACTITIONER

## 2022-08-24 PROCEDURE — 85027 COMPLETE CBC AUTOMATED: CPT | Mod: ORL | Performed by: NURSE PRACTITIONER

## 2022-08-24 PROCEDURE — 80048 BASIC METABOLIC PNL TOTAL CA: CPT | Performed by: NURSE PRACTITIONER

## 2022-08-24 PROCEDURE — P9604 ONE-WAY ALLOW PRORATED TRIP: HCPCS | Mod: ORL | Performed by: NURSE PRACTITIONER

## 2022-08-25 DIAGNOSIS — G89.29 OTHER CHRONIC PAIN: ICD-10-CM

## 2022-08-25 RX ORDER — MORPHINE SULFATE 15 MG/1
7.5 TABLET ORAL EVERY 8 HOURS PRN
Qty: 30 TABLET | Refills: 0 | Status: SHIPPED | OUTPATIENT
Start: 2022-08-25

## 2022-08-25 RX ORDER — MORPHINE SULFATE 30 MG/1
30 TABLET, FILM COATED, EXTENDED RELEASE ORAL
Qty: 30 TABLET | Refills: 0 | Status: SHIPPED | OUTPATIENT
Start: 2022-08-25 | End: 2022-08-30

## 2022-08-26 ENCOUNTER — DOCUMENTATION ONLY (OUTPATIENT)
Dept: OTHER | Facility: CLINIC | Age: 57
End: 2022-08-26

## 2022-08-30 ENCOUNTER — TRANSITIONAL CARE UNIT VISIT (OUTPATIENT)
Dept: GERIATRICS | Facility: CLINIC | Age: 57
End: 2022-08-30
Payer: MEDICARE

## 2022-08-30 VITALS
DIASTOLIC BLOOD PRESSURE: 69 MMHG | TEMPERATURE: 98 F | HEIGHT: 63 IN | OXYGEN SATURATION: 97 % | RESPIRATION RATE: 16 BRPM | HEART RATE: 86 BPM | BODY MASS INDEX: 32.14 KG/M2 | SYSTOLIC BLOOD PRESSURE: 106 MMHG | WEIGHT: 181.4 LBS

## 2022-08-30 DIAGNOSIS — R29.6 FALLS FREQUENTLY: ICD-10-CM

## 2022-08-30 DIAGNOSIS — D50.0 IRON DEFICIENCY ANEMIA DUE TO CHRONIC BLOOD LOSS: ICD-10-CM

## 2022-08-30 DIAGNOSIS — Z79.4 TYPE 2 DIABETES MELLITUS WITH HYPERGLYCEMIA, WITH LONG-TERM CURRENT USE OF INSULIN (H): ICD-10-CM

## 2022-08-30 DIAGNOSIS — S00.83XD CONTUSION OF FACE, SUBSEQUENT ENCOUNTER: Primary | ICD-10-CM

## 2022-08-30 DIAGNOSIS — G89.29 OTHER CHRONIC PAIN: ICD-10-CM

## 2022-08-30 DIAGNOSIS — F41.8 DEPRESSION WITH ANXIETY: ICD-10-CM

## 2022-08-30 DIAGNOSIS — E11.65 TYPE 2 DIABETES MELLITUS WITH HYPERGLYCEMIA, WITH LONG-TERM CURRENT USE OF INSULIN (H): ICD-10-CM

## 2022-08-30 DIAGNOSIS — Z86.718 HISTORY OF DEEP VENOUS THROMBOSIS: ICD-10-CM

## 2022-08-30 PROCEDURE — 99310 SBSQ NF CARE HIGH MDM 45: CPT | Performed by: NURSE PRACTITIONER

## 2022-08-30 NOTE — LETTER
8/30/2022        RE: Tonya Valera  [address]  [facility] [address]        [facility]    Code Status:  FULL CODE   Visit Type:   Chief Complaint   Patient presents with     RECHECK     Facility:  [facility] ([facility]) [[id number]]           Transitional Care Course: Tonya Valera is a 57 year old female who I am seeing today for follow-up on the TCU.  Patient recently hospitalized on 8/15/2022 secondary to fall.  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  Patient had several falls within 24 hours.  Work-up negative.  She sustained multiple contusions to the face and around the right eye as well as right shoulder arms and hip.  Her fall was thought to be due to oversedation from polypharmacy.  Patient chronically on baclofen, hydroxyzine, topiramate, Wellbutrin, MS Contin and extended release morphine.  She underwent titration of her baclofen, hydroxyzine and topiramate.    Today patient sitting up in wheelchair.  She continues on extended release morphine secondary to chronic pain from neuropathy in her lower extremities.  She does have a history of spinal abscess with partial paresis.  She was ambulating with a rolling walker at home she tells me today.  She continues with a large contusion around her right eye.  Some bruising to her shoulder and arms.  She reports she is eating well.  She is having regular bowel movements.  She denies any shortness of breath chest pain or palpitations.  We did again today review her medication list.  There are multiple medications there.  Diabetes mellitus type 2.  Blood sugars are continuously in the 2-3 100s.  She is on diabetic diet.  Nursing staff reports she is noncompliant.  Hemoglobin 9.9.  Magnesium 2.2.    Active Ambulatory  Problems     Diagnosis Date Noted     Cellulitis of right lower extremity 11/22/2021     Failure of outpatient treatment 11/22/2021     Type 2 diabetes mellitus with hyperglycemia, with long-term current use of insulin (H) 11/30/2021     Deep vein thrombosis (DVT) of proximal lower extremity (H) 11/30/2021     Falls frequently 08/15/2022     Contusion of face, initial encounter 08/15/2022     Continuous opioid dependence (H) 08/22/2022     Other chronic pain 08/22/2022     Borderline personality disorder (H) 08/22/2022     Resolved Ambulatory Problems     Diagnosis Date Noted     No Resolved Ambulatory Problems     Past Medical History:   Diagnosis Date     Anxiety      Cellulitis      Chronic pain      DVT (deep venous thrombosis) (H)      Gastroesophageal reflux disease without esophagitis      HLD (hyperlipidemia)      Hypothyroidism      MDD (major depressive disorder)      Paraparesis of both lower limbs (H)      PVD (peripheral vascular disease) (H)      Spinal abscess (H)      T2DM (type 2 diabetes mellitus) (H)      Uncomplicated opioid dependence (H)      Allergies   Allergen Reactions     Compazine [Prochlorperazine]      Latex      Latex sensitive      Metformin      Statins [Hmg-Coa-R Inhibitors]      Sulfa Drugs Itching       All Meds and Allergies reviewed in the record at the facility and is the most up-to-date.      Current Outpatient Medications   Medication Sig     acetaminophen (TYLENOL) 325 MG tablet Take 650 mg by mouth every 4 hours as needed for mild pain     Ascorbic Acid (VITAMIN C GUMMIES PO) Take 1 chew tab by mouth daily     aspirin (ASA) 81 MG chewable tablet Take 81 mg by mouth daily     bacitracin 500 UNIT/GM OINT Apply topically every 8 hours as needed for wound care     baclofen (LIORESAL) 20 MG tablet Take 0.5 tablets (10 mg) by mouth 3 times daily     bisacodyl (DULCOLAX) 10 MG suppository Place 10 mg rectally daily as needed for constipation     buPROPion (WELLBUTRIN SR) 150 MG 12  hr tablet Take 150 mg by mouth daily     calcium carbonate (TUMS) 500 MG chewable tablet Take 2 chew tab by mouth every hour as needed for heartburn     calcium carbonate 600 mg-vitamin D 400 units (CALTRATE) 600-400 MG-UNIT per tablet Take 1 tablet by mouth daily     cholecalciferol 50 MCG (2000 UT) tablet Take 1 tablet by mouth every other day     clotrimazole (LOTRIMIN) 1 % external cream Apply topically 2 times daily     doxycycline hyclate (VIBRAMYCIN) 100 MG capsule Take 100 mg by mouth daily     emollient (VANICREAM) external cream Apply topically 2 times daily as needed for other (to legs for dry skin)     ferrous sulfate (FEROSUL) 325 (65 Fe) MG tablet Take 1 tablet (325 mg) by mouth every other day     insulin aspart (NOVOLOG PEN) 100 UNIT/ML pen Inject 1-7 Units Subcutaneous 3 times daily (before meals) Correction Scale - MEDIUM INSULIN RESISTANCE DOSING   Do Not give Correction Insulin if Pre-Meal BG less than 140. For Pre-Meal  - 189 give 1 unit. For Pre-Meal  - 239 give 2 units. For Pre-Meal  - 289 give 3 units. For Pre-Meal  - 339 give 4 units. For Pre-Meal - 399 give 5 units. For Pre-Meal -449 give 6 units For Pre-Meal BG greater than or equal to 450 give 7 units. To be given with prandial insulin, and based on pre-meal blood glucose.  Notify provider if glucose greater than or equal to 350 mg/dL after administration of correction dose. If given at mealtime, administer within 30 minutes of start of meal     insulin degludec (TRESIBA) 100 UNIT/ML pen Inject 28 Units Subcutaneous daily (with dinner)     levothyroxine (SYNTHROID/LEVOTHROID) 125 MCG tablet Take 125 mcg by mouth See Admin Instructions Daily on Tuesday, Wednesday, Thursday, Saturday, and Sunday     levothyroxine (SYNTHROID/LEVOTHROID) 125 MCG tablet Take 187.5 mcg by mouth See Admin Instructions On Mondays, Fridays     lisinopril (ZESTRIL) 2.5 MG tablet Take 2.5 mg by mouth daily     loratadine  "(CLARITIN) 10 MG tablet Take 10 mg by mouth daily as needed for allergies     magnesium 250 MG tablet Take 1 tablet by mouth daily     menthol (COUGH DROP) 7 MG LOZG Take 1 lozenge by mouth every hour as needed for cough     morphine (MS CONTIN) 30 MG CR tablet Take 1 tablet (30 mg) by mouth 3 times daily     morphine (MSIR) 15 MG IR tablet Take 0.5 tablets (7.5 mg) by mouth every 8 hours as needed for pain     Multiple Vitamins-Minerals (MULTIVITAMIN GUMMIES WOMENS) CHEW Take 1 chew tab by mouth daily     omeprazole (PRILOSEC) 20 MG DR capsule Take 20 mg by mouth daily before breakfast     polyethylene glycol (MIRALAX) 17 GM/Dose powder Take 1 capful by mouth every other day     rivaroxaban ANTICOAGULANT (XARELTO) 20 MG TABS tablet Take 20 mg by mouth daily (with dinner)     rosuvastatin (CRESTOR) 20 MG tablet Take 20 mg by mouth At Bedtime     sennosides (SENOKOT) 8.6 MG tablet Take 2 tablets by mouth 2 times daily     SUMAtriptan (IMITREX) 100 MG tablet Take 100 mg by mouth every 12 hours as needed for migraine     topiramate ER (QUDEXY XR) 50 MG 24 hr capsule Take 1 capsule (50 mg) by mouth daily     torsemide (DEMADEX) 10 MG tablet Take 1 tablet (10 mg) by mouth daily as needed (for LE swelling as needed)     venlafaxine (EFFEXOR-XR) 150 MG 24 hr capsule Take 150 mg by mouth daily Take with 75 mg capsule for a total dose of 225 mg.     venlafaxine (EFFEXOR-XR) 75 MG 24 hr capsule Take 75 mg by mouth daily Take with 150 mg capsule for a total dose of 225 mg.     Zinc Oxide-Petrolatum 20-80 % CREA Externally apply topically every 8 hours as needed (incontinence)     No current facility-administered medications for this visit.       REVIEW OF SYSTEMS:   Review of Systems  10 point review of systems reviewed.  Pertinent positives in HPI.    PHYSICAL EXAMINATION:  Physical Exam     Vital signs: /69   Pulse 86   Temp 98  F (36.7  C)   Resp 16   Ht 1.6 m (5' 3\")   Wt 82.3 kg (181 lb 6.4 oz)   SpO2 97%   " BMI 32.13 kg/m    General: Awake, Alert, sitting up in wheelchair,follows simple commands, conversant  HEENT:Pink conjunctiva, anicteric sclerae, moist oral mucosa.  Large contusion to the right periorbital region of the eye.  Bruising around the face.  NECK: Supple  CVS:  S1  S2, without murmur or gallop.   LUNG: Clear to auscultation, No wheezes, rales or rhonci.  BACK: No kyphosis of the thoracic spine  ABDOMEN: Soft, obese, nontender to palpation, with positive bowel sounds  EXTREMITIES: Moves both upper and lower extremities with diffuse weakness, 1+ pedal edema in Tubigrip, no calf tenderness  SKIN: Multiple contusions and bruises noted.  NEUROLOGIC: Weakness to the lower extremities.  PSYCHIATRIC: Cognition intact      Labs:  All labs reviewed in the nursing home record and Epic   @  Lab Results   Component Value Date    WBC 7.9 08/24/2022     Lab Results   Component Value Date    RBC 4.05 08/24/2022     Lab Results   Component Value Date    HGB 9.9 08/24/2022     Lab Results   Component Value Date    HCT 33.1 08/24/2022     Lab Results   Component Value Date    MCV 82 08/24/2022     Lab Results   Component Value Date    MCH 24.4 08/24/2022     Lab Results   Component Value Date    MCHC 29.9 08/24/2022     Lab Results   Component Value Date    RDW 16.4 08/24/2022     Lab Results   Component Value Date     08/24/2022        @Last Comprehensive Metabolic Panel:  Sodium   Date Value Ref Range Status   08/24/2022 138 136 - 145 mmol/L Final     Potassium   Date Value Ref Range Status   08/24/2022 4.3 3.4 - 5.3 mmol/L Final   08/18/2022 3.8 3.5 - 5.0 mmol/L Final     Chloride   Date Value Ref Range Status   08/24/2022 103 98 - 107 mmol/L Final   08/16/2022 104 98 - 107 mmol/L Final     Carbon Dioxide (CO2)   Date Value Ref Range Status   08/24/2022 27 22 - 29 mmol/L Final   08/16/2022 34 (H) 22 - 31 mmol/L Final     Anion Gap   Date Value Ref Range Status   08/24/2022 8 7 - 15 mmol/L Final   08/16/2022 3 (L)  5 - 18 mmol/L Final     Glucose   Date Value Ref Range Status   08/24/2022 266 (H) 70 - 99 mg/dL Final   08/17/2022 137 (H) 70 - 125 mg/dL Final     GLUCOSE BY METER POCT   Date Value Ref Range Status   08/18/2022 85 70 - 99 mg/dL Final     Urea Nitrogen   Date Value Ref Range Status   08/24/2022 15.7 6.0 - 20.0 mg/dL Final   08/16/2022 13 8 - 22 mg/dL Final     Creatinine   Date Value Ref Range Status   08/24/2022 0.86 0.51 - 0.95 mg/dL Final     GFR Estimate   Date Value Ref Range Status   08/24/2022 78 >60 mL/min/1.73m2 Final     Comment:     Effective December 21, 2021 eGFRcr in adults is calculated using the 2021 CKD-EPI creatinine equation which includes age and gender (Jodie et al., NEJ, DOI: 10.1056/GZOBvq6547713)   09/14/2019 54 (L) >60 mL/min/1.73m2 Final     Calcium   Date Value Ref Range Status   08/24/2022 9.0 8.6 - 10.0 mg/dL Final     Assessment/plan:    ICD-10-CM    1. Contusion of face, subsequent encounter  S00.83XD  continue to use ice.   2. Other chronic pain  G89.29  Patient on multiple drug regimen.  She continues on baclofen, topiramate and MS Contin.  She is using occasional as needed morphine.  DC hydroxyzine.   3. Falls frequently  R29.6  Continue on therapy.   4. Type 2 diabetes mellitus with hyperglycemia, with long-term current use of insulin (H)  E11.65  Blood sugars consistently 2-3 100s.  Increase Tresiba to 28 units daily.  Continue with sliding scale.    Z79.4    5. History of deep venous thrombosis  Z86.718  Continue Xarelto.   6. Iron deficiency anemia due to chronic blood loss  D50.0  Chronic.  Hemoglobin now 9.9.  Continue iron supplement.   7. Depression with anxiety F41.8  Continue venlafaxine and Wellbutrin.     35 minutes spent of which greater than 50% was face to face communication with the patient about polypharmacy risk of falls and review of medications.  -Discontinue the following as prn medications   -Neosporin, guaifenesin, hydroxyzine, loperamide, milk of magnesia,  Zofran, hydrocortisone cream.    This note has been dictated using voice recognition software. Any grammatical or context distortions are unintentional and inherent to the software    Electronically signed by: Tonya Ordaz CNP           Sincerely,        Tonya Ordaz NP

## 2022-08-31 RX ORDER — MORPHINE SULFATE 30 MG/1
30 TABLET, FILM COATED, EXTENDED RELEASE ORAL
Qty: 50 TABLET | Refills: 0 | Status: SHIPPED | OUTPATIENT
Start: 2022-08-31 | End: 2022-09-13

## 2022-08-31 NOTE — PROGRESS NOTES
[facility]    Code Status:  FULL CODE   Visit Type:   Chief Complaint   Patient presents with     RECHECK     Facility:  [facility] ([facility]) [[id number]]           Transitional Care Course: Tonya Valera is a 57 year old female who I am seeing today for follow-up on the TCU.  Patient recently hospitalized on 8/15/2022 secondary to fall.  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  Patient had several falls within 24 hours.  Work-up negative.  She sustained multiple contusions to the face and around the right eye as well as right shoulder arms and hip.  Her fall was thought to be due to oversedation from polypharmacy.  Patient chronically on baclofen, hydroxyzine, topiramate, Wellbutrin, MS Contin and extended release morphine.  She underwent titration of her baclofen, hydroxyzine and topiramate.    Today patient sitting up in wheelchair.  She continues on extended release morphine secondary to chronic pain from neuropathy in her lower extremities.  She does have a history of spinal abscess with partial paresis.  She was ambulating with a rolling walker at home she tells me today.  She continues with a large contusion around her right eye.  Some bruising to her shoulder and arms.  She reports she is eating well.  She is having regular bowel movements.  She denies any shortness of breath chest pain or palpitations.  We did again today review her medication list.  There are multiple medications there.  Diabetes mellitus type 2.  Blood sugars are continuously in the 2-3 100s.  She is on diabetic diet.  Nursing staff reports she is noncompliant.  Hemoglobin 9.9.  Magnesium 2.2.    Active Ambulatory Problems     Diagnosis Date Noted     Cellulitis of right lower extremity 11/22/2021     Failure  of outpatient treatment 11/22/2021     Type 2 diabetes mellitus with hyperglycemia, with long-term current use of insulin (H) 11/30/2021     Deep vein thrombosis (DVT) of proximal lower extremity (H) 11/30/2021     Falls frequently 08/15/2022     Contusion of face, initial encounter 08/15/2022     Continuous opioid dependence (H) 08/22/2022     Other chronic pain 08/22/2022     Borderline personality disorder (H) 08/22/2022     Resolved Ambulatory Problems     Diagnosis Date Noted     No Resolved Ambulatory Problems     Past Medical History:   Diagnosis Date     Anxiety      Cellulitis      Chronic pain      DVT (deep venous thrombosis) (H)      Gastroesophageal reflux disease without esophagitis      HLD (hyperlipidemia)      Hypothyroidism      MDD (major depressive disorder)      Paraparesis of both lower limbs (H)      PVD (peripheral vascular disease) (H)      Spinal abscess (H)      T2DM (type 2 diabetes mellitus) (H)      Uncomplicated opioid dependence (H)      Allergies   Allergen Reactions     Compazine [Prochlorperazine]      Latex      Latex sensitive      Metformin      Statins [Hmg-Coa-R Inhibitors]      Sulfa Drugs Itching       All Meds and Allergies reviewed in the record at the facility and is the most up-to-date.      Current Outpatient Medications   Medication Sig     acetaminophen (TYLENOL) 325 MG tablet Take 650 mg by mouth every 4 hours as needed for mild pain     Ascorbic Acid (VITAMIN C GUMMIES PO) Take 1 chew tab by mouth daily     aspirin (ASA) 81 MG chewable tablet Take 81 mg by mouth daily     bacitracin 500 UNIT/GM OINT Apply topically every 8 hours as needed for wound care     baclofen (LIORESAL) 20 MG tablet Take 0.5 tablets (10 mg) by mouth 3 times daily     bisacodyl (DULCOLAX) 10 MG suppository Place 10 mg rectally daily as needed for constipation     buPROPion (WELLBUTRIN SR) 150 MG 12 hr tablet Take 150 mg by mouth daily     calcium carbonate (TUMS) 500 MG chewable tablet Take 2  chew tab by mouth every hour as needed for heartburn     calcium carbonate 600 mg-vitamin D 400 units (CALTRATE) 600-400 MG-UNIT per tablet Take 1 tablet by mouth daily     cholecalciferol 50 MCG (2000 UT) tablet Take 1 tablet by mouth every other day     clotrimazole (LOTRIMIN) 1 % external cream Apply topically 2 times daily     doxycycline hyclate (VIBRAMYCIN) 100 MG capsule Take 100 mg by mouth daily     emollient (VANICREAM) external cream Apply topically 2 times daily as needed for other (to legs for dry skin)     ferrous sulfate (FEROSUL) 325 (65 Fe) MG tablet Take 1 tablet (325 mg) by mouth every other day     insulin aspart (NOVOLOG PEN) 100 UNIT/ML pen Inject 1-7 Units Subcutaneous 3 times daily (before meals) Correction Scale - MEDIUM INSULIN RESISTANCE DOSING   Do Not give Correction Insulin if Pre-Meal BG less than 140. For Pre-Meal  - 189 give 1 unit. For Pre-Meal  - 239 give 2 units. For Pre-Meal  - 289 give 3 units. For Pre-Meal  - 339 give 4 units. For Pre-Meal - 399 give 5 units. For Pre-Meal -449 give 6 units For Pre-Meal BG greater than or equal to 450 give 7 units. To be given with prandial insulin, and based on pre-meal blood glucose.  Notify provider if glucose greater than or equal to 350 mg/dL after administration of correction dose. If given at mealtime, administer within 30 minutes of start of meal     insulin degludec (TRESIBA) 100 UNIT/ML pen Inject 28 Units Subcutaneous daily (with dinner)     levothyroxine (SYNTHROID/LEVOTHROID) 125 MCG tablet Take 125 mcg by mouth See Admin Instructions Daily on Tuesday, Wednesday, Thursday, Saturday, and Sunday     levothyroxine (SYNTHROID/LEVOTHROID) 125 MCG tablet Take 187.5 mcg by mouth See Admin Instructions On Mondays, Fridays     lisinopril (ZESTRIL) 2.5 MG tablet Take 2.5 mg by mouth daily     loratadine (CLARITIN) 10 MG tablet Take 10 mg by mouth daily as needed for allergies     magnesium 250 MG tablet  "Take 1 tablet by mouth daily     menthol (COUGH DROP) 7 MG LOZG Take 1 lozenge by mouth every hour as needed for cough     morphine (MS CONTIN) 30 MG CR tablet Take 1 tablet (30 mg) by mouth 3 times daily     morphine (MSIR) 15 MG IR tablet Take 0.5 tablets (7.5 mg) by mouth every 8 hours as needed for pain     Multiple Vitamins-Minerals (MULTIVITAMIN GUMMIES WOMENS) CHEW Take 1 chew tab by mouth daily     omeprazole (PRILOSEC) 20 MG DR capsule Take 20 mg by mouth daily before breakfast     polyethylene glycol (MIRALAX) 17 GM/Dose powder Take 1 capful by mouth every other day     rivaroxaban ANTICOAGULANT (XARELTO) 20 MG TABS tablet Take 20 mg by mouth daily (with dinner)     rosuvastatin (CRESTOR) 20 MG tablet Take 20 mg by mouth At Bedtime     sennosides (SENOKOT) 8.6 MG tablet Take 2 tablets by mouth 2 times daily     SUMAtriptan (IMITREX) 100 MG tablet Take 100 mg by mouth every 12 hours as needed for migraine     topiramate ER (QUDEXY XR) 50 MG 24 hr capsule Take 1 capsule (50 mg) by mouth daily     torsemide (DEMADEX) 10 MG tablet Take 1 tablet (10 mg) by mouth daily as needed (for LE swelling as needed)     venlafaxine (EFFEXOR-XR) 150 MG 24 hr capsule Take 150 mg by mouth daily Take with 75 mg capsule for a total dose of 225 mg.     venlafaxine (EFFEXOR-XR) 75 MG 24 hr capsule Take 75 mg by mouth daily Take with 150 mg capsule for a total dose of 225 mg.     Zinc Oxide-Petrolatum 20-80 % CREA Externally apply topically every 8 hours as needed (incontinence)     No current facility-administered medications for this visit.       REVIEW OF SYSTEMS:   Review of Systems  10 point review of systems reviewed.  Pertinent positives in HPI.    PHYSICAL EXAMINATION:  Physical Exam     Vital signs: /69   Pulse 86   Temp 98  F (36.7  C)   Resp 16   Ht 1.6 m (5' 3\")   Wt 82.3 kg (181 lb 6.4 oz)   SpO2 97%   BMI 32.13 kg/m    General: Awake, Alert, sitting up in wheelchair,follows simple commands, " conversant  HEENT:Pink conjunctiva, anicteric sclerae, moist oral mucosa.  Large contusion to the right periorbital region of the eye.  Bruising around the face.  NECK: Supple  CVS:  S1  S2, without murmur or gallop.   LUNG: Clear to auscultation, No wheezes, rales or rhonci.  BACK: No kyphosis of the thoracic spine  ABDOMEN: Soft, obese, nontender to palpation, with positive bowel sounds  EXTREMITIES: Moves both upper and lower extremities with diffuse weakness, 1+ pedal edema in Tubigrip, no calf tenderness  SKIN: Multiple contusions and bruises noted.  NEUROLOGIC: Weakness to the lower extremities.  PSYCHIATRIC: Cognition intact      Labs:  All labs reviewed in the nursing home record and [facility]   @  Lab Results   Component Value Date    WBC 7.9 08/24/2022     Lab Results   Component Value Date    RBC 4.05 08/24/2022     Lab Results   Component Value Date    HGB 9.9 08/24/2022     Lab Results   Component Value Date    HCT 33.1 08/24/2022     Lab Results   Component Value Date    MCV 82 08/24/2022     Lab Results   Component Value Date    MCH 24.4 08/24/2022     Lab Results   Component Value Date    MCHC 29.9 08/24/2022     Lab Results   Component Value Date    RDW 16.4 08/24/2022     Lab Results   Component Value Date     08/24/2022        @Last Comprehensive Metabolic Panel:  Sodium   Date Value Ref Range Status   08/24/2022 138 136 - 145 mmol/L Final     Potassium   Date Value Ref Range Status   08/24/2022 4.3 3.4 - 5.3 mmol/L Final   08/18/2022 3.8 3.5 - 5.0 mmol/L Final     Chloride   Date Value Ref Range Status   08/24/2022 103 98 - 107 mmol/L Final   08/16/2022 104 98 - 107 mmol/L Final     Carbon Dioxide (CO2)   Date Value Ref Range Status   08/24/2022 27 22 - 29 mmol/L Final   08/16/2022 34 (H) 22 - 31 mmol/L Final     Anion Gap   Date Value Ref Range Status   08/24/2022 8 7 - 15 mmol/L Final   08/16/2022 3 (L) 5 - 18 mmol/L Final     Glucose   Date Value Ref Range Status   08/24/2022 266 (H) 70 - 99  mg/dL Final   08/17/2022 137 (H) 70 - 125 mg/dL Final     GLUCOSE BY METER POCT   Date Value Ref Range Status   08/18/2022 85 70 - 99 mg/dL Final     Urea Nitrogen   Date Value Ref Range Status   08/24/2022 15.7 6.0 - 20.0 mg/dL Final   08/16/2022 13 8 - 22 mg/dL Final     Creatinine   Date Value Ref Range Status   08/24/2022 0.86 0.51 - 0.95 mg/dL Final     GFR Estimate   Date Value Ref Range Status   08/24/2022 78 >60 mL/min/1.73m2 Final     Comment:     Effective December 21, 2021 eGFRcr in adults is calculated using the 2021 CKD-EPI creatinine equation which includes age and gender (Jodie et al., NE, DOI: 10.1056/ZQWGxe9581630)   09/14/2019 54 (L) >60 mL/min/1.73m2 Final     Calcium   Date Value Ref Range Status   08/24/2022 9.0 8.6 - 10.0 mg/dL Final     Assessment/plan:    ICD-10-CM    1. Contusion of face, subsequent encounter  S00.83XD  continue to use ice.   2. Other chronic pain  G89.29  Patient on multiple drug regimen.  She continues on baclofen, topiramate and MS Contin.  She is using occasional as needed morphine.  DC hydroxyzine.   3. Falls frequently  R29.6  Continue on therapy.   4. Type 2 diabetes mellitus with hyperglycemia, with long-term current use of insulin (H)  E11.65  Blood sugars consistently 2-3 100s.  Increase Tresiba to 28 units daily.  Continue with sliding scale.    Z79.4    5. History of deep venous thrombosis  Z86.718  Continue Xarelto.   6. Iron deficiency anemia due to chronic blood loss  D50.0  Chronic.  Hemoglobin now 9.9.  Continue iron supplement.   7. Depression with anxiety F41.8  Continue venlafaxine and Wellbutrin.     35 minutes spent of which greater than 50% was face to face communication with the patient about polypharmacy risk of falls and review of medications.  -Discontinue the following as prn medications   -Neosporin, guaifenesin, hydroxyzine, loperamide, milk of magnesia, Zofran, hydrocortisone cream.    This note has been dictated using voice recognition  software. Any grammatical or context distortions are unintentional and inherent to the software    Electronically signed by: Tonya Ordaz CNP

## 2022-09-01 ENCOUNTER — TRANSITIONAL CARE UNIT VISIT (OUTPATIENT)
Dept: GERIATRICS | Facility: CLINIC | Age: 57
End: 2022-09-01
Payer: MEDICARE

## 2022-09-01 VITALS
BODY MASS INDEX: 32.14 KG/M2 | RESPIRATION RATE: 16 BRPM | SYSTOLIC BLOOD PRESSURE: 108 MMHG | WEIGHT: 181.4 LBS | HEART RATE: 89 BPM | TEMPERATURE: 98.6 F | DIASTOLIC BLOOD PRESSURE: 68 MMHG | HEIGHT: 63 IN | OXYGEN SATURATION: 98 %

## 2022-09-01 DIAGNOSIS — D50.0 IRON DEFICIENCY ANEMIA DUE TO CHRONIC BLOOD LOSS: ICD-10-CM

## 2022-09-01 DIAGNOSIS — E11.65 TYPE 2 DIABETES MELLITUS WITH HYPERGLYCEMIA, WITH LONG-TERM CURRENT USE OF INSULIN (H): ICD-10-CM

## 2022-09-01 DIAGNOSIS — F41.8 DEPRESSION WITH ANXIETY: ICD-10-CM

## 2022-09-01 DIAGNOSIS — S00.83XD CONTUSION OF FACE, SUBSEQUENT ENCOUNTER: ICD-10-CM

## 2022-09-01 DIAGNOSIS — R29.6 FALLS FREQUENTLY: ICD-10-CM

## 2022-09-01 DIAGNOSIS — Z79.4 TYPE 2 DIABETES MELLITUS WITH HYPERGLYCEMIA, WITH LONG-TERM CURRENT USE OF INSULIN (H): ICD-10-CM

## 2022-09-01 DIAGNOSIS — G89.29 OTHER CHRONIC PAIN: Primary | ICD-10-CM

## 2022-09-01 PROCEDURE — 99310 SBSQ NF CARE HIGH MDM 45: CPT | Performed by: NURSE PRACTITIONER

## 2022-09-01 NOTE — LETTER
9/1/2022        RE: Tonya Valera  [address]  [facility] [address]        [facility]    Code Status:  FULL CODE   Visit Type:   Chief Complaint   Patient presents with     RECHECK     Facility:  [facility] ([facility]) [[id number]]           Transitional Care Course: Tonya Valera is a 57 year old female who I am seeing today for follow-up on the TCU.  Patient recently hospitalized on 8/15/2022 secondary to fall.  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  Patient had several falls within 24 hours.  Work-up negative.  She sustained multiple contusions to the face and around the right eye as well as right shoulder arms and hip.  Her fall was thought to be due to oversedation from polypharmacy.  Patient chronically on baclofen, hydroxyzine, topiramate, Wellbutrin, MS Contin and extended release morphine.  She underwent titration of her baclofen, hydroxyzine and topiramate.    Today patient sitting up in bedside chair.  Patient recently had a fall sustaining contusion to her face.  She has underlying chronic pain syndrome secondary to back pain with neuropathy.  She continues on multidrug regimen.  Today we did discuss attempting to wean back her morphine.  She is on scheduled morphine as well as using occasional as needed dosing.  No oversedation noted.  She is very reluctant to reduce the use of her as needed.  She reports tightness and spasticity in her lower extremities.  I did asked nursing staff to asked therapy to provide some massage.  She continues on baclofen.  Diabetes mellitus type 2.  Blood sugars slightly improved.  Recent increase in her Tresiba.  She is noncompliant with diabetic diet.  Depression with anxiety.  She continues on Wellbutrin and topiramate.   She does attempt to self distract doing beading.  Iron deficiency anemia.  She continues on supplement.  Hemoglobin 9.9.    Active Ambulatory Problems     Diagnosis Date Noted     Cellulitis of right lower extremity 11/22/2021     Failure of outpatient treatment 11/22/2021     Type 2 diabetes mellitus with hyperglycemia, with long-term current use of insulin (H) 11/30/2021     Deep vein thrombosis (DVT) of proximal lower extremity (H) 11/30/2021     Falls frequently 08/15/2022     Contusion of face, initial encounter 08/15/2022     Continuous opioid dependence (H) 08/22/2022     Other chronic pain 08/22/2022     Borderline personality disorder (H) 08/22/2022     Resolved Ambulatory Problems     Diagnosis Date Noted     No Resolved Ambulatory Problems     Past Medical History:   Diagnosis Date     Anxiety      Cellulitis      Chronic pain      DVT (deep venous thrombosis) (H)      Gastroesophageal reflux disease without esophagitis      HLD (hyperlipidemia)      Hypothyroidism      MDD (major depressive disorder)      Paraparesis of both lower limbs (H)      PVD (peripheral vascular disease) (H)      Spinal abscess (H)      T2DM (type 2 diabetes mellitus) (H)      Uncomplicated opioid dependence (H)      Allergies   Allergen Reactions     Compazine [Prochlorperazine]      Latex      Latex sensitive      Metformin      Statins [Hmg-Coa-R Inhibitors]      Sulfa Drugs Itching       All Meds and Allergies reviewed in the record at the facility and is the most up-to-date.      Current Outpatient Medications   Medication Sig     acetaminophen (TYLENOL) 325 MG tablet Take 650 mg by mouth every 4 hours as needed for mild pain     Ascorbic Acid (VITAMIN C GUMMIES PO) Take 1 chew tab by mouth daily     aspirin (ASA) 81 MG chewable tablet Take 81 mg by mouth daily     bacitracin 500 UNIT/GM OINT Apply topically every 8 hours as needed for wound care     baclofen (LIORESAL) 20 MG tablet Take 0.5 tablets (10 mg) by mouth 3 times  daily     bisacodyl (DULCOLAX) 10 MG suppository Place 10 mg rectally daily as needed for constipation     buPROPion (WELLBUTRIN SR) 150 MG 12 hr tablet Take 150 mg by mouth daily     calcium carbonate (TUMS) 500 MG chewable tablet Take 2 chew tab by mouth every hour as needed for heartburn     calcium carbonate 600 mg-vitamin D 400 units (CALTRATE) 600-400 MG-UNIT per tablet Take 1 tablet by mouth daily     cholecalciferol 50 MCG (2000 UT) tablet Take 1 tablet by mouth every other day     clotrimazole (LOTRIMIN) 1 % external cream Apply topically 2 times daily     doxycycline hyclate (VIBRAMYCIN) 100 MG capsule Take 100 mg by mouth daily     emollient (VANICREAM) external cream Apply topically 2 times daily as needed for other (to legs for dry skin)     ferrous sulfate (FEROSUL) 325 (65 Fe) MG tablet Take 1 tablet (325 mg) by mouth every other day     insulin aspart (NOVOLOG PEN) 100 UNIT/ML pen Inject 1-7 Units Subcutaneous 3 times daily (before meals) Correction Scale - MEDIUM INSULIN RESISTANCE DOSING   Do Not give Correction Insulin if Pre-Meal BG less than 140. For Pre-Meal  - 189 give 1 unit. For Pre-Meal  - 239 give 2 units. For Pre-Meal  - 289 give 3 units. For Pre-Meal  - 339 give 4 units. For Pre-Meal - 399 give 5 units. For Pre-Meal -449 give 6 units For Pre-Meal BG greater than or equal to 450 give 7 units. To be given with prandial insulin, and based on pre-meal blood glucose.  Notify provider if glucose greater than or equal to 350 mg/dL after administration of correction dose. If given at mealtime, administer within 30 minutes of start of meal     insulin degludec (TRESIBA) 100 UNIT/ML pen Inject 28 Units Subcutaneous daily (with dinner)     levothyroxine (SYNTHROID/LEVOTHROID) 125 MCG tablet Take 125 mcg by mouth See Admin Instructions Daily on Tuesday, Wednesday, Thursday, Saturday, and Sunday     levothyroxine (SYNTHROID/LEVOTHROID) 125 MCG tablet Take 187.5 mcg  by mouth See Admin Instructions On Mondays, Fridays     lisinopril (ZESTRIL) 2.5 MG tablet Take 2.5 mg by mouth daily     loratadine (CLARITIN) 10 MG tablet Take 10 mg by mouth daily as needed for allergies     magnesium 250 MG tablet Take 1 tablet by mouth daily     menthol (COUGH DROP) 7 MG LOZG Take 1 lozenge by mouth every hour as needed for cough     morphine (MS CONTIN) 30 MG CR tablet Take 1 tablet (30 mg) by mouth 3 times daily     morphine (MSIR) 15 MG IR tablet Take 0.5 tablets (7.5 mg) by mouth every 8 hours as needed for pain     Multiple Vitamins-Minerals (MULTIVITAMIN GUMMIES WOMENS) CHEW Take 1 chew tab by mouth daily     omeprazole (PRILOSEC) 20 MG DR capsule Take 20 mg by mouth daily before breakfast     polyethylene glycol (MIRALAX) 17 GM/Dose powder Take 1 capful by mouth every other day     rivaroxaban ANTICOAGULANT (XARELTO) 20 MG TABS tablet Take 20 mg by mouth daily (with dinner)     rosuvastatin (CRESTOR) 20 MG tablet Take 20 mg by mouth At Bedtime     sennosides (SENOKOT) 8.6 MG tablet Take 2 tablets by mouth 2 times daily     SUMAtriptan (IMITREX) 100 MG tablet Take 100 mg by mouth every 12 hours as needed for migraine     topiramate ER (QUDEXY XR) 50 MG 24 hr capsule Take 1 capsule (50 mg) by mouth daily     torsemide (DEMADEX) 10 MG tablet Take 1 tablet (10 mg) by mouth daily as needed (for LE swelling as needed)     venlafaxine (EFFEXOR-XR) 150 MG 24 hr capsule Take 150 mg by mouth daily Take with 75 mg capsule for a total dose of 225 mg.     venlafaxine (EFFEXOR-XR) 75 MG 24 hr capsule Take 75 mg by mouth daily Take with 150 mg capsule for a total dose of 225 mg.     Zinc Oxide-Petrolatum 20-80 % CREA Externally apply topically every 8 hours as needed (incontinence)     No current facility-administered medications for this visit.       REVIEW OF SYSTEMS:   Review of Systems  10 point review of systems reviewed.  Pertinent positives in HPI.    PHYSICAL EXAMINATION:  Physical Exam  "    Vital signs: /68   Pulse 89   Temp 98.6  F (37  C)   Resp 16   Ht 1.6 m (5' 3\")   Wt 82.3 kg (181 lb 6.4 oz)   SpO2 98%   BMI 32.13 kg/m    General: Awake, Alert, sitting up in bedside chair, conversant  HEENT:Pink conjunctiva, anicteric sclerae, moist oral mucosa.  Large contusion to the right periorbital region of the eye.  Bruising around the face.  NECK: Supple  CVS:  S1  S2, without murmur or gallop.   LUNG: Clear to auscultation, No wheezes, rales or rhonci.  BACK: No kyphosis of the thoracic spine  ABDOMEN: Soft, obese, with positive bowel sounds  EXTREMITIES: Moves both upper and lower extremities with diffuse weakness, 1+ pedal edema in Tubigrip.  Some spasticity to the lower extremities noted.  NEUROLOGIC: Weakness to the lower extremities.  PSYCHIATRIC: Cognition intact      Labs:  All labs reviewed in the nursing home record and [facility]   @  Lab Results   Component Value Date    WBC 7.9 08/24/2022     Lab Results   Component Value Date    RBC 4.05 08/24/2022     Lab Results   Component Value Date    HGB 9.9 08/24/2022     Lab Results   Component Value Date    HCT 33.1 08/24/2022     Lab Results   Component Value Date    MCV 82 08/24/2022     Lab Results   Component Value Date    MCH 24.4 08/24/2022     Lab Results   Component Value Date    MCHC 29.9 08/24/2022     Lab Results   Component Value Date    RDW 16.4 08/24/2022     Lab Results   Component Value Date     08/24/2022        @Last Comprehensive Metabolic Panel:  Sodium   Date Value Ref Range Status   08/24/2022 138 136 - 145 mmol/L Final     Potassium   Date Value Ref Range Status   08/24/2022 4.3 3.4 - 5.3 mmol/L Final   08/18/2022 3.8 3.5 - 5.0 mmol/L Final     Chloride   Date Value Ref Range Status   08/24/2022 103 98 - 107 mmol/L Final   08/16/2022 104 98 - 107 mmol/L Final     Carbon Dioxide (CO2)   Date Value Ref Range Status   08/24/2022 27 22 - 29 mmol/L Final   08/16/2022 34 (H) 22 - 31 mmol/L Final     Anion Gap "   Date Value Ref Range Status   08/24/2022 8 7 - 15 mmol/L Final   08/16/2022 3 (L) 5 - 18 mmol/L Final     Glucose   Date Value Ref Range Status   08/24/2022 266 (H) 70 - 99 mg/dL Final   08/17/2022 137 (H) 70 - 125 mg/dL Final     GLUCOSE BY METER POCT   Date Value Ref Range Status   08/18/2022 85 70 - 99 mg/dL Final     Urea Nitrogen   Date Value Ref Range Status   08/24/2022 15.7 6.0 - 20.0 mg/dL Final   08/16/2022 13 8 - 22 mg/dL Final     Creatinine   Date Value Ref Range Status   08/24/2022 0.86 0.51 - 0.95 mg/dL Final     GFR Estimate   Date Value Ref Range Status   08/24/2022 78 >60 mL/min/1.73m2 Final     Comment:     Effective December 21, 2021 eGFRcr in adults is calculated using the 2021 CKD-EPI creatinine equation which includes age and gender (Jodie et al., [place], DOI: 10.1056/GAEQci7126199)   09/14/2019 54 (L) >60 mL/min/1.73m2 Final     Calcium   Date Value Ref Range Status   08/24/2022 9.0 8.6 - 10.0 mg/dL Final     Assessment/plan:      ICD-10-CM    1. Other chronic pain  G89.29 continue baclofen, topiramate and MS Contin.  Occasional as needed morphine.  Increased spasticity and tightness to the lower extremities.  No evidence of DVT.  We will asked therapy to initiate massage and her stretching.   2. Contusion of face, subsequent encounter  S00.83XD  continue to apply ice.   3. Falls frequently  R29.6    4. Type 2 diabetes mellitus with hyperglycemia, with long-term current use of insulin (H)  E11.65  Recent increase in Tresiba.  Continue to monitor blood sugars 4 times daily.    Z79.4    5. Iron deficiency anemia due to chronic blood loss  D50.0  Continue iron supplement.  Hemoglobin 9.9.   6. Depression with anxiety  F41.8  Continue Wellbutrin and topiramate.       This note has been dictated using voice recognition software. Any grammatical or context distortions are unintentional and inherent to the software    Electronically signed by: Tonya Ordaz, CNP           Sincerely,        Tonya  Orlin, NP

## 2022-09-02 NOTE — PROGRESS NOTES
[facility] GERIATRIC SERVICES    Code Status:  FULL CODE   Visit Type:   Chief Complaint   Patient presents with     RECHECK     Facility:  [facility] ([facility]) [[id number]]           Transitional Care Course: Tonya Valera is a 57 year old female who I am seeing today for follow-up on the TCU.  Patient recently hospitalized on 8/15/2022 secondary to fall.  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  Patient had several falls within 24 hours.  Work-up negative.  She sustained multiple contusions to the face and around the right eye as well as right shoulder arms and hip.  Her fall was thought to be due to oversedation from polypharmacy.  Patient chronically on baclofen, hydroxyzine, topiramate, Wellbutrin, MS Contin and extended release morphine.  She underwent titration of her baclofen, hydroxyzine and topiramate.    Today patient sitting up in bedside chair.  Patient recently had a fall sustaining contusion to her face.  She has underlying chronic pain syndrome secondary to back pain with neuropathy.  She continues on multidrug regimen.  Today we did discuss attempting to wean back her morphine.  She is on scheduled morphine as well as using occasional as needed dosing.  No oversedation noted.  She is very reluctant to reduce the use of her as needed.  She reports tightness and spasticity in her lower extremities.  I did asked nursing staff to asked therapy to provide some massage.  She continues on baclofen.  Diabetes mellitus type 2.  Blood sugars slightly improved.  Recent increase in her Tresiba.  She is noncompliant with diabetic diet.  Depression with anxiety.  She continues on Wellbutrin and topiramate.  She does attempt to self distract doing beading.  Iron deficiency anemia.  She continues on  supplement.  Hemoglobin 9.9.    Active Ambulatory Problems     Diagnosis Date Noted     Cellulitis of right lower extremity 11/22/2021     Failure of outpatient treatment 11/22/2021     Type 2 diabetes mellitus with hyperglycemia, with long-term current use of insulin (H) 11/30/2021     Deep vein thrombosis (DVT) of proximal lower extremity (H) 11/30/2021     Falls frequently 08/15/2022     Contusion of face, initial encounter 08/15/2022     Continuous opioid dependence (H) 08/22/2022     Other chronic pain 08/22/2022     Borderline personality disorder (H) 08/22/2022     Resolved Ambulatory Problems     Diagnosis Date Noted     No Resolved Ambulatory Problems     Past Medical History:   Diagnosis Date     Anxiety      Cellulitis      Chronic pain      DVT (deep venous thrombosis) (H)      Gastroesophageal reflux disease without esophagitis      HLD (hyperlipidemia)      Hypothyroidism      MDD (major depressive disorder)      Paraparesis of both lower limbs (H)      PVD (peripheral vascular disease) (H)      Spinal abscess (H)      T2DM (type 2 diabetes mellitus) (H)      Uncomplicated opioid dependence (H)      Allergies   Allergen Reactions     Compazine [Prochlorperazine]      Latex      Latex sensitive      Metformin      Statins [Hmg-Coa-R Inhibitors]      Sulfa Drugs Itching       All Meds and Allergies reviewed in the record at the facility and is the most up-to-date.      Current Outpatient Medications   Medication Sig     acetaminophen (TYLENOL) 325 MG tablet Take 650 mg by mouth every 4 hours as needed for mild pain     Ascorbic Acid (VITAMIN C GUMMIES PO) Take 1 chew tab by mouth daily     aspirin (ASA) 81 MG chewable tablet Take 81 mg by mouth daily     bacitracin 500 UNIT/GM OINT Apply topically every 8 hours as needed for wound care     baclofen (LIORESAL) 20 MG tablet Take 0.5 tablets (10 mg) by mouth 3 times daily     bisacodyl (DULCOLAX) 10 MG suppository Place 10 mg rectally daily as needed for  constipation     buPROPion (WELLBUTRIN SR) 150 MG 12 hr tablet Take 150 mg by mouth daily     calcium carbonate (TUMS) 500 MG chewable tablet Take 2 chew tab by mouth every hour as needed for heartburn     calcium carbonate 600 mg-vitamin D 400 units (CALTRATE) 600-400 MG-UNIT per tablet Take 1 tablet by mouth daily     cholecalciferol 50 MCG (2000 UT) tablet Take 1 tablet by mouth every other day     clotrimazole (LOTRIMIN) 1 % external cream Apply topically 2 times daily     doxycycline hyclate (VIBRAMYCIN) 100 MG capsule Take 100 mg by mouth daily     emollient (VANICREAM) external cream Apply topically 2 times daily as needed for other (to legs for dry skin)     ferrous sulfate (FEROSUL) 325 (65 Fe) MG tablet Take 1 tablet (325 mg) by mouth every other day     insulin aspart (NOVOLOG PEN) 100 UNIT/ML pen Inject 1-7 Units Subcutaneous 3 times daily (before meals) Correction Scale - MEDIUM INSULIN RESISTANCE DOSING   Do Not give Correction Insulin if Pre-Meal BG less than 140. For Pre-Meal  - 189 give 1 unit. For Pre-Meal  - 239 give 2 units. For Pre-Meal  - 289 give 3 units. For Pre-Meal  - 339 give 4 units. For Pre-Meal - 399 give 5 units. For Pre-Meal -449 give 6 units For Pre-Meal BG greater than or equal to 450 give 7 units. To be given with prandial insulin, and based on pre-meal blood glucose.  Notify provider if glucose greater than or equal to 350 mg/dL after administration of correction dose. If given at mealtime, administer within 30 minutes of start of meal     insulin degludec (TRESIBA) 100 UNIT/ML pen Inject 28 Units Subcutaneous daily (with dinner)     levothyroxine (SYNTHROID/LEVOTHROID) 125 MCG tablet Take 125 mcg by mouth See Admin Instructions Daily on Tuesday, Wednesday, Thursday, Saturday, and Sunday     levothyroxine (SYNTHROID/LEVOTHROID) 125 MCG tablet Take 187.5 mcg by mouth See Admin Instructions On Mondays, Fridays     lisinopril (ZESTRIL) 2.5 MG  tablet Take 2.5 mg by mouth daily     loratadine (CLARITIN) 10 MG tablet Take 10 mg by mouth daily as needed for allergies     magnesium 250 MG tablet Take 1 tablet by mouth daily     menthol (COUGH DROP) 7 MG LOZG Take 1 lozenge by mouth every hour as needed for cough     morphine (MS CONTIN) 30 MG CR tablet Take 1 tablet (30 mg) by mouth 3 times daily     morphine (MSIR) 15 MG IR tablet Take 0.5 tablets (7.5 mg) by mouth every 8 hours as needed for pain     Multiple Vitamins-Minerals (MULTIVITAMIN GUMMIES WOMENS) CHEW Take 1 chew tab by mouth daily     omeprazole (PRILOSEC) 20 MG DR capsule Take 20 mg by mouth daily before breakfast     polyethylene glycol (MIRALAX) 17 GM/Dose powder Take 1 capful by mouth every other day     rivaroxaban ANTICOAGULANT (XARELTO) 20 MG TABS tablet Take 20 mg by mouth daily (with dinner)     rosuvastatin (CRESTOR) 20 MG tablet Take 20 mg by mouth At Bedtime     sennosides (SENOKOT) 8.6 MG tablet Take 2 tablets by mouth 2 times daily     SUMAtriptan (IMITREX) 100 MG tablet Take 100 mg by mouth every 12 hours as needed for migraine     topiramate ER (QUDEXY XR) 50 MG 24 hr capsule Take 1 capsule (50 mg) by mouth daily     torsemide (DEMADEX) 10 MG tablet Take 1 tablet (10 mg) by mouth daily as needed (for LE swelling as needed)     venlafaxine (EFFEXOR-XR) 150 MG 24 hr capsule Take 150 mg by mouth daily Take with 75 mg capsule for a total dose of 225 mg.     venlafaxine (EFFEXOR-XR) 75 MG 24 hr capsule Take 75 mg by mouth daily Take with 150 mg capsule for a total dose of 225 mg.     Zinc Oxide-Petrolatum 20-80 % CREA Externally apply topically every 8 hours as needed (incontinence)     No current facility-administered medications for this visit.       REVIEW OF SYSTEMS:   Review of Systems  10 point review of systems reviewed.  Pertinent positives in HPI.    PHYSICAL EXAMINATION:  Physical Exam     Vital signs: /68   Pulse 89   Temp 98.6  F (37  C)   Resp 16   Ht 1.6 m (5'  "3\")   Wt 82.3 kg (181 lb 6.4 oz)   SpO2 98%   BMI 32.13 kg/m    General: Awake, Alert, sitting up in bedside chair, conversant  HEENT:Pink conjunctiva, anicteric sclerae, moist oral mucosa.  Large contusion to the right periorbital region of the eye.  Bruising around the face.  NECK: Supple  CVS:  S1  S2, without murmur or gallop.   LUNG: Clear to auscultation, No wheezes, rales or rhonci.  BACK: No kyphosis of the thoracic spine  ABDOMEN: Soft, obese, with positive bowel sounds  EXTREMITIES: Moves both upper and lower extremities with diffuse weakness, 1+ pedal edema in Tubigrip.  Some spasticity to the lower extremities noted.  NEUROLOGIC: Weakness to the lower extremities.  PSYCHIATRIC: Cognition intact      Labs:  All labs reviewed in the nursing home record and Epic   @  Lab Results   Component Value Date    WBC 7.9 08/24/2022     Lab Results   Component Value Date    RBC 4.05 08/24/2022     Lab Results   Component Value Date    HGB 9.9 08/24/2022     Lab Results   Component Value Date    HCT 33.1 08/24/2022     Lab Results   Component Value Date    MCV 82 08/24/2022     Lab Results   Component Value Date    MCH 24.4 08/24/2022     Lab Results   Component Value Date    MCHC 29.9 08/24/2022     Lab Results   Component Value Date    RDW 16.4 08/24/2022     Lab Results   Component Value Date     08/24/2022        @Last Comprehensive Metabolic Panel:  Sodium   Date Value Ref Range Status   08/24/2022 138 136 - 145 mmol/L Final     Potassium   Date Value Ref Range Status   08/24/2022 4.3 3.4 - 5.3 mmol/L Final   08/18/2022 3.8 3.5 - 5.0 mmol/L Final     Chloride   Date Value Ref Range Status   08/24/2022 103 98 - 107 mmol/L Final   08/16/2022 104 98 - 107 mmol/L Final     Carbon Dioxide (CO2)   Date Value Ref Range Status   08/24/2022 27 22 - 29 mmol/L Final   08/16/2022 34 (H) 22 - 31 mmol/L Final     Anion Gap   Date Value Ref Range Status   08/24/2022 8 7 - 15 mmol/L Final   08/16/2022 3 (L) 5 - 18 " mmol/L Final     Glucose   Date Value Ref Range Status   08/24/2022 266 (H) 70 - 99 mg/dL Final   08/17/2022 137 (H) 70 - 125 mg/dL Final     GLUCOSE BY METER POCT   Date Value Ref Range Status   08/18/2022 85 70 - 99 mg/dL Final     Urea Nitrogen   Date Value Ref Range Status   08/24/2022 15.7 6.0 - 20.0 mg/dL Final   08/16/2022 13 8 - 22 mg/dL Final     Creatinine   Date Value Ref Range Status   08/24/2022 0.86 0.51 - 0.95 mg/dL Final     GFR Estimate   Date Value Ref Range Status   08/24/2022 78 >60 mL/min/1.73m2 Final     Comment:     Effective December 21, 2021 eGFRcr in adults is calculated using the 2021 CKD-EPI creatinine equation which includes age and gender (Jodie et al., NEJ, DOI: 10.1056/UVAOpn6788033)   09/14/2019 54 (L) >60 mL/min/1.73m2 Final     Calcium   Date Value Ref Range Status   08/24/2022 9.0 8.6 - 10.0 mg/dL Final     Assessment/plan:      ICD-10-CM    1. Other chronic pain  G89.29 continue baclofen, topiramate and MS Contin.  Occasional as needed morphine.  Increased spasticity and tightness to the lower extremities.  No evidence of DVT.  We will asked therapy to initiate massage and her stretching.   2. Contusion of face, subsequent encounter  S00.83XD  continue to apply ice.   3. Falls frequently  R29.6    4. Type 2 diabetes mellitus with hyperglycemia, with long-term current use of insulin (H)  E11.65  Recent increase in Tresiba.  Continue to monitor blood sugars 4 times daily.    Z79.4    5. Iron deficiency anemia due to chronic blood loss  D50.0  Continue iron supplement.  Hemoglobin 9.9.   6. Depression with anxiety  F41.8  Continue Wellbutrin and topiramate.       This note has been dictated using voice recognition software. Any grammatical or context distortions are unintentional and inherent to the software    Electronically signed by: Tonya Ordaz, CNP

## 2022-09-06 ENCOUNTER — TRANSITIONAL CARE UNIT VISIT (OUTPATIENT)
Dept: GERIATRICS | Facility: CLINIC | Age: 57
End: 2022-09-06
Payer: MEDICARE

## 2022-09-06 ENCOUNTER — LAB REQUISITION (OUTPATIENT)
Dept: LAB | Facility: CLINIC | Age: 57
End: 2022-09-06
Payer: MEDICARE

## 2022-09-06 VITALS
RESPIRATION RATE: 18 BRPM | HEART RATE: 78 BPM | BODY MASS INDEX: 32.25 KG/M2 | HEIGHT: 63 IN | TEMPERATURE: 97.5 F | WEIGHT: 182 LBS | OXYGEN SATURATION: 98 % | DIASTOLIC BLOOD PRESSURE: 72 MMHG | SYSTOLIC BLOOD PRESSURE: 115 MMHG

## 2022-09-06 DIAGNOSIS — D50.0 IRON DEFICIENCY ANEMIA DUE TO CHRONIC BLOOD LOSS: ICD-10-CM

## 2022-09-06 DIAGNOSIS — Z79.4 TYPE 2 DIABETES MELLITUS WITH HYPERGLYCEMIA, WITH LONG-TERM CURRENT USE OF INSULIN (H): ICD-10-CM

## 2022-09-06 DIAGNOSIS — R60.0 LOWER EXTREMITY EDEMA: ICD-10-CM

## 2022-09-06 DIAGNOSIS — S00.83XD CONTUSION OF FACE, SUBSEQUENT ENCOUNTER: ICD-10-CM

## 2022-09-06 DIAGNOSIS — E11.65 TYPE 2 DIABETES MELLITUS WITH HYPERGLYCEMIA, WITH LONG-TERM CURRENT USE OF INSULIN (H): ICD-10-CM

## 2022-09-06 DIAGNOSIS — L03.116 CELLULITIS OF LEFT LOWER EXTREMITY: Primary | ICD-10-CM

## 2022-09-06 DIAGNOSIS — L03.90 CELLULITIS, UNSPECIFIED: ICD-10-CM

## 2022-09-06 DIAGNOSIS — Z86.718 HISTORY OF DEEP VENOUS THROMBOSIS: ICD-10-CM

## 2022-09-06 PROCEDURE — 99310 SBSQ NF CARE HIGH MDM 45: CPT | Performed by: NURSE PRACTITIONER

## 2022-09-06 RX ORDER — CEPHALEXIN 500 MG/1
500 CAPSULE ORAL 3 TIMES DAILY
COMMUNITY
Start: 2022-09-06 | End: 2022-09-10

## 2022-09-06 NOTE — LETTER
9/6/2022        RE: Tonya Valera  [address]  [facility] [address]        [facility]    Code Status:  FULL CODE   Visit Type:   Chief Complaint   Patient presents with     Nursing Home Acute     TCU Follow up     Facility:  [facility] ([facility]) [[id number]]           Transitional Care Course: Tonya Valera is a 57 year old female who I am seeing today for follow-up on the TCU.  Patient recently hospitalized on 8/15/2022 secondary to fall.  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  Patient had several falls within 24 hours.  Work-up negative.  She sustained multiple contusions to the face and around the right eye as well as right shoulder arms and hip.  Her fall was thought to be due to oversedation from polypharmacy.  Patient chronically on baclofen, hydroxyzine, topiramate, Wellbutrin, MS Contin and extended release morphine.  She underwent titration of her baclofen, hydroxyzine and topiramate.    Today patient sitting up in bedside chair.  Patient with underlying history of bilateral lower extremity edema.  She continues on diuretic.  She developed blisters over the weekend.  She continues in topical treatment.  She has multiple small open areas on the left lower extremity.  Extremity red and warm to touch.  She does report pain and burning-like sensation.  Currently afebrile.  She does have a history of repeated cellulitis.  In talking about treating with antibiotics patient reported that she gets a yeast infection every time she is on antibiotics.  She is requesting Diflucan.  Frequent falls.  Strength improving.  She does have some spasticity in her lower extremities that is chronic.  She continues on baclofen.  Chronic pain syndrome on morphine.  Diabetes  mellitus type 2.  Blood sugar satisfactory.  Iron deficiency anemia.  Continues on iron supplement.      Active Ambulatory Problems     Diagnosis Date Noted     Cellulitis of right lower extremity 11/22/2021     Failure of outpatient treatment 11/22/2021     Type 2 diabetes mellitus with hyperglycemia, with long-term current use of insulin (H) 11/30/2021     Deep vein thrombosis (DVT) of proximal lower extremity (H) 11/30/2021     Falls frequently 08/15/2022     Contusion of face, initial encounter 08/15/2022     Continuous opioid dependence (H) 08/22/2022     Other chronic pain 08/22/2022     Borderline personality disorder (H) 08/22/2022     Resolved Ambulatory Problems     Diagnosis Date Noted     No Resolved Ambulatory Problems     Past Medical History:   Diagnosis Date     Anxiety      Cellulitis      Chronic pain      DVT (deep venous thrombosis) (H)      Gastroesophageal reflux disease without esophagitis      HLD (hyperlipidemia)      Hypothyroidism      MDD (major depressive disorder)      Paraparesis of both lower limbs (H)      PVD (peripheral vascular disease) (H)      Spinal abscess (H)      T2DM (type 2 diabetes mellitus) (H)      Uncomplicated opioid dependence (H)      Allergies   Allergen Reactions     Compazine [Prochlorperazine]      Latex      Latex sensitive      Metformin      Statins [Hmg-Coa-R Inhibitors]      Sulfa Drugs Itching       All Meds and Allergies reviewed in the record at the facility and is the most up-to-date.      Current Outpatient Medications   Medication Sig     cephALEXin (KEFLEX) 500 MG capsule Take 500 mg by mouth 3 times daily     acetaminophen (TYLENOL) 325 MG tablet Take 650 mg by mouth every 4 hours as needed for mild pain     Ascorbic Acid (VITAMIN C GUMMIES PO) Take 1 chew tab by mouth daily     aspirin (ASA) 81 MG chewable tablet Take 81 mg by mouth daily     bacitracin 500 UNIT/GM OINT Apply topically every 8 hours as needed for wound care     baclofen (LIORESAL)  20 MG tablet Take 0.5 tablets (10 mg) by mouth 3 times daily     bisacodyl (DULCOLAX) 10 MG suppository Place 10 mg rectally daily as needed for constipation     buPROPion (WELLBUTRIN SR) 150 MG 12 hr tablet Take 150 mg by mouth daily     calcium carbonate (TUMS) 500 MG chewable tablet Take 2 chew tab by mouth every hour as needed for heartburn     calcium carbonate 600 mg-vitamin D 400 units (CALTRATE) 600-400 MG-UNIT per tablet Take 1 tablet by mouth daily     cholecalciferol 50 MCG (2000 UT) tablet Take 1 tablet by mouth every other day     clotrimazole (LOTRIMIN) 1 % external cream Apply topically 2 times daily     doxycycline hyclate (VIBRAMYCIN) 100 MG capsule Take 100 mg by mouth daily     emollient (VANICREAM) external cream Apply topically 2 times daily as needed for other (to legs for dry skin)     ferrous sulfate (FEROSUL) 325 (65 Fe) MG tablet Take 1 tablet (325 mg) by mouth every other day     insulin aspart (NOVOLOG PEN) 100 UNIT/ML pen Inject 1-7 Units Subcutaneous 3 times daily (before meals) Correction Scale - MEDIUM INSULIN RESISTANCE DOSING   Do Not give Correction Insulin if Pre-Meal BG less than 140. For Pre-Meal  - 189 give 1 unit. For Pre-Meal  - 239 give 2 units. For Pre-Meal  - 289 give 3 units. For Pre-Meal  - 339 give 4 units. For Pre-Meal - 399 give 5 units. For Pre-Meal -449 give 6 units For Pre-Meal BG greater than or equal to 450 give 7 units. To be given with prandial insulin, and based on pre-meal blood glucose.  Notify provider if glucose greater than or equal to 350 mg/dL after administration of correction dose. If given at mealtime, administer within 30 minutes of start of meal     insulin degludec (TRESIBA) 100 UNIT/ML pen Inject 28 Units Subcutaneous daily (with dinner)     levothyroxine (SYNTHROID/LEVOTHROID) 125 MCG tablet Take 125 mcg by mouth See Admin Instructions Daily on Tuesday, Wednesday, Thursday, Saturday, and Sunday      levothyroxine (SYNTHROID/LEVOTHROID) 125 MCG tablet Take 187.5 mcg by mouth See Admin Instructions On Mondays, Fridays     lisinopril (ZESTRIL) 2.5 MG tablet Take 2.5 mg by mouth daily     loratadine (CLARITIN) 10 MG tablet Take 10 mg by mouth daily as needed for allergies     magnesium 250 MG tablet Take 1 tablet by mouth daily     menthol (COUGH DROP) 7 MG LOZG Take 1 lozenge by mouth every hour as needed for cough     morphine (MS CONTIN) 30 MG CR tablet Take 1 tablet (30 mg) by mouth 3 times daily     morphine (MSIR) 15 MG IR tablet Take 0.5 tablets (7.5 mg) by mouth every 8 hours as needed for pain     Multiple Vitamins-Minerals (MULTIVITAMIN GUMMIES WOMENS) CHEW Take 1 chew tab by mouth daily     omeprazole (PRILOSEC) 20 MG DR capsule Take 20 mg by mouth daily before breakfast     polyethylene glycol (MIRALAX) 17 GM/Dose powder Take 1 capful by mouth every other day     rivaroxaban ANTICOAGULANT (XARELTO) 20 MG TABS tablet Take 20 mg by mouth daily (with dinner)     rosuvastatin (CRESTOR) 20 MG tablet Take 20 mg by mouth At Bedtime     sennosides (SENOKOT) 8.6 MG tablet Take 2 tablets by mouth 2 times daily     SUMAtriptan (IMITREX) 100 MG tablet Take 100 mg by mouth every 12 hours as needed for migraine     topiramate ER (QUDEXY XR) 50 MG 24 hr capsule Take 1 capsule (50 mg) by mouth daily     torsemide (DEMADEX) 10 MG tablet Take 1 tablet (10 mg) by mouth daily as needed (for LE swelling as needed)     venlafaxine (EFFEXOR-XR) 150 MG 24 hr capsule Take 150 mg by mouth daily Take with 75 mg capsule for a total dose of 225 mg.     venlafaxine (EFFEXOR-XR) 75 MG 24 hr capsule Take 75 mg by mouth daily Take with 150 mg capsule for a total dose of 225 mg.     Zinc Oxide-Petrolatum 20-80 % CREA Externally apply topically every 8 hours as needed (incontinence)     No current facility-administered medications for this visit.       REVIEW OF SYSTEMS:   Review of Systems  10 point review of systems reviewed.   "Pertinent positives in HPI.    PHYSICAL EXAMINATION:  Physical Exam     Vital signs: /72   Pulse 78   Temp 97.5  F (36.4  C)   Resp 18   Ht 1.6 m (5' 3\")   Wt 82.6 kg (182 lb)   SpO2 98%   BMI 32.24 kg/m    General: Awake, Alert, sitting up in bedside chair, conversant  HEENT:Pink conjunctiva, moist oral mucosa.  Large contusion to the right periorbital region of the eye.  Bruising to the face improving.  NECK: Supple  CVS:  S1  S2, without murmur or gallop.   LUNG: Clear to auscultation, No wheezes, rales or rhonci.  BACK: No kyphosis of the thoracic spine  ABDOMEN: Soft, obese, with positive bowel sounds  EXTREMITIES: Moves both upper and lower extremities with diffuse weakness, 1+ pedal edema to bilateral lower extremities.  She does have some open areas to the left lower extremity.  Redness and warmth noted.  NEUROLOGIC: Weakness to the lower extremities with spasticity.  PSYCHIATRIC: Flat affect.      Labs:  All labs reviewed in the nursing home record and Epic   @  Lab Results   Component Value Date    WBC 7.9 08/24/2022     Lab Results   Component Value Date    RBC 4.05 08/24/2022     Lab Results   Component Value Date    HGB 9.9 08/24/2022     Lab Results   Component Value Date    HCT 33.1 08/24/2022     Lab Results   Component Value Date    MCV 82 08/24/2022     Lab Results   Component Value Date    MCH 24.4 08/24/2022     Lab Results   Component Value Date    MCHC 29.9 08/24/2022     Lab Results   Component Value Date    RDW 16.4 08/24/2022     Lab Results   Component Value Date     08/24/2022        @Last Comprehensive Metabolic Panel:  Sodium   Date Value Ref Range Status   08/24/2022 138 136 - 145 mmol/L Final     Potassium   Date Value Ref Range Status   08/24/2022 4.3 3.4 - 5.3 mmol/L Final   08/18/2022 3.8 3.5 - 5.0 mmol/L Final     Chloride   Date Value Ref Range Status   08/24/2022 103 98 - 107 mmol/L Final   08/16/2022 104 98 - 107 mmol/L Final     Carbon Dioxide (CO2)   Date " Value Ref Range Status   08/24/2022 27 22 - 29 mmol/L Final   08/16/2022 34 (H) 22 - 31 mmol/L Final     Anion Gap   Date Value Ref Range Status   08/24/2022 8 7 - 15 mmol/L Final   08/16/2022 3 (L) 5 - 18 mmol/L Final     Glucose   Date Value Ref Range Status   08/24/2022 266 (H) 70 - 99 mg/dL Final   08/17/2022 137 (H) 70 - 125 mg/dL Final     GLUCOSE BY METER POCT   Date Value Ref Range Status   08/18/2022 85 70 - 99 mg/dL Final     Urea Nitrogen   Date Value Ref Range Status   08/24/2022 15.7 6.0 - 20.0 mg/dL Final   08/16/2022 13 8 - 22 mg/dL Final     Creatinine   Date Value Ref Range Status   08/24/2022 0.86 0.51 - 0.95 mg/dL Final     GFR Estimate   Date Value Ref Range Status   08/24/2022 78 >60 mL/min/1.73m2 Final     Comment:     Effective December 21, 2021 eGFRcr in adults is calculated using the 2021 CKD-EPI creatinine equation which includes age and gender (Jodie et al., NEJM, DOI: 10.1056/PWJPtc2184074)   09/14/2019 54 (L) >60 mL/min/1.73m2 Final     Calcium   Date Value Ref Range Status   08/24/2022 9.0 8.6 - 10.0 mg/dL Final     Assessment/plan:      ICD-10-CM    1. Cellulitis of left lower extremity  L03.116  Keflex 500 mg p.o. 3 times daily x7 days.  Patient previously on doxycycline.  Lower extremity ulcers treat topically with Calmoseptine and nonadherent dressing change daily.  Continue Tubigrip.  Encourage elevation.  Follow-up CBC in the a.m.   2. Type 2 diabetes mellitus with hyperglycemia, with long-term current use of insulin (H)  E11.65  Satisfactory.    Z79.4    3. Iron deficiency anemia due to chronic blood loss  D50.0  Continue supplement.  Hemoglobin 9.9.   4. History of deep venous thrombosis  Z86.718  Continue Xarelto.   5. Other chronic pain  G89.29  Continue baclofen, topiramate and MS Contin.   6. Lower extremity edema  R60.0  Encourage elevation.  Continue with Tubigrip.  Also on torsemide.       This note has been dictated using voice recognition software. Any grammatical or  context distortions are unintentional and inherent to the software    Electronically signed by: Tonya Ordaz CNP           Sincerely,        Tonya Ordaz, NP

## 2022-09-07 LAB
ERYTHROCYTE [DISTWIDTH] IN BLOOD BY AUTOMATED COUNT: 16.9 % (ref 10–15)
HCT VFR BLD AUTO: 32 % (ref 35–47)
HGB BLD-MCNC: 9.2 G/DL (ref 11.7–15.7)
MCH RBC QN AUTO: 23.8 PG (ref 26.5–33)
MCHC RBC AUTO-ENTMCNC: 28.8 G/DL (ref 31.5–36.5)
MCV RBC AUTO: 83 FL (ref 78–100)
PLATELET # BLD AUTO: 249 10E3/UL (ref 150–450)
RBC # BLD AUTO: 3.86 10E6/UL (ref 3.8–5.2)
WBC # BLD AUTO: 8.1 10E3/UL (ref 4–11)

## 2022-09-07 PROCEDURE — 85027 COMPLETE CBC AUTOMATED: CPT | Performed by: NURSE PRACTITIONER

## 2022-09-07 PROCEDURE — P9603 ONE-WAY ALLOW PRORATED MILES: HCPCS | Performed by: NURSE PRACTITIONER

## 2022-09-07 PROCEDURE — 36415 COLL VENOUS BLD VENIPUNCTURE: CPT | Performed by: NURSE PRACTITIONER

## 2022-09-07 NOTE — PROGRESS NOTES
[facility]    Code Status:  FULL CODE   Visit Type:   Chief Complaint   Patient presents with     Nursing Home Acute     TCU Follow up     Facility:  [facility] ([facility]) [[id number]]           Transitional Care Course: Tonya Valera is a 57 year old female who I am seeing today for follow-up on the TCU.  Patient recently hospitalized on 8/15/2022 secondary to fall.  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  Patient had several falls within 24 hours.  Work-up negative.  She sustained multiple contusions to the face and around the right eye as well as right shoulder arms and hip.  Her fall was thought to be due to oversedation from polypharmacy.  Patient chronically on baclofen, hydroxyzine, topiramate, Wellbutrin, MS Contin and extended release morphine.  She underwent titration of her baclofen, hydroxyzine and topiramate.    Today patient sitting up in bedside chair.  Patient with underlying history of bilateral lower extremity edema.  She continues on diuretic.  She developed blisters over the weekend.  She continues in topical treatment.  She has multiple small open areas on the left lower extremity.  Extremity red and warm to touch.  She does report pain and burning-like sensation.  Currently afebrile.  She does have a history of repeated cellulitis.  In talking about treating with antibiotics patient reported that she gets a yeast infection every time she is on antibiotics.  She is requesting Diflucan.  Frequent falls.  Strength improving.  She does have some spasticity in her lower extremities that is chronic.  She continues on baclofen.  Chronic pain syndrome on morphine.  Diabetes mellitus type 2.  Blood sugar satisfactory.  Iron deficiency anemia.  Continues on iron  supplement.      Active Ambulatory Problems     Diagnosis Date Noted     Cellulitis of right lower extremity 11/22/2021     Failure of outpatient treatment 11/22/2021     Type 2 diabetes mellitus with hyperglycemia, with long-term current use of insulin (H) 11/30/2021     Deep vein thrombosis (DVT) of proximal lower extremity (H) 11/30/2021     Falls frequently 08/15/2022     Contusion of face, initial encounter 08/15/2022     Continuous opioid dependence (H) 08/22/2022     Other chronic pain 08/22/2022     Borderline personality disorder (H) 08/22/2022     Resolved Ambulatory Problems     Diagnosis Date Noted     No Resolved Ambulatory Problems     Past Medical History:   Diagnosis Date     Anxiety      Cellulitis      Chronic pain      DVT (deep venous thrombosis) (H)      Gastroesophageal reflux disease without esophagitis      HLD (hyperlipidemia)      Hypothyroidism      MDD (major depressive disorder)      Paraparesis of both lower limbs (H)      PVD (peripheral vascular disease) (H)      Spinal abscess (H)      T2DM (type 2 diabetes mellitus) (H)      Uncomplicated opioid dependence (H)      Allergies   Allergen Reactions     Compazine [Prochlorperazine]      Latex      Latex sensitive      Metformin      Statins [Hmg-Coa-R Inhibitors]      Sulfa Drugs Itching       All Meds and Allergies reviewed in the record at the facility and is the most up-to-date.      Current Outpatient Medications   Medication Sig     cephALEXin (KEFLEX) 500 MG capsule Take 500 mg by mouth 3 times daily     acetaminophen (TYLENOL) 325 MG tablet Take 650 mg by mouth every 4 hours as needed for mild pain     Ascorbic Acid (VITAMIN C GUMMIES PO) Take 1 chew tab by mouth daily     aspirin (ASA) 81 MG chewable tablet Take 81 mg by mouth daily     bacitracin 500 UNIT/GM OINT Apply topically every 8 hours as needed for wound care     baclofen (LIORESAL) 20 MG tablet Take 0.5 tablets (10 mg) by mouth 3 times daily     bisacodyl (DULCOLAX) 10  MG suppository Place 10 mg rectally daily as needed for constipation     buPROPion (WELLBUTRIN SR) 150 MG 12 hr tablet Take 150 mg by mouth daily     calcium carbonate (TUMS) 500 MG chewable tablet Take 2 chew tab by mouth every hour as needed for heartburn     calcium carbonate 600 mg-vitamin D 400 units (CALTRATE) 600-400 MG-UNIT per tablet Take 1 tablet by mouth daily     cholecalciferol 50 MCG (2000 UT) tablet Take 1 tablet by mouth every other day     clotrimazole (LOTRIMIN) 1 % external cream Apply topically 2 times daily     doxycycline hyclate (VIBRAMYCIN) 100 MG capsule Take 100 mg by mouth daily     emollient (VANICREAM) external cream Apply topically 2 times daily as needed for other (to legs for dry skin)     ferrous sulfate (FEROSUL) 325 (65 Fe) MG tablet Take 1 tablet (325 mg) by mouth every other day     insulin aspart (NOVOLOG PEN) 100 UNIT/ML pen Inject 1-7 Units Subcutaneous 3 times daily (before meals) Correction Scale - MEDIUM INSULIN RESISTANCE DOSING   Do Not give Correction Insulin if Pre-Meal BG less than 140. For Pre-Meal  - 189 give 1 unit. For Pre-Meal  - 239 give 2 units. For Pre-Meal  - 289 give 3 units. For Pre-Meal  - 339 give 4 units. For Pre-Meal - 399 give 5 units. For Pre-Meal -449 give 6 units For Pre-Meal BG greater than or equal to 450 give 7 units. To be given with prandial insulin, and based on pre-meal blood glucose.  Notify provider if glucose greater than or equal to 350 mg/dL after administration of correction dose. If given at mealtime, administer within 30 minutes of start of meal     insulin degludec (TRESIBA) 100 UNIT/ML pen Inject 28 Units Subcutaneous daily (with dinner)     levothyroxine (SYNTHROID/LEVOTHROID) 125 MCG tablet Take 125 mcg by mouth See Admin Instructions Daily on Tuesday, Wednesday, Thursday, Saturday, and Sunday     levothyroxine (SYNTHROID/LEVOTHROID) 125 MCG tablet Take 187.5 mcg by mouth See Admin Instructions  On Mondays, Fridays     lisinopril (ZESTRIL) 2.5 MG tablet Take 2.5 mg by mouth daily     loratadine (CLARITIN) 10 MG tablet Take 10 mg by mouth daily as needed for allergies     magnesium 250 MG tablet Take 1 tablet by mouth daily     menthol (COUGH DROP) 7 MG LOZG Take 1 lozenge by mouth every hour as needed for cough     morphine (MS CONTIN) 30 MG CR tablet Take 1 tablet (30 mg) by mouth 3 times daily     morphine (MSIR) 15 MG IR tablet Take 0.5 tablets (7.5 mg) by mouth every 8 hours as needed for pain     Multiple Vitamins-Minerals (MULTIVITAMIN GUMMIES WOMENS) CHEW Take 1 chew tab by mouth daily     omeprazole (PRILOSEC) 20 MG DR capsule Take 20 mg by mouth daily before breakfast     polyethylene glycol (MIRALAX) 17 GM/Dose powder Take 1 capful by mouth every other day     rivaroxaban ANTICOAGULANT (XARELTO) 20 MG TABS tablet Take 20 mg by mouth daily (with dinner)     rosuvastatin (CRESTOR) 20 MG tablet Take 20 mg by mouth At Bedtime     sennosides (SENOKOT) 8.6 MG tablet Take 2 tablets by mouth 2 times daily     SUMAtriptan (IMITREX) 100 MG tablet Take 100 mg by mouth every 12 hours as needed for migraine     topiramate ER (QUDEXY XR) 50 MG 24 hr capsule Take 1 capsule (50 mg) by mouth daily     torsemide (DEMADEX) 10 MG tablet Take 1 tablet (10 mg) by mouth daily as needed (for LE swelling as needed)     venlafaxine (EFFEXOR-XR) 150 MG 24 hr capsule Take 150 mg by mouth daily Take with 75 mg capsule for a total dose of 225 mg.     venlafaxine (EFFEXOR-XR) 75 MG 24 hr capsule Take 75 mg by mouth daily Take with 150 mg capsule for a total dose of 225 mg.     Zinc Oxide-Petrolatum 20-80 % CREA Externally apply topically every 8 hours as needed (incontinence)     No current facility-administered medications for this visit.       REVIEW OF SYSTEMS:   Review of Systems  10 point review of systems reviewed.  Pertinent positives in HPI.    PHYSICAL EXAMINATION:  Physical Exam     Vital signs: /72   Pulse 78   " Temp 97.5  F (36.4  C)   Resp 18   Ht 1.6 m (5' 3\")   Wt 82.6 kg (182 lb)   SpO2 98%   BMI 32.24 kg/m    General: Awake, Alert, sitting up in bedside chair, conversant  HEENT:Pink conjunctiva, moist oral mucosa.  Large contusion to the right periorbital region of the eye.  Bruising to the face improving.  NECK: Supple  CVS:  S1  S2, without murmur or gallop.   LUNG: Clear to auscultation, No wheezes, rales or rhonci.  BACK: No kyphosis of the thoracic spine  ABDOMEN: Soft, obese, with positive bowel sounds  EXTREMITIES: Moves both upper and lower extremities with diffuse weakness, 1+ pedal edema to bilateral lower extremities.  She does have some open areas to the left lower extremity.  Redness and warmth noted.  NEUROLOGIC: Weakness to the lower extremities with spasticity.  PSYCHIATRIC: Flat affect.      Labs:  All labs reviewed in the nursing home record and [email or web address]   @  Lab Results   Component Value Date    WBC 7.9 08/24/2022     Lab Results   Component Value Date    RBC 4.05 08/24/2022     Lab Results   Component Value Date    HGB 9.9 08/24/2022     Lab Results   Component Value Date    HCT 33.1 08/24/2022     Lab Results   Component Value Date    MCV 82 08/24/2022     Lab Results   Component Value Date    MCH 24.4 08/24/2022     Lab Results   Component Value Date    MCHC 29.9 08/24/2022     Lab Results   Component Value Date    RDW 16.4 08/24/2022     Lab Results   Component Value Date     08/24/2022        @Last Comprehensive Metabolic Panel:  Sodium   Date Value Ref Range Status   08/24/2022 138 136 - 145 mmol/L Final     Potassium   Date Value Ref Range Status   08/24/2022 4.3 3.4 - 5.3 mmol/L Final   08/18/2022 3.8 3.5 - 5.0 mmol/L Final     Chloride   Date Value Ref Range Status   08/24/2022 103 98 - 107 mmol/L Final   08/16/2022 104 98 - 107 mmol/L Final     Carbon Dioxide (CO2)   Date Value Ref Range Status   08/24/2022 27 22 - 29 mmol/L Final   08/16/2022 34 (H) 22 - 31 mmol/L Final "     Anion Gap   Date Value Ref Range Status   08/24/2022 8 7 - 15 mmol/L Final   08/16/2022 3 (L) 5 - 18 mmol/L Final     Glucose   Date Value Ref Range Status   08/24/2022 266 (H) 70 - 99 mg/dL Final   08/17/2022 137 (H) 70 - 125 mg/dL Final     GLUCOSE BY METER POCT   Date Value Ref Range Status   08/18/2022 85 70 - 99 mg/dL Final     Urea Nitrogen   Date Value Ref Range Status   08/24/2022 15.7 6.0 - 20.0 mg/dL Final   08/16/2022 13 8 - 22 mg/dL Final     Creatinine   Date Value Ref Range Status   08/24/2022 0.86 0.51 - 0.95 mg/dL Final     GFR Estimate   Date Value Ref Range Status   08/24/2022 78 >60 mL/min/1.73m2 Final     Comment:     Effective December 21, 2021 eGFRcr in adults is calculated using the 2021 CKD-EPI creatinine equation which includes age and gender (Jodie et al., [facility], DOI: 10.1056/ALLExq3837857)   09/14/2019 54 (L) >60 mL/min/1.73m2 Final     Calcium   Date Value Ref Range Status   08/24/2022 9.0 8.6 - 10.0 mg/dL Final     Assessment/plan:      ICD-10-CM    1. Cellulitis of left lower extremity  L03.116  Keflex 500 mg p.o. 3 times daily x7 days.  Patient previously on doxycycline.  Lower extremity ulcers treat topically with Calmoseptine and nonadherent dressing change daily.  Continue Tubigrip.  Encourage elevation.  Follow-up CBC in the a.m.   2. Type 2 diabetes mellitus with hyperglycemia, with long-term current use of insulin (H)  E11.65  Satisfactory.    Z79.4    3. Iron deficiency anemia due to chronic blood loss  D50.0  Continue supplement.  Hemoglobin 9.9.   4. History of deep venous thrombosis  Z86.718  Continue Xarelto.   5. Other chronic pain  G89.29  Continue baclofen, topiramate and MS Contin.   6. Lower extremity edema  R60.0  Encourage elevation.  Continue with Tubigrip.  Also on torsemide.       This note has been dictated using voice recognition software. Any grammatical or context distortions are unintentional and inherent to the software    Electronically signed by: Tonya  Orlin, CNP

## 2022-09-08 ENCOUNTER — TRANSITIONAL CARE UNIT VISIT (OUTPATIENT)
Dept: GERIATRICS | Facility: CLINIC | Age: 57
End: 2022-09-08
Payer: MEDICARE

## 2022-09-08 VITALS
TEMPERATURE: 97.2 F | BODY MASS INDEX: 29.22 KG/M2 | RESPIRATION RATE: 16 BRPM | OXYGEN SATURATION: 96 % | HEIGHT: 66 IN | WEIGHT: 181.8 LBS | SYSTOLIC BLOOD PRESSURE: 97 MMHG | DIASTOLIC BLOOD PRESSURE: 68 MMHG | HEART RATE: 84 BPM

## 2022-09-08 DIAGNOSIS — R60.0 LOWER EXTREMITY EDEMA: ICD-10-CM

## 2022-09-08 DIAGNOSIS — Z86.718 HISTORY OF DEEP VENOUS THROMBOSIS: ICD-10-CM

## 2022-09-08 DIAGNOSIS — D50.0 IRON DEFICIENCY ANEMIA DUE TO CHRONIC BLOOD LOSS: ICD-10-CM

## 2022-09-08 DIAGNOSIS — E11.65 TYPE 2 DIABETES MELLITUS WITH HYPERGLYCEMIA, WITH LONG-TERM CURRENT USE OF INSULIN (H): ICD-10-CM

## 2022-09-08 DIAGNOSIS — L03.116 CELLULITIS OF LEFT LOWER EXTREMITY: Primary | ICD-10-CM

## 2022-09-08 DIAGNOSIS — Z79.4 TYPE 2 DIABETES MELLITUS WITH HYPERGLYCEMIA, WITH LONG-TERM CURRENT USE OF INSULIN (H): ICD-10-CM

## 2022-09-08 DIAGNOSIS — G89.29 OTHER CHRONIC PAIN: ICD-10-CM

## 2022-09-08 PROCEDURE — 99309 SBSQ NF CARE MODERATE MDM 30: CPT | Performed by: NURSE PRACTITIONER

## 2022-09-08 NOTE — LETTER
9/8/2022        RE: Tonya Valera  [address]  [facility] [address]        [facility]    Code Status:  FULL CODE   Visit Type:   Chief Complaint   Patient presents with     RECHECK     Facility:  [facility] ([facility]) [[id number]]           Transitional Care Course: Tonya Valera is a 57 year old female who I am seeing today for follow-up on the TCU.  Patient recently hospitalized on 8/15/2022 secondary to fall.  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  Patient had several falls within 24 hours.  Work-up negative.  She sustained multiple contusions to the face and around the right eye as well as right shoulder arms and hip.  Her fall was thought to be due to oversedation from polypharmacy.  Patient chronically on baclofen, hydroxyzine, topiramate, Wellbutrin, MS Contin and extended release morphine.  She underwent titration of her baclofen, hydroxyzine and topiramate.    Today patient sitting up in bedside chair.  Patient recently seen and is currently being treated for left lower extremity cellulitis.  She has 3-4 small open areas on the left lower extremity.  Daily dressings were ordered however patient continues in dressing from 2 days prior.  Dressing was removed.  Areas appear to be improving.  Redness slightly less on today's visit.  No warmth.  She does continue with 1+ lower extremity edema however overall improved.  She continues in Tubigrip for compression.  She is also on diuretic.  Chronic pain syndrome on morphine.  I have attempted to reduce however she is using her as needed dosing.  She continues on baclofen.  She has a history of polyneuropathy.  Diabetes mellitus type 2.  Blood sugars appear satisfactory.  History of DVT on chronic  anticoagulation.    Active Ambulatory Problems     Diagnosis Date Noted     Cellulitis of right lower extremity 11/22/2021     Failure of outpatient treatment 11/22/2021     Type 2 diabetes mellitus with hyperglycemia, with long-term current use of insulin (H) 11/30/2021     Deep vein thrombosis (DVT) of proximal lower extremity (H) 11/30/2021     Falls frequently 08/15/2022     Contusion of face, initial encounter 08/15/2022     Continuous opioid dependence (H) 08/22/2022     Other chronic pain 08/22/2022     Borderline personality disorder (H) 08/22/2022     Resolved Ambulatory Problems     Diagnosis Date Noted     No Resolved Ambulatory Problems     Past Medical History:   Diagnosis Date     Anxiety      Cellulitis      Chronic pain      DVT (deep venous thrombosis) (H)      Gastroesophageal reflux disease without esophagitis      HLD (hyperlipidemia)      Hypothyroidism      MDD (major depressive disorder)      Paraparesis of both lower limbs (H)      PVD (peripheral vascular disease) (H)      Spinal abscess (H)      T2DM (type 2 diabetes mellitus) (H)      Uncomplicated opioid dependence (H)      Allergies   Allergen Reactions     Compazine [Prochlorperazine]      Latex      Latex sensitive      Metformin      Statins [Hmg-Coa-R Inhibitors]      Sulfa Drugs Itching       All Meds and Allergies reviewed in the record at the facility and is the most up-to-date.      Current Outpatient Medications   Medication Sig     acetaminophen (TYLENOL) 325 MG tablet Take 650 mg by mouth every 4 hours as needed for mild pain     Ascorbic Acid (VITAMIN C GUMMIES PO) Take 1 chew tab by mouth daily     aspirin (ASA) 81 MG chewable tablet Take 81 mg by mouth daily     bacitracin 500 UNIT/GM OINT Apply topically every 8 hours as needed for wound care     baclofen (LIORESAL) 20 MG tablet Take 0.5 tablets (10 mg) by mouth 3 times daily     bisacodyl (DULCOLAX) 10 MG suppository Place 10 mg rectally daily as needed for constipation      buPROPion (WELLBUTRIN SR) 150 MG 12 hr tablet Take 150 mg by mouth daily     calcium carbonate (TUMS) 500 MG chewable tablet Take 2 chew tab by mouth every hour as needed for heartburn     calcium carbonate 600 mg-vitamin D 400 units (CALTRATE) 600-400 MG-UNIT per tablet Take 1 tablet by mouth daily     cephALEXin (KEFLEX) 500 MG capsule Take 500 mg by mouth 3 times daily     cholecalciferol 50 MCG (2000 UT) tablet Take 1 tablet by mouth every other day     clotrimazole (LOTRIMIN) 1 % external cream Apply topically 2 times daily     emollient (VANICREAM) external cream Apply topically 2 times daily as needed for other (to legs for dry skin)     ferrous sulfate (FEROSUL) 325 (65 Fe) MG tablet Take 1 tablet (325 mg) by mouth every other day     insulin aspart (NOVOLOG PEN) 100 UNIT/ML pen Inject 1-7 Units Subcutaneous 3 times daily (before meals) Correction Scale - MEDIUM INSULIN RESISTANCE DOSING   Do Not give Correction Insulin if Pre-Meal BG less than 140. For Pre-Meal  - 189 give 1 unit. For Pre-Meal  - 239 give 2 units. For Pre-Meal  - 289 give 3 units. For Pre-Meal  - 339 give 4 units. For Pre-Meal - 399 give 5 units. For Pre-Meal -449 give 6 units For Pre-Meal BG greater than or equal to 450 give 7 units. To be given with prandial insulin, and based on pre-meal blood glucose.  Notify provider if glucose greater than or equal to 350 mg/dL after administration of correction dose. If given at mealtime, administer within 30 minutes of start of meal     insulin degludec (TRESIBA) 100 UNIT/ML pen Inject 28 Units Subcutaneous daily (with dinner)     levothyroxine (SYNTHROID/LEVOTHROID) 125 MCG tablet Take 125 mcg by mouth See Admin Instructions Daily on Tuesday, Wednesday, Thursday, Saturday, and Sunday     levothyroxine (SYNTHROID/LEVOTHROID) 125 MCG tablet Take 187.5 mcg by mouth See Admin Instructions On Mondays, Fridays     lisinopril (ZESTRIL) 2.5 MG tablet Take 2.5 mg by  "mouth daily     loratadine (CLARITIN) 10 MG tablet Take 10 mg by mouth daily as needed for allergies     magnesium 250 MG tablet Take 1 tablet by mouth daily     menthol (COUGH DROP) 7 MG LOZG Take 1 lozenge by mouth every hour as needed for cough     morphine (MS CONTIN) 30 MG CR tablet Take 1 tablet (30 mg) by mouth 3 times daily     morphine (MSIR) 15 MG IR tablet Take 0.5 tablets (7.5 mg) by mouth every 8 hours as needed for pain     Multiple Vitamins-Minerals (MULTIVITAMIN GUMMIES WOMENS) CHEW Take 1 chew tab by mouth daily     omeprazole (PRILOSEC) 20 MG DR capsule Take 20 mg by mouth daily before breakfast     polyethylene glycol (MIRALAX) 17 GM/Dose powder Take 1 capful by mouth every other day     rivaroxaban ANTICOAGULANT (XARELTO) 20 MG TABS tablet Take 20 mg by mouth daily (with dinner)     rosuvastatin (CRESTOR) 20 MG tablet Take 20 mg by mouth At Bedtime     sennosides (SENOKOT) 8.6 MG tablet Take 2 tablets by mouth 2 times daily     SUMAtriptan (IMITREX) 100 MG tablet Take 100 mg by mouth every 12 hours as needed for migraine     topiramate ER (QUDEXY XR) 50 MG 24 hr capsule Take 1 capsule (50 mg) by mouth daily     torsemide (DEMADEX) 10 MG tablet Take 1 tablet (10 mg) by mouth daily as needed (for LE swelling as needed)     venlafaxine (EFFEXOR-XR) 150 MG 24 hr capsule Take 150 mg by mouth daily Take with 75 mg capsule for a total dose of 225 mg.     venlafaxine (EFFEXOR-XR) 75 MG 24 hr capsule Take 75 mg by mouth daily Take with 150 mg capsule for a total dose of 225 mg.     Zinc Oxide-Petrolatum 20-80 % CREA Externally apply topically every 8 hours as needed (incontinence)     No current facility-administered medications for this visit.       REVIEW OF SYSTEMS:   Review of Systems  10 point review of systems reviewed.  Pertinent positives in HPI.    PHYSICAL EXAMINATION:  Physical Exam     Vital signs: BP 97/68   Pulse 84   Temp 97.2  F (36.2  C)   Resp 16   Ht 1.676 m (5' 6\")   Wt 82.5 kg " (181 lb 12.8 oz)   SpO2 96%   BMI 29.34 kg/m    General: Awake, Alert, sitting up in bedside chair, conversant  HEENT:Pink conjunctiva, moist oral mucosa.  Contusion to right periorbital region improving.  Bruising to face subsiding.  NECK: Supple  CVS:  S1  S2, without murmur or gallop.   LUNG: Clear to auscultation, No wheezes, rales or rhonci.  BACK: No kyphosis of the thoracic spine  ABDOMEN: Soft, obese, with positive bowel sounds  EXTREMITIES: Moves both upper and lower extremities with diffuse weakness, 1+ pedal edema to bilateral lower extremities.  She does have some open areas to the left lower extremity.  Redness improved.  Warmth subsided.  NEUROLOGIC: Weakness to the lower extremities with spasticity.    Labs:  All labs reviewed in the nursing home record and [facility]   @  Lab Results   Component Value Date    WBC 7.9 08/24/2022     Lab Results   Component Value Date    RBC 4.05 08/24/2022     Lab Results   Component Value Date    HGB 9.9 08/24/2022     Lab Results   Component Value Date    HCT 33.1 08/24/2022     Lab Results   Component Value Date    MCV 82 08/24/2022     Lab Results   Component Value Date    MCH 24.4 08/24/2022     Lab Results   Component Value Date    MCHC 29.9 08/24/2022     Lab Results   Component Value Date    RDW 16.4 08/24/2022     Lab Results   Component Value Date     08/24/2022        @Last Comprehensive Metabolic Panel:  Sodium   Date Value Ref Range Status   08/24/2022 138 136 - 145 mmol/L Final     Potassium   Date Value Ref Range Status   08/24/2022 4.3 3.4 - 5.3 mmol/L Final   08/18/2022 3.8 3.5 - 5.0 mmol/L Final     Chloride   Date Value Ref Range Status   08/24/2022 103 98 - 107 mmol/L Final   08/16/2022 104 98 - 107 mmol/L Final     Carbon Dioxide (CO2)   Date Value Ref Range Status   08/24/2022 27 22 - 29 mmol/L Final   08/16/2022 34 (H) 22 - 31 mmol/L Final     Anion Gap   Date Value Ref Range Status   08/24/2022 8 7 - 15 mmol/L Final   08/16/2022 3 (L) 5 - 18  mmol/L Final     Glucose   Date Value Ref Range Status   08/24/2022 266 (H) 70 - 99 mg/dL Final   08/17/2022 137 (H) 70 - 125 mg/dL Final     GLUCOSE BY METER POCT   Date Value Ref Range Status   08/18/2022 85 70 - 99 mg/dL Final     Urea Nitrogen   Date Value Ref Range Status   08/24/2022 15.7 6.0 - 20.0 mg/dL Final   08/16/2022 13 8 - 22 mg/dL Final     Creatinine   Date Value Ref Range Status   08/24/2022 0.86 0.51 - 0.95 mg/dL Final     GFR Estimate   Date Value Ref Range Status   08/24/2022 78 >60 mL/min/1.73m2 Final     Comment:     Effective December 21, 2021 eGFRcr in adults is calculated using the 2021 CKD-EPI creatinine equation which includes age and gender (Jodie et al., NE, DOI: 10.1056/ICWNzs9857815)   09/14/2019 54 (L) >60 mL/min/1.73m2 Final     Calcium   Date Value Ref Range Status   08/24/2022 9.0 8.6 - 10.0 mg/dL Final     Assessment/plan:      ICD-10-CM    1. Cellulitis of left lower extremity  L03.116  Continue Keflex.  Continue topical treatment.  Encourage elevation.   2. Type 2 diabetes mellitus with hyperglycemia, with long-term current use of insulin (H)  E11.65  Blood sugar satisfactory.    Z79.4    3. Iron deficiency anemia due to chronic blood loss  D50.0  Continue supplement.  Hemoglobin 9.9.   4. History of deep venous thrombosis  Z86.718  On chronic anticoagulation with Xarelto.   5. Lower extremity edema  R60.0  Improving.  Continue Tubigrip.  Encourage elevation.  Continue torsemide.   6. Other chronic pain  G89.29  Continue baclofen and MS Contin.  Continue to wean from as needed.       This note has been dictated using voice recognition software. Any grammatical or context distortions are unintentional and inherent to the software    Electronically signed by: Tonya Ordaz CNP           Sincerely,        Tonya Ordaz, NP

## 2022-09-09 NOTE — PROGRESS NOTES
[facility] GERIATRIC SERVICES    Code Status:  FULL CODE   Visit Type:   Chief Complaint   Patient presents with     RECHECK     Facility:  [facility] ([facility]) [[id number]]           Transitional Care Course: Tonya Valera is a 57 year old female who I am seeing today for follow-up on the TCU.  Patient recently hospitalized on 8/15/2022 secondary to fall.  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  Patient had several falls within 24 hours.  Work-up negative.  She sustained multiple contusions to the face and around the right eye as well as right shoulder arms and hip.  Her fall was thought to be due to oversedation from polypharmacy.  Patient chronically on baclofen, hydroxyzine, topiramate, Wellbutrin, MS Contin and extended release morphine.  She underwent titration of her baclofen, hydroxyzine and topiramate.    Today patient sitting up in bedside chair.  Patient recently seen and is currently being treated for left lower extremity cellulitis.  She has 3-4 small open areas on the left lower extremity.  Daily dressings were ordered however patient continues in dressing from 2 days prior.  Dressing was removed.  Areas appear to be improving.  Redness slightly less on today's visit.  No warmth.  She does continue with 1+ lower extremity edema however overall improved.  She continues in Tubigrip for compression.  She is also on diuretic.  Chronic pain syndrome on morphine.  I have attempted to reduce however she is using her as needed dosing.  She continues on baclofen.  She has a history of polyneuropathy.  Diabetes mellitus type 2.  Blood sugars appear satisfactory.  History of DVT on chronic anticoagulation.    Active Ambulatory Problems     Diagnosis Date Noted     Cellulitis of right lower extremity  11/22/2021     Failure of outpatient treatment 11/22/2021     Type 2 diabetes mellitus with hyperglycemia, with long-term current use of insulin (H) 11/30/2021     Deep vein thrombosis (DVT) of proximal lower extremity (H) 11/30/2021     Falls frequently 08/15/2022     Contusion of face, initial encounter 08/15/2022     Continuous opioid dependence (H) 08/22/2022     Other chronic pain 08/22/2022     Borderline personality disorder (H) 08/22/2022     Resolved Ambulatory Problems     Diagnosis Date Noted     No Resolved Ambulatory Problems     Past Medical History:   Diagnosis Date     Anxiety      Cellulitis      Chronic pain      DVT (deep venous thrombosis) (H)      Gastroesophageal reflux disease without esophagitis      HLD (hyperlipidemia)      Hypothyroidism      MDD (major depressive disorder)      Paraparesis of both lower limbs (H)      PVD (peripheral vascular disease) (H)      Spinal abscess (H)      T2DM (type 2 diabetes mellitus) (H)      Uncomplicated opioid dependence (H)      Allergies   Allergen Reactions     Compazine [Prochlorperazine]      Latex      Latex sensitive      Metformin      Statins [Hmg-Coa-R Inhibitors]      Sulfa Drugs Itching       All Meds and Allergies reviewed in the record at the facility and is the most up-to-date.      Current Outpatient Medications   Medication Sig     acetaminophen (TYLENOL) 325 MG tablet Take 650 mg by mouth every 4 hours as needed for mild pain     Ascorbic Acid (VITAMIN C GUMMIES PO) Take 1 chew tab by mouth daily     aspirin (ASA) 81 MG chewable tablet Take 81 mg by mouth daily     bacitracin 500 UNIT/GM OINT Apply topically every 8 hours as needed for wound care     baclofen (LIORESAL) 20 MG tablet Take 0.5 tablets (10 mg) by mouth 3 times daily     bisacodyl (DULCOLAX) 10 MG suppository Place 10 mg rectally daily as needed for constipation     buPROPion (WELLBUTRIN SR) 150 MG 12 hr tablet Take 150 mg by mouth daily     calcium carbonate (TUMS) 500 MG  chewable tablet Take 2 chew tab by mouth every hour as needed for heartburn     calcium carbonate 600 mg-vitamin D 400 units (CALTRATE) 600-400 MG-UNIT per tablet Take 1 tablet by mouth daily     cephALEXin (KEFLEX) 500 MG capsule Take 500 mg by mouth 3 times daily     cholecalciferol 50 MCG (2000 UT) tablet Take 1 tablet by mouth every other day     clotrimazole (LOTRIMIN) 1 % external cream Apply topically 2 times daily     emollient (VANICREAM) external cream Apply topically 2 times daily as needed for other (to legs for dry skin)     ferrous sulfate (FEROSUL) 325 (65 Fe) MG tablet Take 1 tablet (325 mg) by mouth every other day     insulin aspart (NOVOLOG PEN) 100 UNIT/ML pen Inject 1-7 Units Subcutaneous 3 times daily (before meals) Correction Scale - MEDIUM INSULIN RESISTANCE DOSING   Do Not give Correction Insulin if Pre-Meal BG less than 140. For Pre-Meal  - 189 give 1 unit. For Pre-Meal  - 239 give 2 units. For Pre-Meal  - 289 give 3 units. For Pre-Meal  - 339 give 4 units. For Pre-Meal - 399 give 5 units. For Pre-Meal -449 give 6 units For Pre-Meal BG greater than or equal to 450 give 7 units. To be given with prandial insulin, and based on pre-meal blood glucose.  Notify provider if glucose greater than or equal to 350 mg/dL after administration of correction dose. If given at mealtime, administer within 30 minutes of start of meal     insulin degludec (TRESIBA) 100 UNIT/ML pen Inject 28 Units Subcutaneous daily (with dinner)     levothyroxine (SYNTHROID/LEVOTHROID) 125 MCG tablet Take 125 mcg by mouth See Admin Instructions Daily on Tuesday, Wednesday, Thursday, Saturday, and Sunday     levothyroxine (SYNTHROID/LEVOTHROID) 125 MCG tablet Take 187.5 mcg by mouth See Admin Instructions On Mondays, Fridays     lisinopril (ZESTRIL) 2.5 MG tablet Take 2.5 mg by mouth daily     loratadine (CLARITIN) 10 MG tablet Take 10 mg by mouth daily as needed for allergies     magnesium  "250 MG tablet Take 1 tablet by mouth daily     menthol (COUGH DROP) 7 MG LOZG Take 1 lozenge by mouth every hour as needed for cough     morphine (MS CONTIN) 30 MG CR tablet Take 1 tablet (30 mg) by mouth 3 times daily     morphine (MSIR) 15 MG IR tablet Take 0.5 tablets (7.5 mg) by mouth every 8 hours as needed for pain     Multiple Vitamins-Minerals (MULTIVITAMIN GUMMIES WOMENS) CHEW Take 1 chew tab by mouth daily     omeprazole (PRILOSEC) 20 MG DR capsule Take 20 mg by mouth daily before breakfast     polyethylene glycol (MIRALAX) 17 GM/Dose powder Take 1 capful by mouth every other day     rivaroxaban ANTICOAGULANT (XARELTO) 20 MG TABS tablet Take 20 mg by mouth daily (with dinner)     rosuvastatin (CRESTOR) 20 MG tablet Take 20 mg by mouth At Bedtime     sennosides (SENOKOT) 8.6 MG tablet Take 2 tablets by mouth 2 times daily     SUMAtriptan (IMITREX) 100 MG tablet Take 100 mg by mouth every 12 hours as needed for migraine     topiramate ER (QUDEXY XR) 50 MG 24 hr capsule Take 1 capsule (50 mg) by mouth daily     torsemide (DEMADEX) 10 MG tablet Take 1 tablet (10 mg) by mouth daily as needed (for LE swelling as needed)     venlafaxine (EFFEXOR-XR) 150 MG 24 hr capsule Take 150 mg by mouth daily Take with 75 mg capsule for a total dose of 225 mg.     venlafaxine (EFFEXOR-XR) 75 MG 24 hr capsule Take 75 mg by mouth daily Take with 150 mg capsule for a total dose of 225 mg.     Zinc Oxide-Petrolatum 20-80 % CREA Externally apply topically every 8 hours as needed (incontinence)     No current facility-administered medications for this visit.       REVIEW OF SYSTEMS:   Review of Systems  10 point review of systems reviewed.  Pertinent positives in HPI.    PHYSICAL EXAMINATION:  Physical Exam     Vital signs: BP 97/68   Pulse 84   Temp 97.2  F (36.2  C)   Resp 16   Ht 1.676 m (5' 6\")   Wt 82.5 kg (181 lb 12.8 oz)   SpO2 96%   BMI 29.34 kg/m    General: Awake, Alert, sitting up in bedside chair, " conversant  HEENT:Pink conjunctiva, moist oral mucosa.  Contusion to right periorbital region improving.  Bruising to face subsiding.  NECK: Supple  CVS:  S1  S2, without murmur or gallop.   LUNG: Clear to auscultation, No wheezes, rales or rhonci.  BACK: No kyphosis of the thoracic spine  ABDOMEN: Soft, obese, with positive bowel sounds  EXTREMITIES: Moves both upper and lower extremities with diffuse weakness, 1+ pedal edema to bilateral lower extremities.  She does have some open areas to the left lower extremity.  Redness improved.  Warmth subsided.  NEUROLOGIC: Weakness to the lower extremities with spasticity.    Labs:  All labs reviewed in the nursing home record and Epic   @  Lab Results   Component Value Date    WBC 7.9 08/24/2022     Lab Results   Component Value Date    RBC 4.05 08/24/2022     Lab Results   Component Value Date    HGB 9.9 08/24/2022     Lab Results   Component Value Date    HCT 33.1 08/24/2022     Lab Results   Component Value Date    MCV 82 08/24/2022     Lab Results   Component Value Date    MCH 24.4 08/24/2022     Lab Results   Component Value Date    MCHC 29.9 08/24/2022     Lab Results   Component Value Date    RDW 16.4 08/24/2022     Lab Results   Component Value Date     08/24/2022        @Last Comprehensive Metabolic Panel:  Sodium   Date Value Ref Range Status   08/24/2022 138 136 - 145 mmol/L Final     Potassium   Date Value Ref Range Status   08/24/2022 4.3 3.4 - 5.3 mmol/L Final   08/18/2022 3.8 3.5 - 5.0 mmol/L Final     Chloride   Date Value Ref Range Status   08/24/2022 103 98 - 107 mmol/L Final   08/16/2022 104 98 - 107 mmol/L Final     Carbon Dioxide (CO2)   Date Value Ref Range Status   08/24/2022 27 22 - 29 mmol/L Final   08/16/2022 34 (H) 22 - 31 mmol/L Final     Anion Gap   Date Value Ref Range Status   08/24/2022 8 7 - 15 mmol/L Final   08/16/2022 3 (L) 5 - 18 mmol/L Final     Glucose   Date Value Ref Range Status   08/24/2022 266 (H) 70 - 99 mg/dL Final    08/17/2022 137 (H) 70 - 125 mg/dL Final     GLUCOSE BY METER POCT   Date Value Ref Range Status   08/18/2022 85 70 - 99 mg/dL Final     Urea Nitrogen   Date Value Ref Range Status   08/24/2022 15.7 6.0 - 20.0 mg/dL Final   08/16/2022 13 8 - 22 mg/dL Final     Creatinine   Date Value Ref Range Status   08/24/2022 0.86 0.51 - 0.95 mg/dL Final     GFR Estimate   Date Value Ref Range Status   08/24/2022 78 >60 mL/min/1.73m2 Final     Comment:     Effective December 21, 2021 eGFRcr in adults is calculated using the 2021 CKD-EPI creatinine equation which includes age and gender (Jodie et al., NEJ, DOI: 10.1056/YNWAik6857066)   09/14/2019 54 (L) >60 mL/min/1.73m2 Final     Calcium   Date Value Ref Range Status   08/24/2022 9.0 8.6 - 10.0 mg/dL Final     Assessment/plan:      ICD-10-CM    1. Cellulitis of left lower extremity  L03.116  Continue Keflex.  Continue topical treatment.  Encourage elevation.   2. Type 2 diabetes mellitus with hyperglycemia, with long-term current use of insulin (H)  E11.65  Blood sugar satisfactory.    Z79.4    3. Iron deficiency anemia due to chronic blood loss  D50.0  Continue supplement.  Hemoglobin 9.9.   4. History of deep venous thrombosis  Z86.718  On chronic anticoagulation with Xarelto.   5. Lower extremity edema  R60.0  Improving.  Continue Tubigrip.  Encourage elevation.  Continue torsemide.   6. Other chronic pain  G89.29  Continue baclofen and MS Contin.  Continue to wean from as needed.       This note has been dictated using voice recognition software. Any grammatical or context distortions are unintentional and inherent to the software    Electronically signed by: Tonya Ordaz, CNP

## 2022-09-13 ENCOUNTER — TRANSITIONAL CARE UNIT VISIT (OUTPATIENT)
Dept: GERIATRICS | Facility: CLINIC | Age: 57
End: 2022-09-13
Payer: MEDICARE

## 2022-09-13 VITALS
DIASTOLIC BLOOD PRESSURE: 60 MMHG | RESPIRATION RATE: 16 BRPM | OXYGEN SATURATION: 93 % | HEIGHT: 63 IN | HEART RATE: 88 BPM | TEMPERATURE: 98 F | BODY MASS INDEX: 32.25 KG/M2 | SYSTOLIC BLOOD PRESSURE: 94 MMHG | WEIGHT: 182 LBS

## 2022-09-13 DIAGNOSIS — L03.116 CELLULITIS OF LEFT LOWER EXTREMITY: Primary | ICD-10-CM

## 2022-09-13 DIAGNOSIS — G89.29 OTHER CHRONIC PAIN: ICD-10-CM

## 2022-09-13 DIAGNOSIS — Z86.718 HISTORY OF DEEP VENOUS THROMBOSIS: ICD-10-CM

## 2022-09-13 DIAGNOSIS — D50.0 IRON DEFICIENCY ANEMIA DUE TO CHRONIC BLOOD LOSS: ICD-10-CM

## 2022-09-13 DIAGNOSIS — Z79.4 TYPE 2 DIABETES MELLITUS WITH HYPERGLYCEMIA, WITH LONG-TERM CURRENT USE OF INSULIN (H): ICD-10-CM

## 2022-09-13 DIAGNOSIS — E11.65 TYPE 2 DIABETES MELLITUS WITH HYPERGLYCEMIA, WITH LONG-TERM CURRENT USE OF INSULIN (H): ICD-10-CM

## 2022-09-13 PROCEDURE — 99316 NF DSCHRG MGMT 30 MIN+: CPT | Performed by: NURSE PRACTITIONER

## 2022-09-13 RX ORDER — DOXYCYCLINE 100 MG/1
100 CAPSULE ORAL DAILY
COMMUNITY

## 2022-09-13 RX ORDER — MORPHINE SULFATE 30 MG/1
30 TABLET, FILM COATED, EXTENDED RELEASE ORAL
Qty: 90 TABLET | Refills: 0 | Status: SHIPPED | OUTPATIENT
Start: 2022-09-13

## 2022-09-13 NOTE — LETTER
9/13/2022        RE: Tonya Valera  [address]  [facility] [address]        [facility]    Code Status:  FULL CODE   Visit Type:   Chief Complaint   Patient presents with     Discharge Summary Nursing Home     Facility:  [facility] ([facility]) [[id number]]           Transitional Care Course: Tonya Valera is a 57 year old female who I am seeing today for for discharge from the TCU.  Patient recently hospitalized on 8/15/2022 secondary to fall.  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  Patient had several falls within 24 hours.  Work-up negative.  She sustained multiple contusions to the face and around the right eye as well as right shoulder arms and hip.  Her fall was thought to be due to oversedation from polypharmacy.  Patient chronically on baclofen, hydroxyzine, topiramate, Wellbutrin, MS Contin and extended release morphine.  She underwent titration of her baclofen, hydroxyzine and topiramate.    Today patient sitting up in bedside chair.  Patient with recent fall.  She did suffer contusion to the scalp however this is resolved.  Patient with a history of spinal abscess with partial paresis.  She continues with some neuropathy and lower extremity weakness.  She is chronically on doxycycline for the spinal abscess.  During her TCU stay she did develop lower extremity cellulitis due to multiple open areas.  She was treated briefly with Keflex.  She has completed her Keflex course.  Wounds have now healed.  Redness has resolved.  She does have trace lower extremity edema and continues in Tubigrip.  Chronic pain syndrome opioid dependence.  She continues on baclofen and morphine.  She is using occasional as needed morphine as well for breakthrough pain.  Baclofen  decreased during hospitalization.  Pain well controlled.  Patient is awaiting a clear waiver.  She is trying to move out of her current assisted living and moved to a different assisted living.  She would like to be in [place] near her mother.  Diabetes mellitus type 2.  Blood sugars appear satisfactory.  History of DVT on chronic anticoagulation.     Active Ambulatory Problems     Diagnosis Date Noted     Cellulitis of right lower extremity 11/22/2021     Failure of outpatient treatment 11/22/2021     Type 2 diabetes mellitus with hyperglycemia, with long-term current use of insulin (H) 11/30/2021     Deep vein thrombosis (DVT) of proximal lower extremity (H) 11/30/2021     Falls frequently 08/15/2022     Contusion of face, initial encounter 08/15/2022     Continuous opioid dependence (H) 08/22/2022     Other chronic pain 08/22/2022     Borderline personality disorder (H) 08/22/2022     Resolved Ambulatory Problems     Diagnosis Date Noted     No Resolved Ambulatory Problems     Past Medical History:   Diagnosis Date     Anxiety      Cellulitis      Chronic pain      DVT (deep venous thrombosis) (H)      Gastroesophageal reflux disease without esophagitis      HLD (hyperlipidemia)      Hypothyroidism      MDD (major depressive disorder)      Paraparesis of both lower limbs (H)      PVD (peripheral vascular disease) (H)      Spinal abscess (H)      T2DM (type 2 diabetes mellitus) (H)      Uncomplicated opioid dependence (H)      Allergies   Allergen Reactions     Compazine [Prochlorperazine]      Latex      Latex sensitive      Metformin      Statins [Hmg-Coa-R Inhibitors]      Sulfa Drugs Itching       All Meds and Allergies reviewed in the record at the facility and is the most up-to-date.      Current Outpatient Medications   Medication Sig     doxycycline hyclate (VIBRAMYCIN) 100 MG capsule Take 100 mg by mouth daily     acetaminophen (TYLENOL) 325 MG tablet Take 650 mg by mouth every 4 hours as needed for  mild pain     Ascorbic Acid (VITAMIN C GUMMIES PO) Take 1 chew tab by mouth daily     aspirin (ASA) 81 MG chewable tablet Take 81 mg by mouth daily     bacitracin 500 UNIT/GM OINT Apply topically every 8 hours as needed for wound care     baclofen (LIORESAL) 20 MG tablet Take 0.5 tablets (10 mg) by mouth 3 times daily     bisacodyl (DULCOLAX) 10 MG suppository Place 10 mg rectally daily as needed for constipation     buPROPion (WELLBUTRIN SR) 150 MG 12 hr tablet Take 150 mg by mouth daily     calcium carbonate (TUMS) 500 MG chewable tablet Take 2 chew tab by mouth every hour as needed for heartburn     calcium carbonate 600 mg-vitamin D 400 units (CALTRATE) 600-400 MG-UNIT per tablet Take 1 tablet by mouth daily     cholecalciferol 50 MCG (2000 UT) tablet Take 1 tablet by mouth every other day     clotrimazole (LOTRIMIN) 1 % external cream Apply topically 2 times daily     emollient (VANICREAM) external cream Apply topically 2 times daily as needed for other (to legs for dry skin)     ferrous sulfate (FEROSUL) 325 (65 Fe) MG tablet Take 1 tablet (325 mg) by mouth every other day     insulin aspart (NOVOLOG PEN) 100 UNIT/ML pen Inject 1-7 Units Subcutaneous 3 times daily (before meals) Correction Scale - MEDIUM INSULIN RESISTANCE DOSING   Do Not give Correction Insulin if Pre-Meal BG less than 140. For Pre-Meal  - 189 give 1 unit. For Pre-Meal  - 239 give 2 units. For Pre-Meal  - 289 give 3 units. For Pre-Meal  - 339 give 4 units. For Pre-Meal - 399 give 5 units. For Pre-Meal -449 give 6 units For Pre-Meal BG greater than or equal to 450 give 7 units. To be given with prandial insulin, and based on pre-meal blood glucose.  Notify provider if glucose greater than or equal to 350 mg/dL after administration of correction dose. If given at mealtime, administer within 30 minutes of start of meal     insulin degludec (TRESIBA) 100 UNIT/ML pen Inject 28 Units Subcutaneous daily (with  dinner)     levothyroxine (SYNTHROID/LEVOTHROID) 125 MCG tablet Take 125 mcg by mouth See Admin Instructions Daily on Tuesday, Wednesday, Thursday, Saturday, and Sunday     levothyroxine (SYNTHROID/LEVOTHROID) 125 MCG tablet Take 187.5 mcg by mouth See Admin Instructions On Mondays, Fridays     lisinopril (ZESTRIL) 2.5 MG tablet Take 2.5 mg by mouth daily     loratadine (CLARITIN) 10 MG tablet Take 10 mg by mouth daily as needed for allergies     magnesium 250 MG tablet Take 1 tablet by mouth daily     menthol (COUGH DROP) 7 MG LOZG Take 1 lozenge by mouth every hour as needed for cough     morphine (MS CONTIN) 30 MG CR tablet Take 1 tablet (30 mg) by mouth 3 times daily     morphine (MSIR) 15 MG IR tablet Take 0.5 tablets (7.5 mg) by mouth every 8 hours as needed for pain     Multiple Vitamins-Minerals (MULTIVITAMIN GUMMIES WOMENS) CHEW Take 1 chew tab by mouth daily     omeprazole (PRILOSEC) 20 MG DR capsule Take 20 mg by mouth daily before breakfast     polyethylene glycol (MIRALAX) 17 GM/Dose powder Take 1 capful by mouth every other day     rivaroxaban ANTICOAGULANT (XARELTO) 20 MG TABS tablet Take 20 mg by mouth daily (with dinner)     rosuvastatin (CRESTOR) 20 MG tablet Take 20 mg by mouth At Bedtime     sennosides (SENOKOT) 8.6 MG tablet Take 2 tablets by mouth 2 times daily     SUMAtriptan (IMITREX) 100 MG tablet Take 100 mg by mouth every 12 hours as needed for migraine     topiramate ER (QUDEXY XR) 50 MG 24 hr capsule Take 1 capsule (50 mg) by mouth daily     torsemide (DEMADEX) 10 MG tablet Take 1 tablet (10 mg) by mouth daily as needed (for LE swelling as needed)     venlafaxine (EFFEXOR-XR) 150 MG 24 hr capsule Take 150 mg by mouth daily Take with 75 mg capsule for a total dose of 225 mg.     venlafaxine (EFFEXOR-XR) 75 MG 24 hr capsule Take 75 mg by mouth daily Take with 150 mg capsule for a total dose of 225 mg.     Zinc Oxide-Petrolatum 20-80 % CREA Externally apply topically every 8 hours as needed  "(incontinence)     No current facility-administered medications for this visit.       REVIEW OF SYSTEMS:   Review of Systems  10 point review of systems reviewed.  Pertinent positives in HPI.    PHYSICAL EXAMINATION:  Physical Exam     Vital signs: BP 94/60   Pulse 88   Temp 98  F (36.7  C)   Resp 16   Ht 1.6 m (5' 3\")   Wt 82.6 kg (182 lb)   SpO2 93%   BMI 32.24 kg/m    General: Awake, Alert, sitting up in bedside chair, conversant  HEENT:Pink conjunctiva, moist oral mucosa.  Contusion to right periorbital region resolved.   NECK: Supple  CVS:  S1  S2, without murmur or gallop.   LUNG: Clear to auscultation, No wheezes, rales or rhonci.  BACK: No kyphosis of the thoracic spine  ABDOMEN: Soft, obese, with positive bowel sounds  EXTREMITIES: Moves both upper and lower extremities with diffuse weakness, 1+ pedal edema to bilateral lower extremities.  Wounds to left lower extremity now resolved.  Redness resolved.  Continues in Tubigrip.  NEUROLOGIC: Weakness to the lower extremities with spasticity.    Labs:  All labs reviewed in the nursing home record and Epic   @  Lab Results   Component Value Date    WBC 7.9 08/24/2022     Lab Results   Component Value Date    RBC 4.05 08/24/2022     Lab Results   Component Value Date    HGB 9.9 08/24/2022     Lab Results   Component Value Date    HCT 33.1 08/24/2022     Lab Results   Component Value Date    MCV 82 08/24/2022     Lab Results   Component Value Date    MCH 24.4 08/24/2022     Lab Results   Component Value Date    MCHC 29.9 08/24/2022     Lab Results   Component Value Date    RDW 16.4 08/24/2022     Lab Results   Component Value Date     08/24/2022        @Last Comprehensive Metabolic Panel:  Sodium   Date Value Ref Range Status   08/24/2022 138 136 - 145 mmol/L Final     Potassium   Date Value Ref Range Status   08/24/2022 4.3 3.4 - 5.3 mmol/L Final   08/18/2022 3.8 3.5 - 5.0 mmol/L Final     Chloride   Date Value Ref Range Status   08/24/2022 103 98 - " 107 mmol/L Final   08/16/2022 104 98 - 107 mmol/L Final     Carbon Dioxide (CO2)   Date Value Ref Range Status   08/24/2022 27 22 - 29 mmol/L Final   08/16/2022 34 (H) 22 - 31 mmol/L Final     Anion Gap   Date Value Ref Range Status   08/24/2022 8 7 - 15 mmol/L Final   08/16/2022 3 (L) 5 - 18 mmol/L Final     Glucose   Date Value Ref Range Status   08/24/2022 266 (H) 70 - 99 mg/dL Final   08/17/2022 137 (H) 70 - 125 mg/dL Final     GLUCOSE BY METER POCT   Date Value Ref Range Status   08/18/2022 85 70 - 99 mg/dL Final     Urea Nitrogen   Date Value Ref Range Status   08/24/2022 15.7 6.0 - 20.0 mg/dL Final   08/16/2022 13 8 - 22 mg/dL Final     Creatinine   Date Value Ref Range Status   08/24/2022 0.86 0.51 - 0.95 mg/dL Final     GFR Estimate   Date Value Ref Range Status   08/24/2022 78 >60 mL/min/1.73m2 Final     Comment:     Effective December 21, 2021 eGFRcr in adults is calculated using the 2021 CKD-EPI creatinine equation which includes age and gender (Jodie et al., NEJ, DOI: 10.1056/IADZfy6334335)   09/14/2019 54 (L) >60 mL/min/1.73m2 Final     Calcium   Date Value Ref Range Status   08/24/2022 9.0 8.6 - 10.0 mg/dL Final     Assessment/plan:      ICD-10-CM    1. Cellulitis of left lower extremity  L03.116  Keflex complete.  Wounds resolved.  Redness resolved.  She has been resumed on her doxycycline for chronic spinal abscess.   2. Type 2 diabetes mellitus with hyperglycemia, with long-term current use of insulin (H)  E11.65  Continue long-acting insulin and sliding scale.  Blood sugars improved.    Z79.4     3. Iron deficiency anemia due to chronic blood loss  D50.0 Hemoglobin stable at 9.2.  Continue iron supplement.   4. History of deep venous thrombosis  Z86.718  Continue chronic anticoagulation.   5. Other chronic pain  G89.29  Continue baclofen and MS Contin.  We will only send with 15 tabs.  This is a chronic med and she has more med at home.           ICD-10-CM    1. Cellulitis of left lower extremity   L03.116  Continue Keflex.  Continue topical treatment.  Encourage elevation.   2. Type 2 diabetes mellitus with hyperglycemia, with long-term current use of insulin (H)  E11.65  Blood sugar satisfactory.    Z79.4    3. Iron deficiency anemia due to chronic blood loss  D50.0  Continue supplement.  Hemoglobin 9.9.   4. History of deep venous thrombosis  Z86.718  On chronic anticoagulation with Xarelto.   5. Lower extremity edema  R60.0  Improving.  Continue Tubigrip.  Encourage elevation.  Continue torsemide.   6. Other chronic pain  G89.29  Continue baclofen and MS Contin.  Continue to wean from as needed.       Okay to [place] home with current meds and treatments.  Only sent with 15 tabs of MS Contin.  This is a chronic med.  Home PT, OT, home health aide and RN for medication management.  Follow-up with primary care provider in 1 week.    DISCHARGE PLAN/FACE TO FACE:  I certify that this patient is under my care and that I, or a nurse practitioner or physician's assistant working with me, had a face-to-face encounter that meets the physician face-to-face encounter requirements with this patient.       I certify that, based on my findings, the following services are medically necessary home health services.    My clinical findings support the need for the above skilled services.    This patient is homebound because: Recent fall with contusion to the face.    The patient is, or has been, under my care and I have initiated the establishment of the plan of care. This patient will be followed by a physician who will periodically review the plan of care.    Total time spent for this visit was 35 minutes were greater than 50% of time spent face-to-face patient reviewing discharge medications, home care services and follow-ups.    This note has been dictated using voice recognition software. Any grammatical or context distortions are unintentional and inherent to the software    Electronically signed by: Tonya Ordaz CNP            Sincerely,        Tonya Ordaz NP

## 2022-09-14 ENCOUNTER — TELEPHONE (OUTPATIENT)
Dept: GERIATRICS | Facility: CLINIC | Age: 57
End: 2022-09-14

## 2022-09-14 NOTE — TELEPHONE ENCOUNTER
Central Prior Authorization Team   Phone: [phone]      SUMAtriptan (IMITREX) 100 MG tablet IS NOT ON THE PATIENTS MED LIST. ALL I SEE IS ONE THAT IS PUT IN HISTORICAL (PATIENT REPORTED). TO PURSE A PA I WOULD NEED TO KNOW QUANTITY, DIRECTIONS, DIAGNOSIS CODE, AND PROVIDER.    PLEASE ROUTE BACK TO ME WHEN THIS HAS BEEN FINISHED

## 2022-09-14 NOTE — PROGRESS NOTES
[facility] GERIATRIC SERVICES    Code Status:  FULL CODE   Visit Type:   Chief Complaint   Patient presents with     Discharge Summary Nursing Home     Facility:  [facility] ([facility]) [[id number]]           Transitional Care Course: Tonya Valera is a 57 year old female who I am seeing today for for discharge from the TCU.  Patient recently hospitalized on 8/15/2022 secondary to fall.  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  Patient had several falls within 24 hours.  Work-up negative.  She sustained multiple contusions to the face and around the right eye as well as right shoulder arms and hip.  Her fall was thought to be due to oversedation from polypharmacy.  Patient chronically on baclofen, hydroxyzine, topiramate, Wellbutrin, MS Contin and extended release morphine.  She underwent titration of her baclofen, hydroxyzine and topiramate.    Today patient sitting up in bedside chair.  Patient with recent fall.  She did suffer contusion to the scalp however this is resolved.  Patient with a history of spinal abscess with partial paresis.  She continues with some neuropathy and lower extremity weakness.  She is chronically on doxycycline for the spinal abscess.  During her TCU stay she did develop lower extremity cellulitis due to multiple open areas.  She was treated briefly with Keflex.  She has completed her Keflex course.  Wounds have now healed.  Redness has resolved.  She does have trace lower extremity edema and continues in Tubigrip.  Chronic pain syndrome opioid dependence.  She continues on baclofen and morphine.  She is using occasional as needed morphine as well for breakthrough pain.  Baclofen decreased during hospitalization.  Pain well controlled.  Patient is awaiting a clear waiver.  She  is trying to move out of her current assisted living and moved to a different assisted living.  She would like to be in [place] near her mother.  Diabetes mellitus type 2.  Blood sugars appear satisfactory.  History of DVT on chronic anticoagulation.     Active Ambulatory Problems     Diagnosis Date Noted     Cellulitis of right lower extremity 11/22/2021     Failure of outpatient treatment 11/22/2021     Type 2 diabetes mellitus with hyperglycemia, with long-term current use of insulin (H) 11/30/2021     Deep vein thrombosis (DVT) of proximal lower extremity (H) 11/30/2021     Falls frequently 08/15/2022     Contusion of face, initial encounter 08/15/2022     Continuous opioid dependence (H) 08/22/2022     Other chronic pain 08/22/2022     Borderline personality disorder (H) 08/22/2022     Resolved Ambulatory Problems     Diagnosis Date Noted     No Resolved Ambulatory Problems     Past Medical History:   Diagnosis Date     Anxiety      Cellulitis      Chronic pain      DVT (deep venous thrombosis) (H)      Gastroesophageal reflux disease without esophagitis      HLD (hyperlipidemia)      Hypothyroidism      MDD (major depressive disorder)      Paraparesis of both lower limbs (H)      PVD (peripheral vascular disease) (H)      Spinal abscess (H)      T2DM (type 2 diabetes mellitus) (H)      Uncomplicated opioid dependence (H)      Allergies   Allergen Reactions     Compazine [Prochlorperazine]      Latex      Latex sensitive      Metformin      Statins [Hmg-Coa-R Inhibitors]      Sulfa Drugs Itching       All Meds and Allergies reviewed in the record at the facility and is the most up-to-date.      Current Outpatient Medications   Medication Sig     doxycycline hyclate (VIBRAMYCIN) 100 MG capsule Take 100 mg by mouth daily     acetaminophen (TYLENOL) 325 MG tablet Take 650 mg by mouth every 4 hours as needed for mild pain     Ascorbic Acid (VITAMIN C GUMMIES PO) Take 1 chew tab by mouth daily     aspirin (ASA)  81 MG chewable tablet Take 81 mg by mouth daily     bacitracin 500 UNIT/GM OINT Apply topically every 8 hours as needed for wound care     baclofen (LIORESAL) 20 MG tablet Take 0.5 tablets (10 mg) by mouth 3 times daily     bisacodyl (DULCOLAX) 10 MG suppository Place 10 mg rectally daily as needed for constipation     buPROPion (WELLBUTRIN SR) 150 MG 12 hr tablet Take 150 mg by mouth daily     calcium carbonate (TUMS) 500 MG chewable tablet Take 2 chew tab by mouth every hour as needed for heartburn     calcium carbonate 600 mg-vitamin D 400 units (CALTRATE) 600-400 MG-UNIT per tablet Take 1 tablet by mouth daily     cholecalciferol 50 MCG (2000 UT) tablet Take 1 tablet by mouth every other day     clotrimazole (LOTRIMIN) 1 % external cream Apply topically 2 times daily     emollient (VANICREAM) external cream Apply topically 2 times daily as needed for other (to legs for dry skin)     ferrous sulfate (FEROSUL) 325 (65 Fe) MG tablet Take 1 tablet (325 mg) by mouth every other day     insulin aspart (NOVOLOG PEN) 100 UNIT/ML pen Inject 1-7 Units Subcutaneous 3 times daily (before meals) Correction Scale - MEDIUM INSULIN RESISTANCE DOSING   Do Not give Correction Insulin if Pre-Meal BG less than 140. For Pre-Meal  - 189 give 1 unit. For Pre-Meal  - 239 give 2 units. For Pre-Meal  - 289 give 3 units. For Pre-Meal  - 339 give 4 units. For Pre-Meal - 399 give 5 units. For Pre-Meal -449 give 6 units For Pre-Meal BG greater than or equal to 450 give 7 units. To be given with prandial insulin, and based on pre-meal blood glucose.  Notify provider if glucose greater than or equal to 350 mg/dL after administration of correction dose. If given at mealtime, administer within 30 minutes of start of meal     insulin degludec (TRESIBA) 100 UNIT/ML pen Inject 28 Units Subcutaneous daily (with dinner)     levothyroxine (SYNTHROID/LEVOTHROID) 125 MCG tablet Take 125 mcg by mouth See Admin  Instructions Daily on Tuesday, Wednesday, Thursday, Saturday, and Sunday     levothyroxine (SYNTHROID/LEVOTHROID) 125 MCG tablet Take 187.5 mcg by mouth See Admin Instructions On Mondays, Fridays     lisinopril (ZESTRIL) 2.5 MG tablet Take 2.5 mg by mouth daily     loratadine (CLARITIN) 10 MG tablet Take 10 mg by mouth daily as needed for allergies     magnesium 250 MG tablet Take 1 tablet by mouth daily     menthol (COUGH DROP) 7 MG LOZG Take 1 lozenge by mouth every hour as needed for cough     morphine (MS CONTIN) 30 MG CR tablet Take 1 tablet (30 mg) by mouth 3 times daily     morphine (MSIR) 15 MG IR tablet Take 0.5 tablets (7.5 mg) by mouth every 8 hours as needed for pain     Multiple Vitamins-Minerals (MULTIVITAMIN GUMMIES WOMENS) CHEW Take 1 chew tab by mouth daily     omeprazole (PRILOSEC) 20 MG DR capsule Take 20 mg by mouth daily before breakfast     polyethylene glycol (MIRALAX) 17 GM/Dose powder Take 1 capful by mouth every other day     rivaroxaban ANTICOAGULANT (XARELTO) 20 MG TABS tablet Take 20 mg by mouth daily (with dinner)     rosuvastatin (CRESTOR) 20 MG tablet Take 20 mg by mouth At Bedtime     sennosides (SENOKOT) 8.6 MG tablet Take 2 tablets by mouth 2 times daily     SUMAtriptan (IMITREX) 100 MG tablet Take 100 mg by mouth every 12 hours as needed for migraine     topiramate ER (QUDEXY XR) 50 MG 24 hr capsule Take 1 capsule (50 mg) by mouth daily     torsemide (DEMADEX) 10 MG tablet Take 1 tablet (10 mg) by mouth daily as needed (for LE swelling as needed)     venlafaxine (EFFEXOR-XR) 150 MG 24 hr capsule Take 150 mg by mouth daily Take with 75 mg capsule for a total dose of 225 mg.     venlafaxine (EFFEXOR-XR) 75 MG 24 hr capsule Take 75 mg by mouth daily Take with 150 mg capsule for a total dose of 225 mg.     Zinc Oxide-Petrolatum 20-80 % CREA Externally apply topically every 8 hours as needed (incontinence)     No current facility-administered medications for this visit.       REVIEW OF  "SYSTEMS:   Review of Systems  10 point review of systems reviewed.  Pertinent positives in HPI.    PHYSICAL EXAMINATION:  Physical Exam     Vital signs: BP 94/60   Pulse 88   Temp 98  F (36.7  C)   Resp 16   Ht 1.6 m (5' 3\")   Wt 82.6 kg (182 lb)   SpO2 93%   BMI 32.24 kg/m    General: Awake, Alert, sitting up in bedside chair, conversant  HEENT:Pink conjunctiva, moist oral mucosa.  Contusion to right periorbital region resolved.   NECK: Supple  CVS:  S1  S2, without murmur or gallop.   LUNG: Clear to auscultation, No wheezes, rales or rhonci.  BACK: No kyphosis of the thoracic spine  ABDOMEN: Soft, obese, with positive bowel sounds  EXTREMITIES: Moves both upper and lower extremities with diffuse weakness, 1+ pedal edema to bilateral lower extremities.  Wounds to left lower extremity now resolved.  Redness resolved.  Continues in Tubigrip.  NEUROLOGIC: Weakness to the lower extremities with spasticity.    Labs:  All labs reviewed in the nursing home record and Epic   @  Lab Results   Component Value Date    WBC 7.9 08/24/2022     Lab Results   Component Value Date    RBC 4.05 08/24/2022     Lab Results   Component Value Date    HGB 9.9 08/24/2022     Lab Results   Component Value Date    HCT 33.1 08/24/2022     Lab Results   Component Value Date    MCV 82 08/24/2022     Lab Results   Component Value Date    MCH 24.4 08/24/2022     Lab Results   Component Value Date    MCHC 29.9 08/24/2022     Lab Results   Component Value Date    RDW 16.4 08/24/2022     Lab Results   Component Value Date     08/24/2022        @Last Comprehensive Metabolic Panel:  Sodium   Date Value Ref Range Status   08/24/2022 138 136 - 145 mmol/L Final     Potassium   Date Value Ref Range Status   08/24/2022 4.3 3.4 - 5.3 mmol/L Final   08/18/2022 3.8 3.5 - 5.0 mmol/L Final     Chloride   Date Value Ref Range Status   08/24/2022 103 98 - 107 mmol/L Final   08/16/2022 104 98 - 107 mmol/L Final     Carbon Dioxide (CO2)   Date Value " Ref Range Status   08/24/2022 27 22 - 29 mmol/L Final   08/16/2022 34 (H) 22 - 31 mmol/L Final     Anion Gap   Date Value Ref Range Status   08/24/2022 8 7 - 15 mmol/L Final   08/16/2022 3 (L) 5 - 18 mmol/L Final     Glucose   Date Value Ref Range Status   08/24/2022 266 (H) 70 - 99 mg/dL Final   08/17/2022 137 (H) 70 - 125 mg/dL Final     GLUCOSE BY METER POCT   Date Value Ref Range Status   08/18/2022 85 70 - 99 mg/dL Final     Urea Nitrogen   Date Value Ref Range Status   08/24/2022 15.7 6.0 - 20.0 mg/dL Final   08/16/2022 13 8 - 22 mg/dL Final     Creatinine   Date Value Ref Range Status   08/24/2022 0.86 0.51 - 0.95 mg/dL Final     GFR Estimate   Date Value Ref Range Status   08/24/2022 78 >60 mL/min/1.73m2 Final     Comment:     Effective December 21, 2021 eGFRcr in adults is calculated using the 2021 CKD-EPI creatinine equation which includes age and gender (Joide et al., NEJM, DOI: 10.1056/FHUYcu1729558)   09/14/2019 54 (L) >60 mL/min/1.73m2 Final     Calcium   Date Value Ref Range Status   08/24/2022 9.0 8.6 - 10.0 mg/dL Final     Assessment/plan:      ICD-10-CM    1. Cellulitis of left lower extremity  L03.116  Keflex complete.  Wounds resolved.  Redness resolved.  She has been resumed on her doxycycline for chronic spinal abscess.   2. Type 2 diabetes mellitus with hyperglycemia, with long-term current use of insulin (H)  E11.65  Continue long-acting insulin and sliding scale.  Blood sugars improved.    Z79.4     3. Iron deficiency anemia due to chronic blood loss  D50.0 Hemoglobin stable at 9.2.  Continue iron supplement.   4. History of deep venous thrombosis  Z86.718  Continue chronic anticoagulation.   5. Other chronic pain  G89.29  Continue baclofen and MS Contin.  We will only send with 15 tabs.  This is a chronic med and she has more med at home.           ICD-10-CM    1. Cellulitis of left lower extremity  L03.116  Continue Keflex.  Continue topical treatment.  Encourage elevation.   2. Type 2  diabetes mellitus with hyperglycemia, with long-term current use of insulin (H)  E11.65  Blood sugar satisfactory.    Z79.4    3. Iron deficiency anemia due to chronic blood loss  D50.0  Continue supplement.  Hemoglobin 9.9.   4. History of deep venous thrombosis  Z86.718  On chronic anticoagulation with Xarelto.   5. Lower extremity edema  R60.0  Improving.  Continue Tubigrip.  Encourage elevation.  Continue torsemide.   6. Other chronic pain  G89.29  Continue baclofen and MS Contin.  Continue to wean from as needed.       Okay to DC home with current meds and treatments.  Only sent with 15 tabs of MS Contin.  This is a chronic med.  Home PT, OT, home health aide and RN for medication management.  Follow-up with primary care provider in 1 week.    DISCHARGE PLAN/FACE TO FACE:  I certify that this patient is under my care and that I, or a nurse practitioner or physician's assistant working with me, had a face-to-face encounter that meets the physician face-to-face encounter requirements with this patient.       I certify that, based on my findings, the following services are medically necessary home health services.    My clinical findings support the need for the above skilled services.    This patient is homebound because: Recent fall with contusion to the face.    The patient is, or has been, under my care and I have initiated the establishment of the plan of care. This patient will be followed by a physician who will periodically review the plan of care.    Total time spent for this visit was 35 minutes were greater than 50% of time spent face-to-face patient reviewing discharge medications, home care services and follow-ups.    This note has been dictated using voice recognition software. Any grammatical or context distortions are unintentional and inherent to the software    Electronically signed by: Tonya Ordaz, CNP

## 2022-09-14 NOTE — TELEPHONE ENCOUNTER
Prior Authorization Retail Medication Request    Medication/Dose: SUMAtriptan (IMITREX) 100 MG tablet 100 mg, EVERY 12 HOURS PRN  ICD code (if different than what is on RX):    Previously Tried and Failed:    Rationale:      Insurance Name:  [facility]  Insurance ID:  [id number]      Pharmacy Information (if different than what is on RX)  Name:  [facility]  Phone:  [phone]  Fax: [phone]

## 2022-09-15 RX ORDER — SUMATRIPTAN 100 MG/1
100 TABLET, FILM COATED ORAL EVERY 12 HOURS PRN
Status: CANCELLED | OUTPATIENT
Start: 2022-09-15

## 2022-09-28 ENCOUNTER — LAB REQUISITION (OUTPATIENT)
Dept: LAB | Facility: CLINIC | Age: 57
End: 2022-09-28
Payer: MEDICARE

## 2022-09-28 DIAGNOSIS — D64.9 ANEMIA, UNSPECIFIED: ICD-10-CM

## 2022-09-28 DIAGNOSIS — I10 ESSENTIAL (PRIMARY) HYPERTENSION: ICD-10-CM

## 2022-09-28 DIAGNOSIS — E11.65 TYPE 2 DIABETES MELLITUS WITH HYPERGLYCEMIA (H): ICD-10-CM

## 2022-09-28 DIAGNOSIS — E03.9 HYPOTHYROIDISM, UNSPECIFIED: ICD-10-CM

## 2022-09-30 LAB
ANION GAP SERPL CALCULATED.3IONS-SCNC: 11 MMOL/L (ref 7–15)
BUN SERPL-MCNC: 16.9 MG/DL (ref 6–20)
CALCIUM SERPL-MCNC: 9 MG/DL (ref 8.6–10)
CHLORIDE SERPL-SCNC: 103 MMOL/L (ref 98–107)
CHOLEST SERPL-MCNC: 114 MG/DL
CREAT SERPL-MCNC: 1 MG/DL (ref 0.51–0.95)
DEPRECATED CALCIDIOL+CALCIFEROL SERPL-MC: 21 UG/L (ref 20–75)
DEPRECATED HCO3 PLAS-SCNC: 27 MMOL/L (ref 22–29)
ERYTHROCYTE [DISTWIDTH] IN BLOOD BY AUTOMATED COUNT: 16.2 % (ref 10–15)
GFR SERPL CREATININE-BSD FRML MDRD: 65 ML/MIN/1.73M2
GLUCOSE SERPL-MCNC: 175 MG/DL (ref 70–99)
HBA1C MFR BLD: 7.2 %
HCT VFR BLD AUTO: 35 % (ref 35–47)
HDLC SERPL-MCNC: 61 MG/DL
HGB BLD-MCNC: 10.3 G/DL (ref 11.7–15.7)
LDLC SERPL CALC-MCNC: 37 MG/DL
MCH RBC QN AUTO: 24 PG (ref 26.5–33)
MCHC RBC AUTO-ENTMCNC: 29.4 G/DL (ref 31.5–36.5)
MCV RBC AUTO: 81 FL (ref 78–100)
NONHDLC SERPL-MCNC: 53 MG/DL
PLATELET # BLD AUTO: 262 10E3/UL (ref 150–450)
POTASSIUM SERPL-SCNC: 3.7 MMOL/L (ref 3.4–5.3)
RBC # BLD AUTO: 4.3 10E6/UL (ref 3.8–5.2)
SODIUM SERPL-SCNC: 141 MMOL/L (ref 136–145)
TRIGL SERPL-MCNC: 81 MG/DL
TSH SERPL DL<=0.005 MIU/L-ACNC: 0.9 UIU/ML (ref 0.3–4.2)
WBC # BLD AUTO: 6.9 10E3/UL (ref 4–11)

## 2022-09-30 PROCEDURE — 85027 COMPLETE CBC AUTOMATED: CPT | Mod: ORL

## 2022-09-30 PROCEDURE — 80048 BASIC METABOLIC PNL TOTAL CA: CPT | Mod: ORL

## 2022-09-30 PROCEDURE — 36415 COLL VENOUS BLD VENIPUNCTURE: CPT | Mod: ORL

## 2022-09-30 PROCEDURE — P9604 ONE-WAY ALLOW PRORATED TRIP: HCPCS | Mod: ORL

## 2022-09-30 PROCEDURE — 80061 LIPID PANEL: CPT | Mod: ORL

## 2022-09-30 PROCEDURE — 83036 HEMOGLOBIN GLYCOSYLATED A1C: CPT | Mod: ORL

## 2022-09-30 PROCEDURE — 82306 VITAMIN D 25 HYDROXY: CPT | Mod: ORL

## 2022-09-30 PROCEDURE — 84443 ASSAY THYROID STIM HORMONE: CPT | Mod: ORL

## 2023-01-26 ENCOUNTER — LAB REQUISITION (OUTPATIENT)
Dept: LAB | Facility: CLINIC | Age: 58
End: 2023-01-26
Payer: MEDICARE

## 2023-01-26 DIAGNOSIS — E55.9 VITAMIN D DEFICIENCY, UNSPECIFIED: ICD-10-CM

## 2023-01-27 LAB — DEPRECATED CALCIDIOL+CALCIFEROL SERPL-MC: 23 UG/L (ref 20–75)

## 2023-01-27 PROCEDURE — P9604 ONE-WAY ALLOW PRORATED TRIP: HCPCS | Mod: ORL | Performed by: INTERNAL MEDICINE

## 2023-01-27 PROCEDURE — 36415 COLL VENOUS BLD VENIPUNCTURE: CPT | Mod: ORL | Performed by: INTERNAL MEDICINE

## 2023-01-27 PROCEDURE — 82306 VITAMIN D 25 HYDROXY: CPT | Mod: ORL | Performed by: INTERNAL MEDICINE

## 2023-03-08 ENCOUNTER — LAB REQUISITION (OUTPATIENT)
Dept: LAB | Facility: CLINIC | Age: 58
End: 2023-03-08
Payer: MEDICARE

## 2023-03-08 DIAGNOSIS — E55.9 VITAMIN D DEFICIENCY, UNSPECIFIED: ICD-10-CM

## 2023-03-08 DIAGNOSIS — F03.90 UNSPECIFIED DEMENTIA, UNSPECIFIED SEVERITY, WITHOUT BEHAVIORAL DISTURBANCE, PSYCHOTIC DISTURBANCE, MOOD DISTURBANCE, AND ANXIETY (H): ICD-10-CM

## 2023-03-08 DIAGNOSIS — E11.65 TYPE 2 DIABETES MELLITUS WITH HYPERGLYCEMIA (H): ICD-10-CM

## 2023-03-08 DIAGNOSIS — K21.9 GASTRO-ESOPHAGEAL REFLUX DISEASE WITHOUT ESOPHAGITIS: ICD-10-CM

## 2023-03-08 DIAGNOSIS — D64.9 ANEMIA, UNSPECIFIED: ICD-10-CM

## 2023-03-10 LAB
ALBUMIN SERPL BCG-MCNC: 3.7 G/DL (ref 3.5–5.2)
ALP SERPL-CCNC: 84 U/L (ref 35–104)
ALT SERPL W P-5'-P-CCNC: <5 U/L (ref 10–35)
ANION GAP SERPL CALCULATED.3IONS-SCNC: 11 MMOL/L (ref 7–15)
AST SERPL W P-5'-P-CCNC: 14 U/L (ref 10–35)
BASOPHILS # BLD AUTO: 0.1 10E3/UL (ref 0–0.2)
BASOPHILS NFR BLD AUTO: 1 %
BILIRUB SERPL-MCNC: <0.2 MG/DL
BUN SERPL-MCNC: 24.2 MG/DL (ref 6–20)
CALCIUM SERPL-MCNC: 8.4 MG/DL (ref 8.6–10)
CHLORIDE SERPL-SCNC: 102 MMOL/L (ref 98–107)
CHOLEST SERPL-MCNC: 122 MG/DL
CREAT SERPL-MCNC: 1.01 MG/DL (ref 0.51–0.95)
DEPRECATED CALCIDIOL+CALCIFEROL SERPL-MC: 22 UG/L (ref 20–75)
DEPRECATED HCO3 PLAS-SCNC: 27 MMOL/L (ref 22–29)
EOSINOPHIL # BLD AUTO: 0.1 10E3/UL (ref 0–0.7)
EOSINOPHIL NFR BLD AUTO: 1 %
ERYTHROCYTE [DISTWIDTH] IN BLOOD BY AUTOMATED COUNT: 14.3 % (ref 10–15)
FERRITIN SERPL-MCNC: 67 NG/ML (ref 11–328)
GFR SERPL CREATININE-BSD FRML MDRD: 64 ML/MIN/1.73M2
GLUCOSE SERPL-MCNC: 253 MG/DL (ref 70–99)
HBA1C MFR BLD: 8.7 %
HCT VFR BLD AUTO: 34.2 % (ref 35–47)
HDLC SERPL-MCNC: 55 MG/DL
HGB BLD-MCNC: 10.3 G/DL (ref 11.7–15.7)
IMM GRANULOCYTES # BLD: 0 10E3/UL
IMM GRANULOCYTES NFR BLD: 1 %
IRON BINDING CAPACITY (ROCHE): 215 UG/DL (ref 240–430)
IRON SATN MFR SERPL: 9 % (ref 15–46)
IRON SERPL-MCNC: 20 UG/DL (ref 37–145)
LDLC SERPL CALC-MCNC: 49 MG/DL
LYMPHOCYTES # BLD AUTO: 2.1 10E3/UL (ref 0.8–5.3)
LYMPHOCYTES NFR BLD AUTO: 28 %
MAGNESIUM SERPL-MCNC: 2.7 MG/DL (ref 1.7–2.3)
MCH RBC QN AUTO: 25.7 PG (ref 26.5–33)
MCHC RBC AUTO-ENTMCNC: 30.1 G/DL (ref 31.5–36.5)
MCV RBC AUTO: 85 FL (ref 78–100)
MONOCYTES # BLD AUTO: 0.5 10E3/UL (ref 0–1.3)
MONOCYTES NFR BLD AUTO: 7 %
NEUTROPHILS # BLD AUTO: 4.6 10E3/UL (ref 1.6–8.3)
NEUTROPHILS NFR BLD AUTO: 62 %
NONHDLC SERPL-MCNC: 67 MG/DL
NRBC # BLD AUTO: 0 10E3/UL
NRBC BLD AUTO-RTO: 0 /100
PLATELET # BLD AUTO: 221 10E3/UL (ref 150–450)
POTASSIUM SERPL-SCNC: 3.8 MMOL/L (ref 3.4–5.3)
PROT SERPL-MCNC: 6.1 G/DL (ref 6.4–8.3)
RBC # BLD AUTO: 4.01 10E6/UL (ref 3.8–5.2)
SODIUM SERPL-SCNC: 140 MMOL/L (ref 136–145)
TRIGL SERPL-MCNC: 91 MG/DL
TSH SERPL DL<=0.005 MIU/L-ACNC: 2.67 UIU/ML (ref 0.3–4.2)
VIT B12 SERPL-MCNC: 335 PG/ML (ref 232–1245)
WBC # BLD AUTO: 7.3 10E3/UL (ref 4–11)

## 2023-03-10 PROCEDURE — 84443 ASSAY THYROID STIM HORMONE: CPT | Mod: ORL | Performed by: INTERNAL MEDICINE

## 2023-03-10 PROCEDURE — 83735 ASSAY OF MAGNESIUM: CPT | Mod: ORL | Performed by: INTERNAL MEDICINE

## 2023-03-10 PROCEDURE — P9603 ONE-WAY ALLOW PRORATED MILES: HCPCS | Mod: ORL | Performed by: INTERNAL MEDICINE

## 2023-03-10 PROCEDURE — 36415 COLL VENOUS BLD VENIPUNCTURE: CPT | Mod: ORL | Performed by: INTERNAL MEDICINE

## 2023-03-10 PROCEDURE — 83550 IRON BINDING TEST: CPT | Mod: ORL | Performed by: INTERNAL MEDICINE

## 2023-03-10 PROCEDURE — 82728 ASSAY OF FERRITIN: CPT | Mod: ORL | Performed by: INTERNAL MEDICINE

## 2023-03-10 PROCEDURE — 80053 COMPREHEN METABOLIC PANEL: CPT | Mod: ORL | Performed by: INTERNAL MEDICINE

## 2023-03-10 PROCEDURE — 83036 HEMOGLOBIN GLYCOSYLATED A1C: CPT | Mod: ORL | Performed by: INTERNAL MEDICINE

## 2023-03-10 PROCEDURE — 85025 COMPLETE CBC W/AUTO DIFF WBC: CPT | Mod: ORL | Performed by: INTERNAL MEDICINE

## 2023-03-10 PROCEDURE — 80061 LIPID PANEL: CPT | Mod: ORL | Performed by: INTERNAL MEDICINE

## 2023-03-10 PROCEDURE — 82306 VITAMIN D 25 HYDROXY: CPT | Mod: ORL | Performed by: INTERNAL MEDICINE

## 2023-03-10 PROCEDURE — 82607 VITAMIN B-12: CPT | Mod: ORL | Performed by: INTERNAL MEDICINE

## 2023-04-25 PROCEDURE — 82272 OCCULT BLD FECES 1-3 TESTS: CPT | Mod: ORL

## 2023-04-26 ENCOUNTER — LAB REQUISITION (OUTPATIENT)
Dept: LAB | Facility: CLINIC | Age: 58
End: 2023-04-26
Payer: MEDICARE

## 2023-04-26 DIAGNOSIS — D64.9 ANEMIA, UNSPECIFIED: ICD-10-CM

## 2023-04-26 LAB — HEMOCCULT STL QL: NEGATIVE

## 2023-05-04 ENCOUNTER — ANCILLARY PROCEDURE (OUTPATIENT)
Dept: MAMMOGRAPHY | Facility: HOSPITAL | Age: 58
End: 2023-05-04
Attending: INTERNAL MEDICINE
Payer: MEDICARE

## 2023-05-04 DIAGNOSIS — Z12.31 SCREENING MAMMOGRAM, ENCOUNTER FOR: ICD-10-CM

## 2023-05-04 PROCEDURE — 77067 SCR MAMMO BI INCL CAD: CPT

## 2023-07-06 ENCOUNTER — LAB REQUISITION (OUTPATIENT)
Dept: LAB | Facility: CLINIC | Age: 58
End: 2023-07-06
Payer: MEDICARE

## 2023-07-06 DIAGNOSIS — I10 ESSENTIAL (PRIMARY) HYPERTENSION: ICD-10-CM

## 2023-07-07 LAB
ANION GAP SERPL CALCULATED.3IONS-SCNC: 11 MMOL/L (ref 7–15)
BUN SERPL-MCNC: 11 MG/DL (ref 6–20)
CALCIUM SERPL-MCNC: 8.8 MG/DL (ref 8.6–10)
CHLORIDE SERPL-SCNC: 108 MMOL/L (ref 98–107)
CREAT SERPL-MCNC: 0.9 MG/DL (ref 0.51–0.95)
DEPRECATED HCO3 PLAS-SCNC: 23 MMOL/L (ref 22–29)
GFR SERPL CREATININE-BSD FRML MDRD: 74 ML/MIN/1.73M2
GLUCOSE SERPL-MCNC: 205 MG/DL (ref 70–99)
POTASSIUM SERPL-SCNC: 3.9 MMOL/L (ref 3.4–5.3)
SODIUM SERPL-SCNC: 142 MMOL/L (ref 136–145)

## 2023-07-07 PROCEDURE — 80048 BASIC METABOLIC PNL TOTAL CA: CPT | Mod: ORL | Performed by: INTERNAL MEDICINE

## 2023-07-07 PROCEDURE — 36415 COLL VENOUS BLD VENIPUNCTURE: CPT | Mod: ORL | Performed by: INTERNAL MEDICINE

## 2023-07-07 PROCEDURE — P9604 ONE-WAY ALLOW PRORATED TRIP: HCPCS | Mod: ORL | Performed by: INTERNAL MEDICINE

## 2023-08-28 ENCOUNTER — LAB REQUISITION (OUTPATIENT)
Dept: LAB | Facility: CLINIC | Age: 58
End: 2023-08-28
Payer: MEDICARE

## 2023-08-28 DIAGNOSIS — R30.0 DYSURIA: ICD-10-CM

## 2023-08-28 LAB
ALBUMIN UR-MCNC: NEGATIVE MG/DL
APPEARANCE UR: CLEAR
BILIRUB UR QL STRIP: NEGATIVE
COLOR UR AUTO: YELLOW
GLUCOSE UR STRIP-MCNC: 200 MG/DL
HGB UR QL STRIP: NEGATIVE
KETONES UR STRIP-MCNC: NEGATIVE MG/DL
LEUKOCYTE ESTERASE UR QL STRIP: NEGATIVE
MUCOUS THREADS #/AREA URNS LPF: PRESENT /LPF
NITRATE UR QL: NEGATIVE
PH UR STRIP: 7 [PH] (ref 5–7)
RBC URINE: 1 /HPF
SP GR UR STRIP: 1.02 (ref 1–1.03)
SQUAMOUS EPITHELIAL: <1 /HPF
UROBILINOGEN UR STRIP-MCNC: NORMAL MG/DL
WBC URINE: 1 /HPF

## 2023-08-28 PROCEDURE — 87086 URINE CULTURE/COLONY COUNT: CPT | Mod: ORL | Performed by: INTERNAL MEDICINE

## 2023-08-28 PROCEDURE — 81001 URINALYSIS AUTO W/SCOPE: CPT | Mod: ORL | Performed by: INTERNAL MEDICINE

## 2023-08-29 LAB — BACTERIA UR CULT: ABNORMAL

## 2023-09-21 ENCOUNTER — LAB REQUISITION (OUTPATIENT)
Dept: LAB | Facility: CLINIC | Age: 58
End: 2023-09-21
Payer: MEDICARE

## 2023-09-21 DIAGNOSIS — E55.9 VITAMIN D DEFICIENCY, UNSPECIFIED: ICD-10-CM

## 2023-09-21 DIAGNOSIS — F03.90 UNSPECIFIED DEMENTIA, UNSPECIFIED SEVERITY, WITHOUT BEHAVIORAL DISTURBANCE, PSYCHOTIC DISTURBANCE, MOOD DISTURBANCE, AND ANXIETY (H): ICD-10-CM

## 2023-09-21 DIAGNOSIS — E78.5 HYPERLIPIDEMIA, UNSPECIFIED: ICD-10-CM

## 2023-09-21 DIAGNOSIS — E11.65 TYPE 2 DIABETES MELLITUS WITH HYPERGLYCEMIA (H): ICD-10-CM

## 2023-09-21 DIAGNOSIS — D64.9 ANEMIA, UNSPECIFIED: ICD-10-CM

## 2023-09-22 LAB
ALBUMIN SERPL BCG-MCNC: 3.6 G/DL (ref 3.5–5.2)
ALP SERPL-CCNC: 92 U/L (ref 35–104)
ALT SERPL W P-5'-P-CCNC: <5 U/L (ref 0–50)
ANION GAP SERPL CALCULATED.3IONS-SCNC: 14 MMOL/L (ref 7–15)
AST SERPL W P-5'-P-CCNC: 18 U/L (ref 0–45)
BASOPHILS # BLD AUTO: 0.1 10E3/UL (ref 0–0.2)
BASOPHILS NFR BLD AUTO: 1 %
BILIRUB SERPL-MCNC: <0.2 MG/DL
BUN SERPL-MCNC: 15.2 MG/DL (ref 8–23)
CALCIUM SERPL-MCNC: 8.8 MG/DL (ref 8.6–10)
CHLORIDE SERPL-SCNC: 104 MMOL/L (ref 98–107)
CHOLEST SERPL-MCNC: 117 MG/DL
CREAT SERPL-MCNC: 0.91 MG/DL (ref 0.51–0.95)
DEPRECATED HCO3 PLAS-SCNC: 21 MMOL/L (ref 22–29)
EGFRCR SERPLBLD CKD-EPI 2021: 72 ML/MIN/1.73M2
EOSINOPHIL # BLD AUTO: 0.1 10E3/UL (ref 0–0.7)
EOSINOPHIL NFR BLD AUTO: 2 %
ERYTHROCYTE [DISTWIDTH] IN BLOOD BY AUTOMATED COUNT: 14 % (ref 10–15)
GLUCOSE SERPL-MCNC: 307 MG/DL (ref 70–99)
HBA1C MFR BLD: 9 %
HCT VFR BLD AUTO: 31.1 % (ref 35–47)
HDLC SERPL-MCNC: 53 MG/DL
HGB BLD-MCNC: 9.9 G/DL (ref 11.7–15.7)
IMM GRANULOCYTES # BLD: 0 10E3/UL
IMM GRANULOCYTES NFR BLD: 1 %
IRON SERPL-MCNC: 26 UG/DL (ref 37–145)
LDLC SERPL CALC-MCNC: 47 MG/DL
LYMPHOCYTES # BLD AUTO: 2.2 10E3/UL (ref 0.8–5.3)
LYMPHOCYTES NFR BLD AUTO: 25 %
MCH RBC QN AUTO: 26.4 PG (ref 26.5–33)
MCHC RBC AUTO-ENTMCNC: 31.8 G/DL (ref 31.5–36.5)
MCV RBC AUTO: 83 FL (ref 78–100)
MONOCYTES # BLD AUTO: 0.6 10E3/UL (ref 0–1.3)
MONOCYTES NFR BLD AUTO: 7 %
NEUTROPHILS # BLD AUTO: 5.7 10E3/UL (ref 1.6–8.3)
NEUTROPHILS NFR BLD AUTO: 64 %
NONHDLC SERPL-MCNC: 64 MG/DL
NRBC # BLD AUTO: 0 10E3/UL
NRBC BLD AUTO-RTO: 0 /100
PLATELET # BLD AUTO: 205 10E3/UL (ref 150–450)
POTASSIUM SERPL-SCNC: 3.9 MMOL/L (ref 3.4–5.3)
PROT SERPL-MCNC: 6.3 G/DL (ref 6.4–8.3)
RBC # BLD AUTO: 3.75 10E6/UL (ref 3.8–5.2)
SODIUM SERPL-SCNC: 139 MMOL/L (ref 136–145)
TRIGL SERPL-MCNC: 84 MG/DL
TSH SERPL DL<=0.005 MIU/L-ACNC: 1.5 UIU/ML (ref 0.3–4.2)
VIT B12 SERPL-MCNC: 302 PG/ML (ref 232–1245)
WBC # BLD AUTO: 8.7 10E3/UL (ref 4–11)

## 2023-09-22 PROCEDURE — 82306 VITAMIN D 25 HYDROXY: CPT | Mod: ORL | Performed by: INTERNAL MEDICINE

## 2023-09-22 PROCEDURE — 80053 COMPREHEN METABOLIC PANEL: CPT | Mod: ORL | Performed by: INTERNAL MEDICINE

## 2023-09-22 PROCEDURE — 83540 ASSAY OF IRON: CPT | Mod: ORL | Performed by: INTERNAL MEDICINE

## 2023-09-22 PROCEDURE — 82607 VITAMIN B-12: CPT | Mod: ORL | Performed by: INTERNAL MEDICINE

## 2023-09-22 PROCEDURE — 83036 HEMOGLOBIN GLYCOSYLATED A1C: CPT | Mod: ORL | Performed by: INTERNAL MEDICINE

## 2023-09-22 PROCEDURE — P9603 ONE-WAY ALLOW PRORATED MILES: HCPCS | Mod: ORL | Performed by: INTERNAL MEDICINE

## 2023-09-22 PROCEDURE — 80061 LIPID PANEL: CPT | Mod: ORL | Performed by: INTERNAL MEDICINE

## 2023-09-22 PROCEDURE — 84443 ASSAY THYROID STIM HORMONE: CPT | Mod: ORL | Performed by: INTERNAL MEDICINE

## 2023-09-22 PROCEDURE — 36415 COLL VENOUS BLD VENIPUNCTURE: CPT | Mod: ORL | Performed by: INTERNAL MEDICINE

## 2023-09-22 PROCEDURE — 85025 COMPLETE CBC W/AUTO DIFF WBC: CPT | Mod: ORL | Performed by: INTERNAL MEDICINE

## 2023-09-23 LAB — DEPRECATED CALCIDIOL+CALCIFEROL SERPL-MC: 33 UG/L (ref 20–75)

## 2023-10-19 ENCOUNTER — LAB REQUISITION (OUTPATIENT)
Dept: LAB | Facility: CLINIC | Age: 58
End: 2023-10-19
Payer: MEDICARE

## 2023-10-19 DIAGNOSIS — R30.0 DYSURIA: ICD-10-CM

## 2023-10-19 LAB
ALBUMIN UR-MCNC: 300 MG/DL
APPEARANCE UR: ABNORMAL
BILIRUB UR QL STRIP: NEGATIVE
COLOR UR AUTO: YELLOW
GLUCOSE UR STRIP-MCNC: NEGATIVE MG/DL
HGB UR QL STRIP: ABNORMAL
KETONES UR STRIP-MCNC: NEGATIVE MG/DL
LEUKOCYTE ESTERASE UR QL STRIP: ABNORMAL
MUCOUS THREADS #/AREA URNS LPF: PRESENT /LPF
NITRATE UR QL: NEGATIVE
PH UR STRIP: 7 [PH] (ref 5–7)
RBC URINE: >182 /HPF
SP GR UR STRIP: 1.02 (ref 1–1.03)
UROBILINOGEN UR STRIP-MCNC: NORMAL MG/DL
WBC CLUMPS #/AREA URNS HPF: PRESENT /HPF
WBC URINE: >182 /HPF

## 2023-10-19 PROCEDURE — 87088 URINE BACTERIA CULTURE: CPT | Mod: ORL | Performed by: INTERNAL MEDICINE

## 2023-10-19 PROCEDURE — 81001 URINALYSIS AUTO W/SCOPE: CPT | Mod: ORL | Performed by: INTERNAL MEDICINE

## 2023-10-21 LAB — BACTERIA UR CULT: ABNORMAL

## 2023-11-27 ENCOUNTER — LAB REQUISITION (OUTPATIENT)
Dept: LAB | Facility: CLINIC | Age: 58
End: 2023-11-27
Payer: MEDICARE

## 2023-11-27 DIAGNOSIS — D50.8 OTHER IRON DEFICIENCY ANEMIAS: ICD-10-CM

## 2023-11-28 LAB
ERYTHROCYTE [DISTWIDTH] IN BLOOD BY AUTOMATED COUNT: 17 % (ref 10–15)
HCT VFR BLD AUTO: 22.3 % (ref 35–47)
HGB BLD-MCNC: 6.6 G/DL (ref 11.7–15.7)
MCH RBC QN AUTO: 26.6 PG (ref 26.5–33)
MCHC RBC AUTO-ENTMCNC: 29.6 G/DL (ref 31.5–36.5)
MCV RBC AUTO: 90 FL (ref 78–100)
PLATELET # BLD AUTO: 401 10E3/UL (ref 150–450)
RBC # BLD AUTO: 2.48 10E6/UL (ref 3.8–5.2)
WBC # BLD AUTO: 8.9 10E3/UL (ref 4–11)

## 2023-11-28 PROCEDURE — 36415 COLL VENOUS BLD VENIPUNCTURE: CPT | Performed by: FAMILY MEDICINE

## 2023-11-28 PROCEDURE — P9604 ONE-WAY ALLOW PRORATED TRIP: HCPCS | Performed by: FAMILY MEDICINE

## 2023-11-28 PROCEDURE — 85027 COMPLETE CBC AUTOMATED: CPT | Performed by: FAMILY MEDICINE

## 2023-12-02 ENCOUNTER — LAB REQUISITION (OUTPATIENT)
Dept: LAB | Facility: CLINIC | Age: 58
End: 2023-12-02
Payer: MEDICARE

## 2023-12-02 DIAGNOSIS — D64.9 ANEMIA, UNSPECIFIED: ICD-10-CM

## 2023-12-04 LAB — HGB BLD-MCNC: 7.3 G/DL (ref 11.7–15.7)

## 2023-12-04 PROCEDURE — 36415 COLL VENOUS BLD VENIPUNCTURE: CPT | Performed by: FAMILY MEDICINE

## 2023-12-04 PROCEDURE — P9604 ONE-WAY ALLOW PRORATED TRIP: HCPCS | Performed by: FAMILY MEDICINE

## 2023-12-04 PROCEDURE — 85018 HEMOGLOBIN: CPT | Performed by: FAMILY MEDICINE

## 2023-12-08 ENCOUNTER — LAB REQUISITION (OUTPATIENT)
Dept: LAB | Facility: CLINIC | Age: 58
End: 2023-12-08
Payer: MEDICARE

## 2023-12-08 DIAGNOSIS — D64.9 ANEMIA, UNSPECIFIED: ICD-10-CM

## 2023-12-09 ENCOUNTER — LAB REQUISITION (OUTPATIENT)
Dept: LAB | Facility: CLINIC | Age: 58
End: 2023-12-09
Payer: MEDICARE

## 2023-12-09 DIAGNOSIS — D64.9 ANEMIA, UNSPECIFIED: ICD-10-CM

## 2023-12-11 LAB
ERYTHROCYTE [DISTWIDTH] IN BLOOD BY AUTOMATED COUNT: 15.5 % (ref 10–15)
HCT VFR BLD AUTO: 28.5 % (ref 35–47)
HGB BLD-MCNC: 8.2 G/DL (ref 11.7–15.7)
MCH RBC QN AUTO: 26.5 PG (ref 26.5–33)
MCHC RBC AUTO-ENTMCNC: 28.8 G/DL (ref 31.5–36.5)
MCV RBC AUTO: 92 FL (ref 78–100)
PLATELET # BLD AUTO: 294 10E3/UL (ref 150–450)
RBC # BLD AUTO: 3.1 10E6/UL (ref 3.8–5.2)
WBC # BLD AUTO: 6.3 10E3/UL (ref 4–11)

## 2023-12-11 PROCEDURE — 36415 COLL VENOUS BLD VENIPUNCTURE: CPT | Performed by: NURSE PRACTITIONER

## 2023-12-11 PROCEDURE — 85027 COMPLETE CBC AUTOMATED: CPT | Performed by: NURSE PRACTITIONER

## 2023-12-11 PROCEDURE — P9604 ONE-WAY ALLOW PRORATED TRIP: HCPCS | Performed by: NURSE PRACTITIONER

## 2023-12-24 ENCOUNTER — LAB REQUISITION (OUTPATIENT)
Dept: LAB | Facility: CLINIC | Age: 58
End: 2023-12-24
Payer: MEDICARE

## 2023-12-24 DIAGNOSIS — D64.9 ANEMIA, UNSPECIFIED: ICD-10-CM

## 2023-12-26 LAB
ERYTHROCYTE [DISTWIDTH] IN BLOOD BY AUTOMATED COUNT: 14.4 % (ref 10–15)
FOLATE SERPL-MCNC: 6.6 NG/ML (ref 4.6–34.8)
HCT VFR BLD AUTO: 32.1 % (ref 35–47)
HGB BLD-MCNC: 9.3 G/DL (ref 11.7–15.7)
IRON SERPL-MCNC: 22 UG/DL (ref 37–145)
MCH RBC QN AUTO: 26.3 PG (ref 26.5–33)
MCHC RBC AUTO-ENTMCNC: 29 G/DL (ref 31.5–36.5)
MCV RBC AUTO: 91 FL (ref 78–100)
PLATELET # BLD AUTO: 305 10E3/UL (ref 150–450)
RBC # BLD AUTO: 3.53 10E6/UL (ref 3.8–5.2)
VIT B12 SERPL-MCNC: 312 PG/ML (ref 232–1245)
WBC # BLD AUTO: 7.6 10E3/UL (ref 4–11)

## 2023-12-26 PROCEDURE — 85027 COMPLETE CBC AUTOMATED: CPT | Mod: ORL | Performed by: INTERNAL MEDICINE

## 2023-12-26 PROCEDURE — 36415 COLL VENOUS BLD VENIPUNCTURE: CPT | Mod: ORL | Performed by: INTERNAL MEDICINE

## 2023-12-26 PROCEDURE — 82746 ASSAY OF FOLIC ACID SERUM: CPT | Mod: ORL | Performed by: INTERNAL MEDICINE

## 2023-12-26 PROCEDURE — 83540 ASSAY OF IRON: CPT | Mod: ORL | Performed by: INTERNAL MEDICINE

## 2023-12-26 PROCEDURE — 82607 VITAMIN B-12: CPT | Mod: ORL | Performed by: INTERNAL MEDICINE

## 2023-12-26 PROCEDURE — P9604 ONE-WAY ALLOW PRORATED TRIP: HCPCS | Mod: ORL | Performed by: INTERNAL MEDICINE

## 2024-02-04 ENCOUNTER — LAB REQUISITION (OUTPATIENT)
Dept: LAB | Facility: CLINIC | Age: 59
End: 2024-02-04
Payer: MEDICARE

## 2024-02-04 DIAGNOSIS — E11.65 TYPE 2 DIABETES MELLITUS WITH HYPERGLYCEMIA (H): ICD-10-CM

## 2024-02-05 ENCOUNTER — LAB REQUISITION (OUTPATIENT)
Dept: LAB | Facility: CLINIC | Age: 59
End: 2024-02-05
Payer: MEDICARE

## 2024-02-05 DIAGNOSIS — E11.65 TYPE 2 DIABETES MELLITUS WITH HYPERGLYCEMIA (H): ICD-10-CM

## 2024-02-05 LAB — HOLD SPECIMEN: NORMAL

## 2024-02-05 PROCEDURE — 36415 COLL VENOUS BLD VENIPUNCTURE: CPT | Mod: ORL | Performed by: INTERNAL MEDICINE

## 2024-02-06 LAB — HBA1C MFR BLD: 8.1 %

## 2024-02-06 PROCEDURE — 36415 COLL VENOUS BLD VENIPUNCTURE: CPT | Mod: ORL | Performed by: INTERNAL MEDICINE

## 2024-02-06 PROCEDURE — P9603 ONE-WAY ALLOW PRORATED MILES: HCPCS | Performed by: INTERNAL MEDICINE

## 2024-02-06 PROCEDURE — 83036 HEMOGLOBIN GLYCOSYLATED A1C: CPT | Mod: ORL | Performed by: INTERNAL MEDICINE

## 2024-02-25 ENCOUNTER — LAB REQUISITION (OUTPATIENT)
Dept: LAB | Facility: CLINIC | Age: 59
End: 2024-02-25
Payer: MEDICARE

## 2024-02-25 DIAGNOSIS — I89.0 LYMPHEDEMA, NOT ELSEWHERE CLASSIFIED: ICD-10-CM

## 2024-02-25 DIAGNOSIS — E11.65 TYPE 2 DIABETES MELLITUS WITH HYPERGLYCEMIA (H): ICD-10-CM

## 2024-02-26 LAB
HBA1C MFR BLD: 9.1 %
HGB BLD-MCNC: 11.3 G/DL (ref 11.7–15.7)

## 2024-02-26 PROCEDURE — 36415 COLL VENOUS BLD VENIPUNCTURE: CPT | Mod: ORL | Performed by: INTERNAL MEDICINE

## 2024-02-26 PROCEDURE — P9604 ONE-WAY ALLOW PRORATED TRIP: HCPCS | Mod: ORL | Performed by: INTERNAL MEDICINE

## 2024-02-26 PROCEDURE — 85018 HEMOGLOBIN: CPT | Mod: ORL | Performed by: INTERNAL MEDICINE

## 2024-02-26 PROCEDURE — 83036 HEMOGLOBIN GLYCOSYLATED A1C: CPT | Mod: ORL | Performed by: INTERNAL MEDICINE

## 2024-03-29 ENCOUNTER — LAB REQUISITION (OUTPATIENT)
Dept: LAB | Facility: CLINIC | Age: 59
End: 2024-03-29
Payer: MEDICARE

## 2024-03-29 DIAGNOSIS — E03.8 OTHER SPECIFIED HYPOTHYROIDISM: ICD-10-CM

## 2024-04-01 LAB
ERYTHROCYTE [DISTWIDTH] IN BLOOD BY AUTOMATED COUNT: 15 % (ref 10–15)
HCT VFR BLD AUTO: 37.4 % (ref 35–47)
HGB BLD-MCNC: 11 G/DL (ref 11.7–15.7)
MCH RBC QN AUTO: 24.6 PG (ref 26.5–33)
MCHC RBC AUTO-ENTMCNC: 29.4 G/DL (ref 31.5–36.5)
MCV RBC AUTO: 84 FL (ref 78–100)
PLATELET # BLD AUTO: 262 10E3/UL (ref 150–450)
RBC # BLD AUTO: 4.47 10E6/UL (ref 3.8–5.2)
TSH SERPL DL<=0.005 MIU/L-ACNC: 2.69 UIU/ML (ref 0.3–4.2)
WBC # BLD AUTO: 7.7 10E3/UL (ref 4–11)

## 2024-04-01 PROCEDURE — 84443 ASSAY THYROID STIM HORMONE: CPT | Mod: ORL | Performed by: FAMILY MEDICINE

## 2024-04-01 PROCEDURE — 85027 COMPLETE CBC AUTOMATED: CPT | Mod: ORL | Performed by: FAMILY MEDICINE

## 2024-04-01 PROCEDURE — P9604 ONE-WAY ALLOW PRORATED TRIP: HCPCS | Mod: ORL | Performed by: FAMILY MEDICINE

## 2024-04-01 PROCEDURE — 36415 COLL VENOUS BLD VENIPUNCTURE: CPT | Mod: ORL | Performed by: FAMILY MEDICINE

## 2024-05-28 NOTE — PLAN OF CARE
"Problem: Hyperglycemia  Goal: Blood Glucose Level Within Targeted Range  Outcome: Improving   Patient noted eating about midnight , stating , \"I don't want my blood sugar to go low\". BS checked at about 0300 and was 158.    Problem: Nausea and Vomiting  Goal: Fluid and Electrolyte Balance  Outcome: Improving  Intervention: Prevent and Manage Nausea and Vomiting  Recent Flowsheet Documentation  Taken 11/24/2021 [id number] by Nba Perez RN  Nausea/Vomiting Interventions: antiemetic   Patient c/o nausea. PRN antiemetic given and effective.     Problem: Pain Chronic (Persistent)  Goal: Acceptable Pain Control and Functional Ability  Outcome: Improving   Denies pain.     Problem: Skin or Soft Tissue Infection  Goal: Infection Symptom Resolution  Outcome: Improving   Redness to BLE, more to LLE. Patient on IV Vanco and Zosyn. Patient afebrile this shift; 98.3 and 97.7.  RLE elevated.   " normal/regular rate and rhythm/S1 S2 present/no gallops/no rub/no murmur

## 2024-06-22 ENCOUNTER — LAB REQUISITION (OUTPATIENT)
Dept: LAB | Facility: CLINIC | Age: 59
End: 2024-06-22
Payer: MEDICARE

## 2024-06-22 DIAGNOSIS — R11.0 NAUSEA: ICD-10-CM

## 2024-06-24 LAB
ANION GAP SERPL CALCULATED.3IONS-SCNC: 9 MMOL/L (ref 7–15)
BUN SERPL-MCNC: 15.4 MG/DL (ref 8–23)
CALCIUM SERPL-MCNC: 8.6 MG/DL (ref 8.6–10)
CHLORIDE SERPL-SCNC: 105 MMOL/L (ref 98–107)
CREAT SERPL-MCNC: 0.97 MG/DL (ref 0.51–0.95)
DEPRECATED HCO3 PLAS-SCNC: 25 MMOL/L (ref 22–29)
EGFRCR SERPLBLD CKD-EPI 2021: 67 ML/MIN/1.73M2
GLUCOSE SERPL-MCNC: 116 MG/DL (ref 70–99)
POTASSIUM SERPL-SCNC: 3.9 MMOL/L (ref 3.4–5.3)
SODIUM SERPL-SCNC: 139 MMOL/L (ref 135–145)

## 2024-06-24 PROCEDURE — P9604 ONE-WAY ALLOW PRORATED TRIP: HCPCS | Mod: ORL | Performed by: FAMILY MEDICINE

## 2024-06-24 PROCEDURE — 80048 BASIC METABOLIC PNL TOTAL CA: CPT | Mod: ORL | Performed by: FAMILY MEDICINE

## 2024-06-24 PROCEDURE — 36415 COLL VENOUS BLD VENIPUNCTURE: CPT | Mod: ORL | Performed by: FAMILY MEDICINE

## 2024-07-16 ENCOUNTER — LAB REQUISITION (OUTPATIENT)
Dept: LAB | Facility: CLINIC | Age: 59
End: 2024-07-16
Payer: MEDICARE

## 2024-07-16 DIAGNOSIS — I73.9 PERIPHERAL VASCULAR DISEASE, UNSPECIFIED (H): ICD-10-CM

## 2024-07-16 DIAGNOSIS — D64.9 ANEMIA, UNSPECIFIED: ICD-10-CM

## 2024-07-16 DIAGNOSIS — I10 ESSENTIAL (PRIMARY) HYPERTENSION: ICD-10-CM

## 2024-07-16 DIAGNOSIS — E03.9 HYPOTHYROIDISM, UNSPECIFIED: ICD-10-CM

## 2024-07-16 DIAGNOSIS — E11.65 TYPE 2 DIABETES MELLITUS WITH HYPERGLYCEMIA (H): ICD-10-CM

## 2024-07-17 LAB
ANION GAP SERPL CALCULATED.3IONS-SCNC: 11 MMOL/L (ref 7–15)
BUN SERPL-MCNC: 13.1 MG/DL (ref 8–23)
CALCIUM SERPL-MCNC: 8.4 MG/DL (ref 8.8–10.4)
CHLORIDE SERPL-SCNC: 105 MMOL/L (ref 98–107)
CREAT SERPL-MCNC: 0.85 MG/DL (ref 0.51–0.95)
EGFRCR SERPLBLD CKD-EPI 2021: 78 ML/MIN/1.73M2
ERYTHROCYTE [DISTWIDTH] IN BLOOD BY AUTOMATED COUNT: 14.7 % (ref 10–15)
GLUCOSE SERPL-MCNC: 215 MG/DL (ref 70–99)
HCO3 SERPL-SCNC: 21 MMOL/L (ref 22–29)
HCT VFR BLD AUTO: 33.6 % (ref 35–47)
HGB BLD-MCNC: 11.5 G/DL (ref 11.7–15.7)
MCH RBC QN AUTO: 27.3 PG (ref 26.5–33)
MCHC RBC AUTO-ENTMCNC: 34.2 G/DL (ref 31.5–36.5)
MCV RBC AUTO: 80 FL (ref 78–100)
PLATELET # BLD AUTO: 271 10E3/UL (ref 150–450)
POTASSIUM SERPL-SCNC: 3.9 MMOL/L (ref 3.4–5.3)
RBC # BLD AUTO: 4.21 10E6/UL (ref 3.8–5.2)
SODIUM SERPL-SCNC: 137 MMOL/L (ref 135–145)
WBC # BLD AUTO: 7.9 10E3/UL (ref 4–11)

## 2024-07-17 PROCEDURE — P9604 ONE-WAY ALLOW PRORATED TRIP: HCPCS | Mod: ORL | Performed by: FAMILY MEDICINE

## 2024-07-17 PROCEDURE — 85027 COMPLETE CBC AUTOMATED: CPT | Mod: ORL | Performed by: FAMILY MEDICINE

## 2024-07-17 PROCEDURE — 36415 COLL VENOUS BLD VENIPUNCTURE: CPT | Mod: ORL | Performed by: FAMILY MEDICINE

## 2024-07-17 PROCEDURE — 80048 BASIC METABOLIC PNL TOTAL CA: CPT | Mod: ORL | Performed by: FAMILY MEDICINE

## 2024-08-21 ENCOUNTER — LAB REQUISITION (OUTPATIENT)
Dept: LAB | Facility: CLINIC | Age: 59
End: 2024-08-21
Payer: MEDICARE

## 2024-08-21 DIAGNOSIS — I89.0 LYMPHEDEMA, NOT ELSEWHERE CLASSIFIED: ICD-10-CM

## 2024-08-21 DIAGNOSIS — M80.00XD AGE-RELATED OSTEOPOROSIS WITH CURRENT PATHOLOGICAL FRACTURE, UNSPECIFIED SITE, SUBSEQUENT ENCOUNTER FOR FRACTURE WITH ROUTINE HEALING: ICD-10-CM

## 2024-08-26 LAB
ANION GAP SERPL CALCULATED.3IONS-SCNC: 10 MMOL/L (ref 7–15)
BUN SERPL-MCNC: 20.2 MG/DL (ref 8–23)
CALCIUM SERPL-MCNC: 8.8 MG/DL (ref 8.8–10.4)
CHLORIDE SERPL-SCNC: 106 MMOL/L (ref 98–107)
CREAT SERPL-MCNC: 0.9 MG/DL (ref 0.51–0.95)
EGFRCR SERPLBLD CKD-EPI 2021: 73 ML/MIN/1.73M2
GLUCOSE SERPL-MCNC: 141 MG/DL (ref 70–99)
HCO3 SERPL-SCNC: 23 MMOL/L (ref 22–29)
POTASSIUM SERPL-SCNC: 4.2 MMOL/L (ref 3.4–5.3)
SODIUM SERPL-SCNC: 139 MMOL/L (ref 135–145)
VIT D+METAB SERPL-MCNC: 26 NG/ML (ref 20–50)

## 2024-08-26 PROCEDURE — 36415 COLL VENOUS BLD VENIPUNCTURE: CPT | Mod: ORL | Performed by: FAMILY MEDICINE

## 2024-08-26 PROCEDURE — 80048 BASIC METABOLIC PNL TOTAL CA: CPT | Mod: ORL | Performed by: FAMILY MEDICINE

## 2024-08-26 PROCEDURE — 82306 VITAMIN D 25 HYDROXY: CPT | Mod: ORL | Performed by: FAMILY MEDICINE

## 2024-08-26 PROCEDURE — P9604 ONE-WAY ALLOW PRORATED TRIP: HCPCS | Mod: ORL | Performed by: FAMILY MEDICINE

## 2024-09-03 ENCOUNTER — LAB REQUISITION (OUTPATIENT)
Dept: LAB | Facility: CLINIC | Age: 59
End: 2024-09-03
Payer: MEDICARE

## 2024-09-03 DIAGNOSIS — E11.65 TYPE 2 DIABETES MELLITUS WITH HYPERGLYCEMIA (H): ICD-10-CM

## 2024-09-05 ENCOUNTER — LAB REQUISITION (OUTPATIENT)
Dept: LAB | Facility: CLINIC | Age: 59
End: 2024-09-05
Payer: MEDICARE

## 2024-09-05 DIAGNOSIS — I10 ESSENTIAL (PRIMARY) HYPERTENSION: ICD-10-CM

## 2024-09-09 LAB
ANION GAP SERPL CALCULATED.3IONS-SCNC: 11 MMOL/L (ref 7–15)
BUN SERPL-MCNC: 21.4 MG/DL (ref 8–23)
CALCIUM SERPL-MCNC: 9 MG/DL (ref 8.8–10.4)
CHLORIDE SERPL-SCNC: 105 MMOL/L (ref 98–107)
CREAT SERPL-MCNC: 0.9 MG/DL (ref 0.51–0.95)
EGFRCR SERPLBLD CKD-EPI 2021: 73 ML/MIN/1.73M2
GLUCOSE SERPL-MCNC: 247 MG/DL (ref 70–99)
HBA1C MFR BLD: 8.3 %
HCO3 SERPL-SCNC: 26 MMOL/L (ref 22–29)
POTASSIUM SERPL-SCNC: 3.9 MMOL/L (ref 3.4–5.3)
SODIUM SERPL-SCNC: 142 MMOL/L (ref 135–145)

## 2024-09-09 PROCEDURE — 36415 COLL VENOUS BLD VENIPUNCTURE: CPT | Mod: ORL | Performed by: FAMILY MEDICINE

## 2024-09-09 PROCEDURE — 80048 BASIC METABOLIC PNL TOTAL CA: CPT | Mod: ORL | Performed by: FAMILY MEDICINE

## 2024-09-09 PROCEDURE — 83036 HEMOGLOBIN GLYCOSYLATED A1C: CPT | Mod: ORL | Performed by: FAMILY MEDICINE

## 2024-09-09 PROCEDURE — P9604 ONE-WAY ALLOW PRORATED TRIP: HCPCS | Mod: ORL | Performed by: FAMILY MEDICINE

## 2024-11-14 ENCOUNTER — LAB REQUISITION (OUTPATIENT)
Dept: LAB | Facility: CLINIC | Age: 59
End: 2024-11-14
Payer: MEDICARE

## 2024-11-14 DIAGNOSIS — E11.9 TYPE 2 DIABETES MELLITUS WITHOUT COMPLICATIONS (H): ICD-10-CM

## 2024-11-18 LAB
ANION GAP SERPL CALCULATED.3IONS-SCNC: 10 MMOL/L (ref 7–15)
BUN SERPL-MCNC: 22.6 MG/DL (ref 8–23)
CALCIUM SERPL-MCNC: 8.9 MG/DL (ref 8.8–10.4)
CHLORIDE SERPL-SCNC: 100 MMOL/L (ref 98–107)
CREAT SERPL-MCNC: 1.14 MG/DL (ref 0.51–0.95)
EGFRCR SERPLBLD CKD-EPI 2021: 55 ML/MIN/1.73M2
GLUCOSE SERPL-MCNC: 160 MG/DL (ref 70–99)
HCO3 SERPL-SCNC: 26 MMOL/L (ref 22–29)
POTASSIUM SERPL-SCNC: 3.7 MMOL/L (ref 3.4–5.3)
SODIUM SERPL-SCNC: 136 MMOL/L (ref 135–145)

## 2024-11-18 PROCEDURE — 80048 BASIC METABOLIC PNL TOTAL CA: CPT | Mod: ORL | Performed by: FAMILY MEDICINE

## 2024-11-18 PROCEDURE — 36415 COLL VENOUS BLD VENIPUNCTURE: CPT | Mod: ORL | Performed by: FAMILY MEDICINE

## 2024-11-18 PROCEDURE — P9604 ONE-WAY ALLOW PRORATED TRIP: HCPCS | Mod: ORL | Performed by: FAMILY MEDICINE

## 2025-02-02 ENCOUNTER — HOSPITAL ENCOUNTER (INPATIENT)
Facility: CLINIC | Age: 60
DRG: 871 | End: 2025-02-02
Attending: EMERGENCY MEDICINE | Admitting: FAMILY MEDICINE
Payer: MEDICARE

## 2025-02-02 ENCOUNTER — LAB REQUISITION (OUTPATIENT)
Dept: LAB | Facility: CLINIC | Age: 60
End: 2025-02-02
Payer: COMMERCIAL

## 2025-02-02 DIAGNOSIS — A41.9 SEPSIS WITHOUT ACUTE ORGAN DYSFUNCTION, DUE TO UNSPECIFIED ORGANISM (H): ICD-10-CM

## 2025-02-02 DIAGNOSIS — L03.115 CELLULITIS OF RIGHT LEG: ICD-10-CM

## 2025-02-02 DIAGNOSIS — E11.9 TYPE 2 DIABETES MELLITUS WITHOUT COMPLICATIONS (H): ICD-10-CM

## 2025-02-02 PROCEDURE — 250N000011 HC RX IP 250 OP 636: Performed by: EMERGENCY MEDICINE

## 2025-02-02 PROCEDURE — 96375 TX/PRO/DX INJ NEW DRUG ADDON: CPT

## 2025-02-02 PROCEDURE — 82805 BLOOD GASES W/O2 SATURATION: CPT | Performed by: EMERGENCY MEDICINE

## 2025-02-02 PROCEDURE — 36415 COLL VENOUS BLD VENIPUNCTURE: CPT | Performed by: EMERGENCY MEDICINE

## 2025-02-02 PROCEDURE — 83036 HEMOGLOBIN GLYCOSYLATED A1C: CPT

## 2025-02-02 PROCEDURE — 80053 COMPREHEN METABOLIC PANEL: CPT | Performed by: EMERGENCY MEDICINE

## 2025-02-02 PROCEDURE — 83605 ASSAY OF LACTIC ACID: CPT | Performed by: EMERGENCY MEDICINE

## 2025-02-02 PROCEDURE — 84145 PROCALCITONIN (PCT): CPT | Performed by: EMERGENCY MEDICINE

## 2025-02-02 PROCEDURE — 85379 FIBRIN DEGRADATION QUANT: CPT | Performed by: EMERGENCY MEDICINE

## 2025-02-02 PROCEDURE — 82310 ASSAY OF CALCIUM: CPT | Performed by: EMERGENCY MEDICINE

## 2025-02-02 PROCEDURE — 85025 COMPLETE CBC W/AUTO DIFF WBC: CPT | Performed by: EMERGENCY MEDICINE

## 2025-02-02 PROCEDURE — 96365 THER/PROPH/DIAG IV INF INIT: CPT

## 2025-02-02 PROCEDURE — 87186 SC STD MICRODIL/AGAR DIL: CPT | Performed by: EMERGENCY MEDICINE

## 2025-02-02 PROCEDURE — 87040 BLOOD CULTURE FOR BACTERIA: CPT | Performed by: EMERGENCY MEDICINE

## 2025-02-02 PROCEDURE — 87637 SARSCOV2&INF A&B&RSV AMP PRB: CPT | Performed by: EMERGENCY MEDICINE

## 2025-02-02 PROCEDURE — 87149 DNA/RNA DIRECT PROBE: CPT | Performed by: EMERGENCY MEDICINE

## 2025-02-02 PROCEDURE — 99291 CRITICAL CARE FIRST HOUR: CPT | Mod: 25

## 2025-02-02 PROCEDURE — 87641 MR-STAPH DNA AMP PROBE: CPT | Performed by: EMERGENCY MEDICINE

## 2025-02-02 RX ORDER — PIPERACILLIN SODIUM, TAZOBACTAM SODIUM 3; .375 G/15ML; G/15ML
3.38 INJECTION, POWDER, LYOPHILIZED, FOR SOLUTION INTRAVENOUS ONCE
Status: COMPLETED | OUTPATIENT
Start: 2025-02-03 | End: 2025-02-03

## 2025-02-02 RX ORDER — KETOROLAC TROMETHAMINE 15 MG/ML
15 INJECTION, SOLUTION INTRAMUSCULAR; INTRAVENOUS ONCE
Status: COMPLETED | OUTPATIENT
Start: 2025-02-03 | End: 2025-02-02

## 2025-02-02 RX ADMIN — KETOROLAC TROMETHAMINE 15 MG: 15 INJECTION, SOLUTION INTRAMUSCULAR; INTRAVENOUS at 23:49

## 2025-02-02 RX ADMIN — PIPERACILLIN AND TAZOBACTAM 3.38 G: 3; .375 INJECTION, POWDER, FOR SOLUTION INTRAVENOUS at 23:56

## 2025-02-02 ASSESSMENT — COLUMBIA-SUICIDE SEVERITY RATING SCALE - C-SSRS
6. HAVE YOU EVER DONE ANYTHING, STARTED TO DO ANYTHING, OR PREPARED TO DO ANYTHING TO END YOUR LIFE?: NO
2. HAVE YOU ACTUALLY HAD ANY THOUGHTS OF KILLING YOURSELF IN THE PAST MONTH?: NO
1. IN THE PAST MONTH, HAVE YOU WISHED YOU WERE DEAD OR WISHED YOU COULD GO TO SLEEP AND NOT WAKE UP?: NO

## 2025-02-03 ENCOUNTER — APPOINTMENT (OUTPATIENT)
Dept: ULTRASOUND IMAGING | Facility: CLINIC | Age: 60
DRG: 871 | End: 2025-02-03
Attending: EMERGENCY MEDICINE
Payer: MEDICARE

## 2025-02-03 ENCOUNTER — APPOINTMENT (OUTPATIENT)
Dept: CT IMAGING | Facility: CLINIC | Age: 60
DRG: 871 | End: 2025-02-03
Attending: EMERGENCY MEDICINE
Payer: MEDICARE

## 2025-02-03 PROBLEM — L03.115 CELLULITIS OF RIGHT LEG: Status: ACTIVE | Noted: 2025-02-03

## 2025-02-03 PROBLEM — A41.9 SEPSIS WITHOUT ACUTE ORGAN DYSFUNCTION, DUE TO UNSPECIFIED ORGANISM (H): Status: ACTIVE | Noted: 2025-02-03

## 2025-02-03 LAB
ALBUMIN SERPL BCG-MCNC: 3.9 G/DL (ref 3.5–5.2)
ALBUMIN UR-MCNC: 30 MG/DL
ALP SERPL-CCNC: 66 U/L (ref 40–150)
ALT SERPL W P-5'-P-CCNC: <5 U/L (ref 0–50)
ANION GAP SERPL CALCULATED.3IONS-SCNC: 10 MMOL/L (ref 7–15)
ANION GAP SERPL CALCULATED.3IONS-SCNC: 11 MMOL/L (ref 7–15)
APPEARANCE UR: CLEAR
AST SERPL W P-5'-P-CCNC: 19 U/L (ref 0–45)
BACTERIA #/AREA URNS HPF: ABNORMAL /HPF
BASE EXCESS BLDV CALC-SCNC: -2 MMOL/L (ref -3–3)
BASOPHILS # BLD AUTO: 0.1 10E3/UL (ref 0–0.2)
BASOPHILS NFR BLD AUTO: 0 %
BILIRUB SERPL-MCNC: 0.4 MG/DL
BILIRUB UR QL STRIP: NEGATIVE
BUN SERPL-MCNC: 15.4 MG/DL (ref 8–23)
BUN SERPL-MCNC: 18.3 MG/DL (ref 8–23)
CALCIUM SERPL-MCNC: 6.8 MG/DL (ref 8.8–10.4)
CALCIUM SERPL-MCNC: 8.4 MG/DL (ref 8.8–10.4)
CHLORIDE SERPL-SCNC: 104 MMOL/L (ref 98–107)
CHLORIDE SERPL-SCNC: 112 MMOL/L (ref 98–107)
COLOR UR AUTO: YELLOW
CREAT SERPL-MCNC: 0.86 MG/DL (ref 0.51–0.95)
CREAT SERPL-MCNC: 0.97 MG/DL (ref 0.51–0.95)
D DIMER PPP FEU-MCNC: 0.3 UG/ML FEU (ref 0–0.5)
EGFRCR SERPLBLD CKD-EPI 2021: 67 ML/MIN/1.73M2
EGFRCR SERPLBLD CKD-EPI 2021: 77 ML/MIN/1.73M2
ENTEROCOCCUS FAECALIS: NOT DETECTED
ENTEROCOCCUS FAECIUM: NOT DETECTED
EOSINOPHIL # BLD AUTO: 0 10E3/UL (ref 0–0.7)
EOSINOPHIL NFR BLD AUTO: 0 %
ERYTHROCYTE [DISTWIDTH] IN BLOOD BY AUTOMATED COUNT: 16.2 % (ref 10–15)
ERYTHROCYTE [DISTWIDTH] IN BLOOD BY AUTOMATED COUNT: 16.3 % (ref 10–15)
EST. AVERAGE GLUCOSE BLD GHB EST-MCNC: 183 MG/DL
FLUAV RNA SPEC QL NAA+PROBE: NEGATIVE
FLUBV RNA RESP QL NAA+PROBE: NEGATIVE
GLUCOSE BLDC GLUCOMTR-MCNC: 102 MG/DL (ref 70–99)
GLUCOSE BLDC GLUCOMTR-MCNC: 110 MG/DL (ref 70–99)
GLUCOSE BLDC GLUCOMTR-MCNC: 137 MG/DL (ref 70–99)
GLUCOSE BLDC GLUCOMTR-MCNC: 156 MG/DL (ref 70–99)
GLUCOSE BLDC GLUCOMTR-MCNC: 70 MG/DL (ref 70–99)
GLUCOSE BLDC GLUCOMTR-MCNC: 89 MG/DL (ref 70–99)
GLUCOSE SERPL-MCNC: 161 MG/DL (ref 70–99)
GLUCOSE SERPL-MCNC: 214 MG/DL (ref 70–99)
GLUCOSE UR STRIP-MCNC: NEGATIVE MG/DL
HBA1C MFR BLD: 8 %
HCO3 BLDV-SCNC: 23 MMOL/L (ref 21–28)
HCO3 SERPL-SCNC: 18 MMOL/L (ref 22–29)
HCO3 SERPL-SCNC: 20 MMOL/L (ref 22–29)
HCT VFR BLD AUTO: 30.1 % (ref 35–47)
HCT VFR BLD AUTO: 34.1 % (ref 35–47)
HGB BLD-MCNC: 10.6 G/DL (ref 11.7–15.7)
HGB BLD-MCNC: 9.3 G/DL (ref 11.7–15.7)
HGB UR QL STRIP: ABNORMAL
IMM GRANULOCYTES # BLD: 0.6 10E3/UL
IMM GRANULOCYTES NFR BLD: 3 %
KETONES UR STRIP-MCNC: NEGATIVE MG/DL
LACTATE SERPL-SCNC: 1.1 MMOL/L (ref 0.7–2)
LEUKOCYTE ESTERASE UR QL STRIP: ABNORMAL
LISTERIA SPECIES (DETECTED/NOT DETECTED): NOT DETECTED
LVEF ECHO: NORMAL
LYMPHOCYTES # BLD AUTO: 0.9 10E3/UL (ref 0.8–5.3)
LYMPHOCYTES NFR BLD AUTO: 3 %
MCH RBC QN AUTO: 24.1 PG (ref 26.5–33)
MCH RBC QN AUTO: 24.5 PG (ref 26.5–33)
MCHC RBC AUTO-ENTMCNC: 30.9 G/DL (ref 31.5–36.5)
MCHC RBC AUTO-ENTMCNC: 31.1 G/DL (ref 31.5–36.5)
MCV RBC AUTO: 78 FL (ref 78–100)
MCV RBC AUTO: 79 FL (ref 78–100)
MONOCYTES # BLD AUTO: 1.1 10E3/UL (ref 0–1.3)
MONOCYTES NFR BLD AUTO: 4 %
MRSA DNA SPEC QL NAA+PROBE: POSITIVE
NEUTROPHILS # BLD AUTO: 22.6 10E3/UL (ref 1.6–8.3)
NEUTROPHILS NFR BLD AUTO: 90 %
NITRATE UR QL: NEGATIVE
NRBC # BLD AUTO: 0 10E3/UL
NRBC BLD AUTO-RTO: 0 /100
NT-PROBNP SERPL-MCNC: 1122 PG/ML (ref 0–900)
O2/TOTAL GAS SETTING VFR VENT: 21 %
OXYHGB MFR BLDV: 39 % (ref 70–75)
PCO2 BLDV: 42 MM HG (ref 40–50)
PH BLDV: 7.36 [PH] (ref 7.32–7.43)
PH UR STRIP: 6 [PH] (ref 5–7)
PLATELET # BLD AUTO: 241 10E3/UL (ref 150–450)
PLATELET # BLD AUTO: 272 10E3/UL (ref 150–450)
PO2 BLDV: 24 MM HG (ref 25–47)
POTASSIUM SERPL-SCNC: 3.4 MMOL/L (ref 3.4–5.3)
POTASSIUM SERPL-SCNC: 4 MMOL/L (ref 3.4–5.3)
PROCALCITONIN SERPL IA-MCNC: 1.63 NG/ML
PROT SERPL-MCNC: 7.3 G/DL (ref 6.4–8.3)
RBC # BLD AUTO: 3.8 10E6/UL (ref 3.8–5.2)
RBC # BLD AUTO: 4.4 10E6/UL (ref 3.8–5.2)
RBC URINE: 44 /HPF
RSV RNA SPEC NAA+PROBE: NEGATIVE
SA TARGET DNA: POSITIVE
SAO2 % BLDV: 40.1 % (ref 70–75)
SARS-COV-2 RNA RESP QL NAA+PROBE: NEGATIVE
SODIUM SERPL-SCNC: 135 MMOL/L (ref 135–145)
SODIUM SERPL-SCNC: 140 MMOL/L (ref 135–145)
SP GR UR STRIP: 1.02 (ref 1–1.03)
SQUAMOUS EPITHELIAL: 8 /HPF
STAPHYLOCOCCUS AUREUS: NOT DETECTED
STAPHYLOCOCCUS EPIDERMIDIS: NOT DETECTED
STAPHYLOCOCCUS LUGDUNENSIS: NOT DETECTED
STAPHYLOCOCCUS SPECIES: NOT DETECTED
STREPTOCOCCUS AGALACTIAE: NOT DETECTED
STREPTOCOCCUS ANGINOSUS GROUP: NOT DETECTED
STREPTOCOCCUS PNEUMONIAE: NOT DETECTED
STREPTOCOCCUS PYOGENES: NOT DETECTED
STREPTOCOCCUS SPECIES: DETECTED
UROBILINOGEN UR STRIP-MCNC: <2 MG/DL
WBC # BLD AUTO: 22.7 10E3/UL (ref 4–11)
WBC # BLD AUTO: 25.2 10E3/UL (ref 4–11)
WBC URINE: 84 /HPF

## 2025-02-03 PROCEDURE — 80048 BASIC METABOLIC PNL TOTAL CA: CPT

## 2025-02-03 PROCEDURE — 250N000013 HC RX MED GY IP 250 OP 250 PS 637

## 2025-02-03 PROCEDURE — 120N000004 HC R&B MS OVERFLOW

## 2025-02-03 PROCEDURE — 250N000011 HC RX IP 250 OP 636: Performed by: EMERGENCY MEDICINE

## 2025-02-03 PROCEDURE — G0463 HOSPITAL OUTPT CLINIC VISIT: HCPCS | Mod: 25

## 2025-02-03 PROCEDURE — 83880 ASSAY OF NATRIURETIC PEPTIDE: CPT

## 2025-02-03 PROCEDURE — 250N000011 HC RX IP 250 OP 636

## 2025-02-03 PROCEDURE — 99223 1ST HOSP IP/OBS HIGH 75: CPT | Mod: AI

## 2025-02-03 PROCEDURE — 82962 GLUCOSE BLOOD TEST: CPT

## 2025-02-03 PROCEDURE — 81001 URINALYSIS AUTO W/SCOPE: CPT | Performed by: EMERGENCY MEDICINE

## 2025-02-03 PROCEDURE — 250N000009 HC RX 250: Performed by: EMERGENCY MEDICINE

## 2025-02-03 PROCEDURE — 250N000011 HC RX IP 250 OP 636: Performed by: FAMILY MEDICINE

## 2025-02-03 PROCEDURE — 71275 CT ANGIOGRAPHY CHEST: CPT

## 2025-02-03 PROCEDURE — 96361 HYDRATE IV INFUSION ADD-ON: CPT

## 2025-02-03 PROCEDURE — 258N000003 HC RX IP 258 OP 636: Performed by: EMERGENCY MEDICINE

## 2025-02-03 PROCEDURE — 36415 COLL VENOUS BLD VENIPUNCTURE: CPT

## 2025-02-03 PROCEDURE — 96366 THER/PROPH/DIAG IV INF ADDON: CPT

## 2025-02-03 PROCEDURE — 93971 EXTREMITY STUDY: CPT | Mod: RT

## 2025-02-03 PROCEDURE — 87086 URINE CULTURE/COLONY COUNT: CPT | Performed by: EMERGENCY MEDICINE

## 2025-02-03 PROCEDURE — 3E043XZ INTRODUCTION OF VASOPRESSOR INTO CENTRAL VEIN, PERCUTANEOUS APPROACH: ICD-10-PCS | Performed by: FAMILY MEDICINE

## 2025-02-03 PROCEDURE — 255N000002 HC RX 255 OP 636

## 2025-02-03 PROCEDURE — 85018 HEMOGLOBIN: CPT

## 2025-02-03 PROCEDURE — 85048 AUTOMATED LEUKOCYTE COUNT: CPT

## 2025-02-03 RX ORDER — LANOLIN ALCOHOL/MO/W.PET/CERES
CREAM (GRAM) TOPICAL 2 TIMES DAILY
COMMUNITY

## 2025-02-03 RX ORDER — FERROUS SULFATE 325(65) MG
325 TABLET ORAL DAILY
COMMUNITY

## 2025-02-03 RX ORDER — ROSUVASTATIN CALCIUM 10 MG/1
20 TABLET, COATED ORAL AT BEDTIME
Status: DISCONTINUED | OUTPATIENT
Start: 2025-02-03 | End: 2025-02-07 | Stop reason: HOSPADM

## 2025-02-03 RX ORDER — OMEPRAZOLE 20 MG/1
20 CAPSULE, DELAYED RELEASE ORAL
Status: DISCONTINUED | OUTPATIENT
Start: 2025-02-04 | End: 2025-02-03

## 2025-02-03 RX ORDER — LEVOTHYROXINE SODIUM 125 UG/1
125 TABLET ORAL
Status: DISCONTINUED | OUTPATIENT
Start: 2025-02-04 | End: 2025-02-07 | Stop reason: HOSPADM

## 2025-02-03 RX ORDER — ONDANSETRON 4 MG/1
4 TABLET, FILM COATED ORAL EVERY 6 HOURS PRN
Status: DISCONTINUED | OUTPATIENT
Start: 2025-02-03 | End: 2025-02-07 | Stop reason: HOSPADM

## 2025-02-03 RX ORDER — VANCOMYCIN HYDROCHLORIDE 1 G/200ML
1000 INJECTION, SOLUTION INTRAVENOUS EVERY 12 HOURS
Status: DISCONTINUED | OUTPATIENT
Start: 2025-02-03 | End: 2025-02-05 | Stop reason: ALTCHOICE

## 2025-02-03 RX ORDER — ENOXAPARIN SODIUM 100 MG/ML
40 INJECTION SUBCUTANEOUS EVERY 24 HOURS
Status: DISCONTINUED | OUTPATIENT
Start: 2025-02-03 | End: 2025-02-07 | Stop reason: HOSPADM

## 2025-02-03 RX ORDER — MORPHINE SULFATE 30 MG/1
30 TABLET, FILM COATED, EXTENDED RELEASE ORAL EVERY 12 HOURS
Status: DISCONTINUED | OUTPATIENT
Start: 2025-02-03 | End: 2025-02-07 | Stop reason: HOSPADM

## 2025-02-03 RX ORDER — BUPROPION HYDROCHLORIDE 150 MG/1
150 TABLET ORAL EVERY MORNING
COMMUNITY

## 2025-02-03 RX ORDER — AMOXICILLIN 250 MG
2 CAPSULE ORAL 2 TIMES DAILY PRN
Status: DISCONTINUED | OUTPATIENT
Start: 2025-02-03 | End: 2025-02-07 | Stop reason: HOSPADM

## 2025-02-03 RX ORDER — FERROUS SULFATE 325(65) MG
325 TABLET ORAL DAILY
Status: DISCONTINUED | OUTPATIENT
Start: 2025-02-03 | End: 2025-02-07 | Stop reason: HOSPADM

## 2025-02-03 RX ORDER — PANTOPRAZOLE SODIUM 40 MG/1
40 TABLET, DELAYED RELEASE ORAL
Status: DISCONTINUED | OUTPATIENT
Start: 2025-02-04 | End: 2025-02-07 | Stop reason: HOSPADM

## 2025-02-03 RX ORDER — NICOTINE POLACRILEX 4 MG
15-30 LOZENGE BUCCAL
Status: DISCONTINUED | OUTPATIENT
Start: 2025-02-03 | End: 2025-02-07 | Stop reason: HOSPADM

## 2025-02-03 RX ORDER — BACLOFEN 10 MG/1
15 TABLET ORAL 3 TIMES DAILY
COMMUNITY

## 2025-02-03 RX ORDER — FUROSEMIDE 20 MG/1
20 TABLET ORAL DAILY
COMMUNITY

## 2025-02-03 RX ORDER — ACETAMINOPHEN 650 MG/1
650 SUPPOSITORY RECTAL EVERY 4 HOURS PRN
Status: DISCONTINUED | OUTPATIENT
Start: 2025-02-03 | End: 2025-02-07 | Stop reason: HOSPADM

## 2025-02-03 RX ORDER — FUROSEMIDE 20 MG/1
20 TABLET ORAL DAILY
Status: DISCONTINUED | OUTPATIENT
Start: 2025-02-03 | End: 2025-02-07 | Stop reason: HOSPADM

## 2025-02-03 RX ORDER — VENLAFAXINE HYDROCHLORIDE 150 MG/1
150 CAPSULE, EXTENDED RELEASE ORAL DAILY
Status: DISCONTINUED | OUTPATIENT
Start: 2025-02-03 | End: 2025-02-07 | Stop reason: HOSPADM

## 2025-02-03 RX ORDER — ACETAMINOPHEN 325 MG/1
650 TABLET ORAL EVERY 4 HOURS PRN
Status: DISCONTINUED | OUTPATIENT
Start: 2025-02-03 | End: 2025-02-07 | Stop reason: HOSPADM

## 2025-02-03 RX ORDER — TOPIRAMATE 50 MG/1
50 TABLET, FILM COATED ORAL DAILY
Status: DISCONTINUED | OUTPATIENT
Start: 2025-02-03 | End: 2025-02-07 | Stop reason: HOSPADM

## 2025-02-03 RX ORDER — TOPIRAMATE 50 MG/1
50 TABLET, FILM COATED ORAL DAILY
COMMUNITY

## 2025-02-03 RX ORDER — IOPAMIDOL 755 MG/ML
100 INJECTION, SOLUTION INTRAVASCULAR ONCE
Status: COMPLETED | OUTPATIENT
Start: 2025-02-03 | End: 2025-02-03

## 2025-02-03 RX ORDER — MORPHINE SULFATE 30 MG/1
30 TABLET, FILM COATED, EXTENDED RELEASE ORAL EVERY 12 HOURS
COMMUNITY

## 2025-02-03 RX ORDER — PIPERACILLIN SODIUM, TAZOBACTAM SODIUM 3; .375 G/15ML; G/15ML
3.38 INJECTION, POWDER, LYOPHILIZED, FOR SOLUTION INTRAVENOUS EVERY 8 HOURS
Status: DISCONTINUED | OUTPATIENT
Start: 2025-02-03 | End: 2025-02-05 | Stop reason: ALTCHOICE

## 2025-02-03 RX ORDER — AMOXICILLIN 250 MG
1 CAPSULE ORAL 2 TIMES DAILY PRN
Status: DISCONTINUED | OUTPATIENT
Start: 2025-02-03 | End: 2025-02-07 | Stop reason: HOSPADM

## 2025-02-03 RX ORDER — LISINOPRIL 2.5 MG/1
2.5 TABLET ORAL DAILY
Status: DISCONTINUED | OUTPATIENT
Start: 2025-02-03 | End: 2025-02-07 | Stop reason: HOSPADM

## 2025-02-03 RX ORDER — LOPERAMIDE HYDROCHLORIDE 2 MG/1
2 CAPSULE ORAL 4 TIMES DAILY PRN
COMMUNITY

## 2025-02-03 RX ORDER — VENLAFAXINE HYDROCHLORIDE 75 MG/1
75 CAPSULE, EXTENDED RELEASE ORAL DAILY
Status: DISCONTINUED | OUTPATIENT
Start: 2025-02-03 | End: 2025-02-07 | Stop reason: HOSPADM

## 2025-02-03 RX ORDER — BUPROPION HYDROCHLORIDE 150 MG/1
150 TABLET ORAL EVERY MORNING
Status: DISCONTINUED | OUTPATIENT
Start: 2025-02-03 | End: 2025-02-07 | Stop reason: HOSPADM

## 2025-02-03 RX ORDER — DEXTROSE MONOHYDRATE 25 G/50ML
25-50 INJECTION, SOLUTION INTRAVENOUS
Status: DISCONTINUED | OUTPATIENT
Start: 2025-02-03 | End: 2025-02-07 | Stop reason: HOSPADM

## 2025-02-03 RX ORDER — LIDOCAINE 40 MG/G
CREAM TOPICAL
Status: DISCONTINUED | OUTPATIENT
Start: 2025-02-03 | End: 2025-02-07 | Stop reason: HOSPADM

## 2025-02-03 RX ORDER — POLYETHYLENE GLYCOL 3350 17 G/17G
17 POWDER, FOR SOLUTION ORAL DAILY
Status: DISCONTINUED | OUTPATIENT
Start: 2025-02-03 | End: 2025-02-07 | Stop reason: HOSPADM

## 2025-02-03 RX ORDER — NOREPINEPHRINE BITARTRATE 0.02 MG/ML
.01-.6 INJECTION, SOLUTION INTRAVENOUS CONTINUOUS
Status: DISCONTINUED | OUTPATIENT
Start: 2025-02-03 | End: 2025-02-05

## 2025-02-03 RX ORDER — ONDANSETRON 4 MG/1
4 TABLET, FILM COATED ORAL EVERY 6 HOURS PRN
COMMUNITY

## 2025-02-03 RX ORDER — CALCIUM CARBONATE 500 MG/1
1000 TABLET, CHEWABLE ORAL 4 TIMES DAILY PRN
Status: DISCONTINUED | OUTPATIENT
Start: 2025-02-03 | End: 2025-02-07 | Stop reason: HOSPADM

## 2025-02-03 RX ADMIN — TOPIRAMATE 50 MG: 50 TABLET, FILM COATED ORAL at 11:30

## 2025-02-03 RX ADMIN — ACETAMINOPHEN 650 MG: 325 TABLET ORAL at 12:19

## 2025-02-03 RX ADMIN — INSULIN DEGLUDEC 12 UNITS: 100 INJECTION, SOLUTION SUBCUTANEOUS at 08:43

## 2025-02-03 RX ADMIN — BACLOFEN 15 MG: 10 TABLET ORAL at 15:58

## 2025-02-03 RX ADMIN — VENLAFAXINE HYDROCHLORIDE 150 MG: 150 CAPSULE, EXTENDED RELEASE ORAL at 11:32

## 2025-02-03 RX ADMIN — FERROUS SULFATE TAB 325 MG (65 MG ELEMENTAL FE) 325 MG: 325 (65 FE) TAB at 11:30

## 2025-02-03 RX ADMIN — PIPERACILLIN AND TAZOBACTAM 3.38 G: 3; .375 INJECTION, POWDER, FOR SOLUTION INTRAVENOUS at 21:48

## 2025-02-03 RX ADMIN — PIPERACILLIN AND TAZOBACTAM 3.38 G: 3; .375 INJECTION, POWDER, FOR SOLUTION INTRAVENOUS at 06:14

## 2025-02-03 RX ADMIN — VENLAFAXINE HYDROCHLORIDE 75 MG: 75 CAPSULE, EXTENDED RELEASE ORAL at 11:32

## 2025-02-03 RX ADMIN — MORPHINE SULFATE 30 MG: 30 TABLET, FILM COATED, EXTENDED RELEASE ORAL at 11:30

## 2025-02-03 RX ADMIN — PERFLUTREN 2 ML: 6.52 INJECTION, SUSPENSION INTRAVENOUS at 14:49

## 2025-02-03 RX ADMIN — BACLOFEN 15 MG: 10 TABLET ORAL at 22:05

## 2025-02-03 RX ADMIN — POLYETHYLENE GLYCOL 3350 17 G: 17 POWDER, FOR SOLUTION ORAL at 11:30

## 2025-02-03 RX ADMIN — SODIUM CHLORIDE 1572 ML: 9 INJECTION, SOLUTION INTRAVENOUS at 01:54

## 2025-02-03 RX ADMIN — ACETAMINOPHEN 650 MG: 325 TABLET ORAL at 21:48

## 2025-02-03 RX ADMIN — MORPHINE SULFATE 30 MG: 30 TABLET, FILM COATED, EXTENDED RELEASE ORAL at 22:22

## 2025-02-03 RX ADMIN — IOPAMIDOL 100 ML: 755 INJECTION, SOLUTION INTRAVENOUS at 01:22

## 2025-02-03 RX ADMIN — PIPERACILLIN AND TAZOBACTAM 3.38 G: 3; .375 INJECTION, POWDER, FOR SOLUTION INTRAVENOUS at 14:18

## 2025-02-03 RX ADMIN — NOREPINEPHRINE BITARTRATE 0.03 MCG/KG/MIN: 0.02 INJECTION, SOLUTION INTRAVENOUS at 03:18

## 2025-02-03 RX ADMIN — ENOXAPARIN SODIUM 40 MG: 40 INJECTION SUBCUTANEOUS at 06:21

## 2025-02-03 RX ADMIN — ROSUVASTATIN CALCIUM 20 MG: 10 TABLET, FILM COATED ORAL at 21:51

## 2025-02-03 RX ADMIN — BUPROPION HYDROCHLORIDE 150 MG: 150 TABLET, EXTENDED RELEASE ORAL at 11:29

## 2025-02-03 RX ADMIN — VANCOMYCIN HYDROCHLORIDE 1750 MG: 5 INJECTION, POWDER, LYOPHILIZED, FOR SOLUTION INTRAVENOUS at 01:53

## 2025-02-03 RX ADMIN — SODIUM CHLORIDE 1000 ML: 9 INJECTION, SOLUTION INTRAVENOUS at 03:03

## 2025-02-03 RX ADMIN — LEVOTHYROXINE SODIUM 187.5 MCG: 0.12 TABLET ORAL at 11:31

## 2025-02-03 RX ADMIN — VANCOMYCIN HYDROCHLORIDE 1000 MG: 1 INJECTION, SOLUTION INTRAVENOUS at 14:18

## 2025-02-03 ASSESSMENT — ACTIVITIES OF DAILY LIVING (ADL)
ADLS_ACUITY_SCORE: 59
ADLS_ACUITY_SCORE: 60
ADLS_ACUITY_SCORE: 60
ADLS_ACUITY_SCORE: 54
ADLS_ACUITY_SCORE: 60
ADLS_ACUITY_SCORE: 54
ADLS_ACUITY_SCORE: 60
ADLS_ACUITY_SCORE: 54
ADLS_ACUITY_SCORE: 54
ADLS_ACUITY_SCORE: 60
ADLS_ACUITY_SCORE: 59
ADLS_ACUITY_SCORE: 60
ADLS_ACUITY_SCORE: 54
ADLS_ACUITY_SCORE: 54
ADLS_ACUITY_SCORE: 59
ADLS_ACUITY_SCORE: 60
ADLS_ACUITY_SCORE: 60

## 2025-02-03 NOTE — PHARMACY-ADMISSION MEDICATION HISTORY
Pharmacy Intern Admission Medication History    Admission medication history is complete. The information provided in this note is only as accurate as the sources available at the time of the update.    Information Source(s): Facility ([facility]/) medication list/MAR via N/A    Pertinent Information: [facility] phone # [phone]    Changes made to PTA medication list:  Added:   Furosemide 20 mg   Ozempic 1 mg   Eucerin cream  Benadryl itch cream   Camphor & Menthol   Benzocaine gel  Loperamide    Milk of Magnesia   Naloxone   Zofran   Deleted:   Vitamin C   Bacitracin    Tums   Cholecalciferol   Clotrimazole   Doxycyline   Vanicream   Novolog   Loratadine   Magnesium   Menthol Nuris  Morphine 15  Sumatriptan   Torsemide   Xarelto   Zinc Oxide cream   Changed:   Bupropion SR --> XL   Ferrous Sulfate every other day--> Daily   Insulin Degludec 28 units --> 15 units  Miralax every other day --> daily   Topiramate ER--> Topiramate IR  Morphine 30 mg TID--> BID   Baclofen 10mg TID --> 15 mg TID   APAP Q4H--> Q6H  Senna 2 tabs--> 1 tab       Allergies reviewed with patient and updates made in EHR: yes    Medication History Completed By: Patricia Platt 2/3/2025 8:43 AM    PTA Med List   Medication Sig Last Dose/Taking    acetaminophen (TYLENOL) 325 MG tablet Take 650 mg by mouth every 6 hours as needed for mild pain. Taking As Needed    aspirin (ASA) 81 MG chewable tablet Take 81 mg by mouth daily 2/2/2025 at  8:00 AM    baclofen (LIORESAL) 10 MG tablet Take 15 mg by mouth 3 times daily. 2/2/2025 at  8:00 PM    benzocaine (ORAJEL MAXIMUM STRENGTH) 20 % GEL gel Take by mouth every 4 hours as needed for mouth sores. Taking As Needed    bisacodyl (DULCOLAX) 10 MG suppository Place 10 mg rectally daily as needed for constipation Taking As Needed    buPROPion (WELLBUTRIN XL) 150 MG 24 hr tablet Take 150 mg by mouth every morning. 2/2/2025 at  8:00 AM    calcium carbonate 600 mg-vitamin D 400 units (CALTRATE)  600-400 MG-UNIT per tablet Take 1 tablet by mouth daily 2/2/2025 at  8:00 AM    camphor-menthol (DERMASARRA) 0.5-0.5 % external lotion Apply topically every 4 hours as needed for skin care (itching). Taking As Needed    diphenhydrAMINE-zinc acetate (BENADRYL) 1-0.1 % external cream Apply topically 2 times daily as needed for itching. Taking As Needed    ferrous sulfate (FEROSUL) 325 (65 Fe) MG tablet Take 325 mg by mouth daily. 2/2/2025 at 12:00 PM    furosemide (LASIX) 20 MG tablet Take 20 mg by mouth daily. 2/2/2025 at  9:00 AM    insulin degludec (TRESIBA) 100 UNIT/ML pen Inject 15 Units subcutaneously daily (with dinner). 2/2/2025 at  8:00 AM    levothyroxine (SYNTHROID/LEVOTHROID) 125 MCG tablet Take 125 mcg by mouth See Admin Instructions Daily on Tuesday, Wednesday, Thursday, Saturday, and Sunday 2/2/2025 at  6:00 AM    levothyroxine (SYNTHROID/LEVOTHROID) 125 MCG tablet Take 187.5 mcg by mouth See Admin Instructions On Mondays, Fridays Past Week    lisinopril (ZESTRIL) 2.5 MG tablet Take 2.5 mg by mouth daily 2/2/2025 at  8:00 AM    loperamide (IMODIUM) 2 MG capsule Take 2 mg by mouth 4 times daily as needed for diarrhea. Taking As Needed    magnesium hydroxide (MOM) 2400 MG/10ML SUSP Take 5 mLs by mouth daily as needed for constipation. Taking As Needed    mineral oil-white petrolatum (EUCERIN/MINERIN) cream Apply topically 2 times daily. Apply to legs topically two times a day for dry skin. 2/2/2025 at  4:00 PM    morphine (MS CONTIN) 30 MG CR tablet Take 30 mg by mouth every 12 hours. 2/2/2025 at  8:00 PM    Multiple Vitamins-Minerals (MULTIVITAMIN GUMMIES WOMENS) CHEW Take 1 chew tab by mouth daily 2/2/2025 at  8:00 AM    naloxone (NARCAN) 4 MG/0.1ML nasal spray Spray 4 mg into one nostril alternating nostrils as needed for opioid reversal. every 2-3 minutes until assistance arrives Taking As Needed    omeprazole (PRILOSEC) 20 MG DR capsule Take 20 mg by mouth daily before breakfast 2/2/2025 at  8:00 AM     ondansetron (ZOFRAN) 4 MG tablet Take 4 mg by mouth every 6 hours as needed for nausea. Taking As Needed    polyethylene glycol (MIRALAX) 17 GM/Dose powder Take 1 capful. by mouth daily. 2/2/2025 at  8:00 AM    rosuvastatin (CRESTOR) 20 MG tablet Take 20 mg by mouth At Bedtime 2/2/2025 at  8:00 PM    Semaglutide, 1 MG/DOSE, (OZEMPIC) 4 MG/3ML pen Inject 1 mg subcutaneously every 7 days. On Wednesdays. Past Week    sennosides (SENOKOT) 8.6 MG tablet Take 1 tablet by mouth 2 times daily. Taking    topiramate (TOPAMAX) 50 MG tablet Take 50 mg by mouth daily. 2/2/2025 at  8:00 AM    venlafaxine (EFFEXOR-XR) 150 MG 24 hr capsule Take 150 mg by mouth daily Take with 75 mg capsule for a total dose of 225 mg. 2/2/2025 at  8:00 AM    venlafaxine (EFFEXOR-XR) 75 MG 24 hr capsule Take 75 mg by mouth daily Take with 150 mg capsule for a total dose of 225 mg. 2/2/2025 at  8:00 AM

## 2025-02-03 NOTE — PHARMACY-VANCOMYCIN DOSING SERVICE
"Pharmacy Vancomycin Initial Note  Date of Service February 3, 2025  Patient's  1965  59 year old, female    Indication: Skin and Soft Tissue Infection    Current estimated CrCl = Estimated Creatinine Clearance: 70.4 mL/min (based on SCr of 0.86 mg/dL).    Creatinine for last 3 days  2025: 11:45 PM Creatinine 0.97 mg/dL  2/3/2025:  6:18 AM Creatinine 0.86 mg/dL    Recent Vancomycin Level(s) for last 3 days  No results found for requested labs within last 3 days.      Vancomycin IV Administrations (past 72 hours)                     vancomycin (VANCOCIN) 1,750 mg in 0.9% NaCl 517.5 mL intermittent infusion (mg) 1,750 mg New Bag 25 0153                    Nephrotoxins and other renal medications (From now, onward)      Start     Dose/Rate Route Frequency Ordered Stop    25 1400  vancomycin (VANCOCIN) 1,000 mg in NaCl 0.9% 200 mL intermittent infusion         1,000 mg  over 60 Minutes Intravenous EVERY 12 HOURS 25 0754      25 0600  piperacillin-tazobactam (ZOSYN) 3.375 g vial to attach to  mL bag        Note to Pharmacy: For [facility], [facility] and [facility]: For Zosyn-naive patients, use the \"Zosyn initial dose + extended infusion\" order panel.    3.375 g  over 240 Minutes Intravenous EVERY 8 HOURS 25 0345      25 0330  norepinephrine (LEVOPHED) 4 mg in  mL infusion PREMIX         0.01-0.6 mcg/kg/min × 81.6 kg  3.1-183.6 mL/hr  Intravenous CONTINUOUS 25 0310              Contrast Orders - past 72 hours (72h ago, onward)      Start     Dose/Rate Route Frequency Stop    25 0100  iopamidol (ISOVUE-370) solution 100 mL         100 mL Intravenous ONCE 25 0122            [employer] Prediction of Planned Initial Vancomycin Regimen  Loading dose: 1750mg  Regimen: 1000 mg IV every 12 hours.  Start time: 14:00 on 2025  Exposure target: AUC24 (range)400-600 mg/L.hr   AUC24,ss: 552 mg/L.hr  Probability of AUC24 > 400: 82 %  Ctrough,ss: 17.9 mg/L  Probability of " Ctrough,ss > 20: 40 %  Probability of nephrotoxicity (Lodise LOUIS 2009): 14 %        Plan:  Start vancomycin  1000 mg IV q12h.   Vancomycin monitoring method: AUC  Vancomycin therapeutic monitoring goal: 400-600 mg*h/L  Pharmacy will check vancomycin levels as appropriate in 1-3 Days.    Serum creatinine levels will be ordered daily for the first week of therapy and at least twice weekly for subsequent weeks.      Avery Martinez RPH

## 2025-02-03 NOTE — PROGRESS NOTES
[facility]    Progress Note - Hospitalist Service       Date of Admission:  2/2/2025    Assessment & Plan   Tonya Valera is a 59 year old female admitted on 2/2/2025. She has a history of T2DM, anxiety disorder, calculus of gallbladder, intertrigo, chronic venous insufficiency, episodic mood disorder, HLD, HTN, hyponatremia, hypothyroidism, leukocytosis, chronic MDD, borderline personality disorder, stage 2 CKD, opioid dependence continuous, multiple wounds of lower extremity, cellulitis of right lower extremity, DVT  and is admitted for sepsis 2/2 RLE cellulitis.     Fever   Sepsis   RLE cellulitis   Hypotension  Presenting with 1 day fever, dysuria, cough in context of recent COVID infection. Patient meeting sepsis criteria with tachycardia, hypotension, fever 101.5, WBC 25, procalcitonin 1.6, but normal lactic acid. No evidence of PE or pneumonia on imaging. No DVT on U/S. Most likely source of infection appears to be the RLE, with cellulitis present. Received 2.5 L NS in ED. Patient on broad spectrum antibiotics and trialed off of Levophed this morning. UA back indicating UTI. S/p 15 mg Toradol ONCE in ED.  - Continue antibiotics with Zosyn and Vanc  - WOC consult   - Follow blood and uirine cultures  - Supplemental O2 for SpO2 <90%  - AM CBC   - Discontinued Levophed  - Continue PTA pain regimen after med rec     Acute hypoxic respiratory failure  Patient requiring 2L oxygen via NC. Nt-proBNP elevated to 1,122. Differential includes recent COVID-19 infection, bacterial infection, heart failure. Less likely PE/DVT, pneumonia based on CT imaging. Will rule out etiologies with further imaging and studies.  - Echocardiogram pending  - EKG if higher suspicion for heart failure    T2DM  A1c of 8.0% on admit. PTA regimen includes 15 units degludec every morning per med sheet at patient's bedside from her facility. Reduced by 20% during hospitalization and place pt on sliding scale.  -  "PTA basal insulin at 12 units in AM (verify once med rec is complete)   - medium ssi   - diabetic diet      HTN  - HOLD PTA lisinopril in light of hypotension     CKD 2  Cr. 0.97 on admission down to 0.86 on morning labs, GFR 77. S/p 2.5 L of NS in ED   - Avoid nephrotoxins   - Daily BMP      Lower extremity edema   - PTA torsemide   MDD  - PTA venlaxafine and wellbutrin   Hypothyroidism   TSH 2.69 (04/2024)  - PTA levothyroxine           Diet: Combination Diet Regular Diet Adult; Moderate Consistent Carb (60 g CHO per Meal) Diet; 2 gm NA Diet    DVT Prophylaxis: Enoxaparin (Lovenox) SQ  Townsend Catheter: Not present  Fluids: PO  Lines: None     Cardiac Monitoring: None  Code Status: Full Code      Clinically Significant Risk Factors Present on Admission          # Hyperchloremia: Highest Cl = 112 mmol/L in last 2 days, will monitor as appropriate      # Hypocalcemia: Lowest Ca = 6.8 mg/dL in last 2 days, will monitor and replace as appropriate         # Drug Induced Platelet Defect: home medication list includes an antiplatelet medication   # Hypertension: Home medication list includes antihypertensive(s)   # Circulatory Shock: required vasopressors within past 24 hours       # Anemia: based on hgb <11      # DMII: A1C = 8.0 % (Ref range: <5.7 %) within past 6 months    # Obesity: Estimated body mass index is 31.89 kg/m  as calculated from the following:    Height as of this encounter: 1.6 m (5' 3\").    Weight as of this encounter: 81.6 kg (180 lb).              Social Drivers of Health   Tobacco Use: Medium Risk (8/15/2024)    Received from "[employer]    Patient History     Smoking Tobacco Use: Former     Smokeless Tobacco Use: Never         Disposition Plan     Medically Ready for Discharge: Anticipated in 2-4 Days         The patient's care was discussed with the Attending Physician, Dr. Ruy Naranjo .    Bruno Friend DO  Hospitalist Service  [facility]  Securely message with [employer] " (more info)  Text page via [facility] Paging/Directory   ______________________________________________________________________    Interval History   No acute overnight events.  Patient appears drowsy from her overnight stay in the ED.  She notes that she has pain in her lower extremities (R > L).  Patient endorses symptoms of nausea without vomiting, increased frequency of urination.  Denies shortness of breath, chest pain, dysuria.  Patient has no new symptoms or complaints at this time.  All questions answered.    Of note, patient does lean to the right in her bed. This is not new.    Physical Exam   Vital Signs: Temp: 99.5  F (37.5  C) Temp src: Oral BP: 95/53 Pulse: 93   Resp: 17 SpO2: 99 % O2 Device: Nasal cannula Oxygen Delivery: 2 LPM  Weight: 180 lbs 0 oz    Constitutional: drowsy, alert, cooperative, no apparent distress, and appears stated age  Hematologic / Lymphatic: no cervical lymphadenopathy and no supraclavicular lymphadenopathy  Respiratory: No increased work of breathing, good air exchange, clear to auscultation bilaterally, no crackles or wheezing  Cardiovascular: Normal apical impulse, regular rate and rhythm, normal S1 and S2, no S3 or S4, and no murmur noted  GI: No scars, normal bowel sounds, soft, non-distended, non-tender, no masses palpated, no hepatosplenomegally  Skin: Erythema of RLE up to mid thigh with multiple healing scabs, 6 cm scar inferolateral to right patella, and falking skin more distal.  Musculoskeletal: Erythema, warmth and edema of RLE compared to LLE.  Neuropsychiatric: General: normal, calm, and normal eye contact        Data     I have personally reviewed the following data over the past 24 hrs:    22.7 (H)  \   9.3 (L)   / 241     140 112 (H) 15.4 /  156 (H)   3.4 18 (L) 0.86 \     ALT: <5 AST: 19 AP: 66 TBILI: 0.4   ALB: 3.9 TOT PROTEIN: 7.3 LIPASE: N/A     Trop: N/A BNP: 1,122 (H)     TSH: N/A T4: N/A A1C: 8.0 (H)     Procal: 1.63 (H) CRP: N/A Lactic Acid: 1.1       INR:   N/A PTT:  N/A   D-dimer:  0.30 Fibrinogen:  N/A       Imaging results reviewed over the past 24 hrs:   Recent Results (from the past 24 hours)   US Lower Extremity Venous Duplex Right    Narrative    EXAM: US LOWER EXTREMITY VENOUS DUPLEX RIGHT  LOCATION: [facility]  DATE: 2/3/2025    INDICATION: Swelling. History of DVT.  COMPARISON: None.  TECHNIQUE: Venous Duplex ultrasound of the right lower extremity with and without compression, augmentation and duplex. Color flow and spectral Doppler with waveform analysis performed.    FINDINGS: Exam includes the common femoral, femoral, popliteal, and contralateral common femoral veins as well as segmentally visualized deep calf veins and greater saphenous vein.     RIGHT: No deep vein thrombosis. No superficial thrombophlebitis. No popliteal cyst.      Impression    IMPRESSION:  1.  No deep venous thrombosis in the right lower extremity.   CT Chest Pulmonary Embolism w Contrast    Narrative    EXAM: CT CHEST PULMONARY EMBOLISM W CONTRAST  LOCATION: [facility]  DATE: 2/3/2025    INDICATION: Chest pain, dyspnea, high risk PE.  COMPARISON: None available.  TECHNIQUE: CT chest pulmonary angiogram during arterial phase injection of IV contrast. Multiplanar reformats and MIP reconstructions were performed. Dose reduction techniques were used.   CONTRAST: 100 ml Isovue 370.    FINDINGS:  ANGIOGRAM CHEST: No evidence of pulmonary embolism. No evidence of thoracic aortic aneurysm or dissection. No evidence for right heart strain.    LUNGS AND PLEURA: No pleural effusion or pneumothorax. Bibasilar pulmonary opacities, likely atelectasis.    MEDIASTINUM/AXILLAE: Mild cardiomegaly. No significant pericardial effusion. Few mildly prominent mediastinal lymph nodes, indeterminate, could be reactive. Moderate-sized hiatal hernia.    CORONARY ARTERY CALCIFICATION: Moderate.    UPPER ABDOMEN: Limited evaluation of the upper abdomen due to  lack of coverage and timing of contrast. Large calcified gallstone.    MUSCULOSKELETAL: Multilevel degenerative changes of the spine. Wedge-shaped compression deformity of T10 vertebra, likely old.      Impression    IMPRESSION:  1.  No evidence of pulmonary embolism.  2.  Mild cardiomegaly and moderate atherosclerotic vascular calcification of the coronary arteries.

## 2025-02-03 NOTE — CONSULTS
[facility]  WO Nurse Inpatient Assessment     Consulted for: RLE cellulitis    Summary: Patient in with pain to RLE, xeroform intact    WO nurse follow-up plan: weekly    Patient History (according to provider note(s):      Tonya Valera is a 59 year old female who has a history of T2DM, anxiety disorder, calculus of gallbladder, intertrigo, chronic venous insufficiency, episodic mood disorder, HLD, HTN, hyponatremia, hypothyroidism, leukocytosis, chronic MDD, borderline personality disorder, stage 2 CKD, opioid dependence continuous, multiple wounds of lower extremity, cellulitis of right lower extremity, DVT  and is admitted for sepsis 2/2 RLE cellulitis.        Patient comes from [facility] where she was found to have a fever. She reports pain in her joints and a recent cough with shortness of breath. Per my chart review, she was diagnosed with COVID on 1/16/25. She also endorses some nausea with vomiting, urinary symptoms and irregular bowel movements. Urine at bedside is malodorous and non-bloody.      In the ED, patient found to be febrile to 101.5F with tachycardia. Requiring 2 LPM via NC to maintain O2 saturations >90%. Work up revealed procal elevated to 1.63 with normal lactic acid. Negative respiratory panel. She has leukocytosis to 25.2. Normal D-dimer as well as CT-PE without evidence of pulmonary embolus or PNA. Lower extremity U/S without DVT. Negative MRSA swab, blood cultures pending. Patient was started on vanc and zosyn in the ED as well as administered 2.5 L of NS. She received toradol for pain.      During admission process patient became hypotensive and did not recover with fluid resuscitation (2.5 L of NS) so levophed was started for BP support to maintain MAP >65.     Assessment:      Wound location: RLE          2/3    Last photo: 2/3  Wound due to:  cellulitis and weeping  Wound history/plan of care: Patient was moaning but did not answer any  "questions  Wound base: 100 % Dermis, active weeping noted. Small darker area to lateral ankle and a small area of fibrin to the medial shin     Palpation of the wound bed: fluctuance      Drainage: moderate     Description of drainage: serous     Measurements (length x width x depth, in cm): 21  x 30  x  0.1 cm      Tunneling: N/A     Undermining: N/A  Periwound skin: Erythema- blanchable      Color: pink and red      Temperature: hot  Odor: moderate  Pain: Irritable or crying out at intervals and during dressing change,  pt unable to verbalize, just moans  Pain interventions prior to dressing change: slow and gentle cares   Treatment goal: Drainage control, Infection control/prevention, Protection, and Promote epidermal migration  STATUS: initial assessment  Supplies ordered: gathered     Treatment Plan:     RLE  Soak kerlix roll with vashe, wrap from toes to knee of RLE, soak for 10-15 minutes, pat dry  Cover open weeping skin with xeroform ([id number]), then ABD pads  Secure with kerlix rolls x2  Elevate heels off bed at all times  Change daily + PRN if soiled, saturated, falls off    Pressure Injury Prevention (PIP) Plan:  If patient is declining pressure injury prevention interventions: Explore reason why and address patient's concerns, Educate on pressure injury risk and prevention intervention(s), If patient is still declining, document \"informed refusal\" , and Ensure Care team is aware ( provider, charge nurse, etc)  Mattress: Follow bed algorithm, add Low Air Loss (Air+) mattress pump if skin is very moist or constantly moist.   HOB: Maintain at or below 30 degrees, unless contraindicated  Repositioning in bed: Every 1-2 hours  and Raise foot of bed prior to raising head of bed, to reduce patient sliding down (shear)  Heels: Keep elevated off mattress and Pillows under calves  Protective Dressing: Sacral Mepilex for prevention (#[id number]),  especially for the agitated patient   Positioning Equipment: " pillows  Chair positioning: Chair cushion (#[id number])    If patient has a buttock pressure injury, or high risk for PI use chair cushion or SPS.  Moisture Management: Perineal cleansing /protection: Follow Incontinence Protocol, Avoid brief in bed, Clean and dry skin folds with bathing , Consider InterDry (#[id number]) between folds, and Moisturize dry skin  Under Devices: Inspect skin under all medical devices during skin inspection , Ensure tubes are stabilized without tension, and Ensure patient is not lying on medical devices or equipment when repositioned  Ask provider to discontinue device when no longer needed.      Orders: Written    RECOMMEND PRIMARY TEAM ORDER: None, at this time  Education provided: plan of care  Discussed plan of care with: Patient and Nurse  Notify WOC if wound(s) deteriorate.  Nursing to notify the Provider(s) and re-consult the WOC Nurse if new skin concern.    DATA:     Current support surface: Standard  ED cart  Containment of urine/stool: Incontinence Protocol  BMI: Body mass index is 31.89 kg/m .   Active diet order: Orders Placed This Encounter      Combination Diet Regular Diet Adult; Moderate Consistent Carb (60 g CHO per Meal) Diet; 2 gm NA Diet     Output: I/O last 3 completed shifts:  In: 1672 [IV Piggyback:1672]  Out: -      Labs:   Recent Labs   Lab 02/03/25  0618 02/02/25  2345   ALBUMIN  --  3.9   HGB 9.3* 10.6*   WBC 22.7* 25.2*   A1C  --  8.0*     Pressure injury risk assessment:                          MARIBELL Rogel RN CWOCN  Pager no longer in use, please contact through [employer] group: [facility] [employer]

## 2025-02-03 NOTE — ED NOTES
Bed: [facility]ED-03  Expected date: 2/2/25  Expected time: 11:25 PM  Means of arrival:   Comments:  EMS: Db  Age/gender: 59/Female  CC: sepsis

## 2025-02-03 NOTE — DISCHARGE INSTRUCTIONS
"[facility] DISCHARGE INSTRUCTIONS:  Pressure Injury Prevention (PIP) Plan:  If patient is declining pressure injury prevention interventions: Explore reason why and address patient's concerns, Educate on pressure injury risk and prevention intervention(s), If patient is still declining, document \"informed refusal\" , and Ensure Care team is aware ( provider, charge nurse, etc)  Mattress: Follow bed algorithm, add Low Air Loss (Air+) mattress pump if skin is very moist or constantly moist.   HOB: Maintain at or below 30 degrees, unless contraindicated  Repositioning in bed: Every 1-2 hours  and Raise foot of bed prior to raising head of bed, to reduce patient sliding down (shear)  Heels: Keep elevated off mattress and Pillows under calves  Protective Dressing: Sacral Mepilex for prevention (#[id number]),  especially for the agitated patient   Positioning Equipment:pillows  Chair positioning: Chair cushion (#[id number])    If patient has a buttock pressure injury, or high risk for PI use chair cushion or SPS.  Moisture Management: Perineal cleansing /protection: Follow Incontinence Protocol, Avoid brief in bed, Clean and dry skin folds with bathing , Consider InterDry (#[id number]) between folds, and Moisturize dry skin  Under Devices: Inspect skin under all medical devices during skin inspection , Ensure tubes are stabilized without tension, and Ensure patient is not lying on medical devices or equipment when repositioned  Ask provider to discontinue device when no longer needed.  "

## 2025-02-03 NOTE — ED PROVIDER NOTES
EMERGENCY DEPARTMENT ENCOUNTER      NAME: Tonya Valera  AGE: 59 year old female  YOB: 1965  MRN: [id number]  EVALUATION DATE & TIME: 2/2/2025 11:27 PM    PCP: Annia Frias    ED PROVIDER: Jordi Elmore M.D.      Chief Complaint   Patient presents with    Fever         FINAL IMPRESSION:  1. Cellulitis of right leg    2. Sepsis without acute organ dysfunction, due to unspecified organism (H)          ED COURSE & MEDICAL DECISION MAKING:    Pertinent Labs & Imaging studies reviewed. (See chart for details)  59 year old female presents to the Emergency Department for evaluation of fever body aches and concern for worsening infection.  Patient does appear to be sepsis on arrival.  He is tachycardic with a temp of 101.5.  Initial blood pressures are okay.  Does have an obvious cellulitis of the right lower extremity.  Patient also has a history of PE.  Given this did do a CT scan of her chest.  She is high risk so did go directly to this.  CT scan does not show PE or pneumonia.  COVID influenza RSV are negative.  Patient's white count is elevated.  IV Zosyn and Vanco given.  Blood cultures drawn.  After patient got her initial 30/kg bolus blood pressure did drop.  Did try another bolus and start Levophed peripherally.  Blood pressure came up with minimal amount of vasopressors.  Patient will be mated to the ICU given her need for pressors.  I am guessing this will be short-term and would not put a central line in at this point.  Would consider a PICC in the morning if she continues to need pressors.  Patient discussed with the resident and the intensivist.    Attempt to transfer but no beds available.    11:40 PM I met with the patient to gather history and to perform my initial exam. I discussed the plan for care while in the Emergency Department.       At the conclusion of the encounter I discussed the results of all of the tests and the disposition. The questions were answered. The patient or  family acknowledged understanding and was agreeable with the care plan.     Medical Decision Making  Obtained supplemental history:Supplemental history obtained?: No  Reviewed external records: External records reviewed?: Documented in chart  Care impacted by chronic illness:Chronic Pain, Diabetes, and Other: Continuous opioid dependence  Did you consider but not order tests?: Work up considered but not performed and documented in chart, if applicable  Did you interpret images independently?: Independent interpretation of ECG and images noted in documentation, when applicable.  Consultation discussion with other provider:Did you involve another provider (consultant, , pharmacy, etc.)?: I discussed the care with another health care provider, see documentation for details.  Admit.    MIPS: Not Applicable        Critical Care     Performed by: Dr Jordi Elmore  Authorized by: Dr Jordi Elmore  Total critical care time: 60 minutes  Critical care was necessary to treat or prevent imminent or life-threatening deterioration of the following conditions: sepsis with need for vasopressors.   Critical care was time spent personally by me on the following activities: development of treatment plan with patient or surrogate, discussions with consultants, examination of patient, evaluation of patient's response to treatment, obtaining history from patient or surrogate, ordering and performing treatments and interventions, ordering and review of laboratory studies, ordering and review of radiographic studies, re-evaluation of patient's condition and monitoring for potential decompensation.  Critical care time was exclusive of separately billable procedures and treating other patients.      MEDICATIONS GIVEN IN THE EMERGENCY:  Medications   sodium chloride (PF) 0.9% PF flush 3 mL (has no administration in time range)   sodium chloride (PF) 0.9% PF flush 3 mL (3 mLs Intracatheter $Given 2/2/25 2846)   norepinephrine (LEVOPHED) 4  mg in  mL infusion PREMIX (0.03 mcg/kg/min × 81.6 kg Intravenous $New Bag 2/3/25 0318)   lidocaine 1 % 0.1-1 mL (has no administration in time range)   lidocaine (LMX4) cream (has no administration in time range)   sodium chloride (PF) 0.9% PF flush 3 mL (3 mLs Intracatheter Not Given 2/3/25 0357)   sodium chloride (PF) 0.9% PF flush 3 mL (has no administration in time range)   senna-docusate (SENOKOT-S/PERICOLACE) 8.6-50 MG per tablet 1 tablet (has no administration in time range)     Or   senna-docusate (SENOKOT-S/PERICOLACE) 8.6-50 MG per tablet 2 tablet (has no administration in time range)   calcium carbonate (TUMS) chewable tablet 1,000 mg (has no administration in time range)   glucose gel 15-30 g (has no administration in time range)     Or   dextrose 50 % injection 25-50 mL (has no administration in time range)     Or   glucagon injection 1 mg (has no administration in time range)   enoxaparin ANTICOAGULANT (LOVENOX) injection 40 mg (has no administration in time range)   acetaminophen (TYLENOL) tablet 650 mg (has no administration in time range)     Or   acetaminophen (TYLENOL) Suppository 650 mg (has no administration in time range)   insulin degludec (TRESIBA) 100 UNIT/ML injection 12 Units (has no administration in time range)   insulin aspart (NovoLOG) injection (RAPID ACTING) (has no administration in time range)   insulin aspart (NovoLOG) injection (RAPID ACTING) (has no administration in time range)   piperacillin-tazobactam (ZOSYN) 3.375 g vial to attach to  mL bag (has no administration in time range)   ketorolac (TORADOL) injection 15 mg (15 mg Intravenous $Given 2/2/25 2349)   piperacillin-tazobactam (ZOSYN) 3.375 g vial to attach to  mL bag (0 g Intravenous Stopped 2/3/25 0032)   vancomycin (VANCOCIN) 1,750 mg in 0.9% NaCl 517.5 mL intermittent infusion (1,750 mg Intravenous $New Bag 2/3/25 0153)   iopamidol (ISOVUE-370) solution 100 mL (100 mLs Intravenous $Given 2/3/25 0122)    sodium chloride 0.9% BOLUS 1,572 mL (0 mLs Intravenous Stopped 2/3/25 5528)   sodium chloride 0.9% BOLUS 1,000 mL (1,000 mLs Intravenous $New Bag 2/3/25 0309)       NEW PRESCRIPTIONS STARTED AT TODAY'S ER VISIT  New Prescriptions    No medications on file          =================================================================    HPI    Patient information was obtained from: Patient     Use of : N/A       Tonya Valera is a 59 year old female with a pertinent history of type 2 diabetes mellitus, anxiety disorder, calculus of gallbladder, intertrigo, chronic venous insufficiency, episodic mood disorder, hyperlipidemia, hypertension, hyponatremia, hypothyroidism, leukocytosis, chronic MDD, borderline personality disorder, stage 2 chronic kidney disease, opioid dependence continuous, multiple wounds of lower extremity, cellulitis of right lower extremity, DVT who presents to this ED for evaluation of fever.    [facility] called EMS after patient was found to have a fever. Patient has diabetes and chronic morphine use. She reports pain in her joints and a cough with shortness of breath. Patient notes nausea and vomiting but is unable to say how much. Patient has says she is having urinary symptoms and irregular bowel movements but is unable to expand on these comments. Patient has lymphedema in her right lower extremity.     Per Chart Review: 1/16/25  Nursing Home visit at [facility] for multiple problems. Patient presents with:  1. SARS-CoV-2 positive, being treated with paxlovid and insolation.  2. Chronic narcotic dependence (HRC)    3. S/P laminectomy    4. Hypothyroidism, unspecified type (HRC)    5. Type 2 diabetes mellitus with hyperglycemia, with long-term current use of insulin (HRC), on ozempic and degludec. Blood sugars .   6. Borderline personality disorder (HRC),  On Wellbutrin, effexor, and Topamax and gets upset when meds are changed.  7. Anxiety and  depression (HRC)    8. Chronic venous insufficiency, has excoriations on legs, refusing lasix, legs wrapped.    9. Other migraine without status migrainosus, not intractable, uses topamax   10. Low vitamin D level, On 8/2624- 26         PAST MEDICAL HISTORY:  Past Medical History:   Diagnosis Date    Anxiety     Borderline personality disorder (H)     Cellulitis     Chronic pain     DVT (deep venous thrombosis) (H)     Gastroesophageal reflux disease without esophagitis     HLD (hyperlipidemia)     Hypothyroidism     MDD (major depressive disorder)     Paraparesis of both lower limbs (H)     following spinal abscess    PVD (peripheral vascular disease)     Spinal abscess (H)     T2DM (type 2 diabetes mellitus) (H)     Uncomplicated opioid dependence (H)        PAST SURGICAL HISTORY:  No past surgical history on file.        CURRENT MEDICATIONS:    Current Facility-Administered Medications   Medication Dose Route Frequency Provider Last Rate Last Admin    acetaminophen (TYLENOL) tablet 650 mg  650 mg Oral Q4H PRN Elena Rahman DO        Or    acetaminophen (TYLENOL) Suppository 650 mg  650 mg Rectal Q4H PRN Elena Rahman DO        calcium carbonate (TUMS) chewable tablet 1,000 mg  1,000 mg Oral 4x Daily PRN Elena Rahman DO        glucose gel 15-30 g  15-30 g Oral Q15 Min PRN Elena Rahman DO        Or    dextrose 50 % injection 25-50 mL  25-50 mL Intravenous Q15 Min PRN Elena Rahman DO        Or    glucagon injection 1 mg  1 mg Subcutaneous Q15 Min PRN Elena Rahman DO        enoxaparin ANTICOAGULANT (LOVENOX) injection 40 mg  40 mg Subcutaneous Q24H Elena Rahman DO        insulin aspart (NovoLOG) injection (RAPID ACTING)  1-7 Units Subcutaneous TID Elena Mena DO        insulin aspart (NovoLOG) injection (RAPID ACTING)  1-5 Units Subcutaneous At Bedtime Elena Rahman DO        insulin degludec (TRESIBA) 100 UNIT/ML injection 12 Units  12 Units Subcutaneous QAM EJ Rahman  DO Elena        lidocaine (LMX4) cream   Topical Q1H PRN Elena Rahman DO        lidocaine 1 % 0.1-1 mL  0.1-1 mL Other Q1H PRN Elena Rahman DO        norepinephrine (LEVOPHED) 4 mg in  mL infusion PREMIX  0.01-0.6 mcg/kg/min Intravenous Continuous Jordi Elmore MD 9.2 mL/hr at 02/03/25 0318 0.03 mcg/kg/min at 02/03/25 0318    piperacillin-tazobactam (ZOSYN) 3.375 g vial to attach to  mL bag  3.375 g Intravenous Q8H Elena Rahman DO        senna-docusate (SENOKOT-S/PERICOLACE) 8.6-50 MG per tablet 1 tablet  1 tablet Oral BID PRN Elena Rahman DO        Or    senna-docusate (SENOKOT-S/PERICOLACE) 8.6-50 MG per tablet 2 tablet  2 tablet Oral BID PRN Elena Rahman DO        sodium chloride (PF) 0.9% PF flush 3 mL  3 mL Intracatheter Q8H Elena Rahman DO        sodium chloride (PF) 0.9% PF flush 3 mL  3 mL Intracatheter q1 min prn Elena Rahman DO        sodium chloride (PF) 0.9% PF flush 3 mL  3 mL Intracatheter q1 min prn Jordi Elmore MD        sodium chloride (PF) 0.9% PF flush 3 mL  3 mL Intracatheter Q8H Jordi Elmore MD   3 mL at 02/02/25 6856     Current Outpatient Medications   Medication Sig Dispense Refill    acetaminophen (TYLENOL) 325 MG tablet Take 650 mg by mouth every 4 hours as needed for mild pain      Ascorbic Acid (VITAMIN C GUMMIES PO) Take 1 chew tab by mouth daily      aspirin (ASA) 81 MG chewable tablet Take 81 mg by mouth daily      bacitracin 500 UNIT/GM OINT Apply topically every 8 hours as needed for wound care      baclofen (LIORESAL) 20 MG tablet Take 0.5 tablets (10 mg) by mouth 3 times daily 45 tablet 0    bisacodyl (DULCOLAX) 10 MG suppository Place 10 mg rectally daily as needed for constipation      buPROPion (WELLBUTRIN SR) 150 MG 12 hr tablet Take 150 mg by mouth daily      calcium carbonate (TUMS) 500 MG chewable tablet Take 2 chew tab by mouth every hour as needed for heartburn      calcium carbonate 600 mg-vitamin D 400  units (CALTRATE) 600-400 MG-UNIT per tablet Take 1 tablet by mouth daily      cholecalciferol 50 MCG (2000 UT) tablet Take 1 tablet by mouth every other day      clotrimazole (LOTRIMIN) 1 % external cream Apply topically 2 times daily      doxycycline hyclate (VIBRAMYCIN) 100 MG capsule Take 100 mg by mouth daily      emollient (VANICREAM) external cream Apply topically 2 times daily as needed for other (to legs for dry skin)      ferrous sulfate (FEROSUL) 325 (65 Fe) MG tablet Take 1 tablet (325 mg) by mouth every other day 45 tablet 0    insulin aspart (NOVOLOG PEN) 100 UNIT/ML pen Inject 1-7 Units Subcutaneous 3 times daily (before meals) Correction Scale - MEDIUM INSULIN RESISTANCE DOSING   Do Not give Correction Insulin if Pre-Meal BG less than 140. For Pre-Meal  - 189 give 1 unit. For Pre-Meal  - 239 give 2 units. For Pre-Meal  - 289 give 3 units. For Pre-Meal  - 339 give 4 units. For Pre-Meal - 399 give 5 units. For Pre-Meal -449 give 6 units For Pre-Meal BG greater than or equal to 450 give 7 units. To be given with prandial insulin, and based on pre-meal blood glucose.  Notify provider if glucose greater than or equal to 350 mg/dL after administration of correction dose. If given at mealtime, administer within 30 minutes of start of meal 6.3 mL 2    insulin degludec (TRESIBA) 100 UNIT/ML pen Inject 28 Units Subcutaneous daily (with dinner)      levothyroxine (SYNTHROID/LEVOTHROID) 125 MCG tablet Take 125 mcg by mouth See Admin Instructions Daily on Tuesday, Wednesday, Thursday, Saturday, and Sunday      levothyroxine (SYNTHROID/LEVOTHROID) 125 MCG tablet Take 187.5 mcg by mouth See Admin Instructions On Mondays, Fridays      lisinopril (ZESTRIL) 2.5 MG tablet Take 2.5 mg by mouth daily      loratadine (CLARITIN) 10 MG tablet Take 10 mg by mouth daily as needed for allergies      magnesium 250 MG tablet Take 1 tablet by mouth daily      menthol (COUGH DROP) 7 MG LOZG Take  1 lozenge by mouth every hour as needed for cough      morphine (MS CONTIN) 30 MG CR tablet Take 1 tablet (30 mg) by mouth 3 times daily 90 tablet 0    morphine (MSIR) 15 MG IR tablet Take 0.5 tablets (7.5 mg) by mouth every 8 hours as needed for pain 30 tablet 0    Multiple Vitamins-Minerals (MULTIVITAMIN GUMMIES WOMENS) CHEW Take 1 chew tab by mouth daily      omeprazole (PRILOSEC) 20 MG DR capsule Take 20 mg by mouth daily before breakfast      polyethylene glycol (MIRALAX) 17 GM/Dose powder Take 1 capful by mouth every other day      rivaroxaban ANTICOAGULANT (XARELTO) 20 MG TABS tablet Take 20 mg by mouth daily (with dinner)      rosuvastatin (CRESTOR) 20 MG tablet Take 20 mg by mouth At Bedtime      sennosides (SENOKOT) 8.6 MG tablet Take 2 tablets by mouth 2 times daily      SUMAtriptan (IMITREX) 100 MG tablet Take 100 mg by mouth every 12 hours as needed for migraine      topiramate ER (QUDEXY XR) 50 MG 24 hr capsule Take 1 capsule (50 mg) by mouth daily 45 capsule 0    torsemide (DEMADEX) 10 MG tablet Take 1 tablet (10 mg) by mouth daily as needed (for LE swelling as needed) 45 tablet 0    venlafaxine (EFFEXOR-XR) 150 MG 24 hr capsule Take 150 mg by mouth daily Take with 75 mg capsule for a total dose of 225 mg.      venlafaxine (EFFEXOR-XR) 75 MG 24 hr capsule Take 75 mg by mouth daily Take with 150 mg capsule for a total dose of 225 mg.      Zinc Oxide-Petrolatum 20-80 % CREA Externally apply topically every 8 hours as needed (incontinence)           ALLERGIES:  Allergies   Allergen Reactions    Compazine [Prochlorperazine]     Latex      Latex sensitive     Metformin     Statins [Statins]     Sulfa Antibiotics Itching       FAMILY HISTORY:  Family History   Family history unknown: Yes       SOCIAL HISTORY:   Social History     Socioeconomic History    Marital status:    Tobacco Use    Smoking status: Former     Types: Cigarettes    Smokeless tobacco: Never     Social Drivers of Health  "    Financial Resource Strain: Low Risk  (2/1/2025)    Received from [facility] [facility]    Financial Resource Strain     Difficulty of Paying Living Expenses: 3   Food Insecurity: No Food Insecurity (7/26/2024)    Received from [facility] [facility]    Food Insecurity     Do you worry your food will run out before you are able to buy more?: 1   Transportation Needs: No Transportation Needs (7/26/2024)    Received from [facility] [facility]    Transportation Needs     Does lack of transportation keep you from medical appointments?: 1     Does lack of transportation keep you from work, meetings or getting things that you need?: 1   Social Connections: Socially Integrated (7/26/2024)    Received from [facility] [facility]    Social Connections     Do you often feel lonely or isolated from those around you?: 0   Housing Stability: Low Risk  (7/26/2024)    Received from [facility] [facility]    Housing Stability     What is your housing situation today?: 1       VITALS:  /55   Pulse 85   Temp (!) 101.5  F (38.6  C) (Oral)   Resp 15   Ht 1.6 m (5' 3\")   Wt 81.6 kg (180 lb)   SpO2 99%   BMI 31.89 kg/m      PHYSICAL EXAM    Physical Exam  Vitals and nursing note reviewed.   Constitutional:       General: She is in acute distress.      Appearance: She is ill-appearing and toxic-appearing. She is not diaphoretic.   HENT:      Head: Atraumatic.      Mouth/Throat:      Pharynx: No oropharyngeal exudate.   Eyes:      General: No scleral icterus.     Pupils: Pupils are equal, round, and reactive to light.   Cardiovascular:      Rate and Rhythm: Regular rhythm. Tachycardia present.      Heart sounds: Normal heart sounds.   Pulmonary:      Effort: No respiratory distress.      Breath sounds: Normal breath sounds.   Abdominal:      Palpations: Abdomen is soft.      Tenderness: There is no abdominal " tenderness. There is no guarding or rebound. Negative signs include Stein's sign.   Musculoskeletal:         General: No tenderness.   Skin:     General: Skin is warm.      Findings: No rash.      Comments: Extremity is wrapped.  It is swollen.  Warm.  There is what appears to be a healing wound on the right shin.   Neurological:      General: No focal deficit present.      Mental Status: She is alert.           LAB:  All pertinent labs reviewed and interpreted.  Labs Ordered and Resulted from Time of ED Arrival to Time of ED Departure   COMPREHENSIVE METABOLIC PANEL - Abnormal       Result Value    Sodium 135      Potassium 4.0      Carbon Dioxide (CO2) 20 (*)     Anion Gap 11      Urea Nitrogen 18.3      Creatinine 0.97 (*)     GFR Estimate 67      Calcium 8.4 (*)     Chloride 104      Glucose 214 (*)     Alkaline Phosphatase 66      AST 19      ALT <5      Protein Total 7.3      Albumin 3.9      Bilirubin Total 0.4     BLOOD GAS VENOUS - Abnormal    pH Venous 7.36      pCO2 Venous 42      pO2 Venous 24 (*)     Bicarbonate Venous 23      Base Excess/Deficit Venous -2.0      FIO2 21      Oxyhemoglobin Venous 39 (*)     O2 Sat, Venous 40.1 (*)    PROCALCITONIN - Abnormal    Procalcitonin 1.63 (*)    CBC WITH PLATELETS AND DIFFERENTIAL - Abnormal    WBC Count 25.2 (*)     RBC Count 4.40      Hemoglobin 10.6 (*)     Hematocrit 34.1 (*)     MCV 78      MCH 24.1 (*)     MCHC 31.1 (*)     RDW 16.2 (*)     Platelet Count 272      % Neutrophils 90      % Lymphocytes 3      % Monocytes 4      % Eosinophils 0      % Basophils 0      % Immature Granulocytes 3      NRBCs per 100 WBC 0      Absolute Neutrophils 22.6 (*)     Absolute Lymphocytes 0.9      Absolute Monocytes 1.1      Absolute Eosinophils 0.0      Absolute Basophils 0.1      Absolute Immature Granulocytes 0.6 (*)     Absolute NRBCs 0.0     HEMOGLOBIN A1C - Abnormal    Estimated Average Glucose 183 (*)     Hemoglobin A1C 8.0 (*)    LACTIC ACID WHOLE BLOOD WITH 1X  REPEAT IN 2 HR WHEN >2 - Normal    Lactic Acid, Initial 1.1     D DIMER QUANTITATIVE - Normal    D-Dimer Quantitative 0.30     INFLUENZA A/B, RSV AND SARS-COV2 PCR - Normal    Influenza A PCR Negative      Influenza B PCR Negative      RSV PCR Negative      SARS CoV2 PCR Negative     ROUTINE UA WITH MICROSCOPIC REFLEX TO CULTURE   BASIC METABOLIC PANEL   CBC WITH PLATELETS   GLUCOSE MONITOR NURSING POCT   GLUCOSE MONITOR NURSING POCT   GLUCOSE MONITOR NURSING POCT   GLUCOSE MONITOR NURSING POCT   GLUCOSE MONITOR NURSING POCT   BLOOD CULTURE   BLOOD CULTURE   MRSA MSSA PCR, NASAL SWAB       RADIOLOGY:  Reviewed all pertinent imaging. Please see official radiology report.  CT Chest Pulmonary Embolism w Contrast   Final Result   IMPRESSION:   1.  No evidence of pulmonary embolism.   2.  Mild cardiomegaly and moderate atherosclerotic vascular calcification of the coronary arteries.      US Lower Extremity Venous Duplex Right   Final Result   IMPRESSION:   1.  No deep venous thrombosis in the right lower extremity.            PROCEDURES:         I, Lori Henley, am serving as a scribe to document services personally performed by Dr. Jordi Elmore, based on my observation and the provider's statements to me. I, Jordi Elmore MD attest that Lori Henley is acting in a scribe capacity, has observed my performance of the services and has documented them in accordance with my direction.    Jordi Elmore M.D.  Emergency Medicine  [facility] EMERGENCY ROOM  [address] [facility] [id number] [phone]  Dept: [phone]       Jordi Elmore MD  02/03/25 0434

## 2025-02-03 NOTE — H&P
[facility]    History and Physical - Hospitalist Service       Date of Admission:  2/2/2025    Assessment & Plan      Tonya Valera is a 59 year old female admitted on 2/2/2025. She has a history of T2DM, anxiety disorder, calculus of gallbladder, intertrigo, chronic venous insufficiency, episodic mood disorder, HLD, HTN, hyponatremia, hypothyroidism, leukocytosis, chronic MDD, borderline personality disorder, stage 2 CKD, opioid dependence continuous, multiple wounds of lower extremity, cellulitis of right lower extremity, DVT  and is admitted for sepsis 2/2 RLE cellulitis.     Fever   Sepsis   RLE cellulitis   Hypotension  Patient presents to ED from [facility] for 1 day of fever with non-specific symptoms including dysuria and cough with recent COVID infection. She is tachycardic, hypotensive and febrile to 101.5 F. She is requiring 2 L supplemental O2. WBC 25, procal 1.6, normal lactic acid. Chest CT without evidence of PE or PNA. Lower extremity U/S without DVT. Based on physical exam, most likely source of infection appears to be cellulitis of right lower extremity. Patient was started on antibiotics in the ED.   - Admit to inpatient   - Continue antibiotics with Zosyn and Vanc  - WOC consult   - Follow blood cultures and UA   - Supplemental O2 for SpO2 <90%  - AM CBC   - s/p 2.5 L of NS in ED   - Levophed to keep MAP >65  - Continue PTA pain regimen after med rec   - s/p toradol 15 mg in ED     T2DM  A1c of 8.3% (09/2024). PTA regimen includes 15 units degludec every morning per med sheet at patient's bedside from her facility. Will reduce by 20% during hospitalization and place pt on sliding scale, please verify dose once med rec is complete.   - PTA basal insulin at 12 units in AM (verify once med rec is complete)   - medium ssi   - diabetic diet   - A1c add on     HTN  - HOLD PTA lisinopril after med rec in light of hypotension    CKD 2  Cr. 0.97 on admission, GFR  "67.   - Avoid nephrotoxins   - Daily BMP   - s/p 2.5 L of NS in ED     Lower extremity edema   - PTA torsemide after med rec    MDD  - PTA venlaxafine and wellbutrin after med rec    Hypothyroidism   TSH 2.69 (04/2024)  - PTA levothyroxine after med rec        Diet: Combination Diet Regular Diet Adult; Moderate Consistent Carb (60 g CHO per Meal) Diet; 2 gm NA Diet  DVT Prophylaxis: Enoxaparin (Lovenox) SQ  Townsend Catheter: Not present  Fluids: po  Lines: None     Cardiac Monitoring: None  Code Status: Full Code    Clinically Significant Risk Factors Present on Admission           # Hypocalcemia: Lowest Ca = 8.4 mg/dL in last 2 days, will monitor and replace as appropriate        # Drug Induced Coagulation Defect: home medication list includes an anticoagulant medication  # Drug Induced Platelet Defect: home medication list includes an antiplatelet medication   # Hypertension: Home medication list includes antihypertensive(s)   # Circulatory Shock: required vasopressors within past 24 hours       # Anemia: based on hgb <11      # DMII: A1C = N/A within past 6 months   # Obesity: Estimated body mass index is 31.89 kg/m  as calculated from the following:    Height as of this encounter: 1.6 m (5' 3\").    Weight as of this encounter: 81.6 kg (180 lb).              Disposition Plan      Expected Discharge Date: 02/05/2025                The patient's care was discussed with the Attending Physician, Dr. Hernandez, Bedside Nurse, and Patient.Patient will be seen by attending physician Dr. Naranjo on 2/3/2025.       Elena Rahman DO  Hospitalist Service  [facility]  Securely message with [employer] (more info)  Text page via [employer] Paging/Directory   ______________________________________________________________________    Chief Complaint   Fever    History is obtained from the patient    History of Present Illness   Tonya Valera is a 59 year old female who has a history of T2DM, anxiety disorder, calculus " of gallbladder, intertrigo, chronic venous insufficiency, episodic mood disorder, HLD, HTN, hyponatremia, hypothyroidism, leukocytosis, chronic MDD, borderline personality disorder, stage 2 CKD, opioid dependence continuous, multiple wounds of lower extremity, cellulitis of right lower extremity, DVT  and is admitted for sepsis 2/2 RLE cellulitis.      Patient comes from [facility] where she was found to have a fever. She reports pain in her joints and a recent cough with shortness of breath. Per my chart review, she was diagnosed with COVID on 1/16/25. She also endorses some nausea with vomiting, urinary symptoms and irregular bowel movements. Urine at bedside is malodorous and non-bloody.     In the ED, patient found to be febrile to 101.5F with tachycardia. Requiring 2 LPM via NC to maintain O2 saturations >90%. Work up revealed procal elevated to 1.63 with normal lactic acid. Negative respiratory panel. She has leukocytosis to 25.2. Normal D-dimer as well as CT-PE without evidence of pulmonary embolus or PNA. Lower extremity U/S without DVT. Negative MRSA swab, blood cultures pending. Patient was started on vanc and zosyn in the ED as well as administered 2.5 L of NS. She received toradol for pain.     During admission process patient became hypotensive and did not recover with fluid resuscitation (2.5 L of NS) so levophed was started for BP support to maintain MAP >65.         Past Medical History    Past Medical History:   Diagnosis Date    Anxiety     Borderline personality disorder (H)     Cellulitis     Chronic pain     DVT (deep venous thrombosis) (H)     Gastroesophageal reflux disease without esophagitis     HLD (hyperlipidemia)     Hypothyroidism     MDD (major depressive disorder)     Paraparesis of both lower limbs (H)     following spinal abscess    PVD (peripheral vascular disease)     Spinal abscess (H)     T2DM (type 2 diabetes mellitus) (H)     Uncomplicated opioid dependence (H)         Past Surgical History   No past surgical history on file.    Prior to Admission Medications   Prior to Admission Medications   Prescriptions Last Dose Informant Patient Reported? Taking?   Ascorbic Acid (VITAMIN C GUMMIES PO)   Yes No   Sig: Take 1 chew tab by mouth daily   Multiple Vitamins-Minerals (MULTIVITAMIN GUMMIES WOMENS) CHEW   Yes No   Sig: Take 1 chew tab by mouth daily   SUMAtriptan (IMITREX) 100 MG tablet   Yes No   Sig: Take 100 mg by mouth every 12 hours as needed for migraine   Zinc Oxide-Petrolatum 20-80 % CREA   Yes No   Sig: Externally apply topically every 8 hours as needed (incontinence)   acetaminophen (TYLENOL) 325 MG tablet   Yes No   Sig: Take 650 mg by mouth every 4 hours as needed for mild pain   aspirin (ASA) 81 MG chewable tablet   Yes No   Sig: Take 81 mg by mouth daily   bacitracin 500 UNIT/GM OINT   Yes No   Sig: Apply topically every 8 hours as needed for wound care   baclofen (LIORESAL) 20 MG tablet   No No   Sig: Take 0.5 tablets (10 mg) by mouth 3 times daily   bisacodyl (DULCOLAX) 10 MG suppository   Yes No   Sig: Place 10 mg rectally daily as needed for constipation   buPROPion (WELLBUTRIN SR) 150 MG 12 hr tablet   Yes No   Sig: Take 150 mg by mouth daily   calcium carbonate (TUMS) 500 MG chewable tablet   Yes No   Sig: Take 2 chew tab by mouth every hour as needed for heartburn   calcium carbonate 600 mg-vitamin D 400 units (CALTRATE) 600-400 MG-UNIT per tablet   Yes No   Sig: Take 1 tablet by mouth daily   cholecalciferol 50 MCG (2000 UT) tablet   Yes No   Sig: Take 1 tablet by mouth every other day   clotrimazole (LOTRIMIN) 1 % external cream   Yes No   Sig: Apply topically 2 times daily   doxycycline hyclate (VIBRAMYCIN) 100 MG capsule   Yes No   Sig: Take 100 mg by mouth daily   emollient (VANICREAM) external cream   Yes No   Sig: Apply topically 2 times daily as needed for other (to legs for dry skin)   ferrous sulfate (FEROSUL) 325 (65 Fe) MG tablet   No No   Sig:  Take 1 tablet (325 mg) by mouth every other day   insulin aspart (NOVOLOG PEN) 100 UNIT/ML pen   No No   Sig: Inject 1-7 Units Subcutaneous 3 times daily (before meals) Correction Scale - MEDIUM INSULIN RESISTANCE DOSING   Do Not give Correction Insulin if Pre-Meal BG less than 140. For Pre-Meal  - 189 give 1 unit. For Pre-Meal  - 239 give 2 units. For Pre-Meal  - 289 give 3 units. For Pre-Meal  - 339 give 4 units. For Pre-Meal - 399 give 5 units. For Pre-Meal -449 give 6 units For Pre-Meal BG greater than or equal to 450 give 7 units. To be given with prandial insulin, and based on pre-meal blood glucose.  Notify provider if glucose greater than or equal to 350 mg/dL after administration of correction dose. If given at mealtime, administer within 30 minutes of start of meal   insulin degludec (TRESIBA) 100 UNIT/ML pen   Yes No   Sig: Inject 28 Units Subcutaneous daily (with dinner)   levothyroxine (SYNTHROID/LEVOTHROID) 125 MCG tablet   Yes No   Sig: Take 125 mcg by mouth See Admin Instructions Daily on Tuesday, Wednesday, Thursday, Saturday, and Sunday   levothyroxine (SYNTHROID/LEVOTHROID) 125 MCG tablet   Yes No   Sig: Take 187.5 mcg by mouth See Admin Instructions On Mondays, Fridays   lisinopril (ZESTRIL) 2.5 MG tablet   Yes No   Sig: Take 2.5 mg by mouth daily   loratadine (CLARITIN) 10 MG tablet   Yes No   Sig: Take 10 mg by mouth daily as needed for allergies   magnesium 250 MG tablet   Yes No   Sig: Take 1 tablet by mouth daily   menthol (COUGH DROP) 7 MG LOZG   Yes No   Sig: Take 1 lozenge by mouth every hour as needed for cough   morphine (MS CONTIN) 30 MG CR tablet   No No   Sig: Take 1 tablet (30 mg) by mouth 3 times daily   morphine (MSIR) 15 MG IR tablet   No No   Sig: Take 0.5 tablets (7.5 mg) by mouth every 8 hours as needed for pain   omeprazole (PRILOSEC) 20 MG DR capsule   Yes No   Sig: Take 20 mg by mouth daily before breakfast   polyethylene glycol (MIRALAX)  17 GM/Dose powder   Yes No   Sig: Take 1 capful by mouth every other day   rivaroxaban ANTICOAGULANT (XARELTO) 20 MG TABS tablet   Yes No   Sig: Take 20 mg by mouth daily (with dinner)   rosuvastatin (CRESTOR) 20 MG tablet   Yes No   Sig: Take 20 mg by mouth At Bedtime   sennosides (SENOKOT) 8.6 MG tablet   Yes No   Sig: Take 2 tablets by mouth 2 times daily   topiramate ER (QUDEXY XR) 50 MG 24 hr capsule   No No   Sig: Take 1 capsule (50 mg) by mouth daily   torsemide (DEMADEX) 10 MG tablet   No No   Sig: Take 1 tablet (10 mg) by mouth daily as needed (for LE swelling as needed)   venlafaxine (EFFEXOR-XR) 150 MG 24 hr capsule   Yes No   Sig: Take 150 mg by mouth daily Take with 75 mg capsule for a total dose of 225 mg.   venlafaxine (EFFEXOR-XR) 75 MG 24 hr capsule   Yes No   Sig: Take 75 mg by mouth daily Take with 150 mg capsule for a total dose of 225 mg.      Facility-Administered Medications: None            Social History   I have reviewed this patient's social history and updated it with pertinent information if needed.  Social History     Tobacco Use    Smoking status: Former     Types: Cigarettes    Smokeless tobacco: Never         Allergies   Allergies   Allergen Reactions    Compazine [Prochlorperazine]     Latex      Latex sensitive     Metformin     Statins [Statins]     Sulfa Antibiotics Itching        Physical Exam   Vital Signs: Temp: (!) 101.5  F (38.6  C) Temp src: Oral BP: 102/55 Pulse: 85   Resp: 15 SpO2: 99 % O2 Device: None (Room air)    Weight: 180 lbs 0 oz    Constitutional: awake, alert, no apparent distress, and appears stated age  ENT: normocephalic, without obvious abnormality  Respiratory: no increased work of breathing, good air exchange, no retractions, no crackles or wheezing, diminished breath sounds throughout lungs, and maintaining SpO2 >90% on 2 LPM via NC.  Cardiovascular: regular rate and rhythm and no murmur noted, bilateral lower extremity edema   GI: Normal bowel sounds,  soft, non-distended, non-tender  Genitounirinary: No CVA tenderness, noted to have malodorous non-bloody urine by bedside RN  Skin: Right anterior shin with clearly demarcated line of erythema from mid shin to ankle with warmth, no active drainage or bleeding, wet gauze in place for wound cares  Neurologic: Awake, alert, oriented to name but not place or time.      Medical Decision Making       Please see A&P for additional details of medical decision making.      Data   ------------------------- PAST 24 HR DATA REVIEWED -----------------------------------------------    I have personally reviewed the following data over the past 24 hrs:    25.2 (H)  \   10.6 (L)   / 272     135 104 18.3 /  214 (H)   4.0 20 (L) 0.97 (H) \     ALT: <5 AST: 19 AP: 66 TBILI: 0.4   ALB: 3.9 TOT PROTEIN: 7.3 LIPASE: N/A     Procal: 1.63 (H) CRP: N/A Lactic Acid: 1.1       INR:  N/A PTT:  N/A   D-dimer:  0.30 Fibrinogen:  N/A       Imaging results reviewed over the past 24 hrs:   Recent Results (from the past 24 hours)   US Lower Extremity Venous Duplex Right    Narrative    EXAM: US LOWER EXTREMITY VENOUS DUPLEX RIGHT  LOCATION: [facility]  DATE: 2/3/2025    INDICATION: Swelling. History of DVT.  COMPARISON: None.  TECHNIQUE: Venous Duplex ultrasound of the right lower extremity with and without compression, augmentation and duplex. Color flow and spectral Doppler with waveform analysis performed.    FINDINGS: Exam includes the common femoral, femoral, popliteal, and contralateral common femoral veins as well as segmentally visualized deep calf veins and greater saphenous vein.     RIGHT: No deep vein thrombosis. No superficial thrombophlebitis. No popliteal cyst.      Impression    IMPRESSION:  1.  No deep venous thrombosis in the right lower extremity.   CT Chest Pulmonary Embolism w Contrast    Narrative    EXAM: CT CHEST PULMONARY EMBOLISM W CONTRAST  LOCATION: [facility]  DATE:  2/3/2025    INDICATION: Chest pain, dyspnea, high risk PE.  COMPARISON: None available.  TECHNIQUE: CT chest pulmonary angiogram during arterial phase injection of IV contrast. Multiplanar reformats and MIP reconstructions were performed. Dose reduction techniques were used.   CONTRAST: 100 ml Isovue 370.    FINDINGS:  ANGIOGRAM CHEST: No evidence of pulmonary embolism. No evidence of thoracic aortic aneurysm or dissection. No evidence for right heart strain.    LUNGS AND PLEURA: No pleural effusion or pneumothorax. Bibasilar pulmonary opacities, likely atelectasis.    MEDIASTINUM/AXILLAE: Mild cardiomegaly. No significant pericardial effusion. Few mildly prominent mediastinal lymph nodes, indeterminate, could be reactive. Moderate-sized hiatal hernia.    CORONARY ARTERY CALCIFICATION: Moderate.    UPPER ABDOMEN: Limited evaluation of the upper abdomen due to lack of coverage and timing of contrast. Large calcified gallstone.    MUSCULOSKELETAL: Multilevel degenerative changes of the spine. Wedge-shaped compression deformity of T10 vertebra, likely old.      Impression    IMPRESSION:  1.  No evidence of pulmonary embolism.  2.  Mild cardiomegaly and moderate atherosclerotic vascular calcification of the coronary arteries.

## 2025-02-03 NOTE — PHARMACY-VANCOMYCIN DOSING SERVICE
Pharmacy Vancomycin Initial Note  Date of Service 2025  Patient's  1965  59 year old, female    Indication: [facility] Pneumonia and Sepsis    Current estimated CrCl = Estimated Creatinine Clearance: 53.1 mL/min (A) (based on SCr of 1.14 mg/dL (H)).    Creatinine for last 3 days  No results found for requested labs within last 3 days.    Recent Vancomycin Level(s) for last 3 days  No results found for requested labs within last 3 days.      Vancomycin IV Administrations (past 72 hours)        No vancomycin orders with administrations in past 72 hours.                    Nephrotoxins and other renal medications (From now, onward)      Start     Dose/Rate Route Frequency Ordered Stop    25 0000  piperacillin-tazobactam (ZOSYN) 3.375 g vial to attach to  mL bag         3.375 g  over 30 Minutes Intravenous ONCE 25 2348      25 0000  vancomycin (VANCOCIN) 1,750 mg in 0.9% NaCl 517.5 mL intermittent infusion         1,750 mg  over 2 Hours Intravenous ONCE 25 2352              Contrast Orders - past 72 hours (72h ago, onward)      None                    Plan:  Start vancomycin  1750 mg (~22mg/kg) IV x1.   If vancomycin to be continued please re-order pharmacy consult.    Javan Cuevas RP

## 2025-02-03 NOTE — ED TRIAGE NOTES
Patient presents to the ED, brought in by EMS, she resides at [facility] and they stated she hasn't been feeling well for days.  Patient is febrile but doesn't offer much more into what isn't feeling good.  She is complaining of generalized body aches.  MD in room shortly upon arrival.  Dressing to right lower leg in place and removed to be evaluated by MD.       Triage Assessment (Adult)       Row Name 02/02/25 6207          Triage Assessment    Airway WDL WDL        Respiratory WDL    Respiratory WDL WDL        Skin Circulation/Temperature WDL    Skin Circulation/Temperature WDL WDL        Cardiac WDL    Cardiac WDL WDL        Peripheral/Neurovascular WDL    Peripheral Neurovascular WDL WDL        Cognitive/Neuro/Behavioral WDL    Cognitive/Neuro/Behavioral WDL WDL

## 2025-02-04 LAB
ANION GAP SERPL CALCULATED.3IONS-SCNC: 7 MMOL/L (ref 7–15)
BACTERIA UR CULT: NORMAL
BUN SERPL-MCNC: 14.6 MG/DL (ref 8–23)
CALCIUM SERPL-MCNC: 7.4 MG/DL (ref 8.8–10.4)
CHLORIDE SERPL-SCNC: 110 MMOL/L (ref 98–107)
CREAT SERPL-MCNC: 0.96 MG/DL (ref 0.51–0.95)
EGFRCR SERPLBLD CKD-EPI 2021: 67 ML/MIN/1.73M2
ERYTHROCYTE [DISTWIDTH] IN BLOOD BY AUTOMATED COUNT: 16.3 % (ref 10–15)
GLUCOSE BLDC GLUCOMTR-MCNC: 109 MG/DL (ref 70–99)
GLUCOSE BLDC GLUCOMTR-MCNC: 110 MG/DL (ref 70–99)
GLUCOSE BLDC GLUCOMTR-MCNC: 128 MG/DL (ref 70–99)
GLUCOSE BLDC GLUCOMTR-MCNC: 179 MG/DL (ref 70–99)
GLUCOSE BLDC GLUCOMTR-MCNC: 84 MG/DL (ref 70–99)
GLUCOSE SERPL-MCNC: 146 MG/DL (ref 70–99)
HCO3 SERPL-SCNC: 20 MMOL/L (ref 22–29)
HCT VFR BLD AUTO: 25.9 % (ref 35–47)
HGB BLD-MCNC: 8 G/DL (ref 11.7–15.7)
MCH RBC QN AUTO: 24.3 PG (ref 26.5–33)
MCHC RBC AUTO-ENTMCNC: 30.9 G/DL (ref 31.5–36.5)
MCV RBC AUTO: 79 FL (ref 78–100)
PLATELET # BLD AUTO: 181 10E3/UL (ref 150–450)
POTASSIUM SERPL-SCNC: 3.7 MMOL/L (ref 3.4–5.3)
RBC # BLD AUTO: 3.29 10E6/UL (ref 3.8–5.2)
SODIUM SERPL-SCNC: 137 MMOL/L (ref 135–145)
WBC # BLD AUTO: 10.2 10E3/UL (ref 4–11)

## 2025-02-04 PROCEDURE — 84520 ASSAY OF UREA NITROGEN: CPT

## 2025-02-04 PROCEDURE — 250N000011 HC RX IP 250 OP 636

## 2025-02-04 PROCEDURE — 85014 HEMATOCRIT: CPT

## 2025-02-04 PROCEDURE — 82310 ASSAY OF CALCIUM: CPT

## 2025-02-04 PROCEDURE — 250N000013 HC RX MED GY IP 250 OP 250 PS 637: Performed by: FAMILY MEDICINE

## 2025-02-04 PROCEDURE — 36415 COLL VENOUS BLD VENIPUNCTURE: CPT

## 2025-02-04 PROCEDURE — 250N000013 HC RX MED GY IP 250 OP 250 PS 637

## 2025-02-04 PROCEDURE — 99233 SBSQ HOSP IP/OBS HIGH 50: CPT | Mod: GC

## 2025-02-04 PROCEDURE — 80048 BASIC METABOLIC PNL TOTAL CA: CPT

## 2025-02-04 PROCEDURE — 250N000011 HC RX IP 250 OP 636: Performed by: FAMILY MEDICINE

## 2025-02-04 PROCEDURE — 120N000004 HC R&B MS OVERFLOW

## 2025-02-04 PROCEDURE — 87040 BLOOD CULTURE FOR BACTERIA: CPT

## 2025-02-04 RX ADMIN — ENOXAPARIN SODIUM 40 MG: 40 INJECTION SUBCUTANEOUS at 06:47

## 2025-02-04 RX ADMIN — PIPERACILLIN AND TAZOBACTAM 3.38 G: 3; .375 INJECTION, POWDER, FOR SOLUTION INTRAVENOUS at 22:04

## 2025-02-04 RX ADMIN — POLYETHYLENE GLYCOL 3350 17 G: 17 POWDER, FOR SOLUTION ORAL at 09:31

## 2025-02-04 RX ADMIN — LEVOTHYROXINE SODIUM 125 MCG: 0.12 TABLET ORAL at 06:49

## 2025-02-04 RX ADMIN — MORPHINE SULFATE 30 MG: 30 TABLET, FILM COATED, EXTENDED RELEASE ORAL at 22:04

## 2025-02-04 RX ADMIN — VENLAFAXINE HYDROCHLORIDE 150 MG: 150 CAPSULE, EXTENDED RELEASE ORAL at 09:30

## 2025-02-04 RX ADMIN — BACLOFEN 15 MG: 10 TABLET ORAL at 14:02

## 2025-02-04 RX ADMIN — ROSUVASTATIN CALCIUM 20 MG: 10 TABLET, FILM COATED ORAL at 22:03

## 2025-02-04 RX ADMIN — BUPROPION HYDROCHLORIDE 150 MG: 150 TABLET, EXTENDED RELEASE ORAL at 09:29

## 2025-02-04 RX ADMIN — ACETAMINOPHEN 650 MG: 325 TABLET ORAL at 20:39

## 2025-02-04 RX ADMIN — TOPIRAMATE 50 MG: 50 TABLET, FILM COATED ORAL at 09:32

## 2025-02-04 RX ADMIN — MORPHINE SULFATE 30 MG: 30 TABLET, FILM COATED, EXTENDED RELEASE ORAL at 10:03

## 2025-02-04 RX ADMIN — VANCOMYCIN HYDROCHLORIDE 1000 MG: 1 INJECTION, SOLUTION INTRAVENOUS at 02:45

## 2025-02-04 RX ADMIN — VENLAFAXINE HYDROCHLORIDE 75 MG: 75 CAPSULE, EXTENDED RELEASE ORAL at 09:31

## 2025-02-04 RX ADMIN — PANTOPRAZOLE SODIUM 40 MG: 40 TABLET, DELAYED RELEASE ORAL at 06:49

## 2025-02-04 RX ADMIN — ACETAMINOPHEN 650 MG: 325 TABLET ORAL at 09:50

## 2025-02-04 RX ADMIN — BACLOFEN 15 MG: 10 TABLET ORAL at 20:39

## 2025-02-04 RX ADMIN — PIPERACILLIN AND TAZOBACTAM 3.38 G: 3; .375 INJECTION, POWDER, FOR SOLUTION INTRAVENOUS at 06:47

## 2025-02-04 RX ADMIN — VANCOMYCIN HYDROCHLORIDE 1000 MG: 1 INJECTION, SOLUTION INTRAVENOUS at 14:02

## 2025-02-04 RX ADMIN — INSULIN DEGLUDEC 12 UNITS: 100 INJECTION, SOLUTION SUBCUTANEOUS at 09:38

## 2025-02-04 RX ADMIN — BACLOFEN 15 MG: 10 TABLET ORAL at 09:32

## 2025-02-04 RX ADMIN — PIPERACILLIN AND TAZOBACTAM 3.38 G: 3; .375 INJECTION, POWDER, FOR SOLUTION INTRAVENOUS at 15:55

## 2025-02-04 RX ADMIN — FERROUS SULFATE TAB 325 MG (65 MG ELEMENTAL FE) 325 MG: 325 (65 FE) TAB at 09:30

## 2025-02-04 RX ADMIN — ACETAMINOPHEN 650 MG: 325 TABLET ORAL at 02:45

## 2025-02-04 ASSESSMENT — ACTIVITIES OF DAILY LIVING (ADL)
ADLS_ACUITY_SCORE: 65
ADLS_ACUITY_SCORE: 65
ADLS_ACUITY_SCORE: 60
ADLS_ACUITY_SCORE: 64
ADLS_ACUITY_SCORE: 61
ADLS_ACUITY_SCORE: 60
DEPENDENT_IADLS:: CLEANING;COOKING;LAUNDRY;SHOPPING;MEAL PREPARATION;MEDICATION MANAGEMENT;TRANSPORTATION;MONEY MANAGEMENT
ADLS_ACUITY_SCORE: 60
ADLS_ACUITY_SCORE: 64
ADLS_ACUITY_SCORE: 60
ADLS_ACUITY_SCORE: 61
ADLS_ACUITY_SCORE: 65
ADLS_ACUITY_SCORE: 60
ADLS_ACUITY_SCORE: 60
ADLS_ACUITY_SCORE: 65
ADLS_ACUITY_SCORE: 65
ADLS_ACUITY_SCORE: 60
ADLS_ACUITY_SCORE: 64
ADLS_ACUITY_SCORE: 64
ADLS_ACUITY_SCORE: 65
ADLS_ACUITY_SCORE: 60
ADLS_ACUITY_SCORE: 65

## 2025-02-04 NOTE — PLAN OF CARE
Goal Outcome Evaluation:      Plan of Care Reviewed With: patient          Outcome Evaluation: Pt to d/c back to SNF once medically ready

## 2025-02-04 NOTE — PLAN OF CARE
Patient up to the floor this afternoon. A/Ox4. Says she is unable to stand so has not been out of bed. Afebrile VSS. Vanco and zosyn given. Echo completed. Tele sinus rhythm w/ a first degree.      Positive blood cultures this evening, currently growing gram positive cocci in pairs and chains. MD aware.

## 2025-02-04 NOTE — PROGRESS NOTES
[facility]    Progress Note - Hospitalist Service       Date of Admission:  2/2/2025    Assessment & Plan   Tonya Valera is a 59 year old female admitted on 2/2/2025. She has a history of T2DM, anxiety disorder, calculus of gallbladder, intertrigo, chronic venous insufficiency, episodic mood disorder, HLD, HTN, hyponatremia, hypothyroidism, leukocytosis, chronic MDD, borderline personality disorder, stage 2 CKD, opioid dependence continuous, multiple wounds of lower extremity, cellulitis of right lower extremity, DVT  and is admitted for sepsis 2/2 RLE cellulitis.      Fever   Sepsis, resolved  Streptococcal bacteremia  RLE cellulitis   Hypotension  Presented with 1 day fever, dysuria, cough in context of recent COVID infection. Patient met sepsis criteria with tachycardia, hypotension, fever 101.5, WBC 25, procalcitonin 1.6, but normal lactic acid. Imaging r/o of PE and DVT. Most likely source of infection appears to be the RLE, with cellulitis present. Received 2.5 L NS in ED and continued on maintenance fluids until evening of 2/3. Stopped Levophed 2/3. Patient on broad spectrum antibiotics.  S/p 15 mg Toradol ONCE in ED.   - Continue antibiotics with Zosyn and Vancomycin   - Blood Cx: positive streptococcus, pending sensitivities   - If clinically worsening, low threshold for Infectious Disease consult and MARGARETTE given bacteremia.  - WOC consult   - Blood Cx: postive streptococcus  - Follow urine cultures  - Supplemental O2 for SpO2 <90%  - AM CBC, BMP  - Continue PTA pain regimen after med rec    Anemia  On admission, Hgb of 10.6, now down to 8.0 after receiving fluids for sepsis. In light of this, likely that significant drop in Hgb is dilutional. Will continue to monitor.  - AM CBC    UTI  UA 2/3 indicative of UTI. Appropriate coverage with current antibiotic regimen as above.  -Monitor for symptoms    Acute hypoxic respiratory failure  Patient requiring 2L oxygen via NC. Nt-proBNP  "elevated to 1,122. Differential includes recent COVID-19 infection, bacterial infection, heart failure. Less likely PE/DVT, pneumonia based on CT imaging. Echocardiogram unremarkable.  - EKG if higher suspicion for heart failure     T2DM  A1c of 8.0% on admit. PTA regimen includes 15 units degludec every morning per med sheet at patient's bedside from her facility. Reduced by 20% during hospitalization and place pt on sliding scale. Glucose is well controlled on current regimen.  - PTA basal insulin at 12 units in AM (verify once med rec is complete)   - medium ssi   - diabetic diet      HTN  - HOLD PTA lisinopril in light of hypotension     CKD 2  Cr. 0.97 and remaining in this range, which is her normal. S/p 2.5 L of NS in ED   - Avoid nephrotoxins   - Daily BMP      Lower extremity edema   - PTA torsemide   MDD  - PTA venlaxafine and wellbutrin   Hypothyroidism   TSH 2.69 (04/2024)  - PTA levothyroxine           Diet: Combination Diet Regular Diet Adult; Moderate Consistent Carb (60 g CHO per Meal) Diet; 2 gm NA Diet    DVT Prophylaxis: Enoxaparin (Lovenox) SQ  Townsend Catheter: Not present  Fluids: PO  Lines: None     Cardiac Monitoring: None  Code Status: Full Code      Clinically Significant Risk Factors          # Hyperchloremia: Highest Cl = 112 mmol/L in last 2 days, will monitor as appropriate      # Hypocalcemia: Lowest Ca = 6.8 mg/dL in last 2 days, will monitor and replace as appropriate                   # DMII: A1C = 8.0 % (Ref range: <5.7 %) within past 6 months, PRESENT ON ADMISSION  # Obesity: Estimated body mass index is 31.48 kg/m  as calculated from the following:    Height as of this encounter: 1.6 m (5' 3\").    Weight as of this encounter: 80.6 kg (177 lb 11.1 oz)., PRESENT ON ADMISSION     # Financial/Environmental Concerns: none         Social Drivers of Health   Tobacco Use: Medium Risk (8/15/2024)    Received from [employer]    Patient History     Smoking Tobacco Use: Former     " Smokeless Tobacco Use: Never         Disposition Plan     Medically Ready for Discharge: Anticipated in 2-4 Days         The patient's care was discussed with the Attending Physician, Dr. Ruy Naranjo .    Bruno Friend DO  Hospitalist Service  [facility]  Securely message with [employer] (more info)  Text page via [facility] Paging/Directory   ______________________________________________________________________    Interval History   No acute overnight events.  Patient is resting comfortably in bed.  Denies any symptoms of fevers, chills, SOB, chest pain.  Endorses occasional headaches, nausea without emesis.  She reports getting very little sleep.  No new symptoms, no new complaints.    Physical Exam   Vital Signs: Temp: 98.1  F (36.7  C) Temp src: Oral BP: 106/58 Pulse: 85   Resp: 16 SpO2: 95 % O2 Device: None (Room air)    Weight: 177 lbs 11.05 oz    Constitutional: Awake, alert, cooperative, no apparent distress, and appears stated age  Hematologic / Lymphatic: no cervical lymphadenopathy and no supraclavicular lymphadenopathy  Respiratory: No increased work of breathing, good air exchange, clear to auscultation bilaterally, no crackles or wheezing  Cardiovascular: Normal apical impulse, regular rate and rhythm, normal S1 and S2, no S3 or S4, and no murmur noted  GI: No scars, normal bowel sounds, soft, non-distended, non-tender, no masses palpated, no hepatosplenomegally  Skin: Erythema of RLE up to mid thigh with multiple healing scabs, 6 cm scar inferolateral to right patella, and falking skin more distal.  Slightly decreased erythema of RLE near marked line.  Musculoskeletal: Erythema, warmth and edema of RLE compared to LLE.  Neuropsychiatric: General: normal, calm, and normal eye contact    Data     I have personally reviewed the following data over the past 24 hrs:    10.2  \   8.0 (L)   / 181     137 110 (H) 14.6 /  128 (H)   3.7 20 (L) 0.96 (H) \       Imaging results reviewed over the  past 24 hrs:   Recent Results (from the past 24 hours)   Echocardiogram Complete   Result Value    LVEF  60-65%    [facility]    [id number]  [id number]  [id number]  [id number]^ALLEY^ANNEMARIE^[place] [address]     Name: FRANCHESKA ALEXANDER  MRN: [id number]  : 1965  Study Date: 2025 02:32 PM  Age: 59 yrs  Gender: Female  Patient Location: [facility]  Reason For Study: Other, Please Specify in Comments  Ordering Physician: ANNEMARIE HAGER  Performed By: ACE     BSA: 1.8 m2  Height: 63 in  Weight: 177 lb  HR: 92  BP: 116/56 mmHg  ______________________________________________________________________________  Procedure  Echocardiogram with two-dimensional, color and spectral Doppler. Definity (NDC  #[id number]) given intravenously. No hemodynamically significant valvular  abnormalities on 2D or color flow imaging. The study was technically  difficult. There is no comparison study available.  ______________________________________________________________________________  Interpretation Summary     1.Left ventricular size, wall motion and function are normal. The ejection  fraction is 60-65%.  2.Proximal septal thickening is noted.  3.Normal right ventricle size and systolic function.  4.Cannot exclude a small amount of chordal systolic anterior motion of the  mitral valve, no significant gradient, sampled only at peak 8 mmHg. No  significant mitral stenosis nor any significant mitral regurgitation.  5.No hemodynamically significant valvular abnormalities on 2D or color flow  imaging.  6.The study was technically difficult.  There is no comparison study available.  ______________________________________________________________________________  I      WMSI = 1.00     % Normal = 100     X - Cannot   0 -                      (2) - Mildly 2 -          Segments  Size  Interpret    Hyperkinetic 1 - Normal  Hypokinetic  Hypokinetic  1-2     small                                                      7 -          3-5      moderate  3 - Akinetic 4 -          5 -         6 - Akinetic Dyskinetic   6-14    large               Dyskinetic   Aneurysmal  w/scar       w/scar       15-16   diffuse     Left Ventricle  Left ventricular size, wall motion and function are normal. The ejection  fraction is 60-65%. Proximal septal thickening is noted. Left ventricular  diastolic function is normal. No regional wall motion abnormalities noted.     Right Ventricle  Normal right ventricle size and systolic function. TAPSE is normal, which is  consistent with normal right ventricular systolic function.     Atria  The left atrium is borderline dilated. Right atrial size is normal. There is  no color Doppler evidence of an atrial shunt.     Mitral Valve  Cannot exclude a small amount of chordal systolic anterior motion of the  mitral valve, no significant gradient, sampled only at peak 8 mmHg. No  significant mitral stenosis nor any significant mitral regurgitation.     Tricuspid Valve  The tricuspid valve is not well visualized, but is grossly normal. Right  ventricle systolic pressure estimate normal. There is physiologic tricuspid  regurgitation. There is no tricuspid stenosis.     Aortic Valve  The aortic valve is not well visualized. No aortic regurgitation is present.  No aortic stenosis is present.     Pulmonic Valve  The pulmonic valve is not well seen, but is grossly normal. This degree of  valvular regurgitation is within normal limits. There is no pulmonic valvular  stenosis.     Vessels  The aorta root is normal. Normal size ascending aorta. IVC diameter <2.1 cm  collapsing >50% with sniff suggests a normal RA pressure of 3 mmHg.     Pericardium  There is no pericardial effusion.     Rhythm  Sinus rhythm was noted.     ______________________________________________________________________________  MMode/2D Measurements & Calculations  IVSd: 0.50 cm  LVIDd: 4.4 cm  LVIDs: 2.9 cm  LVPWd: 0.79  cm  FS: 35.2 %     LV mass(C)d: 83.5 grams  LV mass(C)dI: 45.5 grams/m2  EF Biplane: 71.2 %  LA Volume (BP): 45.0 ml  LA Volume Index (BP): 24.5 ml/m2     LA Volume Indexed (AL/bp): 26.4 ml/m2  RWT: 0.36  TAPSE: 2.5 cm     Time Measurements  MM HR: 90.0 BPM     Doppler Measurements & Calculations  MV E max leonidas: 89.3 cm/sec  MV A max leonidas: 113.7 cm/sec  MV E/A: 0.79  MV dec slope: 704.5 cm/sec2  MV dec time: 0.13 sec  Ao V2 max: 138.8 cm/sec  Ao max P.0 mmHg  Ao V2 mean: 95.3 cm/sec  Ao mean P.2 mmHg  Ao V2 VTI: 26.8 cm  LV V1 max PG: 3.2 mmHg  LV V1 max: 89.3 cm/sec  LV V1 VTI: 18.7 cm  PA acc time: 0.06 sec  TR max leonidas: 254.9 cm/sec  TR max P.0 mmHg  AV Leonidas Ratio (DI): 0.64     E/E': 8.1  E/E' av.0  Lateral E/e': 8.1  Medial E/e': 9.9  Peak E' Leonidas: 11.0 cm/sec  RV S Leonidas: 21.2 cm/sec     ______________________________________________________________________________  Report approved by: Jamison Sanchez MD on 2025 03:37 PM

## 2025-02-04 NOTE — PLAN OF CARE
Patient alert and oriented. In bed this shift. Turn and reposition as allowed. Bilateral lower extremity elevated.   BP soft otherwise VSS. LS clear diminished.   On IV Zosyn and Vancomycin. On Tele NSR.   Will monitor and continue plan of care.     Problem: Pain Acute  Goal: Optimal Pain Control and Function  Outcome: Progressing     Problem: Infection  Goal: Absence of Infection Signs and Symptoms  Outcome: Progressing     Problem: Adult Inpatient Plan of Care  Goal: Absence of Hospital-Acquired Illness or Injury  Intervention: Identify and Manage Fall Risk  Recent Flowsheet Documentation  Taken 2/4/2025 0930 by Bertha Larsen, RN  Safety Promotion/Fall Prevention:   safety round/check completed   room near nurse's station   nonskid shoes/slippers when out of bed   mobility aid in reach   lighting adjusted   increase visualization of patient   increased rounding and observation   clutter free environment maintained

## 2025-02-04 NOTE — PROGRESS NOTES
VSS on RA. Tylenol given PRN for headache. Refused repositioning. Patient preferred to lay of her right side due to past spine surgery. Calm and cooperative. Legs are Red and warm to touch. IV zosyn and vanco alessandra. Dressing is clean, dry and intact. Bed alarm turn on. Safety rounding completed. Will continue to monitor.

## 2025-02-04 NOTE — CONSULTS
Care Management Initial Consult    General Information  Assessment completed with: Patient,    Type of CM/SW Visit: Initial Assessment    Primary Care Provider verified and updated as needed: No (pt states she doesn't have one)   Readmission within the last 30 days: no previous admission in last 30 days      Reason for Consult: other (see comments) (ERS)  Advance Care Planning: Advance Care Planning Reviewed: no concerns identified          Communication Assessment  Patient's communication style: spoken language (English or Bilingual)             Cognitive  Cognitive/Neuro/Behavioral: WDL                      Living Environment:   People in home: facility resident     Current living Arrangements: other (see comments) (SNF)      Able to return to prior arrangements: yes       Family/Social Support:  Care provided by: self, other (see comments) (Facility staff)  Provides care for: no one, unable/limited ability to care for self  Marital Status:   Support system: Facility resident(s)/Staff, Children          Description of Support System: Supportive, Involved         Current Resources:   Patient receiving home care services: No        Community Resources: Skilled Nursing Facility  Equipment currently used at home: grab bar, toilet, grab bar, tub/shower, lift device, wheelchair, manual  Supplies currently used at home: None    Employment/Financial:  Employment Status: disabled        Financial Concerns: none   Referral to Financial Worker: No       Does the patient's insurance plan have a 3 day qualifying hospital stay waiver?  No    Lifestyle & Psychosocial Needs:  Social Drivers of Health     Food Insecurity: No Food Insecurity (7/26/2024)    Received from [employer] & [facility]    Food Insecurity     Do you worry your food will run out before you are able to buy more?: 1   Depression: Not at risk (3/2/2019)    Received from [employer] [facility] [employer], [employer] [facility]    PHQ-2     PHQ-2 Score: 2   Housing Stability: Low Risk  (7/26/2024)    Received from [employer] / [facility]    Housing Stability     What is your housing situation today?: 1   Tobacco Use: Medium Risk (8/15/2024)    Received from [facility]    Patient History     Smoking Tobacco Use: Former     Smokeless Tobacco Use: Never     Passive Exposure: Not on file   Financial Resource Strain: Low Risk  (2/1/2025)    Received from [employer] / [facility]    Financial Resource Strain     Difficulty of Paying Living Expenses: 3     Difficulty of Paying Living Expenses: Not on file   Alcohol Use: Not on file   Transportation Needs: No Transportation Needs (7/26/2024)    Received from [employer] [facility]    Transportation Needs     Does lack of transportation keep you from medical appointments?: 1     Does lack of transportation keep you from work, meetings or getting things that you need?: 1   Physical Activity: Not on file   Interpersonal Safety: Not on file   Stress: Not on file   Social Connections: Socially Integrated (7/26/2024)    Received from [employer] [facility]    Social Connections     Do you often feel lonely or isolated from those around you?: 0   Health Literacy: Not on file       Functional Status:  Prior to admission patient needed assistance:   Dependent ADLs:: Bathing, Dressing, Wheelchair-independent  Dependent IADLs:: Cleaning, Cooking, Laundry, Shopping, Meal Preparation, Medication Management, Transportation, Money Management       Mental Health Status:  Mental Health Status: No Current Concerns       Chemical Dependency Status:  Chemical Dependency Status: No Current Concerns             Values/Beliefs:  Spiritual, Cultural Beliefs, Roman Catholic Practices, Values that affect care: no               Discussed  Partnership in Safe Discharge Planning  document with patient/family:  Yes    Additional Information:  Tonya Valera is a(n) 59 year old year old female who presented with Cellulitis of right leg [L03.115]  Sepsis without acute organ dysfunction, due to unspecified organism (H) [A41.9].     CM spoke with pt at bedside. Introduced self and role. Patient assisted with completing assessment. Patient lives in a(n) other (see comments) (SNF) alone. Patient receives assistance with bathing, cooking, cleaning, dressing, laundry, medication management, money management, shopping, toileting, and transportation. Anticipated that patient will discharge to nursing home.    Pt states she will need [facility] for transport back to facility. Pt was informed there may be a fee for the ride, pt verbalized understanding and agreed.     Pt denies any further needs at this time.    Contacts:  Extended Emergency Contact Information  Primary Emergency Contact: Noe Crabtree  Mobile Phone: [phone]  Relation: Son  Secondary Emergency Contact: Justo Soto  Mobile Phone: [phone]  Relation: Son      Next Steps: medical readiness.     Gibran Marte RN

## 2025-02-05 LAB
ANION GAP SERPL CALCULATED.3IONS-SCNC: 8 MMOL/L (ref 7–15)
BACTERIA BLD CULT: ABNORMAL
BUN SERPL-MCNC: 10.3 MG/DL (ref 8–23)
CALCIUM SERPL-MCNC: 7.7 MG/DL (ref 8.8–10.4)
CHLORIDE SERPL-SCNC: 111 MMOL/L (ref 98–107)
CREAT SERPL-MCNC: 0.9 MG/DL (ref 0.51–0.95)
EGFRCR SERPLBLD CKD-EPI 2021: 73 ML/MIN/1.73M2
ERYTHROCYTE [DISTWIDTH] IN BLOOD BY AUTOMATED COUNT: 16.4 % (ref 10–15)
GLUCOSE BLDC GLUCOMTR-MCNC: 103 MG/DL (ref 70–99)
GLUCOSE BLDC GLUCOMTR-MCNC: 108 MG/DL (ref 70–99)
GLUCOSE BLDC GLUCOMTR-MCNC: 113 MG/DL (ref 70–99)
GLUCOSE BLDC GLUCOMTR-MCNC: 123 MG/DL (ref 70–99)
GLUCOSE BLDC GLUCOMTR-MCNC: 74 MG/DL (ref 70–99)
GLUCOSE SERPL-MCNC: 103 MG/DL (ref 70–99)
HCO3 SERPL-SCNC: 20 MMOL/L (ref 22–29)
HCT VFR BLD AUTO: 27.3 % (ref 35–47)
HGB BLD-MCNC: 8.5 G/DL (ref 11.7–15.7)
MCH RBC QN AUTO: 24.1 PG (ref 26.5–33)
MCHC RBC AUTO-ENTMCNC: 31.1 G/DL (ref 31.5–36.5)
MCV RBC AUTO: 77 FL (ref 78–100)
PLATELET # BLD AUTO: 202 10E3/UL (ref 150–450)
POTASSIUM SERPL-SCNC: 3.6 MMOL/L (ref 3.4–5.3)
RBC # BLD AUTO: 3.53 10E6/UL (ref 3.8–5.2)
SODIUM SERPL-SCNC: 139 MMOL/L (ref 135–145)
WBC # BLD AUTO: 7.4 10E3/UL (ref 4–11)

## 2025-02-05 PROCEDURE — 250N000013 HC RX MED GY IP 250 OP 250 PS 637

## 2025-02-05 PROCEDURE — 250N000011 HC RX IP 250 OP 636: Performed by: FAMILY MEDICINE

## 2025-02-05 PROCEDURE — 36415 COLL VENOUS BLD VENIPUNCTURE: CPT

## 2025-02-05 PROCEDURE — 250N000011 HC RX IP 250 OP 636

## 2025-02-05 PROCEDURE — 99232 SBSQ HOSP IP/OBS MODERATE 35: CPT | Mod: GC

## 2025-02-05 PROCEDURE — 85048 AUTOMATED LEUKOCYTE COUNT: CPT

## 2025-02-05 PROCEDURE — 120N000004 HC R&B MS OVERFLOW

## 2025-02-05 PROCEDURE — 87040 BLOOD CULTURE FOR BACTERIA: CPT

## 2025-02-05 PROCEDURE — 85018 HEMOGLOBIN: CPT

## 2025-02-05 PROCEDURE — 80048 BASIC METABOLIC PNL TOTAL CA: CPT

## 2025-02-05 PROCEDURE — 250N000013 HC RX MED GY IP 250 OP 250 PS 637: Performed by: FAMILY MEDICINE

## 2025-02-05 RX ORDER — CEFTRIAXONE 2 G/1
2 INJECTION, POWDER, FOR SOLUTION INTRAMUSCULAR; INTRAVENOUS EVERY 24 HOURS
Status: DISCONTINUED | OUTPATIENT
Start: 2025-02-05 | End: 2025-02-06

## 2025-02-05 RX ORDER — ONDANSETRON 2 MG/ML
4 INJECTION INTRAMUSCULAR; INTRAVENOUS EVERY 6 HOURS PRN
Status: DISCONTINUED | OUTPATIENT
Start: 2025-02-05 | End: 2025-02-07 | Stop reason: HOSPADM

## 2025-02-05 RX ADMIN — ONDANSETRON 4 MG: 2 INJECTION INTRAMUSCULAR; INTRAVENOUS at 00:43

## 2025-02-05 RX ADMIN — LEVOTHYROXINE SODIUM 125 MCG: 0.12 TABLET ORAL at 08:33

## 2025-02-05 RX ADMIN — MORPHINE SULFATE 30 MG: 30 TABLET, FILM COATED, EXTENDED RELEASE ORAL at 21:37

## 2025-02-05 RX ADMIN — POLYETHYLENE GLYCOL 3350 17 G: 17 POWDER, FOR SOLUTION ORAL at 08:34

## 2025-02-05 RX ADMIN — ONDANSETRON HYDROCHLORIDE 4 MG: 4 TABLET, FILM COATED ORAL at 16:28

## 2025-02-05 RX ADMIN — BACLOFEN 15 MG: 10 TABLET ORAL at 19:49

## 2025-02-05 RX ADMIN — VANCOMYCIN HYDROCHLORIDE 1000 MG: 1 INJECTION, SOLUTION INTRAVENOUS at 03:30

## 2025-02-05 RX ADMIN — FERROUS SULFATE TAB 325 MG (65 MG ELEMENTAL FE) 325 MG: 325 (65 FE) TAB at 08:33

## 2025-02-05 RX ADMIN — BUPROPION HYDROCHLORIDE 150 MG: 150 TABLET, EXTENDED RELEASE ORAL at 08:33

## 2025-02-05 RX ADMIN — INSULIN DEGLUDEC 12 UNITS: 100 INJECTION, SOLUTION SUBCUTANEOUS at 08:44

## 2025-02-05 RX ADMIN — ACETAMINOPHEN 650 MG: 325 TABLET ORAL at 00:43

## 2025-02-05 RX ADMIN — MORPHINE SULFATE 30 MG: 30 TABLET, FILM COATED, EXTENDED RELEASE ORAL at 11:05

## 2025-02-05 RX ADMIN — ACETAMINOPHEN 650 MG: 325 TABLET ORAL at 13:36

## 2025-02-05 RX ADMIN — PIPERACILLIN AND TAZOBACTAM 3.38 G: 3; .375 INJECTION, POWDER, FOR SOLUTION INTRAVENOUS at 06:08

## 2025-02-05 RX ADMIN — VENLAFAXINE HYDROCHLORIDE 150 MG: 150 CAPSULE, EXTENDED RELEASE ORAL at 08:33

## 2025-02-05 RX ADMIN — TOPIRAMATE 50 MG: 50 TABLET, FILM COATED ORAL at 08:32

## 2025-02-05 RX ADMIN — PANTOPRAZOLE SODIUM 40 MG: 40 TABLET, DELAYED RELEASE ORAL at 08:33

## 2025-02-05 RX ADMIN — ROSUVASTATIN CALCIUM 20 MG: 10 TABLET, FILM COATED ORAL at 21:35

## 2025-02-05 RX ADMIN — ENOXAPARIN SODIUM 40 MG: 40 INJECTION SUBCUTANEOUS at 08:39

## 2025-02-05 RX ADMIN — CEFTRIAXONE SODIUM 2 G: 2 INJECTION, POWDER, FOR SOLUTION INTRAMUSCULAR; INTRAVENOUS at 11:06

## 2025-02-05 RX ADMIN — BACLOFEN 15 MG: 10 TABLET ORAL at 08:32

## 2025-02-05 RX ADMIN — BACLOFEN 15 MG: 10 TABLET ORAL at 13:37

## 2025-02-05 RX ADMIN — VENLAFAXINE HYDROCHLORIDE 75 MG: 75 CAPSULE, EXTENDED RELEASE ORAL at 08:34

## 2025-02-05 ASSESSMENT — ACTIVITIES OF DAILY LIVING (ADL)
ADLS_ACUITY_SCORE: 71
ADLS_ACUITY_SCORE: 69
ADLS_ACUITY_SCORE: 71
ADLS_ACUITY_SCORE: 66
ADLS_ACUITY_SCORE: 69
ADLS_ACUITY_SCORE: 69
ADLS_ACUITY_SCORE: 71
ADLS_ACUITY_SCORE: 71
ADLS_ACUITY_SCORE: 69
ADLS_ACUITY_SCORE: 71
ADLS_ACUITY_SCORE: 65
ADLS_ACUITY_SCORE: 69
ADLS_ACUITY_SCORE: 71
ADLS_ACUITY_SCORE: 70
ADLS_ACUITY_SCORE: 71
ADLS_ACUITY_SCORE: 69
ADLS_ACUITY_SCORE: 69
ADLS_ACUITY_SCORE: 65
ADLS_ACUITY_SCORE: 69
ADLS_ACUITY_SCORE: 65
ADLS_ACUITY_SCORE: 69

## 2025-02-05 NOTE — PLAN OF CARE
Problem: Infection  Goal: Absence of Infection Signs and Symptoms  Outcome: Progressing    Problem: Pain Acute  Goal: Optimal Pain Control and Function  Outcome: Progressing    Pt sleeping between cares, no events. C/o mild neck pain relieved w/ scheduled analgesics & tylenol. Tele reading SR. RLE dressing CDI. Purewick in place. BP also WDL this shift, continue to monitor. Will return to [facility] at time of discharge.

## 2025-02-05 NOTE — PLAN OF CARE
Problem: Infection  Goal: Absence of Infection Signs and Symptoms  Outcome: Progressing  Intervention: Prevent or Manage Infection  Recent Flowsheet Documentation  Taken 2/5/2025 0335 by Eugenia Stein RN  Isolation Precautions: contact precautions maintained  Taken 2/5/2025 0000 by Eugenia Stein RN  Isolation Precautions: contact precautions maintained     Problem: Pain Acute  Goal: Optimal Pain Control and Function  Intervention: Prevent or Manage Pain  Recent Flowsheet Documentation  Taken 2/5/2025 0335 by Eugenia Stein, RN  Medication Review/Management: medications reviewed  Taken 2/5/2025 0000 by Eugenia Stein RN  Medication Review/Management: medications reviewed   Goal Outcome Evaluation:      Pt slept well after 03. VSS. No fever. SR. Rm air. C/o neck pain and given tylenol X1 with relief. Sleeps in a ball on her right side with ^HOB and says she is comfortable that way. Wretching at MN. Given Zofran then felt she needed to eat a sandwich as her BG was 108. She then had an emesis at 03 and since then has not had further c/o.  UOP per [place]. Taking pills and fluids well. Call light in reach to make needs known.

## 2025-02-05 NOTE — PLAN OF CARE
Patient alert and oriented. Turned and repositioned as allowed. PT/OT consult. Incontinent of bladder. VSS. LS clear. On Tele NSR. On Mechanical/Dental Soft Diet.   Denies chest pain or shortness of breath. PRN Tylenol given for complain of headache. No nausea or vomiting.   On IV antibiotics Rocephin. Will monitor and continue plan of care.     Problem: Pain Acute  Goal: Optimal Pain Control and Function  Outcome: Progressing     Problem: Infection  Goal: Absence of Infection Signs and Symptoms  Outcome: Progressing     Problem: Adult Inpatient Plan of Care  Goal: Absence of Hospital-Acquired Illness or Injury  Intervention: Identify and Manage Fall Risk  Recent Flowsheet Documentation  Taken 2/5/2025 0071 by Bertha Larsen, RN  Safety Promotion/Fall Prevention:   safety round/check completed   room near nurse's station   nonskid shoes/slippers when out of bed   mobility aid in reach   lighting adjusted   increase visualization of patient   increased rounding and observation   clutter free environment maintained

## 2025-02-05 NOTE — PROGRESS NOTES
[facility]    Progress Note - Hospitalist Service       Date of Admission:  2/2/2025    Assessment & Plan   Tonya Valera is a 59 year old female admitted on 2/2/2025. She has a history of T2DM, anxiety disorder, calculus of gallbladder, intertrigo, chronic venous insufficiency, episodic mood disorder, HLD, HTN, hyponatremia, hypothyroidism, leukocytosis, chronic MDD, borderline personality disorder, stage 2 CKD, opioid dependence continuous, multiple wounds of lower extremity, cellulitis of right lower extremity, DVT  and is admitted for sepsis 2/2 RLE cellulitis.      Fever   Sepsis, resolved  Streptococcal bacteremia  RLE cellulitis   Hypotension  Presented with 1 day fever, dysuria, cough in context of recent COVID infection. Patient met sepsis criteria with tachycardia, hypotension, fever 101.5, WBC 25, procalcitonin 1.6, but normal lactic acid. Imaging r/o of PE and DVT. Most likely source of infection appears to be the RLE, with cellulitis present. Received 2.5 L NS in ED and continued on maintenance fluids until evening of 2/3. Stopped Levophed 2/3. Patient on broad spectrum antibiotics, narrowing coverage day.  S/p 15 mg Toradol ONCE in ED.  Blood cultures growing strep dysgalactiae, pending sensitivities.  Blood cultures after antibiotics with no growth to date.  Right lower extremity rash improving.  - Stop Zosyn and Vancomycin, start ceftriaxone              - If clinically worsening, low threshold for Infectious Disease consult and MARGARETTE given bacteremia.  - Zofran as needed for nausea  - WOC consult   - Supplemental O2 for SpO2 <90%  - AM CBC, BMP  - Continue PTA pain regimen after med rec     Anemia  On admission, Hgb of 10.6, down to 8.0 after receiving fluids for sepsis, now improving. In light of this, likely that significant drop in Hgb is dilutional. Will continue to monitor.  - AM CBC     Abnormal urinalysis  Bacteruria  UA 2/3 indicative of UTI. Appropriate coverage  "with current antibiotic regimen as above.  Denies symptoms.  - Monitor for symptoms  - Follow urine cultures     Acute hypoxic respiratory failure, resolved  Patient initially requiring 2L oxygen via NC. Nt-proBNP elevated to 1,122. Differential includes recent COVID-19 infection, bacterial infection, heart failure. Less likely PE/DVT, pneumonia based on CT imaging. Echocardiogram unremarkable.  Patient no longer requires supplemental oxygen.     T2DM  A1c of 8.0% on admit. PTA regimen includes 15 units degludec every morning per med sheet at patient's bedside from her facility. Reduced by 20% during hospitalization and place pt on sliding scale. Glucose is well controlled on current regimen.  - PTA basal insulin at 12 units in AM (verify once med rec is complete)   - medium ssi   - diabetic diet      HTN  - HOLD PTA lisinopril in light of hypotension     CKD 2  Cr. 0.97 and remaining in this range, which is her normal. S/p 2.5 L of NS in ED   - Avoid nephrotoxins   - Daily BMP      Lower extremity edema   - HOLD PTA Lasix     MDD  - PTA venlaxafine and wellbutrin   Hypothyroidism   TSH 2.69 (04/2024)  - PTA levothyroxine           Diet: Combination Diet Regular Diet Adult; Moderate Consistent Carb (60 g CHO per Meal) Diet; 2 gm NA Diet    DVT Prophylaxis: Enoxaparin (Lovenox) SQ  Townsend Catheter: Not present  Fluids: PO  Lines: None     Cardiac Monitoring: None  Code Status: Full Code      Clinically Significant Risk Factors          # Hyperchloremia: Highest Cl = 111 mmol/L in last 2 days, will monitor as appropriate                        # DMII: A1C = 8.0 % (Ref range: <5.7 %) within past 6 months, PRESENT ON ADMISSION  # Obesity: Estimated body mass index is 31.48 kg/m  as calculated from the following:    Height as of this encounter: 1.6 m (5' 3\").    Weight as of this encounter: 80.6 kg (177 lb 11.1 oz)., PRESENT ON ADMISSION       # Financial/Environmental Concerns: none         Social Drivers of Health "   Tobacco Use: Medium Risk (8/15/2024)    Received from [facility]    Patient History     Smoking Tobacco Use: Former     Smokeless Tobacco Use: Never         Disposition Plan     Medically Ready for Discharge: Anticipated in 2-4 Days         The patient's care was discussed with the Attending Physician, Dr. Ruy Naranjo .    Bruno Friend, DO  Hospitalist Service  [facility]  Securely message with [employer] (more info)  Text page via [facility] Paging/Directory   ______________________________________________________________________    Interval History   Patient nauseous overnight, requiring use of Zofran.  However, patient still had an episode of emesis and is no apprehensive about eating any food.  She was not able to get any sleep last night.  She denies fevers, chills.  Feels that her RLE is less painful and feels less firm to touch.  No new complaints at this time.    Physical Exam   Vital Signs: Temp: 98.7  F (37.1  C) Temp src: Oral BP: (!) 147/73 Pulse: 83   Resp: 16 SpO2: 99 % O2 Device: None (Room air)    Weight: 177 lbs 11.05 oz    Constitutional: Awake, alert, cooperative, no apparent distress, and appears stated age  Respiratory: No increased work of breathing, good air exchange, clear to auscultation bilaterally, no crackles or wheezing  Cardiovascular: Normal apical impulse, regular rate and rhythm, normal S1 and S2, no S3 or S4, and no murmur noted  GI: No scars, normal bowel sounds, soft, non-distended, non-tender, no masses palpated, no hepatosplenomegally  Skin: Erythema of RLE up to just above the patella with multiple healing scabs, 6 cm scar inferolateral to right patella, and falking skin more distal.  Erythema has significantly receded from line drawn on 2/2.  Musculoskeletal: Erythema, warmth and edema of RLE compared to LLE.  Neuropsychiatric: General: normal, calm, and normal eye contact    Data     I have personally reviewed the following data over the past 24  hrs:    7.4  \   8.5 (L)   / 202     139 111 (H) 10.3 /  123 (H)   3.6 20 (L) 0.90 \       Imaging results reviewed over the past 24 hrs:   No results found for this or any previous visit (from the past 24 hours).

## 2025-02-06 ENCOUNTER — APPOINTMENT (OUTPATIENT)
Dept: OCCUPATIONAL THERAPY | Facility: CLINIC | Age: 60
DRG: 871 | End: 2025-02-06
Payer: MEDICARE

## 2025-02-06 VITALS
WEIGHT: 177.69 LBS | BODY MASS INDEX: 31.48 KG/M2 | HEART RATE: 71 BPM | DIASTOLIC BLOOD PRESSURE: 85 MMHG | RESPIRATION RATE: 16 BRPM | TEMPERATURE: 98.5 F | HEIGHT: 63 IN | SYSTOLIC BLOOD PRESSURE: 144 MMHG | OXYGEN SATURATION: 98 %

## 2025-02-06 LAB
ANION GAP SERPL CALCULATED.3IONS-SCNC: 10 MMOL/L (ref 7–15)
BACTERIA BLD CULT: NORMAL
BACTERIA SPEC CULT: NORMAL
BUN SERPL-MCNC: 6 MG/DL (ref 8–23)
CALCIUM SERPL-MCNC: 8 MG/DL (ref 8.8–10.4)
CHLORIDE SERPL-SCNC: 110 MMOL/L (ref 98–107)
CREAT SERPL-MCNC: 0.79 MG/DL (ref 0.51–0.95)
EGFRCR SERPLBLD CKD-EPI 2021: 85 ML/MIN/1.73M2
ERYTHROCYTE [DISTWIDTH] IN BLOOD BY AUTOMATED COUNT: 16.4 % (ref 10–15)
GLUCOSE BLDC GLUCOMTR-MCNC: 130 MG/DL (ref 70–99)
GLUCOSE BLDC GLUCOMTR-MCNC: 148 MG/DL (ref 70–99)
GLUCOSE BLDC GLUCOMTR-MCNC: 184 MG/DL (ref 70–99)
GLUCOSE BLDC GLUCOMTR-MCNC: 92 MG/DL (ref 70–99)
GLUCOSE SERPL-MCNC: 76 MG/DL (ref 70–99)
HCO3 SERPL-SCNC: 20 MMOL/L (ref 22–29)
HCT VFR BLD AUTO: 28.8 % (ref 35–47)
HGB BLD-MCNC: 9.1 G/DL (ref 11.7–15.7)
MCH RBC QN AUTO: 23.9 PG (ref 26.5–33)
MCHC RBC AUTO-ENTMCNC: 31.6 G/DL (ref 31.5–36.5)
MCV RBC AUTO: 76 FL (ref 78–100)
PLATELET # BLD AUTO: 234 10E3/UL (ref 150–450)
POTASSIUM SERPL-SCNC: 3.5 MMOL/L (ref 3.4–5.3)
RBC # BLD AUTO: 3.8 10E6/UL (ref 3.8–5.2)
SODIUM SERPL-SCNC: 140 MMOL/L (ref 135–145)
WBC # BLD AUTO: 6.7 10E3/UL (ref 4–11)

## 2025-02-06 PROCEDURE — 250N000011 HC RX IP 250 OP 636

## 2025-02-06 PROCEDURE — 85014 HEMATOCRIT: CPT

## 2025-02-06 PROCEDURE — 82374 ASSAY BLOOD CARBON DIOXIDE: CPT

## 2025-02-06 PROCEDURE — 97530 THERAPEUTIC ACTIVITIES: CPT | Mod: GO

## 2025-02-06 PROCEDURE — 120N000004 HC R&B MS OVERFLOW

## 2025-02-06 PROCEDURE — 250N000013 HC RX MED GY IP 250 OP 250 PS 637

## 2025-02-06 PROCEDURE — 250N000012 HC RX MED GY IP 250 OP 636 PS 637

## 2025-02-06 PROCEDURE — 80048 BASIC METABOLIC PNL TOTAL CA: CPT

## 2025-02-06 PROCEDURE — 36415 COLL VENOUS BLD VENIPUNCTURE: CPT

## 2025-02-06 PROCEDURE — 85048 AUTOMATED LEUKOCYTE COUNT: CPT

## 2025-02-06 PROCEDURE — 999N000147 HC STATISTIC PT IP EVAL DEFER

## 2025-02-06 PROCEDURE — 97165 OT EVAL LOW COMPLEX 30 MIN: CPT | Mod: GO

## 2025-02-06 PROCEDURE — 99232 SBSQ HOSP IP/OBS MODERATE 35: CPT | Mod: GC

## 2025-02-06 PROCEDURE — 250N000013 HC RX MED GY IP 250 OP 250 PS 637: Performed by: FAMILY MEDICINE

## 2025-02-06 RX ORDER — CEFUROXIME AXETIL 500 MG/1
500 TABLET ORAL EVERY 12 HOURS SCHEDULED
Status: DISCONTINUED | OUTPATIENT
Start: 2025-02-06 | End: 2025-02-07 | Stop reason: HOSPADM

## 2025-02-06 RX ORDER — DIPHENHYDRAMINE HCL 25 MG
25 CAPSULE ORAL 2 TIMES DAILY PRN
Status: DISCONTINUED | OUTPATIENT
Start: 2025-02-06 | End: 2025-02-07 | Stop reason: HOSPADM

## 2025-02-06 RX ADMIN — CEFUROXIME AXETIL 500 MG: 500 TABLET, FILM COATED ORAL at 20:30

## 2025-02-06 RX ADMIN — BUPROPION HYDROCHLORIDE 150 MG: 150 TABLET, EXTENDED RELEASE ORAL at 09:13

## 2025-02-06 RX ADMIN — TOPIRAMATE 50 MG: 50 TABLET, FILM COATED ORAL at 09:13

## 2025-02-06 RX ADMIN — DIPHENHYDRAMINE HYDROCHLORIDE 25 MG: 25 CAPSULE ORAL at 11:41

## 2025-02-06 RX ADMIN — FERROUS SULFATE TAB 325 MG (65 MG ELEMENTAL FE) 325 MG: 325 (65 FE) TAB at 09:12

## 2025-02-06 RX ADMIN — VENLAFAXINE HYDROCHLORIDE 75 MG: 75 CAPSULE, EXTENDED RELEASE ORAL at 09:14

## 2025-02-06 RX ADMIN — BACLOFEN 15 MG: 10 TABLET ORAL at 20:29

## 2025-02-06 RX ADMIN — ROSUVASTATIN CALCIUM 20 MG: 10 TABLET, FILM COATED ORAL at 21:16

## 2025-02-06 RX ADMIN — ACETAMINOPHEN 650 MG: 325 TABLET ORAL at 13:46

## 2025-02-06 RX ADMIN — CEFUROXIME AXETIL 500 MG: 500 TABLET, FILM COATED ORAL at 13:46

## 2025-02-06 RX ADMIN — VENLAFAXINE HYDROCHLORIDE 150 MG: 150 CAPSULE, EXTENDED RELEASE ORAL at 09:13

## 2025-02-06 RX ADMIN — MORPHINE SULFATE 30 MG: 30 TABLET, FILM COATED, EXTENDED RELEASE ORAL at 10:27

## 2025-02-06 RX ADMIN — BACLOFEN 15 MG: 10 TABLET ORAL at 09:13

## 2025-02-06 RX ADMIN — BACLOFEN 15 MG: 10 TABLET ORAL at 13:47

## 2025-02-06 RX ADMIN — PANTOPRAZOLE SODIUM 40 MG: 40 TABLET, DELAYED RELEASE ORAL at 09:13

## 2025-02-06 RX ADMIN — POLYETHYLENE GLYCOL 3350 17 G: 17 POWDER, FOR SOLUTION ORAL at 09:12

## 2025-02-06 RX ADMIN — INSULIN DEGLUDEC 12 UNITS: 100 INJECTION, SOLUTION SUBCUTANEOUS at 09:17

## 2025-02-06 RX ADMIN — MORPHINE SULFATE 30 MG: 30 TABLET, FILM COATED, EXTENDED RELEASE ORAL at 21:16

## 2025-02-06 RX ADMIN — ACETAMINOPHEN 650 MG: 325 TABLET ORAL at 04:53

## 2025-02-06 RX ADMIN — ENOXAPARIN SODIUM 40 MG: 40 INJECTION SUBCUTANEOUS at 09:12

## 2025-02-06 RX ADMIN — LEVOTHYROXINE SODIUM 125 MCG: 0.12 TABLET ORAL at 09:13

## 2025-02-06 RX ADMIN — CEFTRIAXONE SODIUM 2 G: 2 INJECTION, POWDER, FOR SOLUTION INTRAMUSCULAR; INTRAVENOUS at 10:27

## 2025-02-06 RX ADMIN — INSULIN ASPART 1 UNITS: 100 INJECTION, SOLUTION INTRAVENOUS; SUBCUTANEOUS at 17:49

## 2025-02-06 ASSESSMENT — ACTIVITIES OF DAILY LIVING (ADL)
ADLS_ACUITY_SCORE: 69
ADLS_ACUITY_SCORE: 71
ADLS_ACUITY_SCORE: 74
ADLS_ACUITY_SCORE: 75
ADLS_ACUITY_SCORE: 69
ADLS_ACUITY_SCORE: 71
ADLS_ACUITY_SCORE: 69
ADLS_ACUITY_SCORE: 75
ADLS_ACUITY_SCORE: 69
ADLS_ACUITY_SCORE: 75
IADL_COMMENTS: ASSIST FOR ALL IADLS
ADLS_ACUITY_SCORE: 69
ADLS_ACUITY_SCORE: 75
ADLS_ACUITY_SCORE: 69
ADLS_ACUITY_SCORE: 69
ADLS_ACUITY_SCORE: 71
ADLS_ACUITY_SCORE: 69
ADLS_ACUITY_SCORE: 75
ADLS_ACUITY_SCORE: 71
ADLS_ACUITY_SCORE: 74
ADLS_ACUITY_SCORE: 71
ADLS_ACUITY_SCORE: 69

## 2025-02-06 NOTE — PROGRESS NOTES
Care Management Follow Up    Length of Stay (days): 3    Expected Discharge Date: 02/07/2025     Concerns to be Addressed: denies needs/concerns at this time     Patient plan of care discussed at interdisciplinary rounds: Yes    Anticipated Discharge Disposition:     TBD           Anticipated Discharge Services: TBD   Anticipated Discharge DME:  TBD      Referrals Placed by CM/SW: TBD   Private pay costs discussed: Not applicable    Discussed  Partnership in Safe Discharge Planning  document with patient/family: Yes     Handoff Completed: No, handoff not indicated or clinically appropriate    Additional Information:  Pt to work with PT and OT today. Will coordinate discharge plan based on recommendations.     Next Steps: PT and OT luiz Marte RN

## 2025-02-06 NOTE — PLAN OF CARE
Patient alert and oriented. Turned and repositioned as allowed. Tolerating diet. Denies chest pain or shortness of breath. VSS. Up in chair today. Now on PO antibiotics ceftin. Plan discharge back to residency pending PT/OT eval and medical progression.     Problem: Pain Acute  Goal: Optimal Pain Control and Function  Outcome: Progressing     Problem: Infection  Goal: Absence of Infection Signs and Symptoms  Outcome: Progressing     Problem: Adult Inpatient Plan of Care  Goal: Absence of Hospital-Acquired Illness or Injury  Intervention: Identify and Manage Fall Risk  Recent Flowsheet Documentation  Taken 2/6/2025 0900 by Bertha Larsen, RN  Safety Promotion/Fall Prevention:   safety round/check completed   room near nurse's station   nonskid shoes/slippers when out of bed   mobility aid in reach   lighting adjusted   increase visualization of patient   increased rounding and observation   clutter free environment maintained

## 2025-02-06 NOTE — PLAN OF CARE
[id number]    Pt Aox4. C/o headache. PRN tylenol utilized. Also c/o nausea. PRN zofran utilized and effective per pt. VSS on RA. NSR on tele. Writer attempted to encourage pt to get up in the chair this evening. Pt refused and said she would continue to refuse. Refused to work with therapy today. Education provided. Purewick in place for incontinence. Improved appetite this evening. RLE dressing changed per orders. Pt states it is greatly improving. Plan is to continue IV abx. Pt able to make needs known. Call light within reach and purposeful rounding performed. Melanie Bazan RN      Problem: Adult Inpatient Plan of Care  Goal: Absence of Hospital-Acquired Illness or Injury  Outcome: Progressing     Problem: Adult Inpatient Plan of Care  Goal: Optimal Comfort and Wellbeing  Outcome: Progressing     Problem: Infection  Goal: Absence of Infection Signs and Symptoms  Outcome: Progressing

## 2025-02-06 NOTE — PLAN OF CARE
Problem: Infection  Goal: Absence of Infection Signs and Symptoms  Outcome: Progressing  Intervention: Prevent or Manage Infection  Recent Flowsheet Documentation  Taken 2/6/2025 0448 by Eugenia Stein, RN  Isolation Precautions: contact precautions maintained     Problem: Pain Acute  Goal: Optimal Pain Control and Function  Outcome: Progressing   Goal Outcome Evaluation:      Pt rested well most of the shift. Woke with a head/neck ache this am lessened with tylenol. No n/v this shift. VSS. No fever. Using purewick with good UOP. Taking sips of fluids. Drsg to RLE intact with minimal drainage. Call light in reach to make needs known.

## 2025-02-06 NOTE — PROGRESS NOTES
02/06/25 1300   Appointment Info   Signing Clinician's Name / Credentials (OT) Zeny Ferreira, OTR/L   Living Environment   People in Home alone   Current Living Arrangements extended care facility   Self-Care   Equipment Currently Used at Home shower chair;wheelchair, manual  (bedrail, reacher)   Activity/Exercise/Self-Care Comment typically IND for toileting and LB dressing with reacher and with transfers to/from wheelchair, assist for LE wound management and socks, assist for bathing, can request assist for all ADLs and transfers   Instrumental Activities of Daily Living (IADL)   IADL Comments assist for all IADLs   General Information   Onset of Illness/Injury or Date of Surgery 02/02/25   Referring Physician Donte Jackman MD   Patient/Family Therapy Goal Statement (OT) feel better   Additional Occupational Profile Info/Pertinent History of Current Problem PMH T2DM, anxiety disorder, calculus of gallbladder, intertrigo, chronic venous insufficiency, episodic mood disorder, HLD, HTN, hyponatremia, hypothyroidism, leukocytosis, chronic MDD, borderline personality disorder, stage 2 CKD, opioid dependence continuous, multiple wounds of lower extremity, cellulitis of right lower extremity, DVT  and is admitted for sepsis 2/2 RLE cellulitis   Existing Precautions/Restrictions fall   Cognitive Status Examination   Orientation Status orientation to person, place and time   Affect/Mental Status (Cognitive) flat/blunted affect;emotionally labile   Follows Commands follows one-step commands   Cognitive Status Comments behavioral with past attempts at OT initiation. benefitting today from having med needs addressed and a plan in place prior to OT session.   Pain Assessment   Patient Currently in Pain Yes, see Vital Sign flowsheet   Range of Motion Comprehensive   General Range of Motion bilateral upper extremity ROM WFL   Strength Comprehensive (MMT)   Comment, General Manual Muscle Testing (MMT) Assessment  weakness BUE   Bed Mobility   Bed Mobility supine-sit   Supine-Sit Baldwin (Bed Mobility) moderate assist (50% patient effort)   Assistive Device (Bed Mobility) bed rails   Transfers   Transfers sit-stand transfer   Sit-Stand Transfer   Sit/Stand Transfer Comments unsuccessful first attempt without intervention from writer   Balance   Balance Comments SBA EOB sitting   Activities of Daily Living   BADL Assessment/Intervention lower body dressing;toileting   Lower Body Dressing Assessment/Training   Baldwin Level (Lower Body Dressing) maximum assist (25% patient effort)   Toileting   Baldwin Level (Toileting) maximum assist (25% patient effort)   Clinical Impression   Criteria for Skilled Therapeutic Interventions Met (OT) Yes, treatment indicated   OT Diagnosis dec BADL IND   OT Problem List-Impairments impacting ADL problems related to;activity tolerance impaired;balance;cognition;mobility;strength;pain   Assessment of Occupational Performance 1-3 Performance Deficits   Identified Performance Deficits dressing, toileting, functional transfers   Planned Therapy Interventions (OT) ADL retraining;bed mobility training;transfer training;progressive activity/exercise;strengthening   Clinical Decision Making Complexity (OT) problem focused assessment/low complexity   Risk & Benefits of therapy have been explained evaluation/treatment results reviewed;participants included;patient   OT Total Evaluation Time   OT Eval, Low Complexity Minutes (81644) 15   OT Goals   Therapy Frequency (OT) 3 times/week   OT Predicted Duration/Target Date for Goal Attainment 02/13/25   OT Goals Lower Body Dressing;Toilet Transfer/Toileting;Bed Mobility;Transfers   OT: Lower Body Dressing Supervision/stand-by assist;using adaptive equipment  (brief)   OT: Bed Mobility Supervision/stand-by assist;supine to/from sitting   OT: Transfer Supervision/stand-by assist  (pivot bed <> wheelchair)   OT: Toilet Transfer/Toileting  Supervision/stand-by assist;toilet transfer;cleaning and garment management   Therapeutic Activities   Therapeutic Activity Minutes (90454) 24   Symptoms noted during/after treatment dizziness  (room spinning, nausea)   Treatment Detail/Skilled Intervention Cues to adjust position to increase ease of transfer. Requires significant time for activity, appears a mix of behavioral and symptoms mimicing vertigo. With following cues to adjust hand position, progresses to min a sit > stand from EOB, requires assist for clothing management as pt unable to remove hands from armrest and bedrail, CGA with no AD for pivot from bed to recliner, taking small steps while holding bedrail and armrest, switching to bilateral armrests when closer. Able to scoot self back into recliner. BP WNL.   OT Discharge Planning   OT Plan pre-med for session per pt's request (benadryl for dizziness? tylenol for pain?), LB dressing with AE, toileting on commode, wc transfer   OT Discharge Recommendation (DC Rec) home with home care occupational therapy   OT Rationale for DC Rec per pt, can have assist for all BADLs and transfers from nursing staff given LTC facility. would recommend home OT and PT to maximize functional IND as pt is currently below baseline.   OT Brief overview of current status max A ADLs, CGA/min toileting   OT Total Distance Amb During Session (feet) 0   Total Session Time   Timed Code Treatment Minutes 24   Total Session Time (sum of timed and untimed services) 39

## 2025-02-06 NOTE — PROGRESS NOTES
[facility]    Progress Note - Hospitalist Service       Date of Admission:  2/2/2025    Assessment & Plan   Tonya Valera is a 59 year old female admitted on 2/2/2025. She has a history of T2DM, anxiety disorder, calculus of gallbladder, intertrigo, chronic venous insufficiency, episodic mood disorder, HLD, HTN, hyponatremia, hypothyroidism, leukocytosis, chronic MDD, borderline personality disorder, stage 2 CKD, opioid dependence continuous, multiple wounds of lower extremity, cellulitis of right lower extremity, DVT  and is admitted for sepsis 2/2 RLE cellulitis.      Streptococcal bacteremia  RLE cellulitis, improving  Fever, resolved  Sepsis, resolved  Hypotension, resolved  Presented with 1 day fever, dysuria, cough in context of recent COVID infection. Patient met sepsis criteria on admission. Imaging r/o of PE and DVT. Most likely source of infection appears to be the RLE, with cellulitis present. Received 2.5 L NS in ED and continued on maintenance fluids until evening of 2/3. Stopped Levophed 2/3. Patient on broad spectrum antibiotics, narrowing coverage day.  S/p 15 mg Toradol ONCE in ED.  Blood cultures growing strep dysgalactiae, pan-sensitive.  Blood cultures after antibiotics with no growth to date.  Right lower extremity rash improving. Patient requesting Benadryl for dizziness associated with attempts at therapy.  - Stop Ceftriaxone, Start Cefuroxime 500 mg BID p.o. (end 2/15)              - If clinically worsening, low threshold for Infectious Disease consult and MARGARETTE given bacteremia.  - Zofran as needed for nausea  - Benadryl for dizziness related to activity  - WOC consult   - Supplemental O2 for SpO2 <90%  - AM CBC, BMP  - Continue PTA pain regimen after med rec     Anemia  On admission, Hgb of 10.6, down to 8.0 after receiving fluids for sepsis, now improving. In light of this, likely that significant drop in Hgb is dilutional. Will continue to monitor.  - AM CBC    "  Abnormal urinalysis  Bacteruria  UA 2/3 indicative of UTI. Appropriate coverage with current antibiotic regimen as above.  Denies symptoms.  - Monitor for symptoms  - Urine cultures: NGTD     Acute hypoxic respiratory failure, resolved  Patient initially requiring 2L oxygen via NC. Nt-proBNP elevated to 1,122. Differential includes recent COVID-19 infection, bacterial infection, heart failure. Less likely PE/DVT, pneumonia based on CT imaging. Echocardiogram unremarkable.  Patient no longer requires supplemental oxygen.     T2DM  A1c of 8.0% on admit. PTA regimen includes 15 units degludec every morning per med sheet at patient's bedside from her facility. Reduced by 20% during hospitalization and place pt on sliding scale. Glucose is well controlled on current regimen.  - PTA basal insulin at 12 units in AM (verify once med rec is complete)   - medium ssi   - diabetic diet      HTN  - HOLD PTA lisinopril in light of hypotension     CKD 2  Cr. 0.97 and remaining in this range, which is her normal. S/p 2.5 L of NS in ED   - Avoid nephrotoxins   - Daily BMP      Lower extremity edema   - Continue to HOLD PTA Lasix     MDD  - PTA venlaxafine and wellbutrin   Hypothyroidism   TSH 2.69 (04/2024)  - PTA levothyroxine           Diet: Mechanical/Dental Soft Diet    DVT Prophylaxis: Enoxaparin (Lovenox) SQ  Townsend Catheter: Not present  Fluids: PO  Lines: None     Cardiac Monitoring: None  Code Status: Full Code      Clinically Significant Risk Factors          # Hyperchloremia: Highest Cl = 111 mmol/L in last 2 days, will monitor as appropriate                        # DMII: A1C = 8.0 % (Ref range: <5.7 %) within past 6 months, PRESENT ON ADMISSION  # Obesity: Estimated body mass index is 31.48 kg/m  as calculated from the following:    Height as of this encounter: 1.6 m (5' 3\").    Weight as of this encounter: 80.6 kg (177 lb 11.1 oz)., PRESENT ON ADMISSION       # Financial/Environmental Concerns: none         Social " Drivers of Health   Tobacco Use: Medium Risk (8/15/2024)    Received from [employer]    Patient History     Smoking Tobacco Use: Former     Smokeless Tobacco Use: Never         Disposition Plan     Medically Ready for Discharge: Anticipated 1-2 days         The patient's care was discussed with the Attending Physician, Dr. Ruy Naranjo .    Bruno Friend, DO  Hospitalist Service  [facility]  Securely message with [employer] (more info)  Text page via [employer] Paging/Directory   ______________________________________________________________________    Interval History   No acute overnight events.  Patient reports that she has not been able to get very much sleep, but this is normal for her.  She has not had any more nausea, and denies any more new symptoms.  She states that her leg is slightly less painful today.    Physical Exam   Vital Signs: Temp: 98.2  F (36.8  C) Temp src: Oral BP: (!) 155/76 Pulse: 70   Resp: 18 SpO2: 98 % O2 Device: None (Room air)    Weight: 177 lbs 11.05 oz    Constitutional: Awake, alert, cooperative, no apparent distress, and appears stated age  Respiratory: No increased work of breathing, good air exchange, clear to auscultation bilaterally, no crackles or wheezing  Cardiovascular: Normal apical impulse, regular rate and rhythm, normal S1 and S2, no S3 or S4, and no murmur noted  GI: No scars, normal bowel sounds, soft, non-distended, non-tender, no masses palpated, no hepatosplenomegally  Skin: Erythema of RLE up to just above the patella with multiple healing scabs, 6 cm scar inferolateral to right patella, and flaking skin more distal.  Erythema has significantly receded from line drawn on 2/2 and is improved from yesterday.  Musculoskeletal: Erythema, warmth and edema of RLE compared to LLE.  Neuropsychiatric: General: normal, calm, and normal eye contact    Data     I have personally reviewed the following data over the past 24 hrs:    6.7  \   9.1 (L)   / 234      140 110 (H) 6.0 (L) /  92   3.5 20 (L) 0.79 \       Imaging results reviewed over the past 24 hrs:   No results found for this or any previous visit (from the past 24 hours).

## 2025-02-06 NOTE — PROGRESS NOTES
Physical Therapy: Orders received. Chart reviewed and discussed with care team.? Physical Therapy not indicated due to per OT, pt has no skilled PT needs as pt only completes pivot transfers from bed<>wheelchair. Pt reports does not ambulate at baseline.?OT will address transfers as well as ADL's. Pt able to receive assist with transfers as needed at long term care. Defer discharge recommendations to OT, care team and nursing.? Will complete orders.   Nilda Gallo, PT, DPT

## 2025-02-07 VITALS
RESPIRATION RATE: 18 BRPM | TEMPERATURE: 98.7 F | DIASTOLIC BLOOD PRESSURE: 70 MMHG | WEIGHT: 177.69 LBS | HEIGHT: 63 IN | BODY MASS INDEX: 31.48 KG/M2 | OXYGEN SATURATION: 97 % | SYSTOLIC BLOOD PRESSURE: 138 MMHG | HEART RATE: 73 BPM

## 2025-02-07 LAB
ERYTHROCYTE [DISTWIDTH] IN BLOOD BY AUTOMATED COUNT: 16.4 % (ref 10–15)
GLUCOSE BLDC GLUCOMTR-MCNC: 145 MG/DL (ref 70–99)
GLUCOSE BLDC GLUCOMTR-MCNC: 165 MG/DL (ref 70–99)
HCT VFR BLD AUTO: 31.2 % (ref 35–47)
HGB BLD-MCNC: 9.9 G/DL (ref 11.7–15.7)
MCH RBC QN AUTO: 24.1 PG (ref 26.5–33)
MCHC RBC AUTO-ENTMCNC: 31.7 G/DL (ref 31.5–36.5)
MCV RBC AUTO: 76 FL (ref 78–100)
PLATELET # BLD AUTO: 265 10E3/UL (ref 150–450)
RBC # BLD AUTO: 4.1 10E6/UL (ref 3.8–5.2)
WBC # BLD AUTO: 6 10E3/UL (ref 4–11)

## 2025-02-07 PROCEDURE — 85027 COMPLETE CBC AUTOMATED: CPT

## 2025-02-07 PROCEDURE — 36415 COLL VENOUS BLD VENIPUNCTURE: CPT

## 2025-02-07 PROCEDURE — 250N000013 HC RX MED GY IP 250 OP 250 PS 637: Performed by: FAMILY MEDICINE

## 2025-02-07 PROCEDURE — 250N000013 HC RX MED GY IP 250 OP 250 PS 637

## 2025-02-07 PROCEDURE — 250N000011 HC RX IP 250 OP 636

## 2025-02-07 PROCEDURE — 99238 HOSP IP/OBS DSCHRG MGMT 30/<: CPT | Mod: GC

## 2025-02-07 RX ORDER — CEFUROXIME AXETIL 500 MG/1
500 TABLET ORAL EVERY 12 HOURS
Qty: 16 TABLET | Refills: 0 | Status: SHIPPED | OUTPATIENT
Start: 2025-02-07 | End: 2025-02-15

## 2025-02-07 RX ADMIN — TOPIRAMATE 50 MG: 50 TABLET, FILM COATED ORAL at 09:13

## 2025-02-07 RX ADMIN — CEFUROXIME AXETIL 500 MG: 500 TABLET, FILM COATED ORAL at 09:15

## 2025-02-07 RX ADMIN — POLYETHYLENE GLYCOL 3350 17 G: 17 POWDER, FOR SOLUTION ORAL at 09:12

## 2025-02-07 RX ADMIN — DIPHENHYDRAMINE HYDROCHLORIDE 25 MG: 25 CAPSULE ORAL at 00:09

## 2025-02-07 RX ADMIN — BUPROPION HYDROCHLORIDE 150 MG: 150 TABLET, EXTENDED RELEASE ORAL at 09:16

## 2025-02-07 RX ADMIN — LEVOTHYROXINE SODIUM 187.5 MCG: 0.12 TABLET ORAL at 09:14

## 2025-02-07 RX ADMIN — ENOXAPARIN SODIUM 40 MG: 40 INJECTION SUBCUTANEOUS at 09:12

## 2025-02-07 RX ADMIN — INSULIN ASPART 1 UNITS: 100 INJECTION, SOLUTION INTRAVENOUS; SUBCUTANEOUS at 09:14

## 2025-02-07 RX ADMIN — PANTOPRAZOLE SODIUM 40 MG: 40 TABLET, DELAYED RELEASE ORAL at 09:16

## 2025-02-07 RX ADMIN — MORPHINE SULFATE 30 MG: 30 TABLET, FILM COATED, EXTENDED RELEASE ORAL at 09:15

## 2025-02-07 RX ADMIN — VENLAFAXINE HYDROCHLORIDE 75 MG: 75 CAPSULE, EXTENDED RELEASE ORAL at 09:17

## 2025-02-07 RX ADMIN — VENLAFAXINE HYDROCHLORIDE 150 MG: 150 CAPSULE, EXTENDED RELEASE ORAL at 09:14

## 2025-02-07 RX ADMIN — INSULIN DEGLUDEC 12 UNITS: 100 INJECTION, SOLUTION SUBCUTANEOUS at 09:17

## 2025-02-07 RX ADMIN — FERROUS SULFATE TAB 325 MG (65 MG ELEMENTAL FE) 325 MG: 325 (65 FE) TAB at 09:16

## 2025-02-07 RX ADMIN — BACLOFEN 15 MG: 10 TABLET ORAL at 09:16

## 2025-02-07 ASSESSMENT — ACTIVITIES OF DAILY LIVING (ADL)
ADLS_ACUITY_SCORE: 72
ADLS_ACUITY_SCORE: 74
ADLS_ACUITY_SCORE: 72
ADLS_ACUITY_SCORE: 74
ADLS_ACUITY_SCORE: 72
ADLS_ACUITY_SCORE: 74
ADLS_ACUITY_SCORE: 72
ADLS_ACUITY_SCORE: 74

## 2025-02-07 NOTE — PLAN OF CARE
[id number]    Pt Aox4. C/o RLE discomfort. PTA morphine utilized and effective. Dressing changed this shift. Denies nausea. SR on tele. VSS on RA. Up in chair this afternoon and ambulated to bathroom A2. Tolerated fairly well but described GARCIA. Purewick utilized for incontinence while in bed. Writer continues to encourage activity. Pt appears in good spirits today. She is hopeful to go home tomorrow. Pt able to make needs known. Call light within reach and purposeful rounding performed. Melanie Bazan RN      Problem: Adult Inpatient Plan of Care  Goal: Absence of Hospital-Acquired Illness or Injury  Outcome: Progressing     Problem: Adult Inpatient Plan of Care  Goal: Optimal Comfort and Wellbeing  Outcome: Progressing    Problem: Pain Acute  Goal: Optimal Pain Control and Function  Outcome: Progressing

## 2025-02-07 NOTE — DISCHARGE SUMMARY
"[facility]  Discharge Summary - Medicine & Pediatrics       Date of Admission:  2/2/2025  Date of Discharge:  2/7/2025  Discharging Provider: Dr. Ruy Naranjo  Discharge Service: Hospitalist Service    Discharge Diagnoses   Group C streptococcal bacteremia  Right lower extremity cellulitis, improving  Septic shock, resolved  Microcytic anemia   Acute hypoxic respiratory failure, resolved     Clinically Significant Risk Factors     # DMII: A1C = 8.0 % (Ref range: <5.7 %) within past 6 months    # Obesity: Estimated body mass index is 31.48 kg/m  as calculated from the following:    Height as of this encounter: 1.6 m (5' 3\").    Weight as of this encounter: 80.6 kg (177 lb 11.1 oz).       Follow-ups Needed After Discharge   Follow-up Appointments       Follow-up and recommended labs and tests       Follow up with facility physician within 7 days for hospital follow-up. Consider iron deficiency workup.              Unresulted Labs Ordered in the Past 30 Days of this Admission       Date and Time Order Name Status Description    2/5/2025 12:01 AM Blood Culture Peripheral Blood Preliminary     2/5/2025 12:01 AM Blood Culture Peripheral Blood Preliminary     2/4/2025  9:29 AM Blood Culture Peripheral Blood Preliminary     2/4/2025  9:29 AM Blood Culture Peripheral Blood Preliminary         These results will be followed up by hospitalist    Discharge Disposition   Discharged to home  Condition at discharge: Stable    Hospital Course     Tonya Valera is a 59 year old female admitted on 2/2/2025. She has a history of T2DM, anxiety disorder, calculus of gallbladder, intertrigo, chronic venous insufficiency, episodic mood disorder, HLD, HTN, hyponatremia, hypothyroidism, leukocytosis, chronic MDD, borderline personality disorder, stage 2 CKD, opioid dependence continuous, multiple wounds of lower extremity, cellulitis of right lower extremity, and DVT and was admitted for sepsis due to right lower " extremity cellulitis and Group C strep bacteremia.     Presented with 1 day fever, dysuria, and cough in context of recent COVID infection with septic shock requiring short course of levophed with source of right lower extremity cellulitis. Blood cultures grew strep C dysgalactiae, pan-sensitive, with subsequent cultures with no growth to date. She was treated with IV antibiotics until transitioned to Cefuroxime 500 mg BID to finish antibiotic course (end 2/15). WOC following while admitted with improvement in lower extremity cellulitis.    Microcytic anemia present with recommend to workup iron deficiency outpatient. Initially required 2L oxygen via NC, now weaned off. Return to LTC with home OT and WOC.     Consultations This Hospital Stay   PHARMACY TO DOSE VANCO  WOUND OSTOMY CONTINENCE NURSE  IP CONSULT  PHARMACY TO DOSE VANCO  CARE MANAGEMENT / SOCIAL WORK IP CONSULT  CARE MANAGEMENT / SOCIAL WORK IP CONSULT  OCCUPATIONAL THERAPY ADULT IP CONSULT  PHYSICAL THERAPY ADULT IP CONSULT    Code Status   Full Code       The patient was discussed with Dr. Jose A Harrison MD  [facility]  [address] [place] [id number]  Phone: [phone]  Fax: [phone]  ______________________________________________________________________    Physical Exam   Vital Signs: Temp: 98.7  F (37.1  C) Temp src: Oral BP: 138/70 Pulse: 73   Resp: 18 SpO2: 97 % O2 Device: None (Room air)    Weight: 177 lbs 11.05 oz  Constitutional: Awake, alert, cooperative, no apparent distress, and appears stated age  Respiratory: No increased work of breathing, good air exchange, clear to auscultation bilaterally, no crackles or wheezing  Cardiovascular: Normal apical impulse, regular rate and rhythm, normal S1 and S2, no S3 or S4, and no murmur noted  GI: Soft, non-distended, non-tender, no masses palpated, no hepatosplenomegally  Skin: No visualized erythema on right knee and proximal, distal  right lower extremity with bandage, no drainage, no pain to palpation  Neuropsychiatric: General: normal, calm, and normal eye contact         Primary Care Physician   Annia Frias    Discharge Orders      Home Care Referral      Reason for your hospital stay    Skin infection and blood infection     Follow-up and recommended labs and tests     Follow up with facility physician within 7 days for hospital follow-up. No labs/tests needed.       Significant Results and Procedures   Most Recent 3 CBC's:  Recent Labs   Lab Test 02/07/25 0444 02/06/25 0445 02/05/25 0454   WBC 6.0 6.7 7.4   HGB 9.9* 9.1* 8.5*   MCV 76* 76* 77*    234 202     Most Recent 3 BMP's:  Recent Labs   Lab Test 02/07/25  1232 02/07/25  0838 02/06/25  2149 02/06/25  0753 02/06/25 0445 02/05/25  0841 02/05/25  0454 02/04/25  0927 02/04/25 0444   NA  --   --   --   --  140  --  139  --  137   POTASSIUM  --   --   --   --  3.5  --  3.6  --  3.7   CHLORIDE  --   --   --   --  110*  --  111*  --  110*   CO2  --   --   --   --  20*  --  20*  --  20*   BUN  --   --   --   --  6.0*  --  10.3  --  14.6   CR  --   --   --   --  0.79  --  0.90  --  0.96*   ANIONGAP  --   --   --   --  10  --  8  --  7   MALI  --   --   --   --  8.0*  --  7.7*  --  7.4*   * 165* 148*   < > 76   < > 103*   < > 146*    < > = values in this interval not displayed.   ,   Results for orders placed or performed during the hospital encounter of 02/02/25   CT Chest Pulmonary Embolism w Contrast    Narrative    EXAM: CT CHEST PULMONARY EMBOLISM W CONTRAST  LOCATION: [facility]  DATE: 2/3/2025    INDICATION: Chest pain, dyspnea, high risk PE.  COMPARISON: None available.  TECHNIQUE: CT chest pulmonary angiogram during arterial phase injection of IV contrast. Multiplanar reformats and MIP reconstructions were performed. Dose reduction techniques were used.   CONTRAST: 100 ml Isovue 370.    FINDINGS:  ANGIOGRAM CHEST: No evidence of pulmonary  embolism. No evidence of thoracic aortic aneurysm or dissection. No evidence for right heart strain.    LUNGS AND PLEURA: No pleural effusion or pneumothorax. Bibasilar pulmonary opacities, likely atelectasis.    MEDIASTINUM/AXILLAE: Mild cardiomegaly. No significant pericardial effusion. Few mildly prominent mediastinal lymph nodes, indeterminate, could be reactive. Moderate-sized hiatal hernia.    CORONARY ARTERY CALCIFICATION: Moderate.    UPPER ABDOMEN: Limited evaluation of the upper abdomen due to lack of coverage and timing of contrast. Large calcified gallstone.    MUSCULOSKELETAL: Multilevel degenerative changes of the spine. Wedge-shaped compression deformity of T10 vertebra, likely old.      Impression    IMPRESSION:  1.  No evidence of pulmonary embolism.  2.  Mild cardiomegaly and moderate atherosclerotic vascular calcification of the coronary arteries.   US Lower Extremity Venous Duplex Right    Narrative    EXAM: US LOWER EXTREMITY VENOUS DUPLEX RIGHT  LOCATION: [facility]  DATE: 2/3/2025    INDICATION: Swelling. History of DVT.  COMPARISON: None.  TECHNIQUE: Venous Duplex ultrasound of the right lower extremity with and without compression, augmentation and duplex. Color flow and spectral Doppler with waveform analysis performed.    FINDINGS: Exam includes the common femoral, femoral, popliteal, and contralateral common femoral veins as well as segmentally visualized deep calf veins and greater saphenous vein.     RIGHT: No deep vein thrombosis. No superficial thrombophlebitis. No popliteal cyst.      Impression    IMPRESSION:  1.  No deep venous thrombosis in the right lower extremity.   Echocardiogram Complete     Value    LVEF  60-65%    Narrative    [id number]  [id number]  [id number]  [id number]^ALLEY^ANNEMARIE^[place] [address]     Name: FRANCHESKA ALEXANDER  MRN: [id number]  : 1965  Study Date: 2025 02:32 PM  Age:  59 yrs  Gender: Female  Patient Location: [facility]  Reason For Study: Other, Please Specify in Comments  Ordering Physician: ANNEMARIE HAGER  Performed By: ACE     BSA: 1.8 m2  Height: 63 in  Weight: 177 lb  HR: 92  BP: 116/56 mmHg  ______________________________________________________________________________  Procedure  Echocardiogram with two-dimensional, color and spectral Doppler. Definity (NDC  #13875-843) given intravenously. No hemodynamically significant valvular  abnormalities on 2D or color flow imaging. The study was technically  difficult. There is no comparison study available.  ______________________________________________________________________________  Interpretation Summary     1.Left ventricular size, wall motion and function are normal. The ejection  fraction is 60-65%.  2.Proximal septal thickening is noted.  3.Normal right ventricle size and systolic function.  4.Cannot exclude a small amount of chordal systolic anterior motion of the  mitral valve, no significant gradient, sampled only at peak 8 mmHg. No  significant mitral stenosis nor any significant mitral regurgitation.  5.No hemodynamically significant valvular abnormalities on 2D or color flow  imaging.  6.The study was technically difficult.  There is no comparison study available.  ______________________________________________________________________________  I      WMSI = 1.00     % Normal = 100     X - Cannot   0 -                      (2) - Mildly 2 -          Segments  Size  Interpret    Hyperkinetic 1 - Normal  Hypokinetic  Hypokinetic  1-2     small                                                     7 -          3-5      moderate  3 - Akinetic 4 -          5 -         6 - Akinetic Dyskinetic   6-14    large               Dyskinetic   Aneurysmal  w/scar       w/scar       15-16   diffuse     Left Ventricle  Left ventricular size, wall motion and function are normal. The ejection  fraction is 60-65%. Proximal septal  thickening is noted. Left ventricular  diastolic function is normal. No regional wall motion abnormalities noted.     Right Ventricle  Normal right ventricle size and systolic function. TAPSE is normal, which is  consistent with normal right ventricular systolic function.     Atria  The left atrium is borderline dilated. Right atrial size is normal. There is  no color Doppler evidence of an atrial shunt.     Mitral Valve  Cannot exclude a small amount of chordal systolic anterior motion of the  mitral valve, no significant gradient, sampled only at peak 8 mmHg. No  significant mitral stenosis nor any significant mitral regurgitation.     Tricuspid Valve  The tricuspid valve is not well visualized, but is grossly normal. Right  ventricle systolic pressure estimate normal. There is physiologic tricuspid  regurgitation. There is no tricuspid stenosis.     Aortic Valve  The aortic valve is not well visualized. No aortic regurgitation is present.  No aortic stenosis is present.     Pulmonic Valve  The pulmonic valve is not well seen, but is grossly normal. This degree of  valvular regurgitation is within normal limits. There is no pulmonic valvular  stenosis.     Vessels  The aorta root is normal. Normal size ascending aorta. IVC diameter <2.1 cm  collapsing >50% with sniff suggests a normal RA pressure of 3 mmHg.     Pericardium  There is no pericardial effusion.     Rhythm  Sinus rhythm was noted.     ______________________________________________________________________________  MMode/2D Measurements & Calculations  IVSd: 0.50 cm  LVIDd: 4.4 cm  LVIDs: 2.9 cm  LVPWd: 0.79 cm  FS: 35.2 %     LV mass(C)d: 83.5 grams  LV mass(C)dI: 45.5 grams/m2  EF Biplane: 71.2 %  LA Volume (BP): 45.0 ml  LA Volume Index (BP): 24.5 ml/m2     LA Volume Indexed (AL/bp): 26.4 ml/m2  RWT: 0.36  TAPSE: 2.5 cm     Time Measurements  MM HR: 90.0 BPM     Doppler Measurements & Calculations  MV E max freda: 89.3 cm/sec  MV A max freda: 113.7  cm/sec  MV E/A: 0.79  MV dec slope: 704.5 cm/sec2  MV dec time: 0.13 sec  Ao V2 max: 138.8 cm/sec  Ao max P.0 mmHg  Ao V2 mean: 95.3 cm/sec  Ao mean P.2 mmHg  Ao V2 VTI: 26.8 cm  LV V1 max PG: 3.2 mmHg  LV V1 max: 89.3 cm/sec  LV V1 VTI: 18.7 cm  PA acc time: 0.06 sec  TR max leonidas: 254.9 cm/sec  TR max P.0 mmHg  AV Leonidas Ratio (DI): 0.64     E/E': 8.1  E/E' av.0  Lateral E/e': 8.1  Medial E/e': 9.9  Peak E' Leonidas: 11.0 cm/sec  RV S Leonidas: 21.2 cm/sec     ______________________________________________________________________________  Report approved by: Jamison Sanchez MD on 2025 03:37 PM             Discharge Medications   Current Discharge Medication List        START taking these medications    Details   cefuroxime (CEFTIN) 500 MG tablet Take 1 tablet (500 mg) by mouth every 12 hours for 8 days.  Qty: 16 tablet, Refills: 0    Associated Diagnoses: Cellulitis of right leg; Sepsis without acute organ dysfunction, due to unspecified organism (H)           CONTINUE these medications which have NOT CHANGED    Details   acetaminophen (TYLENOL) 325 MG tablet Take 650 mg by mouth every 6 hours as needed for mild pain.      aspirin (ASA) 81 MG chewable tablet Take 81 mg by mouth daily      baclofen (LIORESAL) 10 MG tablet Take 15 mg by mouth 3 times daily.      benzocaine (ORAJEL MAXIMUM STRENGTH) 20 % GEL gel Take by mouth every 4 hours as needed for mouth sores.      bisacodyl (DULCOLAX) 10 MG suppository Place 10 mg rectally daily as needed for constipation      buPROPion (WELLBUTRIN XL) 150 MG 24 hr tablet Take 150 mg by mouth every morning.      calcium carbonate 600 mg-vitamin D 400 units (CALTRATE) 600-400 MG-UNIT per tablet Take 1 tablet by mouth daily      camphor-menthol (DERMASARRA) 0.5-0.5 % external lotion Apply topically every 4 hours as needed for skin care (itching).      diphenhydrAMINE-zinc acetate (BENADRYL) 1-0.1 % external cream Apply topically 2 times daily as needed for itching.       ferrous sulfate (FEROSUL) 325 (65 Fe) MG tablet Take 325 mg by mouth daily.      furosemide (LASIX) 20 MG tablet Take 20 mg by mouth daily.      insulin degludec (TRESIBA) 100 UNIT/ML pen Inject 15 Units subcutaneously every morning.      !! levothyroxine (SYNTHROID/LEVOTHROID) 125 MCG tablet Take 125 mcg by mouth See Admin Instructions Daily on Tuesday, Wednesday, Thursday, Saturday, and Sunday      !! levothyroxine (SYNTHROID/LEVOTHROID) 125 MCG tablet Take 187.5 mcg by mouth See Admin Instructions On Mondays, Fridays      lisinopril (ZESTRIL) 2.5 MG tablet Take 2.5 mg by mouth daily      loperamide (IMODIUM) 2 MG capsule Take 2 mg by mouth 4 times daily as needed for diarrhea.      magnesium hydroxide (MOM) 2400 MG/10ML SUSP Take 5 mLs by mouth daily as needed for constipation.      mineral oil-white petrolatum (EUCERIN/MINERIN) cream Apply topically 2 times daily. Apply to legs topically two times a day for dry skin.      morphine (MS CONTIN) 30 MG CR tablet Take 30 mg by mouth every 12 hours.      Multiple Vitamins-Minerals (MULTIVITAMIN GUMMIES WOMENS) CHEW Take 1 chew tab by mouth daily      naloxone (NARCAN) 4 MG/0.1ML nasal spray Spray 4 mg into one nostril alternating nostrils as needed for opioid reversal. every 2-3 minutes until assistance arrives      omeprazole (PRILOSEC) 20 MG DR capsule Take 20 mg by mouth daily before breakfast      ondansetron (ZOFRAN) 4 MG tablet Take 4 mg by mouth every 6 hours as needed for nausea.      polyethylene glycol (MIRALAX) 17 GM/Dose powder Take 1 capful. by mouth daily.      rosuvastatin (CRESTOR) 20 MG tablet Take 20 mg by mouth At Bedtime      Semaglutide, 1 MG/DOSE, (OZEMPIC) 4 MG/3ML pen Inject 1 mg subcutaneously every 7 days. On Wednesdays.      topiramate (TOPAMAX) 50 MG tablet Take 50 mg by mouth daily.      !! venlafaxine (EFFEXOR-XR) 150 MG 24 hr capsule Take 150 mg by mouth daily Take with 75 mg capsule for a total dose of 225 mg.      !! venlafaxine  (EFFEXOR-XR) 75 MG 24 hr capsule Take 75 mg by mouth daily Take with 150 mg capsule for a total dose of 225 mg.       !! - Potential duplicate medications found. Please discuss with provider.        Allergies   Allergies   Allergen Reactions    Compazine [Prochlorperazine]     Latex      Latex sensitive     Metformin     Statins [Statins]     Sulfa Antibiotics Itching

## 2025-02-07 NOTE — PROGRESS NOTES
Care Management Discharge Note    Discharge Date: 02/07/2025       Discharge Disposition: Long Term Care    Discharge Services: Transportation Services    Discharge DME: None    Discharge Transportation: agency    Private pay costs discussed: Not applicable    Does the patient's insurance plan have a 3 day qualifying hospital stay waiver?  No    PAS Confirmation Code:    Patient/family educated on Medicare website which has current facility and service quality ratings: yes    Education Provided on the Discharge Plan: Yes  Persons Notified of Discharge Plans: pt and LTC  Patient/Family in Agreement with the Plan: yes    Handoff Referral Completed: No, handoff not indicated or clinically appropriate    Additional Information:  Pt to return to [facility] via [facility]. Discharge orders were sent to [facility].     [facility] to transport at [phone]    Gibran Marte RN

## 2025-02-07 NOTE — PROGRESS NOTES
[facility]    Progress Note - Hospitalist Service       Date of Admission:  2/2/2025    Assessment & Plan   Tonya Valera is a 59 year old female admitted on 2/2/2025. She has a history of T2DM, anxiety disorder, calculus of gallbladder, intertrigo, chronic venous insufficiency, episodic mood disorder, HLD, HTN, hyponatremia, hypothyroidism, leukocytosis, chronic MDD, borderline personality disorder, stage 2 CKD, opioid dependence continuous, multiple wounds of lower extremity, cellulitis of right lower extremity, DVT and was admitted for sepsis due to right lower extremity cellulitis and Group C strep bacteremia. Medically ready for discharge.      Group C streptococcal bacteremia  Right lower extremity cellulitis, improving  Septic shock, resolved  Presented with 1 day fever, dysuria, and cough in context of recent COVID infection with septic shock requiring short course of levophed with source of right lower extremity cellulitis. Blood cultures grew strep C dysgalactiae, pan-sensitive with subsequent cultures no growth to date.  - Cefuroxime 500 mg BID (end 2/15)  - Zofran as needed for nausea  - Benadryl for dizziness related to activity  - WOC following  - Supplemental O2 for SpO2 <90%  - AM CBC, BMP  - Continue PTA pain regimen     Microcytic anemia  Some element of dilution Low concern for acute bleed.  - AM CBC  -Consider outpatient iron studies     Abnormal urinalysis  Asymptomatic. Urine culture no growth to date.     Acute hypoxic respiratory failure, resolved  Initially requiring 2L oxygen via NC, now weaned off.    T2DM  A1c 8%. Appropriate control.  - PTA basal insulin at 12 units in AM  - Medium ssi   - Diabetic diet      HTN  - Resume PTA lisinopril    Lower extremity edema   - Resume PTA Lasix     CKD 2  Cr. 0.97 and remaining in this range, which is her normal. S/p 2.5 L of NS in ED   - Avoid nephrotoxins   - AM BMP      MDD  - PTA venlaxafine and wellbutrin  "    Hypothyroidism   - PTA levothyroxine        Diet: Mechanical/Dental Soft Diet    DVT Prophylaxis: Enoxaparin (Lovenox) SQ  Townsend Catheter: Not present  Fluids: PO  Lines: None     Cardiac Monitoring: None  Code Status: Full Code      Clinically Significant Risk Factors          # Hyperchloremia: Highest Cl = 110 mmol/L in last 2 days, will monitor as appropriate                        # DMII: A1C = 8.0 % (Ref range: <5.7 %) within past 6 months   # Obesity: Estimated body mass index is 31.48 kg/m  as calculated from the following:    Height as of this encounter: 1.6 m (5' 3\").    Weight as of this encounter: 80.6 kg (177 lb 11.1 oz).        # Financial/Environmental Concerns: none         Social Drivers of Health   Tobacco Use: Medium Risk (8/15/2024)    Received from [employer]    Patient History     Smoking Tobacco Use: Former     Smokeless Tobacco Use: Never         Disposition Plan     Medically Ready for Discharge: Anticipated 1-2 days         The patient's care was discussed with the Attending Physician, Dr. Ruy Naranjo .    Mirela Harrison MD  Hospitalist Service  [facility]  Securely message with [email or web address]-DENTIST (more info)  Text page via [employer] Paging/Directory   ______________________________________________________________________    Interval History   No acute overnight events.  Patient reports that she has not been able to get very much sleep, but this is normal for her.  She has not had any more nausea, and denies any more new symptoms.  She states that her leg is slightly less painful today.    Physical Exam   Vital Signs: Temp: 98.3  F (36.8  C) Temp src: Oral BP: (!) 149/74 Pulse: 72   Resp: 18 SpO2: 96 % O2 Device: None (Room air)    Weight: 177 lbs 11.05 oz    Constitutional: Awake, alert, cooperative, no apparent distress, and appears stated age  Respiratory: No increased work of breathing, good air exchange, clear to auscultation bilaterally, no crackles or " wheezing  Cardiovascular: Normal apical impulse, regular rate and rhythm, normal S1 and S2, no S3 or S4, and no murmur noted  GI: Soft, non-distended, non-tender, no masses palpated, no hepatosplenomegally  Skin: No visualized erythema on right knee and proximal, distal right lower extremity with bandage, no drainage, no pain to palpation  Neuropsychiatric: General: normal, calm, and normal eye contact    Data     I have personally reviewed the following data over the past 24 hrs:    6.0  \   9.9 (L)   / 265     N/A N/A N/A /  165 (H)   N/A N/A N/A \       Imaging results reviewed over the past 24 hrs:   No results found for this or any previous visit (from the past 24 hours).

## 2025-02-07 NOTE — PLAN OF CARE
Occupational Therapy Discharge Summary    Reason for therapy discharge:    Discharged to home with home therapy.    Progress towards therapy goal(s). See goals on Care Plan in Psychiatric electronic health record for goal details.  Goals partially met.  Barriers to achieving goals:   discharge from facility.    Therapy recommendation(s):    Continued therapy is recommended.  Rationale/Recommendations:  maximize functional ADL participation.

## 2025-02-07 NOTE — PLAN OF CARE
"  Problem: Infection  Goal: Absence of Infection Signs and Symptoms  Outcome: Progressing     Problem: Pain Acute  Goal: Optimal Pain Control and Function  Outcome: Progressing   Goal Outcome Evaluation:  Pt slept very little this shift, playing games on her phone. Denies pain. No n/v. C/o hunger then didn't eat the sandwich she requested. \"It gives me comfort just having it here next to me\"? Taking very little PO this shift.  UOP per [place]. Dressing to RLE CDI. Call light in reach and can make needs known.                       "

## 2025-02-09 LAB
BACTERIA BLD CULT: NO GROWTH
BACTERIA BLD CULT: NO GROWTH

## 2025-02-10 ENCOUNTER — PATIENT OUTREACH (OUTPATIENT)
Dept: CARE COORDINATION | Facility: CLINIC | Age: 60
End: 2025-02-10
Payer: COMMERCIAL

## 2025-02-10 LAB
BACTERIA BLD CULT: NO GROWTH
BACTERIA BLD CULT: NO GROWTH

## 2025-02-10 NOTE — PROGRESS NOTES
[facility] Care Resource [place]    Background: Transitional Care Management program identified per system criteria and reviewed by [facility] team for possible outreach.    Assessment: Upon chart review, [facility] Team member will not proceed with patient outreach related to this episode of Transitional Care Management program due to reason below:    Patient has discharged to a Memory Care, Long-term Care, Assisted Living or Group Home where patient is receiving on-site support with their daily cares, including support with hospital follow up plan.    Plan: Transitional Care Management episode addressed appropriately per reason noted above.      TERRANCE Garcia  Connected Care Resource [place], [facility]    *Connected Care Resource Team does NOT follow patient ongoing. Referrals are identified based on internal discharge reports and the outreach is to ensure patient has an understanding of their discharge instructions.

## 2025-03-03 ENCOUNTER — LAB REQUISITION (OUTPATIENT)
Dept: LAB | Facility: CLINIC | Age: 60
End: 2025-03-03
Payer: MEDICARE

## 2025-03-03 DIAGNOSIS — R60.9 EDEMA, UNSPECIFIED: ICD-10-CM

## 2025-03-04 LAB
ANION GAP SERPL CALCULATED.3IONS-SCNC: 12 MMOL/L (ref 7–15)
BASOPHILS # BLD AUTO: 0.1 10E3/UL (ref 0–0.2)
BASOPHILS NFR BLD AUTO: 1 %
BUN SERPL-MCNC: 14.6 MG/DL (ref 8–23)
CALCIUM SERPL-MCNC: 9 MG/DL (ref 8.8–10.4)
CHLORIDE SERPL-SCNC: 100 MMOL/L (ref 98–107)
CREAT SERPL-MCNC: 0.85 MG/DL (ref 0.51–0.95)
EGFRCR SERPLBLD CKD-EPI 2021: 78 ML/MIN/1.73M2
EOSINOPHIL # BLD AUTO: 0.2 10E3/UL (ref 0–0.7)
EOSINOPHIL NFR BLD AUTO: 3 %
ERYTHROCYTE [DISTWIDTH] IN BLOOD BY AUTOMATED COUNT: 16.7 % (ref 10–15)
GLUCOSE SERPL-MCNC: 106 MG/DL (ref 70–99)
HCO3 SERPL-SCNC: 25 MMOL/L (ref 22–29)
HCT VFR BLD AUTO: 36.6 % (ref 35–47)
HGB BLD-MCNC: 10.5 G/DL (ref 11.7–15.7)
IMM GRANULOCYTES # BLD: 0.1 10E3/UL
IMM GRANULOCYTES NFR BLD: 1 %
LYMPHOCYTES # BLD AUTO: 2.3 10E3/UL (ref 0.8–5.3)
LYMPHOCYTES NFR BLD AUTO: 27 %
MCH RBC QN AUTO: 23.4 PG (ref 26.5–33)
MCHC RBC AUTO-ENTMCNC: 28.7 G/DL (ref 31.5–36.5)
MCV RBC AUTO: 82 FL (ref 78–100)
MONOCYTES # BLD AUTO: 0.6 10E3/UL (ref 0–1.3)
MONOCYTES NFR BLD AUTO: 7 %
NEUTROPHILS # BLD AUTO: 5.3 10E3/UL (ref 1.6–8.3)
NEUTROPHILS NFR BLD AUTO: 62 %
NRBC # BLD AUTO: 0 10E3/UL
NRBC BLD AUTO-RTO: 0 /100
PLATELET # BLD AUTO: 361 10E3/UL (ref 150–450)
POTASSIUM SERPL-SCNC: 3.9 MMOL/L (ref 3.4–5.3)
PROCALCITONIN SERPL IA-MCNC: 0.02 NG/ML
RBC # BLD AUTO: 4.49 10E6/UL (ref 3.8–5.2)
SODIUM SERPL-SCNC: 137 MMOL/L (ref 135–145)
WBC # BLD AUTO: 8.5 10E3/UL (ref 4–11)

## 2025-03-04 PROCEDURE — 80048 BASIC METABOLIC PNL TOTAL CA: CPT | Mod: ORL | Performed by: FAMILY MEDICINE

## 2025-03-04 PROCEDURE — 36415 COLL VENOUS BLD VENIPUNCTURE: CPT | Mod: ORL | Performed by: FAMILY MEDICINE

## 2025-03-04 PROCEDURE — P9604 ONE-WAY ALLOW PRORATED TRIP: HCPCS | Mod: ORL | Performed by: FAMILY MEDICINE

## 2025-03-04 PROCEDURE — 84145 PROCALCITONIN (PCT): CPT | Mod: ORL | Performed by: FAMILY MEDICINE

## 2025-03-04 PROCEDURE — 85025 COMPLETE CBC W/AUTO DIFF WBC: CPT | Mod: ORL | Performed by: FAMILY MEDICINE

## 2025-03-05 LAB — HOLD SPECIMEN: NORMAL

## 2025-03-05 PROCEDURE — 36415 COLL VENOUS BLD VENIPUNCTURE: CPT | Mod: ORL | Performed by: FAMILY MEDICINE

## 2025-03-23 ENCOUNTER — APPOINTMENT (OUTPATIENT)
Dept: CT IMAGING | Facility: CLINIC | Age: 60
DRG: 871 | End: 2025-03-23
Attending: STUDENT IN AN ORGANIZED HEALTH CARE EDUCATION/TRAINING PROGRAM
Payer: MEDICARE

## 2025-03-23 ENCOUNTER — HOSPITAL ENCOUNTER (INPATIENT)
Facility: CLINIC | Age: 60
LOS: 2 days | Discharge: NURSING, REHAB OR LONG-TERM CARE | DRG: 871 | End: 2025-03-26
Attending: STUDENT IN AN ORGANIZED HEALTH CARE EDUCATION/TRAINING PROGRAM | Admitting: STUDENT IN AN ORGANIZED HEALTH CARE EDUCATION/TRAINING PROGRAM
Payer: MEDICARE

## 2025-03-23 DIAGNOSIS — G89.29 OTHER CHRONIC PAIN: Primary | ICD-10-CM

## 2025-03-23 DIAGNOSIS — L03.115 CELLULITIS OF RIGHT LOWER EXTREMITY: ICD-10-CM

## 2025-03-23 LAB
ALBUMIN SERPL BCG-MCNC: 3.2 G/DL (ref 3.5–5.2)
ALP SERPL-CCNC: 70 U/L (ref 40–150)
ALT SERPL W P-5'-P-CCNC: <5 U/L (ref 0–50)
ANION GAP SERPL CALCULATED.3IONS-SCNC: 10 MMOL/L (ref 7–15)
AST SERPL W P-5'-P-CCNC: 16 U/L (ref 0–45)
BASOPHILS # BLD AUTO: 0.1 10E3/UL (ref 0–0.2)
BASOPHILS NFR BLD AUTO: 0 %
BILIRUB SERPL-MCNC: 0.2 MG/DL
BUN SERPL-MCNC: 18.7 MG/DL (ref 8–23)
CALCIUM SERPL-MCNC: 8.2 MG/DL (ref 8.8–10.4)
CHLORIDE SERPL-SCNC: 102 MMOL/L (ref 98–107)
CREAT SERPL-MCNC: 0.89 MG/DL (ref 0.51–0.95)
CRP SERPL-MCNC: 45.8 MG/L
EGFRCR SERPLBLD CKD-EPI 2021: 74 ML/MIN/1.73M2
EOSINOPHIL # BLD AUTO: 0 10E3/UL (ref 0–0.7)
EOSINOPHIL NFR BLD AUTO: 0 %
ERYTHROCYTE [DISTWIDTH] IN BLOOD BY AUTOMATED COUNT: 16.4 % (ref 10–15)
ERYTHROCYTE [SEDIMENTATION RATE] IN BLOOD BY WESTERGREN METHOD: 42 MM/HR (ref 0–30)
GLUCOSE BLDC GLUCOMTR-MCNC: 255 MG/DL (ref 70–99)
GLUCOSE SERPL-MCNC: 295 MG/DL (ref 70–99)
HCO3 SERPL-SCNC: 21 MMOL/L (ref 22–29)
HCT VFR BLD AUTO: 30.9 % (ref 35–47)
HGB BLD-MCNC: 9.6 G/DL (ref 11.7–15.7)
IMM GRANULOCYTES # BLD: 0.2 10E3/UL
IMM GRANULOCYTES NFR BLD: 1 %
LACTATE SERPL-SCNC: 1.9 MMOL/L (ref 0.7–2)
LYMPHOCYTES # BLD AUTO: 0.8 10E3/UL (ref 0.8–5.3)
LYMPHOCYTES NFR BLD AUTO: 4 %
MCH RBC QN AUTO: 23.9 PG (ref 26.5–33)
MCHC RBC AUTO-ENTMCNC: 31.1 G/DL (ref 31.5–36.5)
MCV RBC AUTO: 77 FL (ref 78–100)
MONOCYTES # BLD AUTO: 0.8 10E3/UL (ref 0–1.3)
MONOCYTES NFR BLD AUTO: 4 %
NEUTROPHILS # BLD AUTO: 18.7 10E3/UL (ref 1.6–8.3)
NEUTROPHILS NFR BLD AUTO: 91 %
NRBC # BLD AUTO: 0 10E3/UL
NRBC BLD AUTO-RTO: 0 /100
PLATELET # BLD AUTO: 346 10E3/UL (ref 150–450)
POTASSIUM SERPL-SCNC: 4 MMOL/L (ref 3.4–5.3)
PROT SERPL-MCNC: 6.6 G/DL (ref 6.4–8.3)
RBC # BLD AUTO: 4.01 10E6/UL (ref 3.8–5.2)
SODIUM SERPL-SCNC: 133 MMOL/L (ref 135–145)
WBC # BLD AUTO: 20.5 10E3/UL (ref 4–11)

## 2025-03-23 PROCEDURE — 73701 CT LOWER EXTREMITY W/DYE: CPT | Mod: XS

## 2025-03-23 PROCEDURE — 87040 BLOOD CULTURE FOR BACTERIA: CPT | Performed by: STUDENT IN AN ORGANIZED HEALTH CARE EDUCATION/TRAINING PROGRAM

## 2025-03-23 PROCEDURE — 83605 ASSAY OF LACTIC ACID: CPT | Performed by: STUDENT IN AN ORGANIZED HEALTH CARE EDUCATION/TRAINING PROGRAM

## 2025-03-23 PROCEDURE — 83735 ASSAY OF MAGNESIUM: CPT | Performed by: INTERNAL MEDICINE

## 2025-03-23 PROCEDURE — 36415 COLL VENOUS BLD VENIPUNCTURE: CPT | Performed by: STUDENT IN AN ORGANIZED HEALTH CARE EDUCATION/TRAINING PROGRAM

## 2025-03-23 PROCEDURE — 250N000011 HC RX IP 250 OP 636: Performed by: STUDENT IN AN ORGANIZED HEALTH CARE EDUCATION/TRAINING PROGRAM

## 2025-03-23 PROCEDURE — 96367 TX/PROPH/DG ADDL SEQ IV INF: CPT

## 2025-03-23 PROCEDURE — 80053 COMPREHEN METABOLIC PANEL: CPT | Performed by: STUDENT IN AN ORGANIZED HEALTH CARE EDUCATION/TRAINING PROGRAM

## 2025-03-23 PROCEDURE — 96365 THER/PROPH/DIAG IV INF INIT: CPT | Mod: 59

## 2025-03-23 PROCEDURE — 99292 CRITICAL CARE ADDL 30 MIN: CPT

## 2025-03-23 PROCEDURE — 83880 ASSAY OF NATRIURETIC PEPTIDE: CPT | Performed by: INTERNAL MEDICINE

## 2025-03-23 PROCEDURE — 85652 RBC SED RATE AUTOMATED: CPT | Performed by: STUDENT IN AN ORGANIZED HEALTH CARE EDUCATION/TRAINING PROGRAM

## 2025-03-23 PROCEDURE — 258N000003 HC RX IP 258 OP 636: Performed by: STUDENT IN AN ORGANIZED HEALTH CARE EDUCATION/TRAINING PROGRAM

## 2025-03-23 PROCEDURE — 250N000013 HC RX MED GY IP 250 OP 250 PS 637: Performed by: STUDENT IN AN ORGANIZED HEALTH CARE EDUCATION/TRAINING PROGRAM

## 2025-03-23 PROCEDURE — 99291 CRITICAL CARE FIRST HOUR: CPT | Mod: 25

## 2025-03-23 PROCEDURE — 73701 CT LOWER EXTREMITY W/DYE: CPT | Mod: RT

## 2025-03-23 PROCEDURE — 82962 GLUCOSE BLOOD TEST: CPT

## 2025-03-23 PROCEDURE — 85025 COMPLETE CBC W/AUTO DIFF WBC: CPT | Performed by: STUDENT IN AN ORGANIZED HEALTH CARE EDUCATION/TRAINING PROGRAM

## 2025-03-23 PROCEDURE — 86140 C-REACTIVE PROTEIN: CPT | Performed by: STUDENT IN AN ORGANIZED HEALTH CARE EDUCATION/TRAINING PROGRAM

## 2025-03-23 RX ORDER — IOPAMIDOL 755 MG/ML
100 INJECTION, SOLUTION INTRAVASCULAR ONCE
Status: COMPLETED | OUTPATIENT
Start: 2025-03-23 | End: 2025-03-23

## 2025-03-23 RX ORDER — PIPERACILLIN SODIUM, TAZOBACTAM SODIUM 3; .375 G/15ML; G/15ML
3.38 INJECTION, POWDER, LYOPHILIZED, FOR SOLUTION INTRAVENOUS ONCE
Status: COMPLETED | OUTPATIENT
Start: 2025-03-23 | End: 2025-03-23

## 2025-03-23 RX ADMIN — IOPAMIDOL 100 ML: 755 INJECTION, SOLUTION INTRAVENOUS at 22:52

## 2025-03-23 RX ADMIN — SODIUM CHLORIDE, SODIUM LACTATE, POTASSIUM CHLORIDE, AND CALCIUM CHLORIDE 1000 ML: .6; .31; .03; .02 INJECTION, SOLUTION INTRAVENOUS at 23:10

## 2025-03-23 RX ADMIN — PIPERACILLIN AND TAZOBACTAM 3.38 G: 3; .375 INJECTION, POWDER, FOR SOLUTION INTRAVENOUS at 21:43

## 2025-03-23 RX ADMIN — VANCOMYCIN HYDROCHLORIDE 1750 MG: 5 INJECTION, POWDER, LYOPHILIZED, FOR SOLUTION INTRAVENOUS at 22:22

## 2025-03-23 RX ADMIN — BACLOFEN 15 MG: 10 TABLET ORAL at 23:35

## 2025-03-23 ASSESSMENT — ACTIVITIES OF DAILY LIVING (ADL)
ADLS_ACUITY_SCORE: 58

## 2025-03-24 ENCOUNTER — APPOINTMENT (OUTPATIENT)
Dept: ULTRASOUND IMAGING | Facility: CLINIC | Age: 60
DRG: 871 | End: 2025-03-24
Attending: INTERNAL MEDICINE
Payer: MEDICARE

## 2025-03-24 PROBLEM — L03.115 CELLULITIS OF RIGHT LOWER EXTREMITY: Status: ACTIVE | Noted: 2021-11-22

## 2025-03-24 LAB
ALBUMIN SERPL BCG-MCNC: 2.8 G/DL (ref 3.5–5.2)
ALP SERPL-CCNC: 56 U/L (ref 40–150)
ALT SERPL W P-5'-P-CCNC: <5 U/L (ref 0–50)
ANION GAP SERPL CALCULATED.3IONS-SCNC: 9 MMOL/L (ref 7–15)
AST SERPL W P-5'-P-CCNC: 13 U/L (ref 0–45)
BASOPHILS # BLD AUTO: 0.1 10E3/UL (ref 0–0.2)
BASOPHILS NFR BLD AUTO: 0 %
BILIRUB SERPL-MCNC: 0.4 MG/DL
BUN SERPL-MCNC: 12.1 MG/DL (ref 8–23)
CALCIUM SERPL-MCNC: 7.9 MG/DL (ref 8.8–10.4)
CHLORIDE SERPL-SCNC: 108 MMOL/L (ref 98–107)
CREAT SERPL-MCNC: 0.86 MG/DL (ref 0.51–0.95)
EGFRCR SERPLBLD CKD-EPI 2021: 77 ML/MIN/1.73M2
EOSINOPHIL # BLD AUTO: 0 10E3/UL (ref 0–0.7)
EOSINOPHIL NFR BLD AUTO: 0 %
ERYTHROCYTE [DISTWIDTH] IN BLOOD BY AUTOMATED COUNT: 16.3 % (ref 10–15)
FLUAV RNA SPEC QL NAA+PROBE: NEGATIVE
FLUBV RNA RESP QL NAA+PROBE: NEGATIVE
GLUCOSE BLDC GLUCOMTR-MCNC: 100 MG/DL (ref 70–99)
GLUCOSE BLDC GLUCOMTR-MCNC: 117 MG/DL (ref 70–99)
GLUCOSE BLDC GLUCOMTR-MCNC: 141 MG/DL (ref 70–99)
GLUCOSE BLDC GLUCOMTR-MCNC: 145 MG/DL (ref 70–99)
GLUCOSE BLDC GLUCOMTR-MCNC: 158 MG/DL (ref 70–99)
GLUCOSE BLDC GLUCOMTR-MCNC: 97 MG/DL (ref 70–99)
GLUCOSE SERPL-MCNC: 131 MG/DL (ref 70–99)
HCO3 SERPL-SCNC: 21 MMOL/L (ref 22–29)
HCT VFR BLD AUTO: 27.4 % (ref 35–47)
HGB BLD-MCNC: 8.5 G/DL (ref 11.7–15.7)
IMM GRANULOCYTES # BLD: 0.2 10E3/UL
IMM GRANULOCYTES NFR BLD: 1 %
LYMPHOCYTES # BLD AUTO: 1.1 10E3/UL (ref 0.8–5.3)
LYMPHOCYTES NFR BLD AUTO: 6 %
MAGNESIUM SERPL-MCNC: 1.8 MG/DL (ref 1.7–2.3)
MCH RBC QN AUTO: 23.8 PG (ref 26.5–33)
MCHC RBC AUTO-ENTMCNC: 31 G/DL (ref 31.5–36.5)
MCV RBC AUTO: 77 FL (ref 78–100)
MONOCYTES # BLD AUTO: 0.6 10E3/UL (ref 0–1.3)
MONOCYTES NFR BLD AUTO: 4 %
NEUTROPHILS # BLD AUTO: 15.9 10E3/UL (ref 1.6–8.3)
NEUTROPHILS NFR BLD AUTO: 89 %
NRBC # BLD AUTO: 0 10E3/UL
NRBC BLD AUTO-RTO: 0 /100
NT-PROBNP SERPL-MCNC: 101 PG/ML (ref 0–900)
PLATELET # BLD AUTO: 260 10E3/UL (ref 150–450)
POTASSIUM SERPL-SCNC: 3.5 MMOL/L (ref 3.4–5.3)
PROT SERPL-MCNC: 5.7 G/DL (ref 6.4–8.3)
RBC # BLD AUTO: 3.57 10E6/UL (ref 3.8–5.2)
RSV RNA SPEC NAA+PROBE: NEGATIVE
SARS-COV-2 RNA RESP QL NAA+PROBE: NEGATIVE
SODIUM SERPL-SCNC: 138 MMOL/L (ref 135–145)
WBC # BLD AUTO: 17.9 10E3/UL (ref 4–11)

## 2025-03-24 PROCEDURE — 36415 COLL VENOUS BLD VENIPUNCTURE: CPT | Performed by: INTERNAL MEDICINE

## 2025-03-24 PROCEDURE — 250N000013 HC RX MED GY IP 250 OP 250 PS 637: Performed by: INTERNAL MEDICINE

## 2025-03-24 PROCEDURE — 120N000004 HC R&B MS OVERFLOW

## 2025-03-24 PROCEDURE — 258N000003 HC RX IP 258 OP 636: Performed by: INTERNAL MEDICINE

## 2025-03-24 PROCEDURE — 85025 COMPLETE CBC W/AUTO DIFF WBC: CPT | Performed by: INTERNAL MEDICINE

## 2025-03-24 PROCEDURE — G0463 HOSPITAL OUTPT CLINIC VISIT: HCPCS

## 2025-03-24 PROCEDURE — 93970 EXTREMITY STUDY: CPT

## 2025-03-24 PROCEDURE — 87637 SARSCOV2&INF A&B&RSV AMP PRB: CPT | Performed by: INTERNAL MEDICINE

## 2025-03-24 PROCEDURE — 99207 PR NO BILLABLE SERVICE THIS VISIT: CPT | Performed by: STUDENT IN AN ORGANIZED HEALTH CARE EDUCATION/TRAINING PROGRAM

## 2025-03-24 PROCEDURE — 99223 1ST HOSP IP/OBS HIGH 75: CPT | Performed by: INTERNAL MEDICINE

## 2025-03-24 PROCEDURE — 250N000013 HC RX MED GY IP 250 OP 250 PS 637: Performed by: STUDENT IN AN ORGANIZED HEALTH CARE EDUCATION/TRAINING PROGRAM

## 2025-03-24 PROCEDURE — 250N000011 HC RX IP 250 OP 636: Performed by: INTERNAL MEDICINE

## 2025-03-24 PROCEDURE — 80053 COMPREHEN METABOLIC PANEL: CPT | Performed by: INTERNAL MEDICINE

## 2025-03-24 PROCEDURE — 82962 GLUCOSE BLOOD TEST: CPT

## 2025-03-24 RX ORDER — VENLAFAXINE HYDROCHLORIDE 150 MG/1
150 CAPSULE, EXTENDED RELEASE ORAL DAILY
Status: DISCONTINUED | OUTPATIENT
Start: 2025-03-24 | End: 2025-03-26 | Stop reason: HOSPADM

## 2025-03-24 RX ORDER — VENLAFAXINE HYDROCHLORIDE 75 MG/1
75 CAPSULE, EXTENDED RELEASE ORAL DAILY
Status: DISCONTINUED | OUTPATIENT
Start: 2025-03-24 | End: 2025-03-26 | Stop reason: HOSPADM

## 2025-03-24 RX ORDER — ACETAMINOPHEN 325 MG/1
650 TABLET ORAL EVERY 4 HOURS PRN
Status: DISCONTINUED | OUTPATIENT
Start: 2025-03-24 | End: 2025-03-26 | Stop reason: HOSPADM

## 2025-03-24 RX ORDER — ENOXAPARIN SODIUM 100 MG/ML
40 INJECTION SUBCUTANEOUS EVERY 24 HOURS
Status: DISCONTINUED | OUTPATIENT
Start: 2025-03-24 | End: 2025-03-26 | Stop reason: HOSPADM

## 2025-03-24 RX ORDER — VANCOMYCIN HYDROCHLORIDE 1 G/200ML
1000 INJECTION, SOLUTION INTRAVENOUS EVERY 12 HOURS
Status: DISCONTINUED | OUTPATIENT
Start: 2025-03-24 | End: 2025-03-25

## 2025-03-24 RX ORDER — LIDOCAINE 40 MG/G
CREAM TOPICAL
Status: DISCONTINUED | OUTPATIENT
Start: 2025-03-24 | End: 2025-03-26 | Stop reason: HOSPADM

## 2025-03-24 RX ORDER — ONDANSETRON 2 MG/ML
4 INJECTION INTRAMUSCULAR; INTRAVENOUS EVERY 6 HOURS PRN
Status: DISCONTINUED | OUTPATIENT
Start: 2025-03-24 | End: 2025-03-26 | Stop reason: HOSPADM

## 2025-03-24 RX ORDER — NALOXONE HYDROCHLORIDE 0.4 MG/ML
0.4 INJECTION, SOLUTION INTRAMUSCULAR; INTRAVENOUS; SUBCUTANEOUS
Status: DISCONTINUED | OUTPATIENT
Start: 2025-03-24 | End: 2025-03-26 | Stop reason: HOSPADM

## 2025-03-24 RX ORDER — AMOXICILLIN 250 MG
2 CAPSULE ORAL 2 TIMES DAILY PRN
Status: DISCONTINUED | OUTPATIENT
Start: 2025-03-24 | End: 2025-03-26 | Stop reason: HOSPADM

## 2025-03-24 RX ORDER — BUPROPION HYDROCHLORIDE 150 MG/1
150 TABLET ORAL EVERY MORNING
Status: DISCONTINUED | OUTPATIENT
Start: 2025-03-24 | End: 2025-03-26 | Stop reason: HOSPADM

## 2025-03-24 RX ORDER — HYDROCODONE BITARTRATE AND ACETAMINOPHEN 5; 325 MG/1; MG/1
1 TABLET ORAL EVERY 4 HOURS PRN
Status: DISCONTINUED | OUTPATIENT
Start: 2025-03-24 | End: 2025-03-25

## 2025-03-24 RX ORDER — AMOXICILLIN 250 MG
1 CAPSULE ORAL 2 TIMES DAILY PRN
Status: DISCONTINUED | OUTPATIENT
Start: 2025-03-24 | End: 2025-03-26 | Stop reason: HOSPADM

## 2025-03-24 RX ORDER — FERROUS SULFATE 325(65) MG
325 TABLET ORAL
Status: DISCONTINUED | OUTPATIENT
Start: 2025-03-24 | End: 2025-03-26 | Stop reason: HOSPADM

## 2025-03-24 RX ORDER — FUROSEMIDE 20 MG/1
20 TABLET ORAL DAILY
Status: DISCONTINUED | OUTPATIENT
Start: 2025-03-24 | End: 2025-03-26 | Stop reason: HOSPADM

## 2025-03-24 RX ORDER — PIPERACILLIN SODIUM, TAZOBACTAM SODIUM 3; .375 G/15ML; G/15ML
3.38 INJECTION, POWDER, LYOPHILIZED, FOR SOLUTION INTRAVENOUS EVERY 8 HOURS
Status: DISCONTINUED | OUTPATIENT
Start: 2025-03-24 | End: 2025-03-26 | Stop reason: HOSPADM

## 2025-03-24 RX ORDER — NALOXONE HYDROCHLORIDE 0.4 MG/ML
0.2 INJECTION, SOLUTION INTRAMUSCULAR; INTRAVENOUS; SUBCUTANEOUS
Status: DISCONTINUED | OUTPATIENT
Start: 2025-03-24 | End: 2025-03-26 | Stop reason: HOSPADM

## 2025-03-24 RX ORDER — ARGININE/GLUTAMINE/CALCIUM BMB 7G-7G-1.5G
1 POWDER IN PACKET (EA) ORAL 2 TIMES DAILY
COMMUNITY

## 2025-03-24 RX ORDER — NYSTATIN 100000 [USP'U]/G
POWDER TOPICAL EVERY 12 HOURS PRN
COMMUNITY

## 2025-03-24 RX ORDER — LISINOPRIL 2.5 MG/1
2.5 TABLET ORAL DAILY
Status: DISCONTINUED | OUTPATIENT
Start: 2025-03-25 | End: 2025-03-26 | Stop reason: HOSPADM

## 2025-03-24 RX ORDER — TOPIRAMATE 50 MG/1
50 TABLET, FILM COATED ORAL DAILY
Status: DISCONTINUED | OUTPATIENT
Start: 2025-03-24 | End: 2025-03-26 | Stop reason: HOSPADM

## 2025-03-24 RX ORDER — PANTOPRAZOLE SODIUM 40 MG/1
40 TABLET, DELAYED RELEASE ORAL
Status: DISCONTINUED | OUTPATIENT
Start: 2025-03-24 | End: 2025-03-26 | Stop reason: HOSPADM

## 2025-03-24 RX ORDER — SIMETHICONE 125 MG
125 TABLET,CHEWABLE ORAL EVERY 6 HOURS PRN
COMMUNITY

## 2025-03-24 RX ORDER — ROSUVASTATIN CALCIUM 10 MG/1
20 TABLET, COATED ORAL AT BEDTIME
Status: DISCONTINUED | OUTPATIENT
Start: 2025-03-24 | End: 2025-03-26 | Stop reason: HOSPADM

## 2025-03-24 RX ORDER — CALCIUM CARBONATE 500 MG/1
1000 TABLET, CHEWABLE ORAL 4 TIMES DAILY PRN
Status: DISCONTINUED | OUTPATIENT
Start: 2025-03-24 | End: 2025-03-26 | Stop reason: HOSPADM

## 2025-03-24 RX ORDER — DEXTROSE MONOHYDRATE 25 G/50ML
25-50 INJECTION, SOLUTION INTRAVENOUS
Status: DISCONTINUED | OUTPATIENT
Start: 2025-03-24 | End: 2025-03-26 | Stop reason: HOSPADM

## 2025-03-24 RX ORDER — ACETAMINOPHEN 650 MG/1
650 SUPPOSITORY RECTAL EVERY 4 HOURS PRN
Status: DISCONTINUED | OUTPATIENT
Start: 2025-03-24 | End: 2025-03-26 | Stop reason: HOSPADM

## 2025-03-24 RX ORDER — NICOTINE POLACRILEX 4 MG
15-30 LOZENGE BUCCAL
Status: DISCONTINUED | OUTPATIENT
Start: 2025-03-24 | End: 2025-03-26 | Stop reason: HOSPADM

## 2025-03-24 RX ORDER — ONDANSETRON 4 MG/1
4 TABLET, ORALLY DISINTEGRATING ORAL EVERY 6 HOURS PRN
Status: DISCONTINUED | OUTPATIENT
Start: 2025-03-24 | End: 2025-03-26 | Stop reason: HOSPADM

## 2025-03-24 RX ORDER — HYDROCODONE BITARTRATE AND ACETAMINOPHEN 5; 325 MG/1; MG/1
1 TABLET ORAL EVERY 6 HOURS PRN
Status: DISCONTINUED | OUTPATIENT
Start: 2025-03-24 | End: 2025-03-25

## 2025-03-24 RX ORDER — DIPHENHYDRAMINE HYDROCHLORIDE, ZINC ACETATE 2; .1 G/100G; G/100G
CREAM TOPICAL 2 TIMES DAILY PRN
Status: DISCONTINUED | OUTPATIENT
Start: 2025-03-24 | End: 2025-03-26 | Stop reason: HOSPADM

## 2025-03-24 RX ORDER — LEVOTHYROXINE SODIUM 125 UG/1
125 TABLET ORAL
Status: DISCONTINUED | OUTPATIENT
Start: 2025-03-25 | End: 2025-03-26 | Stop reason: HOSPADM

## 2025-03-24 RX ORDER — ASPIRIN 81 MG/1
81 TABLET, CHEWABLE ORAL DAILY
Status: DISCONTINUED | OUTPATIENT
Start: 2025-03-24 | End: 2025-03-26 | Stop reason: HOSPADM

## 2025-03-24 RX ORDER — POLYETHYLENE GLYCOL 3350 17 G/17G
17 POWDER, FOR SOLUTION ORAL DAILY
Status: DISCONTINUED | OUTPATIENT
Start: 2025-03-24 | End: 2025-03-26 | Stop reason: HOSPADM

## 2025-03-24 RX ORDER — MORPHINE SULFATE 30 MG/1
30 TABLET, FILM COATED, EXTENDED RELEASE ORAL EVERY 12 HOURS
Status: DISCONTINUED | OUTPATIENT
Start: 2025-03-24 | End: 2025-03-25

## 2025-03-24 RX ADMIN — HYDROCODONE BITARTRATE AND ACETAMINOPHEN 1 TABLET: 5; 325 TABLET ORAL at 01:48

## 2025-03-24 RX ADMIN — VANCOMYCIN HYDROCHLORIDE 1000 MG: 1 INJECTION, SOLUTION INTRAVENOUS at 09:19

## 2025-03-24 RX ADMIN — LEVOTHYROXINE SODIUM 187.5 MCG: 0.03 TABLET ORAL at 09:19

## 2025-03-24 RX ADMIN — BACLOFEN 15 MG: 10 TABLET ORAL at 15:12

## 2025-03-24 RX ADMIN — BACLOFEN 15 MG: 10 TABLET ORAL at 21:45

## 2025-03-24 RX ADMIN — VENLAFAXINE HYDROCHLORIDE 150 MG: 150 CAPSULE, EXTENDED RELEASE ORAL at 09:18

## 2025-03-24 RX ADMIN — VANCOMYCIN HYDROCHLORIDE 1000 MG: 1 INJECTION, SOLUTION INTRAVENOUS at 21:48

## 2025-03-24 RX ADMIN — INSULIN DEGLUDEC 15 UNITS: 100 INJECTION, SOLUTION SUBCUTANEOUS at 09:25

## 2025-03-24 RX ADMIN — BUPROPION HYDROCHLORIDE 150 MG: 150 TABLET, EXTENDED RELEASE ORAL at 09:25

## 2025-03-24 RX ADMIN — PIPERACILLIN AND TAZOBACTAM 3.38 G: 3; .375 INJECTION, POWDER, FOR SOLUTION INTRAVENOUS at 21:40

## 2025-03-24 RX ADMIN — VENLAFAXINE HYDROCHLORIDE 75 MG: 75 CAPSULE, EXTENDED RELEASE ORAL at 09:18

## 2025-03-24 RX ADMIN — PIPERACILLIN AND TAZOBACTAM 3.38 G: 3; .375 INJECTION, POWDER, FOR SOLUTION INTRAVENOUS at 11:57

## 2025-03-24 RX ADMIN — MORPHINE SULFATE 30 MG: 30 TABLET, FILM COATED, EXTENDED RELEASE ORAL at 21:44

## 2025-03-24 RX ADMIN — ACETAMINOPHEN 650 MG: 325 TABLET ORAL at 08:53

## 2025-03-24 RX ADMIN — SODIUM CHLORIDE 750 ML: 0.9 INJECTION, SOLUTION INTRAVENOUS at 01:53

## 2025-03-24 RX ADMIN — ROSUVASTATIN CALCIUM 20 MG: 10 TABLET, FILM COATED ORAL at 21:44

## 2025-03-24 RX ADMIN — POLYETHYLENE GLYCOL 3350 17 G: 17 POWDER, FOR SOLUTION ORAL at 09:19

## 2025-03-24 RX ADMIN — BACLOFEN 15 MG: 10 TABLET ORAL at 09:19

## 2025-03-24 RX ADMIN — PIPERACILLIN AND TAZOBACTAM 3.38 G: 3; .375 INJECTION, POWDER, FOR SOLUTION INTRAVENOUS at 04:29

## 2025-03-24 RX ADMIN — MORPHINE SULFATE 30 MG: 30 TABLET, FILM COATED, EXTENDED RELEASE ORAL at 09:18

## 2025-03-24 RX ADMIN — ENOXAPARIN SODIUM 40 MG: 40 INJECTION SUBCUTANEOUS at 00:52

## 2025-03-24 RX ADMIN — PANTOPRAZOLE SODIUM 40 MG: 40 TABLET, DELAYED RELEASE ORAL at 09:25

## 2025-03-24 RX ADMIN — ACETAMINOPHEN 650 MG: 325 TABLET ORAL at 00:54

## 2025-03-24 RX ADMIN — FERROUS SULFATE TAB 325 MG (65 MG ELEMENTAL FE) 325 MG: 325 (65 FE) TAB at 11:57

## 2025-03-24 RX ADMIN — ASPIRIN 81 MG CHEWABLE TABLET 81 MG: 81 TABLET CHEWABLE at 09:21

## 2025-03-24 RX ADMIN — TOPIRAMATE 50 MG: 50 TABLET, FILM COATED ORAL at 09:20

## 2025-03-24 RX ADMIN — SODIUM CHLORIDE 1000 ML: 0.9 INJECTION, SOLUTION INTRAVENOUS at 00:49

## 2025-03-24 ASSESSMENT — ACTIVITIES OF DAILY LIVING (ADL)
ADLS_ACUITY_SCORE: 63
ADLS_ACUITY_SCORE: 63
ADLS_ACUITY_SCORE: 64
ADLS_ACUITY_SCORE: 63
ADLS_ACUITY_SCORE: 64
ADLS_ACUITY_SCORE: 63
ADLS_ACUITY_SCORE: 58
ADLS_ACUITY_SCORE: 64
ADLS_ACUITY_SCORE: 63
ADLS_ACUITY_SCORE: 58
ADLS_ACUITY_SCORE: 64
ADLS_ACUITY_SCORE: 63
ADLS_ACUITY_SCORE: 64

## 2025-03-24 NOTE — PHARMACY-VANCOMYCIN DOSING SERVICE
Pharmacy Vancomycin Initial Note  Date of Service 2025  Patient's  1965  59 year old, female    Indication: Skin and Soft Tissue Infection    Current estimated CrCl = Estimated Creatinine Clearance: 71.8 mL/min (based on SCr of 0.89 mg/dL).    Creatinine for last 3 days  3/23/2025:  9:27 PM Creatinine 0.89 mg/dL    Recent Vancomycin Level(s) for last 3 days  No results found for requested labs within last 3 days.      Vancomycin IV Administrations (past 72 hours)                     vancomycin (VANCOCIN) 1,750 mg in 0.9% NaCl 517.5 mL intermittent infusion (mg) 1,750 mg New Bag 25 2222                    Nephrotoxins and other renal medications (From now, onward)      Start     Dose/Rate Route Frequency Ordered Stop    25 1000  vancomycin (VANCOCIN) 1,000 mg in NaCl 0.9% 200 mL intermittent infusion         1,000 mg  over 60 Minutes Intravenous EVERY 12 HOURS 25 0034      25 0400  piperacillin-tazobactam (ZOSYN) 3.375 g vial to attach to  mL bag         3.375 g  over 240 Minutes Intravenous EVERY 8 HOURS 25 0017              Contrast Orders - past 72 hours (72h ago, onward)      Start     Dose/Rate Route Frequency Stop    25 2230  iopamidol (ISOVUE-370) solution 100 mL         100 mL Intravenous ONCE 25 2252            [employer] Prediction of Planned Initial Vancomycin Regimen  Regimen: 1000 mg IV every 12 hours.  Start time: 10:22 on 2025  Exposure target: AUC24 (range)400-600 mg/L.hr   AUC24,ss: 522 mg/L.hr  Probability of AUC24 > 400: 77 %  Ctrough,ss: 17.1 mg/L  Probability of Ctrough,ss > 20: 36 %  Probability of nephrotoxicity (Lodise LOUIS ): 13 %          Plan:  Start vancomycin  1000 mg IV q12h.   Vancomycin monitoring method: AUC  Vancomycin therapeutic monitoring goal: 400-600 mg*h/L  Pharmacy will check vancomycin levels as appropriate in 1-3 Days.    Serum creatinine levels will be ordered daily for the first week of therapy and  at least twice weekly for subsequent weeks.      Johnny Hathaway, RPH

## 2025-03-24 NOTE — ED NOTES
[facility] ED Handoff Report    ED Chief Complaint: Fever    ED Diagnosis:  (L03.115) Cellulitis of right lower extremity  Comment: was admitted 2/2/25 for cellulitis of RLE, worsening, today developed fever  Plan: IV antibiotics, WOC consult       PMH:    Past Medical History:   Diagnosis Date    Anxiety     Borderline personality disorder (H)     Cellulitis     Chronic pain     DVT (deep venous thrombosis) (H)     Gastroesophageal reflux disease without esophagitis     HLD (hyperlipidemia)     Hypothyroidism     MDD (major depressive disorder)     Paraparesis of both lower limbs (H)     following spinal abscess    PVD (peripheral vascular disease)     Spinal abscess (H)     T2DM (type 2 diabetes mellitus) (H)     Uncomplicated opioid dependence (H)         Code Status:  Full Code    Falls Risk: Yes Band: Applied    Current Living Situation/Residence: lives in an assisted living facility     Elimination Status: Continent: wears briefs     Activity Level: bedrest with bedside commode. states she has been using a wheelchair but unable to verbalize for how long     Patient's Preferred Language:  English     Needed: No    Vital Signs:  /68   Pulse 102   Temp 99.3  F (37.4  C) (Oral)   Resp 18   SpO2 98%      Cardiac Rhythm: NSR    Pain Score: 0/10 after baclofen    Is the Patient Confused:  No    Last Food or Drink:  3/23/25  at unknown    Assessment and Plan of Care:  cellulitis outlined on right upper thigh, right lower leg red, weeping    Tests Performed: Done: Labs and Imaging    Treatments Provided:  1L NS via EMS, 1L LR, 3.375g zosyn, 1750mg vancomycin, 15mg baclofen for leg spasms, 650 mg tylenol for temp 101    Family Dynamics/Concerns: No    Belongings Sent with Patient: clothing, cell phone, , purple water bottle    Boarding medications sent with patient: novolog pen    Additional Information: potassium/magnesium protocols, glucose checks    RN: Alison Acevedo,  RN  3/24/2025 12:18 AM

## 2025-03-24 NOTE — PLAN OF CARE
Goal Outcome Evaluation:    Problem: Skin Injury Risk Increased  Goal: Skin Health and Integrity  Intervention: Plan: Nurse Driven Intervention: Moisture Management     Problem: Adult Inpatient Plan of Care  Goal: Optimal Comfort and Wellbeing  Outcome: Progressing  Intervention: Monitor Pain and Promote Comfort  Pt transferred from ED to  around 0130. Pt is alert and oriented X 2 disoriented to place and time. Complains of severe pain in both legs, managed with PRN Norco. On K and Mag protocol, recheck tomorrow Am. Right upper thigh, right lower leg red, and weeping. Left leg is also swollen and red. Currently Zosyn infusing. Voiding adequately through purewick. Not OOB this shift. Reposition as tolerated. Bed alarm is on. Call light within reach.

## 2025-03-24 NOTE — H&P
[facility] MEDICINE ADMISSION HISTORY AND PHYSICAL       Assessment & Plan        Present on Admission (POA)    1. RLE cellulitis (leg and some redness in medial thigh), sepsis concern. Last admitted Feb 2025, for same issue and complicated by bacteremia and septic shock requiring pressors.     - This is a recurrent issue, and required courses of antibiotics - recent one is cephalexin. Despite this and wound cares, seems not getting better according to her. Given elev WBC, did not appear just stasis dermatitis    - Did not appear toxic or septic looking     - CT showed No abscess. No soft tissue gas or deep fascial edema.   - Low suspicion for compartment syndrome or necrotizing fasciitis  - Continue Zosyn an Vancomycin given MRSA   - CBC/CMP  - CMS checks  - Was given 1L of LR in ED, check BNP, will give 1L for now  - Wound consult     2. R Leg swelling and redness, some swelling on left.  Signs of chronic venous insufficiency.   - DVT check, given history of DVT     Chronic Medical Conditions    1. Recurrent cellulitis- RLE  2. DM2 - poorly controlled, sliding scale insulin/FS   3. HTN  4. Borderline personality Disorder, Anxiety/Depression  5. Hypothyroidism  6. Chronic opioid use: Morphine ER 30mg b.i.d. for chronic back pain   7. Chronic anemia       Clinically Significant Risk Factors Present on Admission         # Hyponatremia: Lowest Na = 133 mmol/L in last 2 days, will monitor as appropriate   # Hypocalcemia: Lowest Ca = 8.2 mg/dL in last 2 days, will monitor and replace as appropriate     # Hypoalbuminemia: Lowest albumin = 3.2 g/dL at 3/23/2025  9:27 PM, will monitor as appropriate   # Drug Induced Platelet Defect: home medication list includes an antiplatelet medication   # Hypertension: Home medication list includes antihypertensive(s)      # Anemia: based on hgb <11      # DMII: A1C = 8.0 % (Ref range: <5.7 %) within past 6 months        # Financial/Environmental Concerns:              VTE prophylaxis --  SQL  Diet --  Cardiac diet,   Code Status -- Full,   Barriers to discharge -- Admitting/Acute medical condition/s  Discharge Disposition and goals --  Unable to determine at this point, pending progress/treatment response.     PPE - I was wearing PPE including but not limited to - N95 mask, Gloves, and/or Safety glasses.  Admission Status --  Inpatient     Care plan is based on available information and patient's condition at encounter. Care plan was discussed and agreed to proceed by the patient/family member. Made aware that care plan may change.     I recommend to review/revise history/care plan for information/results not available during my encounter. All or some home medication/s were not resumed or was modified on admission due to safety concerns. Will have rounding AM doc  review safety to resume held/modified home medications.     Availability -- If need to coordinate care after night shift  -- Contact assigned AM rounding Hospitalist.     75 minutes spent by me on the date of service doing chart review, history, exam, diagnostic test results interpretation, care coordination with RN, RT and/or Pharm, & further activities per the note.      Jesse Mora MD, MPH, FACP, [facility]  Internal Medicine - Hospitalist        Chief Complaint Leg swelling      HISTORY     - Patient was seen in ED - 14   - Per phone triage -  911 to transport patient to ED,given concerns for RLE edema, pain and spreading erythema to thigh. Has been on antibiotics on and off over past weeks for RLE cellulitis     - She was awake and alert when I met her. She reported redness and swelling on RLE. Denies pain. Right leg is weepy and has been getting wounds cares at the facility. Some redness noted on right thigh. The left leg is not red but has signs of chronic venous insuff (CVI). She denies prior varicose veins    - She denies CP or SOB. No headaches. No cough. Felt chilly but no fever till in ED at 101.       - She was admitted last month for - RLE cellulitis and Group C bacteremia. She required pressors that time given septic shock. She was discharged Feb 7 2025.      - In the ED - was given IVF,  Zosyn and vanco, with sepsis concerns. CT of RLE - Showed cellulitis. No abscess. No soft tissue gas or deep fascial edema.      - ROS --- No headache. No dizziness. No weakness. No CP or SOB. No palpitations. No abdominal pain. No nausea or vomiting. No urinary symptoms. No bleeding symptoms. No weight loss. Rest of 12 point ROS was reviewed and negative.       Past Medical History     Past Medical History:   Diagnosis Date    Anxiety     Borderline personality disorder (H)     Cellulitis     Chronic pain     DVT (deep venous thrombosis) (H)     Gastroesophageal reflux disease without esophagitis     HLD (hyperlipidemia)     Hypothyroidism     MDD (major depressive disorder)     Paraparesis of both lower limbs (H)     following spinal abscess    PVD (peripheral vascular disease)     Spinal abscess (H)     T2DM (type 2 diabetes mellitus) (H)     Uncomplicated opioid dependence (H)          Surgical History     No past surgical history on file.     Family History      Family History   Family history unknown: Yes         Social History      .  Social History     Socioeconomic History    Marital status:      Spouse name: Not on file    Number of children: Not on file    Years of education: Not on file    Highest education level: Not on file   Occupational History    Not on file   Tobacco Use    Smoking status: Former     Types: Cigarettes    Smokeless tobacco: Never   Substance and Sexual Activity    Alcohol use: Not on file    Drug use: Not on file    Sexual activity: Not on file   Other Topics Concern    Not on file   Social History Narrative    Not on file     Social Drivers of Health     Financial Resource Strain: Low Risk  (2/1/2025)    Received from [facility]    Financial  Resource Strain     Difficulty of Paying Living Expenses: 3     Difficulty of Paying Living Expenses: Not on file   Food Insecurity: No Food Insecurity (7/26/2024)    [facility]    Food Insecurity     Do you worry your food will run out before you are able to buy more?: 1   Transportation Needs: No Transportation Needs (7/26/2024)    Received from [facility]    Transportation Needs     Does lack of transportation keep you from medical appointments?: 1     Does lack of transportation keep you from work, meetings or getting things that you need?: 1   Physical Activity: Not on file   Stress: Not on file   Social Connections: Socially Integrated (7/26/2024)    Received from [facility]    Social Connections     Do you often feel lonely or isolated from those around you?: 0   Interpersonal Safety: Not on file   Housing Stability: Low Risk  (7/26/2024)    Received from [facility]    Housing Stability     What is your housing situation today?: 1          Allergies        Allergies   Allergen Reactions    Compazine [Prochlorperazine]     Latex      Latex sensitive     Metformin     Statins [Statins]     Sulfa Antibiotics Itching         Prior to Admission Medications      Current Facility-Administered Medications   Medication Dose Route Frequency Provider Last Rate Last Admin    vancomycin (VANCOCIN) 1,750 mg in 0.9% NaCl 517.5 mL intermittent infusion  1,750 mg Intravenous Once Ohl, Win Varela DO   1,750 mg at 03/23/25 2222     Current Outpatient Medications   Medication Sig Dispense Refill    acetaminophen (TYLENOL) 325 MG tablet Take 650 mg by mouth every 6 hours as needed for mild pain.      aspirin (ASA) 81 MG chewable tablet Take 81 mg by mouth daily      baclofen (LIORESAL) 10 MG tablet Take 15 mg by mouth 3 times daily.      benzocaine (ORAJEL MAXIMUM STRENGTH) 20 %  GEL gel Take by mouth every 4 hours as needed for mouth sores.      bisacodyl (DULCOLAX) 10 MG suppository Place 10 mg rectally daily as needed for constipation      buPROPion (WELLBUTRIN XL) 150 MG 24 hr tablet Take 150 mg by mouth every morning.      calcium carbonate 600 mg-vitamin D 400 units (CALTRATE) 600-400 MG-UNIT per tablet Take 1 tablet by mouth daily      camphor-menthol (DERMASARRA) 0.5-0.5 % external lotion Apply topically every 4 hours as needed for skin care (itching).      diphenhydrAMINE-zinc acetate (BENADRYL) 1-0.1 % external cream Apply topically 2 times daily as needed for itching.      ferrous sulfate (FEROSUL) 325 (65 Fe) MG tablet Take 325 mg by mouth daily.      furosemide (LASIX) 20 MG tablet Take 20 mg by mouth daily.      insulin degludec (TRESIBA) 100 UNIT/ML pen Inject 15 Units subcutaneously every morning.      levothyroxine (SYNTHROID/LEVOTHROID) 125 MCG tablet Take 125 mcg by mouth See Admin Instructions Daily on Tuesday, Wednesday, Thursday, Saturday, and Sunday      levothyroxine (SYNTHROID/LEVOTHROID) 125 MCG tablet Take 187.5 mcg by mouth See Admin Instructions On Mondays, Fridays      lisinopril (ZESTRIL) 2.5 MG tablet Take 2.5 mg by mouth daily      loperamide (IMODIUM) 2 MG capsule Take 2 mg by mouth 4 times daily as needed for diarrhea.      magnesium hydroxide (MOM) 2400 MG/10ML SUSP Take 5 mLs by mouth daily as needed for constipation.      mineral oil-white petrolatum (EUCERIN/MINERIN) cream Apply topically 2 times daily. Apply to legs topically two times a day for dry skin.      morphine (MS CONTIN) 30 MG CR tablet Take 30 mg by mouth every 12 hours.      Multiple Vitamins-Minerals (MULTIVITAMIN GUMMIES WOMENS) CHEW Take 1 chew tab by mouth daily      naloxone (NARCAN) 4 MG/0.1ML nasal spray Spray 4 mg into one nostril alternating nostrils as needed for opioid reversal. every 2-3 minutes until assistance arrives      omeprazole (PRILOSEC) 20 MG DR capsule Take 20 mg by  mouth daily before breakfast      ondansetron (ZOFRAN) 4 MG tablet Take 4 mg by mouth every 6 hours as needed for nausea.      polyethylene glycol (MIRALAX) 17 GM/Dose powder Take 1 capful. by mouth daily.      rosuvastatin (CRESTOR) 20 MG tablet Take 20 mg by mouth At Bedtime      Semaglutide, 1 MG/DOSE, (OZEMPIC) 4 MG/3ML pen Inject 1 mg subcutaneously every 7 days. On Wednesdays.      topiramate (TOPAMAX) 50 MG tablet Take 50 mg by mouth daily.      venlafaxine (EFFEXOR-XR) 150 MG 24 hr capsule Take 150 mg by mouth daily Take with 75 mg capsule for a total dose of 225 mg.      venlafaxine (EFFEXOR-XR) 75 MG 24 hr capsule Take 75 mg by mouth daily Take with 150 mg capsule for a total dose of 225 mg.             Review of Systems     A 12 point comprehensive review of systems was negative except as noted above in HPI.    PHYSICAL EXAMINATION       Vitals      Vitals: /68   Pulse 102   Temp 99.3  F (37.4  C) (Oral)   Resp 18   SpO2 98%   BMI= There is no height or weight on file to calculate BMI.      Examination     General Appearance:  Alert, cooperative, no distress  Head:    Normocephalic, without obvious abnormality, atraumatic  EENT:  PERRL, conjunctiva/corneas clear, EOM's intact.   Neck:   Supple, symmetrical, trachea midline, no adenopathy; no NVE  Back:  Symmetric, no curvature, no CVA tenderness  Chest/Lungs: Clear to auscultation bilaterally, respirations unlabored, No tenderness or deformity. No abdominal breathing or use of accessory muscles.   Heart:    Regular rate and rhythm, S1 and S2 normal, no murmur, rub   or gallop  Abdomen: Soft, non-tender, bowel sounds active all four quadrants, not peritoneal on palpation. Not distended  Extremities:  judd leg swelling but more on right LE -- has redness and swelling on RLE, no bullae, no crepitus, some skin breakdown. RLE is weepy. Some redness noted on medial thigh on RLE. The LLE has mild swelling and has signs of CVI  Skin:  Skin color,  texture, turgor normal, no rashes or lesion  Neurologic:  Awake and alert, No lateralizing or localizing signs          Pertinent Lab     Results for orders placed or performed during the hospital encounter of 03/23/25   CT Femur Thigh Right w Contrast    Impression    IMPRESSION:  1.  Moderate subcutaneous edema from the mid thigh to ankle, nonspecific, but likely corresponding with reported cellulitis. No abscess. No soft tissue gas or deep fascial edema.    2.  Tibial and fibular hardware, without evidence of hardware complication.     CT Tibia Fibula Lower Leg Right w Contrast    Impression    IMPRESSION:  1.  Moderate subcutaneous edema from the mid thigh to ankle, nonspecific, but likely corresponding with reported cellulitis. No abscess. No soft tissue gas or deep fascial edema.    2.  Tibial and fibular hardware, without evidence of hardware complication.     Glucose by meter   Result Value Ref Range    GLUCOSE BY METER POCT 255 (H) 70 - 99 mg/dL   Comprehensive metabolic panel   Result Value Ref Range    Sodium 133 (L) 135 - 145 mmol/L    Potassium 4.0 3.4 - 5.3 mmol/L    Carbon Dioxide (CO2) 21 (L) 22 - 29 mmol/L    Anion Gap 10 7 - 15 mmol/L    Urea Nitrogen 18.7 8.0 - 23.0 mg/dL    Creatinine 0.89 0.51 - 0.95 mg/dL    GFR Estimate 74 >60 mL/min/1.73m2    Calcium 8.2 (L) 8.8 - 10.4 mg/dL    Chloride 102 98 - 107 mmol/L    Glucose 295 (H) 70 - 99 mg/dL    Alkaline Phosphatase 70 40 - 150 U/L    AST 16 0 - 45 U/L    ALT <5 0 - 50 U/L    Protein Total 6.6 6.4 - 8.3 g/dL    Albumin 3.2 (L) 3.5 - 5.2 g/dL    Bilirubin Total 0.2 <=1.2 mg/dL   Result Value Ref Range    CRP Inflammation 45.80 (H) <5.00 mg/L   Erythrocyte sedimentation rate auto   Result Value Ref Range    Erythrocyte Sedimentation Rate 42 (H) 0 - 30 mm/hr   Lactic acid whole blood with 1x repeat in 2 hr when >2   Result Value Ref Range    Lactic Acid, Initial 1.9 0.7 - 2.0 mmol/L   CBC with platelets and differential   Result Value Ref Range     WBC Count 20.5 (H) 4.0 - 11.0 10e3/uL    RBC Count 4.01 3.80 - 5.20 10e6/uL    Hemoglobin 9.6 (L) 11.7 - 15.7 g/dL    Hematocrit 30.9 (L) 35.0 - 47.0 %    MCV 77 (L) 78 - 100 fL    MCH 23.9 (L) 26.5 - 33.0 pg    MCHC 31.1 (L) 31.5 - 36.5 g/dL    RDW 16.4 (H) 10.0 - 15.0 %    Platelet Count 346 150 - 450 10e3/uL    % Neutrophils 91 %    % Lymphocytes 4 %    % Monocytes 4 %    % Eosinophils 0 %    % Basophils 0 %    % Immature Granulocytes 1 %    NRBCs per 100 WBC 0 <1 /100    Absolute Neutrophils 18.7 (H) 1.6 - 8.3 10e3/uL    Absolute Lymphocytes 0.8 0.8 - 5.3 10e3/uL    Absolute Monocytes 0.8 0.0 - 1.3 10e3/uL    Absolute Eosinophils 0.0 0.0 - 0.7 10e3/uL    Absolute Basophils 0.1 0.0 - 0.2 10e3/uL    Absolute Immature Granulocytes 0.2 <=0.4 10e3/uL    Absolute NRBCs 0.0 10e3/uL           Pertinent Radiology

## 2025-03-24 NOTE — PHARMACY-VANCOMYCIN DOSING SERVICE
Pharmacy Vancomycin Initial Note  Date of Service 2025  Patient's  1965  59 year old, female    Indication: Sepsis and Skin and Soft Tissue Infection    Current estimated CrCl = Estimated Creatinine Clearance: 70.8 mL/min (based on SCr of 0.85 mg/dL).    Creatinine for last 3 days  No results found for requested labs within last 3 days.    Recent Vancomycin Level(s) for last 3 days  No results found for requested labs within last 3 days.      Vancomycin IV Administrations (past 72 hours)        No vancomycin orders with administrations in past 72 hours.                    Nephrotoxins and other renal medications (From now, onward)      Start     Dose/Rate Route Frequency Ordered Stop    25  piperacillin-tazobactam (ZOSYN) 3.375 g vial to attach to  mL bag         3.375 g  over 30 Minutes Intravenous ONCE 25  vancomycin (VANCOCIN) 1,750 mg in 0.9% NaCl 517.5 mL intermittent infusion         1,750 mg  over 2 Hours Intravenous ONCE 25              Contrast Orders - past 72 hours (72h ago, onward)      None                  Plan:  Start vancomycin  1750 mg IV x 1 (22 mg/kg)  Consult pharmacy to dose if planning to continue vancomycin.     Alison Rucker [facility]

## 2025-03-24 NOTE — ED TRIAGE NOTES
Pt has developed fever and increasing redness on R lower leg,     Triage Assessment (Adult)       Row Name 03/23/25 2050          Triage Assessment    Airway WDL WDL        Respiratory WDL    Respiratory WDL WDL        Skin Circulation/Temperature WDL    Skin Circulation/Temperature WDL temperature     Skin Temperature warm        Cardiac WDL    Cardiac WDL rhythm     Pulse Rate & Regularity tachycardic        Peripheral/Neurovascular WDL    Peripheral Neurovascular WDL neurovascular assessment lower;pulse assessment     Pulse Assessment dorsalis pedis        Pulse Dorsalis Pedis    Left Dorsalis Pedis Pulse 1+ (weak)     Right Dorsalis Pedis Pulse 1+ (weak)        Cognitive/Neuro/Behavioral WDL    Cognitive/Neuro/Behavioral WDL WDL        LLE Neurovascular Assessment    Temperature LLE cool     Color LLE blotchy;dusky     Sensation LLE numbness present;tenderness present;tingling present        RLE Neurovascular Assessment    Temperature RLE hot     Color RLE red     Sensation RLE numbness present;tenderness present;tingling present

## 2025-03-24 NOTE — PLAN OF CARE
Problem: Adult Inpatient Plan of Care  Goal: Absence of Hospital-Acquired Illness or Injury  Intervention: Identify and Manage Fall Risk  Recent Flowsheet Documentation  Taken 3/24/2025 1517 by Karthik Mae RN  Safety Promotion/Fall Prevention:   safety round/check completed   room organization consistent   clutter free environment maintained  Taken 3/24/2025 1201 by Karthik Mae RN  Safety Promotion/Fall Prevention:   room organization consistent   safety round/check completed   clutter free environment maintained   other (see comments)  Taken 3/24/2025 1049 by Karthik Mae RN  Safety Promotion/Fall Prevention:   room organization consistent   safety round/check completed   clutter free environment maintained     Problem: Adult Inpatient Plan of Care  Goal: Optimal Comfort and Wellbeing  Outcome: Progressing   Goal Outcome Evaluation:             Pt a/ox2. Disoriented to place and time. Bed rest. Repositioning. Fever this morning. PRN tylenol given and effective. NSR. Incontinent. Pure wick utilized. Right leg weeping and red, wound care done by WOC. Mg and K protocol, AM checks. Interdry under breast fold and abdomen folds. Contact precaution maintained. Encouraged fluids and meals. Was drowsy after PO morphine was given, now more alert when transferred pt to room 334. Report given to 3N TIFFANIE Dietz. All belongings with pt.

## 2025-03-24 NOTE — PHARMACY-ADMISSION MEDICATION HISTORY
Pharmacist Admission Medication History    Admission medication history is complete. The information provided in this note is only as accurate as the sources available at the time of the update.    Information Source(s): Facility ([facility]/) medication list/MAR via N/A    Pertinent Information: Patient cam from [facility].  Called for a MAR but was send a medication list.  Since patient came in last night about 2100 I assumed patient received morning medications yesterday.     Changes made to PTA medication list:  Added: cali, simethicone, nystatin powder  Deleted: None  Changed: None    Allergies reviewed with patient and updates made in EHR: yes    Medication History Completed By: Sarah Simpson RPH 3/24/2025 8:14 AM    PTA Med List   Medication Sig Last Dose/Taking    acetaminophen (TYLENOL) 325 MG tablet Take 650 mg by mouth every 6 hours as needed for mild pain. Unknown    aspirin (ASA) 81 MG chewable tablet Take 81 mg by mouth daily 3/23/2025 Morning    baclofen (LIORESAL) 10 MG tablet Take 15 mg by mouth 3 times daily. 3/23/2025    benzocaine (ORAJEL MAXIMUM STRENGTH) 20 % GEL gel Take by mouth every 4 hours as needed for mouth sores. Unknown    bisacodyl (DULCOLAX) 10 MG suppository Place 10 mg rectally daily as needed for constipation Unknown    buPROPion (WELLBUTRIN XL) 150 MG 24 hr tablet Take 150 mg by mouth every morning. 3/23/2025 Morning    calcium carbonate 600 mg-vitamin D 400 units (CALTRATE) 600-400 MG-UNIT per tablet Take 1 tablet by mouth daily 3/23/2025 Morning    camphor-menthol (DERMASARRA) 0.5-0.5 % external lotion Apply topically every 4 hours as needed for skin care (itching). Unknown    diphenhydrAMINE-zinc acetate (BENADRYL) 1-0.1 % external cream Apply topically 2 times daily as needed for itching. Unknown    ferrous sulfate (FEROSUL) 325 (65 Fe) MG tablet Take 325 mg by mouth daily. 3/23/2025 Noon    furosemide (LASIX) 20 MG tablet Take 20 mg by mouth daily. 3/23/2025  Morning    insulin degludec (TRESIBA) 100 UNIT/ML pen Inject 15 Units subcutaneously every morning. 3/23/2025 Morning    levothyroxine (SYNTHROID/LEVOTHROID) 125 MCG tablet Take 125 mcg by mouth See Admin Instructions Daily on Tuesday, Wednesday, Thursday, Saturday, and Too 3/23/2025 Morning    levothyroxine (SYNTHROID/LEVOTHROID) 125 MCG tablet Take 187.5 mcg by mouth See Admin Instructions On Mondays, Fridays 3/21/2025 Morning    lisinopril (ZESTRIL) 2.5 MG tablet Take 2.5 mg by mouth daily 3/24/2025 Morning    loperamide (IMODIUM) 2 MG capsule Take 2 mg by mouth 4 times daily as needed for diarrhea. Unknown    magnesium hydroxide (MOM) 2400 MG/10ML SUSP Take 5 mLs by mouth daily as needed for constipation. Unknown    mineral oil-white petrolatum (EUCERIN/MINERIN) cream Apply topically 2 times daily. Apply to legs topically two times a day for dry skin. Unknown    morphine (MS CONTIN) 30 MG CR tablet Take 30 mg by mouth every 12 hours. 3/23/2025    Multiple Vitamins-Minerals (MULTIVITAMIN GUMMIES WOMENS) CHEW Take 1 chew tab by mouth daily 3/23/2025 Morning    naloxone (NARCAN) 4 MG/0.1ML nasal spray Spray 4 mg into one nostril alternating nostrils as needed for opioid reversal. every 2-3 minutes until assistance arrives Unknown    Nutritional Supplements (SANTIAGO) PACK Take 1 packet by mouth 2 times daily. 3/23/2025    nystatin (MYCOSTATIN) 530491 UNIT/GM external powder Apply topically every 12 hours as needed. For redness Unknown    omeprazole (PRILOSEC) 20 MG DR capsule Take 20 mg by mouth daily before breakfast 3/23/2025 Morning    ondansetron (ZOFRAN) 4 MG tablet Take 4 mg by mouth every 6 hours as needed for nausea. Unknown    polyethylene glycol (MIRALAX) 17 GM/Dose powder Take 17 g by mouth daily. 3/23/2025 Morning    rosuvastatin (CRESTOR) 20 MG tablet Take 20 mg by mouth At Bedtime 3/22/2025    Semaglutide, 1 MG/DOSE, (OZEMPIC) 4 MG/3ML pen Inject 1 mg subcutaneously every 7 days. On Wednesdays.  3/19/2025    simethicone (MYLICON) 125 MG chewable tablet Take 125 mg by mouth every 6 hours as needed for intestinal gas. Unknown    topiramate (TOPAMAX) 50 MG tablet Take 50 mg by mouth daily. 3/23/2025 Morning    venlafaxine (EFFEXOR-XR) 150 MG 24 hr capsule Take 150 mg by mouth daily Take with 75 mg capsule for a total dose of 225 mg. 3/23/2025 Morning    venlafaxine (EFFEXOR-XR) 75 MG 24 hr capsule Take 75 mg by mouth daily Take with 150 mg capsule for a total dose of 225 mg. 3/23/2025 Morning

## 2025-03-24 NOTE — ED PROVIDER NOTES
Emergency Department Encounter         FINAL IMPRESSION:  Cellulitis        ED COURSE AND MEDICAL DECISION MAKING       ED Course as of 03/24/25 0018   Mon Mar 24, 2025   0017 Spoke with hospitalist who agrees with admission.     Patient is a 59-year-old hypertensive diabetic female with a history of bacteremia from a right lower extremity cellulitis, here again with cellulitic changes of her right lower extremity.  Reports over the last 24 to 48 hours worsening pain, fever or swelling and redness that started to streak up into her right groin.  No abdominal pain, chest pain or trouble breathing.    Arrival she is febrile and tachycardic.  Otherwise looks well.  Heart and lungs normal.  Abdomen benign.  Extensive cellulitic changes with weeping and redness in the right lower extremity with lymphangitic spreading up to the mid right groin.  No obvious abscess.  Will obtain imaging to rule out fluid collection    -Labs suggestive of sepsis.  Antibiotics and fluids initiated.  Patient with a negative CT other than subcutaneous edema suggestive of the clinical diagnosis of cellulitis.  Discussed case with hospitalist.  Plan for admission.      Additional ED Course Timeline:  10:14 PM I met with the patient, obtained history, performed an initial exam, and discussed options and plan for diagnostics and treatment here in the ED.                        Medical Decision Making  Obtained supplemental history:Supplemental history obtained?: N/A  Reviewed external records: External records reviewed?: Care everywhere  Care impacted by chronic illness:Chronic Pain, Diabetes, Hyperlipidemia, Hypertension, and Mental Health  Did you consider but not order tests?: Work up considered but not performed and documented in chart, if applicable  Did you interpret images independently?: Independent interpretation of ECG and images noted in documentation, when applicable.  Consultation discussion with other provider:Did you involve  another provider (consultant, , pharmacy, etc.)?: I discussed the care with another health care provider, see documentation for details.  Admit.    MIPS (CTPE, Dental pain, [place], Sinusitis, Asthma/COPD, Head Trauma): Not Applicable    SEPSIS: The patient has signs of sepsis   Sepsis ED evaluation   The patient has signs of sepsis as evidenced by:  1. Presence of 2 SIRS criteria, suspected infection, AND  2. Organ dysfunction: Sepsis work up in progress. Will continue to monitor for signs of organ dysfunction    Sepsis Care Initiation: Starting at  10 PM on 03/23/25, until specified. Prior to this documentation, sepsis, severe sepsis, or septic shock was NOT thought to be a significant cause of illness. This order represents the first time infection was seriously considered to be affecting the patient.    Lactic Acid Results:  Recent Labs   Lab Test 03/23/25 2127 02/02/25 2345 01/31/22 2055   LACT 1.9 1.1 0.6*       3 Hour Bundle 6 Hour Bundle (Reassessment)   Blood Cultures before IV Antibiotics: Yes  Antibiotics given: see below  Prehospital fluid volume (mL):   Prehospital IV Fluid Type: normal saline                 Total fluids given (ED +Pre-hospital):  Full 30 mL/kg bolus given based on weight: 2,420 mL   Repeat Lactic Acid Level: Ordered by reflex for 2 hours after initial lactic acid collection.  Vasopressors: MAP>65 after initial IVF bolus, will continue to monitor fluid status and vital signs.  Repeat perfusion exam: I attest to having performed a repeat sepsis exam and assessment of perfusion at 12:20 AM .   BMI Readings from Last 1 Encounters:   02/04/25 31.48 kg/m        Anti-infectives (From admission through now)      Start     Dose/Rate Route Frequency Ordered Stop    03/23/25 2130  piperacillin-tazobactam (ZOSYN) 3.375 g vial to attach to  mL bag         3.375 g  over 30 Minutes Intravenous ONCE 03/23/25 2115 03/23/25 2222 03/23/25 2130  vancomycin (VANCOCIN) 1,750 mg in 0.9% NaCl  517.5 mL intermittent infusion         1,750 mg  over 2 Hours Intravenous ONCE 03/23/25 2119 03/24/25 0017                      Critical Care     Performed by: Win Acosta or    Authorized by: Win Acosta  Total critical care time: 120 minutes  Critical care was necessary to treat or prevent imminent or life-threatening deterioration of the following conditions: sepsis - cellulitis   Critical care was time spent personally by me on the following activities: development of treatment plan with patient or surrogate, discussions with consultants, examination of patient, evaluation of patient's response to treatment, obtaining history from patient or surrogate, ordering and performing treatments and interventions, ordering and review of laboratory studies, ordering and review of radiographic studies, re-evaluation of patient's condition and monitoring for potential decompensation.  Critical care time was exclusive of separately billable procedures and treating other patients.'    At the conclusion of the encounter I discussed the results of all the tests and the disposition. The questions were answered. The patient or family acknowledged understanding and was agreeable with the care plan.                  MEDICATIONS GIVEN IN THE EMERGENCY DEPARTMENT:  Medications   vancomycin (VANCOCIN) 1,750 mg in 0.9% NaCl 517.5 mL intermittent infusion (has no administration in time range)   piperacillin-tazobactam (ZOSYN) 3.375 g vial to attach to  mL bag (3.375 g Intravenous $New Bag 3/23/25 4747)       NEW PRESCRIPTIONS STARTED AT TODAY'S ED VISIT:  New Prescriptions    No medications on file       HPI     Patient information obtained from: The patient    Use of : N/A     Tonya Valera is a 59 year old female with a pertinent history of T2DM, chronic pain, Borderline personality disorder, HLD, HTN, who presents to this ED via ambulance for evaluation of fever.    Patient reports increasing right leg pain, swelling and  redness worsening last 48 hours.  Mild nausea no vomiting.  No chest pain or trouble breathing.  No falls or trauma        REVIEW OF SYSTEMS:  See HPI          MEDICAL HISTORY     Past Medical History:   Diagnosis Date    Anxiety     Borderline personality disorder (H)     Cellulitis     Chronic pain     DVT (deep venous thrombosis) (H)     Gastroesophageal reflux disease without esophagitis     HLD (hyperlipidemia)     Hypothyroidism     MDD (major depressive disorder)     Paraparesis of both lower limbs (H)     PVD (peripheral vascular disease)     Spinal abscess (H)     T2DM (type 2 diabetes mellitus) (H)     Uncomplicated opioid dependence (H)        No past surgical history on file.    Social History     Tobacco Use    Smoking status: Former     Types: Cigarettes    Smokeless tobacco: Never       acetaminophen (TYLENOL) 325 MG tablet  aspirin (ASA) 81 MG chewable tablet  baclofen (LIORESAL) 10 MG tablet  benzocaine (ORAJEL MAXIMUM STRENGTH) 20 % GEL gel  bisacodyl (DULCOLAX) 10 MG suppository  buPROPion (WELLBUTRIN XL) 150 MG 24 hr tablet  calcium carbonate 600 mg-vitamin D 400 units (CALTRATE) 600-400 MG-UNIT per tablet  camphor-menthol (DERMASARRA) 0.5-0.5 % external lotion  diphenhydrAMINE-zinc acetate (BENADRYL) 1-0.1 % external cream  ferrous sulfate (FEROSUL) 325 (65 Fe) MG tablet  furosemide (LASIX) 20 MG tablet  insulin degludec (TRESIBA) 100 UNIT/ML pen  levothyroxine (SYNTHROID/LEVOTHROID) 125 MCG tablet  levothyroxine (SYNTHROID/LEVOTHROID) 125 MCG tablet  lisinopril (ZESTRIL) 2.5 MG tablet  loperamide (IMODIUM) 2 MG capsule  magnesium hydroxide (MOM) 2400 MG/10ML SUSP  mineral oil-white petrolatum (EUCERIN/MINERIN) cream  morphine (MS CONTIN) 30 MG CR tablet  Multiple Vitamins-Minerals (MULTIVITAMIN GUMMIES WOMENS) CHEW  naloxone (NARCAN) 4 MG/0.1ML nasal spray  omeprazole (PRILOSEC) 20 MG DR capsule  ondansetron (ZOFRAN) 4 MG tablet  polyethylene glycol (MIRALAX) 17 GM/Dose powder  rosuvastatin  (CRESTOR) 20 MG tablet  Semaglutide, 1 MG/DOSE, (OZEMPIC) 4 MG/3ML pen  topiramate (TOPAMAX) 50 MG tablet  venlafaxine (EFFEXOR-XR) 150 MG 24 hr capsule  venlafaxine (EFFEXOR-XR) 75 MG 24 hr capsule            PHYSICAL EXAM     /60   Pulse 107   Temp 99.3  F (37.4  C) (Oral)   Resp 18   SpO2 98%       PHYSICAL EXAM:     General: Patient appears well, nontoxic, comfortable  HEENT: Moist mucous membranes,  No head trauma.    Cardiovascular: Tachycardic,normal rhythm, no extremity edema.  No appreciable murmur.  Respiratory: No signs of respiratory distress, lungs are clear to auscultation bilaterally with no wheezes rhonchi or rales.  Abdominal: Soft, nontender, nondistended, no palpable masses, no guarding, no rebound  Musculoskeletal: Full range of motion of right lower extremity.  Cellulitis weeping and redness essentially covering the entire right lower extremity with streaking up the right medial groin.  No fluctuance.  No bullae.  Soft compartments.    Neurological: Alert and oriented, grossly neurologically intact.  Psychological: Normal affect and mood.  Integument: No rashes appreciated          RESULTS       Labs Ordered and Resulted from Time of ED Arrival to Time of ED Departure   GLUCOSE BY METER - Abnormal       Result Value    GLUCOSE BY METER POCT 255 (*)    COMPREHENSIVE METABOLIC PANEL - Abnormal    Sodium 133 (*)     Potassium 4.0      Carbon Dioxide (CO2) 21 (*)     Anion Gap 10      Urea Nitrogen 18.7      Creatinine 0.89      GFR Estimate 74      Calcium 8.2 (*)     Chloride 102      Glucose 295 (*)     Alkaline Phosphatase 70      AST 16      ALT <5      Protein Total 6.6      Albumin 3.2 (*)     Bilirubin Total 0.2     CRP INFLAMMATION - Abnormal    CRP Inflammation 45.80 (*)    ERYTHROCYTE SEDIMENTATION RATE AUTO - Abnormal    Erythrocyte Sedimentation Rate 42 (*)    CBC WITH PLATELETS AND DIFFERENTIAL - Abnormal    WBC Count 20.5 (*)     RBC Count 4.01      Hemoglobin 9.6 (*)      Hematocrit 30.9 (*)     MCV 77 (*)     MCH 23.9 (*)     MCHC 31.1 (*)     RDW 16.4 (*)     Platelet Count 346      % Neutrophils 91      % Lymphocytes 4      % Monocytes 4      % Eosinophils 0      % Basophils 0      % Immature Granulocytes 1      NRBCs per 100 WBC 0      Absolute Neutrophils 18.7 (*)     Absolute Lymphocytes 0.8      Absolute Monocytes 0.8      Absolute Eosinophils 0.0      Absolute Basophils 0.1      Absolute Immature Granulocytes 0.2      Absolute NRBCs 0.0     LACTIC ACID WHOLE BLOOD WITH 1X REPEAT IN 2 HR WHEN >2 - Normal    Lactic Acid, Initial 1.9     BLOOD CULTURE   BLOOD CULTURE       CT Femur Thigh Right w Contrast    (Results Pending)   CT Tibia Fibula Lower Leg Right w Contrast    (Results Pending)                     PROCEDURES:  Procedures:  Procedures       I, Konrad Carrero am serving as a scribe to document services personally performed by Win Acosta DO, based on my observations and the provider's statements to me.  I, Win Acosta DO, attest that Konrad Carrero is acting in a scribe capacity, has observed my performance of the services and has documented them in accordance with my direction.    Win Acosta DO  Emergency Medicine  [facility] EMERGENCY ROOM       Win Acosta DO  03/24/25 0034

## 2025-03-24 NOTE — CONSULTS
[facility] Nurse Inpatient Assessment     Consulted for: RLE    Summary: patient was seen recently by [facility] for same leg    [facility] nurse follow-up plan: weekly    Patient History (according to provider note(s):      Patient was seen in ED - 14   - Per phone triage -  911 to transport patient to ED,given concerns for RLE edema, pain and spreading erythema to thigh. Has been on antibiotics on and off over past weeks for RLE cellulitis      - She was awake and alert when I met her. She reported redness and swelling on RLE. Denies pain. Right leg is weepy and has been getting wounds cares at the facility. Some redness noted on right thigh. The left leg is not red but has signs of chronic venous insuff (CVI). She denies prior varicose veins     - She denies CP or SOB. No headaches. No cough. Felt chilly but no fever till in ED at 101.       - She was admitted last month for - RLE cellulitis and Group C bacteremia. She required pressors that time given septic shock. She was discharged Feb 7 2025.      - In the ED - was given IVF,  Zosyn and vanco, with sepsis concerns. CT of RLE - Showed cellulitis. No abscess. No soft tissue gas or deep fascial edema.      - ROS --- No headache. No dizziness. No weakness. No CP or SOB. No palpitations. No abdominal pain. No nausea or vomiting. No urinary symptoms. No bleeding symptoms. No weight loss. Rest of 12 point ROS was reviewed and negative.        Assessment:      Skin Injury Location: RLE        3/24    Last photo: 3/24  Skin injury due to:  cellulitis  Skin history and plan of care:   patient seen recently in February for same leg, less open skin this round but small area to shin with purulence and flaking skin to ankle and foot  Affected area:      Skin assessment: Dry/flaky, Erosion of epidermis, Erythema, Serous crusting, and Scaly     Measurements (length x width x depth, in cm) see photos     Color: dusky and red     Temperature  warm      Drainage: moderate .      Color: serous      Odor: mild  Pain: mild, tender  Pain interventions prior to dressing change: slow and gentle cares   Treatment goal: Drainage control, Infection control/prevention, and Promote epidermal migration  STATUS: initial assessment  Supplies ordered: ordered xeroform       Treatment Plan:     RLE  Soak one kerlix roll with vashe, wrap around leg, soak for 20-30 minutes, wipe clean to help debride flaking skin  Apply sween cream to whole leg on intact skin  Cover open areas with xeroform then abd pads  Secure with kerlix rolls  Change daily + PRN is soiled    Orders: Written    RECOMMEND PRIMARY TEAM ORDER: None, at this time  Education provided: plan of care and wound progress  Discussed plan of care with: Patient and Nurse  Notify WOC if wound(s) deteriorate.  Nursing to notify the Provider(s) and re-consult the WO Nurse if new skin concern.    DATA:     Current support surface: Standard  Standard gel mattress (Isoflex)  Containment of urine/stool: Incontinence Protocol  BMI: Body mass index is 31.71 kg/m .   Active diet order: Orders Placed This Encounter      Combination Diet Low Saturated Fat Na <2400mg Diet, No Caffeine Diet     Output: I/O last 3 completed shifts:  In: 2517.5 [I.V.:1000; IV Piggyback:1517.5]  Out: 800 [Urine:800]     Labs:   Recent Labs   Lab 03/24/25  0555   ALBUMIN 2.8*   HGB 8.5*   WBC 17.9*     Pressure injury risk assessment:   Sensory Perception: 3-->slightly limited  Moisture: 3-->occasionally moist  Activity: 1-->bedfast  Mobility: 3-->slightly limited  Nutrition: 3-->adequate  Friction and Shear: 2-->potential problem  Chaitanya Score: 15    MARIBELL Rogel RN CWOCN  Pager no longer in use, please contact through [facility] [facility] group: [facility]

## 2025-03-24 NOTE — PROGRESS NOTES
Brief progress note:    Evaluated at bedside, patient very emotional at that time agitated as she had not had her morning meds yet, reassured patient that they were being processed by pharmacy. Resumed home meds, unclear patients baseline mental status, does not remember being admitted previously in February for cellulitis but then states that was for a broken leg. She reports she lives at assisted living/facility, reaching out to CM/SW to see what services she has at her facility and how much help she requires. Resumed home meds as appropriate. Cares otherwise per todays H&P.     Garfield Dixon MD  Hospitalist

## 2025-03-25 LAB
CREAT SERPL-MCNC: 0.86 MG/DL (ref 0.51–0.95)
EGFRCR SERPLBLD CKD-EPI 2021: 77 ML/MIN/1.73M2
ERYTHROCYTE [DISTWIDTH] IN BLOOD BY AUTOMATED COUNT: 16.6 % (ref 10–15)
GLUCOSE BLDC GLUCOMTR-MCNC: 113 MG/DL (ref 70–99)
GLUCOSE BLDC GLUCOMTR-MCNC: 115 MG/DL (ref 70–99)
GLUCOSE BLDC GLUCOMTR-MCNC: 446 MG/DL (ref 70–99)
GLUCOSE BLDC GLUCOMTR-MCNC: 80 MG/DL (ref 70–99)
GLUCOSE SERPL-MCNC: 113 MG/DL (ref 70–99)
HCT VFR BLD AUTO: 27.5 % (ref 35–47)
HGB BLD-MCNC: 8.4 G/DL (ref 11.7–15.7)
MAGNESIUM SERPL-MCNC: 2.1 MG/DL (ref 1.7–2.3)
MCH RBC QN AUTO: 23.7 PG (ref 26.5–33)
MCHC RBC AUTO-ENTMCNC: 30.5 G/DL (ref 31.5–36.5)
MCV RBC AUTO: 78 FL (ref 78–100)
PLATELET # BLD AUTO: 273 10E3/UL (ref 150–450)
POTASSIUM SERPL-SCNC: 3.8 MMOL/L (ref 3.4–5.3)
RBC # BLD AUTO: 3.55 10E6/UL (ref 3.8–5.2)
VANCOMYCIN SERPL-MCNC: 29.3 UG/ML
WBC # BLD AUTO: 7.2 10E3/UL (ref 4–11)

## 2025-03-25 PROCEDURE — 120N000004 HC R&B MS OVERFLOW

## 2025-03-25 PROCEDURE — 99222 1ST HOSP IP/OBS MODERATE 55: CPT | Performed by: PHYSICIAN ASSISTANT

## 2025-03-25 PROCEDURE — 258N000003 HC RX IP 258 OP 636: Performed by: STUDENT IN AN ORGANIZED HEALTH CARE EDUCATION/TRAINING PROGRAM

## 2025-03-25 PROCEDURE — 80202 ASSAY OF VANCOMYCIN: CPT | Performed by: STUDENT IN AN ORGANIZED HEALTH CARE EDUCATION/TRAINING PROGRAM

## 2025-03-25 PROCEDURE — 250N000013 HC RX MED GY IP 250 OP 250 PS 637: Performed by: STUDENT IN AN ORGANIZED HEALTH CARE EDUCATION/TRAINING PROGRAM

## 2025-03-25 PROCEDURE — 250N000013 HC RX MED GY IP 250 OP 250 PS 637: Performed by: PHYSICIAN ASSISTANT

## 2025-03-25 PROCEDURE — 83735 ASSAY OF MAGNESIUM: CPT | Performed by: INTERNAL MEDICINE

## 2025-03-25 PROCEDURE — 82947 ASSAY GLUCOSE BLOOD QUANT: CPT | Performed by: STUDENT IN AN ORGANIZED HEALTH CARE EDUCATION/TRAINING PROGRAM

## 2025-03-25 PROCEDURE — 250N000012 HC RX MED GY IP 250 OP 636 PS 637: Performed by: INTERNAL MEDICINE

## 2025-03-25 PROCEDURE — 82565 ASSAY OF CREATININE: CPT | Performed by: INTERNAL MEDICINE

## 2025-03-25 PROCEDURE — 84132 ASSAY OF SERUM POTASSIUM: CPT | Performed by: INTERNAL MEDICINE

## 2025-03-25 PROCEDURE — 250N000011 HC RX IP 250 OP 636: Performed by: INTERNAL MEDICINE

## 2025-03-25 PROCEDURE — 36415 COLL VENOUS BLD VENIPUNCTURE: CPT | Performed by: STUDENT IN AN ORGANIZED HEALTH CARE EDUCATION/TRAINING PROGRAM

## 2025-03-25 PROCEDURE — 85018 HEMOGLOBIN: CPT | Performed by: STUDENT IN AN ORGANIZED HEALTH CARE EDUCATION/TRAINING PROGRAM

## 2025-03-25 PROCEDURE — 250N000011 HC RX IP 250 OP 636: Performed by: STUDENT IN AN ORGANIZED HEALTH CARE EDUCATION/TRAINING PROGRAM

## 2025-03-25 PROCEDURE — 99233 SBSQ HOSP IP/OBS HIGH 50: CPT | Performed by: STUDENT IN AN ORGANIZED HEALTH CARE EDUCATION/TRAINING PROGRAM

## 2025-03-25 RX ORDER — MORPHINE SULFATE 15 MG/1
15 TABLET ORAL 2 TIMES DAILY PRN
Status: DISCONTINUED | OUTPATIENT
Start: 2025-03-25 | End: 2025-03-26 | Stop reason: HOSPADM

## 2025-03-25 RX ORDER — MORPHINE SULFATE 15 MG/1
15 TABLET, FILM COATED, EXTENDED RELEASE ORAL EVERY 12 HOURS SCHEDULED
Status: DISCONTINUED | OUTPATIENT
Start: 2025-03-25 | End: 2025-03-26 | Stop reason: HOSPADM

## 2025-03-25 RX ORDER — MORPHINE SULFATE 15 MG/1
15 TABLET ORAL 2 TIMES DAILY
Status: DISCONTINUED | OUTPATIENT
Start: 2025-03-25 | End: 2025-03-25

## 2025-03-25 RX ORDER — BACLOFEN 10 MG/1
10 TABLET ORAL 4 TIMES DAILY
Status: DISCONTINUED | OUTPATIENT
Start: 2025-03-25 | End: 2025-03-26 | Stop reason: HOSPADM

## 2025-03-25 RX ADMIN — INSULIN ASPART 7 UNITS: 100 INJECTION, SOLUTION INTRAVENOUS; SUBCUTANEOUS at 08:51

## 2025-03-25 RX ADMIN — VENLAFAXINE HYDROCHLORIDE 75 MG: 75 CAPSULE, EXTENDED RELEASE ORAL at 08:48

## 2025-03-25 RX ADMIN — ROSUVASTATIN CALCIUM 20 MG: 10 TABLET, FILM COATED ORAL at 21:09

## 2025-03-25 RX ADMIN — INSULIN DEGLUDEC 15 UNITS: 100 INJECTION, SOLUTION SUBCUTANEOUS at 08:52

## 2025-03-25 RX ADMIN — MORPHINE SULFATE 30 MG: 30 TABLET, FILM COATED, EXTENDED RELEASE ORAL at 08:50

## 2025-03-25 RX ADMIN — POLYETHYLENE GLYCOL 3350 17 G: 17 POWDER, FOR SOLUTION ORAL at 08:51

## 2025-03-25 RX ADMIN — PIPERACILLIN AND TAZOBACTAM 3.38 G: 3; .375 INJECTION, POWDER, FOR SOLUTION INTRAVENOUS at 13:10

## 2025-03-25 RX ADMIN — BUPROPION HYDROCHLORIDE 150 MG: 150 TABLET, EXTENDED RELEASE ORAL at 08:50

## 2025-03-25 RX ADMIN — PIPERACILLIN AND TAZOBACTAM 3.38 G: 3; .375 INJECTION, POWDER, FOR SOLUTION INTRAVENOUS at 05:07

## 2025-03-25 RX ADMIN — VANCOMYCIN HYDROCHLORIDE 1250 MG: 5 INJECTION, POWDER, LYOPHILIZED, FOR SOLUTION INTRAVENOUS at 21:40

## 2025-03-25 RX ADMIN — ENOXAPARIN SODIUM 40 MG: 40 INJECTION SUBCUTANEOUS at 01:08

## 2025-03-25 RX ADMIN — ONDANSETRON 4 MG: 4 TABLET, ORALLY DISINTEGRATING ORAL at 15:25

## 2025-03-25 RX ADMIN — TOPIRAMATE 50 MG: 50 TABLET, FILM COATED ORAL at 08:50

## 2025-03-25 RX ADMIN — VENLAFAXINE HYDROCHLORIDE 150 MG: 150 CAPSULE, EXTENDED RELEASE ORAL at 08:49

## 2025-03-25 RX ADMIN — FERROUS SULFATE TAB 325 MG (65 MG ELEMENTAL FE) 325 MG: 325 (65 FE) TAB at 13:10

## 2025-03-25 RX ADMIN — BACLOFEN 15 MG: 10 TABLET ORAL at 08:50

## 2025-03-25 RX ADMIN — BACLOFEN 10 MG: 10 TABLET ORAL at 17:48

## 2025-03-25 RX ADMIN — BACLOFEN 10 MG: 10 TABLET ORAL at 21:09

## 2025-03-25 RX ADMIN — MORPHINE SULFATE 15 MG: 15 TABLET, EXTENDED RELEASE ORAL at 19:53

## 2025-03-25 RX ADMIN — BACLOFEN 10 MG: 10 TABLET ORAL at 13:10

## 2025-03-25 RX ADMIN — PANTOPRAZOLE SODIUM 40 MG: 40 TABLET, DELAYED RELEASE ORAL at 08:50

## 2025-03-25 RX ADMIN — PIPERACILLIN AND TAZOBACTAM 3.38 G: 3; .375 INJECTION, POWDER, FOR SOLUTION INTRAVENOUS at 19:56

## 2025-03-25 RX ADMIN — LEVOTHYROXINE SODIUM 125 MCG: 0.12 TABLET ORAL at 08:49

## 2025-03-25 RX ADMIN — ASPIRIN 81 MG CHEWABLE TABLET 81 MG: 81 TABLET CHEWABLE at 08:50

## 2025-03-25 ASSESSMENT — ACTIVITIES OF DAILY LIVING (ADL)
ADLS_ACUITY_SCORE: 70
ADLS_ACUITY_SCORE: 67
ADLS_ACUITY_SCORE: 70
ADLS_ACUITY_SCORE: 70
ADLS_ACUITY_SCORE: 64
ADLS_ACUITY_SCORE: 67
ADLS_ACUITY_SCORE: 70
ADLS_ACUITY_SCORE: 67
ADLS_ACUITY_SCORE: 70
ADLS_ACUITY_SCORE: 66
ADLS_ACUITY_SCORE: 67
ADLS_ACUITY_SCORE: 67
ADLS_ACUITY_SCORE: 66
ADLS_ACUITY_SCORE: 64
ADLS_ACUITY_SCORE: 67
ADLS_ACUITY_SCORE: 64
ADLS_ACUITY_SCORE: 70
ADLS_ACUITY_SCORE: 64
DEPENDENT_IADLS:: CLEANING;COOKING;LAUNDRY;SHOPPING;MEAL PREPARATION;MEDICATION MANAGEMENT;TRANSPORTATION;INCONTINENCE
ADLS_ACUITY_SCORE: 64
ADLS_ACUITY_SCORE: 70
ADLS_ACUITY_SCORE: 64

## 2025-03-25 NOTE — PLAN OF CARE
Problem: Adult Inpatient Plan of Care  Goal: Readiness for Transition of Care  Recent Flowsheet Documentation  Taken 3/25/2025 1253 by Praneeth Carrero RN  Transportation Anticipated: agency  Concerns to be Addressed: discharge planning  Intervention: Mutually Develop Transition Plan  Recent Flowsheet Documentation  Taken 3/25/2025 1253 by Praneeth Carrero RN  Readmission Within the Last 30 Days: no previous admission in last 30 days  Transportation Anticipated: agency  Concerns to be Addressed: discharge planning  Patient/Family Anticipates Transition to: nursing home  Offered/Gave Vendor List: yes  Equipment Currently Used at Home:   shower chair   wheelchair, manual       Goal Outcome Evaluation:      Plan of Care Reviewed With: patient, caregiver    Overall Patient Progress: improvingOverall Patient Progress: improving    Outcome Evaluation: Anticipate discharge to LTC when medically ready for discharge    Praneeth Carrero RN

## 2025-03-25 NOTE — PHARMACY-VANCOMYCIN DOSING SERVICE
Pharmacy Vancomycin Note  Date of Service 2025  Patient's  1965   59 year old, female    Indication: Skin and Soft Tissue Infection  Day of Therapy: 3  Current vancomycin regimen:  1000 mg IV q12h  Current vancomycin monitoring method: AUC  Current vancomycin therapeutic monitoring goal: 400-600 mg*h/L    InsightRX Prediction of Current Vancomycin Regimen  Regimen: 1000 mg IV every 12 hours.  Start time: 09:48 on 2025  Exposure target: AUC24 (range)400-600 mg/L.hr   AUC24,ss: 804 mg/L.hr  Probability of AUC24 > 400: 100 %  Ctrough,ss: 26.4 mg/L  Probability of Ctrough,ss > 20: 89 %  Probability of nephrotoxicity (Lodise LOUIS ): 31 %    Current estimated CrCl = Estimated Creatinine Clearance: 70.2 mL/min (based on SCr of 0.86 mg/dL).    Creatinine for last 3 days  3/23/2025:  9:27 PM Creatinine 0.89 mg/dL  3/24/2025:  5:55 AM Creatinine 0.86 mg/dL  3/25/2025:  4:50 AM Creatinine 0.86 mg/dL    Recent Vancomycin Levels (past 3 days)  3/25/2025:  4:50 AM Vancomycin 29.3 ug/mL    Vancomycin IV Administrations (past 72 hours)                     vancomycin (VANCOCIN) 1,000 mg in NaCl 0.9% 200 mL intermittent infusion (mg) 1,000 mg Given 258     1,000 mg Given  919    vancomycin (VANCOCIN) 1,750 mg in 0.9% NaCl 517.5 mL intermittent infusion (mg) 1,750 mg New Bag 25 2222                    Nephrotoxins and other renal medications (From now, onward)      Start     Dose/Rate Route Frequency Ordered Stop    25 2200  vancomycin (VANCOCIN) 1,250 mg in 0.9% NaCl 262.5 mL intermittent infusion         1,250 mg  over 90 Minutes Intravenous EVERY 24 HOURS 25 0846      25 0900  [Held by provider]  lisinopril (ZESTRIL) tablet 2.5 mg        (On hold since yesterday at 0856 until manually unheld; held by Garfield Dixon MDHold Reason: Other)   Note to Pharmacy: PTA Sig:Take 2.5 mg by mouth daily      2.5 mg Oral DAILY 25 0856      25 0900  [Held by provider]   furosemide (LASIX) tablet 20 mg        (On hold since yesterday at 0856 until manually unheld; held by Garfield Dixon MDHold Reason: Other)   Note to Pharmacy: PTA Sig:Take 20 mg by mouth daily.      20 mg Oral DAILY 03/24/25 0856      03/24/25 0400  piperacillin-tazobactam (ZOSYN) 3.375 g vial to attach to  mL bag         3.375 g  over 240 Minutes Intravenous EVERY 8 HOURS 03/24/25 0017                 Contrast Orders - past 72 hours (72h ago, onward)      Start     Dose/Rate Route Frequency Stop    03/23/25 2230  iopamidol (ISOVUE-370) solution 100 mL         100 mL Intravenous ONCE 03/23/25 2252            Interpretation of levels and current regimen:  Vancomycin level is reflective of AUC greater than 600    Has serum creatinine changed greater than 50% in last 72 hours: No    Urine output:  unable to determine    Renal Function: Stable    InsightRX Prediction of Planned New Vancomycin Regimen  Regimen: 1250 mg IV every 24 hours.  Start time: 21:48 on 03/25/2025  Exposure target: AUC24 (range)400-600 mg/L.hr   AUC24,ss: 521 mg/L.hr  Probability of AUC24 > 400: 94 %  Ctrough,ss: 14 mg/L  Probability of Ctrough,ss > 20: 10 %  Probability of nephrotoxicity (Lodise LOUIS 2009): 9 %    Plan:  Change Vancomycin IV to 1250mg IV Q24h  Vancomycin monitoring method: AUC  Vancomycin therapeutic monitoring goal: 400-600 mg*h/L  Pharmacy will check vancomycin levels as appropriate in 1-3 Days.  Serum creatinine levels will be ordered daily for the first week of therapy and at least twice weekly for subsequent weeks.    Charity Sanz, [facility]

## 2025-03-25 NOTE — PLAN OF CARE
Pt intermittently AxOx4 to being lethargic. Pain controlled with scheduled medications. No acute changes overnight. VSS and on RA. Pt refusing repositioning. Educated pt on importance of weight shifting. Bedrest maintained and encouraged in bed activity with weight shifting/turns. Voiding using purewick. Bed alarm utilized for safety and all light within reach. Purposeful rounding performed.       Problem: Adult Inpatient Plan of Care  Goal: Optimal Comfort and Wellbeing  Outcome: Progressing     Problem: Skin Injury Risk Increased  Goal: Skin Health and Integrity  Outcome: Progressing

## 2025-03-25 NOTE — PLAN OF CARE
Patient alert and oriented. Intermittently forgetful and confused. Denies chest pain or shortness of breath. VSS. LS diminished. Voiding using external catheter. Tolerating diet. On IV antibiotics. Lower extremity pain controlled with scheduled pain medication. Will monitor and continue plan of care.     Problem: Infection  Goal: Absence of Infection Signs and Symptoms  Outcome: Progressing     Problem: Skin Injury Risk Increased  Goal: Skin Health and Integrity  Outcome: Progressing     Problem: Adult Inpatient Plan of Care  Goal: Absence of Hospital-Acquired Illness or Injury  Intervention: Identify and Manage Fall Risk  Recent Flowsheet Documentation  Taken 3/25/2025 0850 by Bertha Larsen, RN  Safety Promotion/Fall Prevention:   safety round/check completed   room near nurse's station   nonskid shoes/slippers when out of bed   mobility aid in reach   lighting adjusted   increase visualization of patient   increased rounding and observation   clutter free environment maintained

## 2025-03-25 NOTE — CONSULTS
Care Management Initial Consult    General Information  Assessment completed with: Patient, Other (LTC Nurse),    Type of CM/SW Visit: Initial Assessment    Primary Care Provider verified and updated as needed:     Readmission within the last 30 days: no previous admission in last 30 days      Reason for Consult: discharge planning  Advance Care Planning: Advance Care Planning Reviewed: no concerns identified          Communication Assessment  Patient's communication style: spoken language (English or Bilingual)             Cognitive  Cognitive/Neuro/Behavioral: .WDL except  Level of Consciousness: intermittent confusion  Arousal Level: opens eyes spontaneously  Orientation: oriented x 4  Mood/Behavior: calm, cooperative  Best Language: 0 - No aphasia  Speech: spontaneous    Living Environment:   People in home: facility resident     Current living Arrangements: other (see comments) (LTC)  Name of Facility: [facility]   Able to return to prior arrangements: yes       Family/Social Support:  Care provided by: self, other (see comments) (LTC staff)  Provides care for: no one, unable/limited ability to care for self     Support system: Children          Description of Support System:           Current Resources:   Patient receiving home care services: No        Community Resources:    Equipment currently used at home: shower chair, wheelchair, manual  Supplies currently used at home: Incontinence Supplies    Employment/Financial:  Employment Status: disabled        Financial Concerns:             Does the patient's insurance plan have a 3 day qualifying hospital stay waiver?  No    Lifestyle & Psychosocial Needs:  Social Drivers of Health     Food Insecurity: No Food Insecurity (7/26/2024)    Received from [employer] [facility] [facility]    Food Insecurity     Do you worry your food will run out before you are able to buy more?: 1   Depression: Not at risk (3/2/2019)    Received from [facility] [facility] [employer]  [facility], [facility]    PHQ-2     PHQ-2 Score: 2   Housing Stability: Low Risk  (7/26/2024)    Received from [facility]    Housing Stability     What is your housing situation today?: 1   Tobacco Use: Medium Risk (8/15/2024)    Received from [facility]    Patient History     Smoking Tobacco Use: Former     Smokeless Tobacco Use: Never     Passive Exposure: Not on file   Financial Resource Strain: Low Risk  (2/1/2025)    Received from [facility]    Financial Resource Strain     Difficulty of Paying Living Expenses: 3     Difficulty of Paying Living Expenses: Not on file   Alcohol Use: Not on file   Transportation Needs: No Transportation Needs (7/26/2024)    Received from [facility]    Transportation Needs     Does lack of transportation keep you from medical appointments?: 1     Does lack of transportation keep you from work, meetings or getting things that you need?: 1   Physical Activity: Not on file   Interpersonal Safety: Not on file   Stress: Not on file   Social Connections: Socially Integrated (7/26/2024)    Received from [facility]    Social Connections     Do you often feel lonely or isolated from those around you?: 0   Health Literacy: Not on file       Functional Status:  Prior to admission patient needed assistance:   Dependent ADLs:: Wheelchair-independent, Bathing, Dressing, Grooming, Incontinence  Dependent IADLs:: Cleaning, Cooking, Laundry, Shopping, Meal Preparation, Medication Management, Transportation, Incontinence       Mental Health Status:  Mental Health Status: Current Concern (See H&P)  Mental Health Management: Medication    Chemical Dependency Status:  Chemical Dependency Status: No Current Concerns             Values/Beliefs:  Spiritual, Cultural Beliefs, Anglican Practices, Values that affect  care: no               Discussed  Partnership in Safe Discharge Planning  document with patient/family: No    Additional Information:  Assessed via pt and LTC Nursing Staff.  Pt resides at [facility].  Pt resides in a LTC facility.    [facility] in admissions confirms pt resides in their LTC.  Pt has a bed hold.  CM to coordinate discharge with [facility] in admissions.      [facility] 3rd Floor Nurse [phone] - Nurse Manager Delphine reports pt's baseline:  Pt is typically alert, oriented, able to make her needs known.  Pt can get very emotional or agitated at times.  Hx of PTSD and bipolar.  Pt self-pivots to manual wheelchair; spends most of the day in the wheelchair and self-propels wheelchair.  Pt is incontinent at times.  Pt often spends a long time in the restroom, even just for voiding.  Bathing -staff assist pt with getting in/out of bath/shower and setting up. Dressing/grooming- staff assist with set up.  Pt is her own decision maker.    Anticipate pt will return to [facility] when medically ready for discharge; transportation TBD.    CM will continue to follow.    Praneeth Carrero RN

## 2025-03-25 NOTE — PROGRESS NOTES
[facility]    Medicine Progress Note - Hospitalist Service    Date of Admission:  3/23/2025    Assessment & Plan   Tonya Valera is a 59 year old female admitted on 2/2/2025. She has a history of T2DM, anxiety disorder, calculus of gallbladder, intertrigo, chronic venous insufficiency, episodic mood disorder, HLD, HTN, hyponatremia, hypothyroidism, leukocytosis, chronic MDD, borderline personality disorder, stage 2 CKD, opioid dependence, previous hospitalization in February for cellulitis and bacteremia who presented with right leg pain increased swelling and redness worsening over the last 48 hours admitted for right lower extremity cellulitis    Changes today:  -Concern for oversedation, pain team consulted, patient required sternal rub by nurses to wake up at one point  -Appreciate care management's assistance, see consult note for patient's baseline functional/mental status  -Cellulitis improved, patient is anxious about discharge, if pain is well controlled tomorrow will plan on discharging as long as continued improvement    Sepsis  MRSA-MRSA Nares  RLE cellulitis-improving on antibiotics white blood cell count has now resolved  -Continue ceftriaxone and vancomycin today given MRSA nares positive, will plan to discharge on orals likely doxycycline given concern for MRSA    T2DM  -PTA meds and sliding scale insulin    HTN  -Holding PTA lisinopril and furosemide given soft blood pressures and sepsis    Hypothyroidism-PTA Synthroid  Hyperlipidemia-PTA rosuvastatin    Chronic pain  -Pain team consulted, appreciate recs    Anxiety disorder  Major depressive disorder  Borderline personality disorder  -PTA venlafaxine, topiramate, and bupropion        Diet: Combination Diet Low Saturated Fat Na <2400mg Diet, No Caffeine Diet    DVT Prophylaxis: Enoxaparin (Lovenox) SQ  Townsend Catheter: Not present  Lines: None     Cardiac Monitoring: None  Code Status: Full Code      Clinically Significant  "Risk Factors         # Hyponatremia: Lowest Na = 133 mmol/L in last 2 days, will monitor as appropriate  # Hyperchloremia: Highest Cl = 108 mmol/L in last 2 days, will monitor as appropriate          # Hypoalbuminemia: Lowest albumin = 2.8 g/dL at 3/24/2025  5:55 AM, will monitor as appropriate               # DMII: A1C = 8.0 % (Ref range: <5.7 %) within past 6 months, PRESENT ON ADMISSION  # Obesity: Estimated body mass index is 31.71 kg/m  as calculated from the following:    Height as of this encounter: 1.6 m (5' 3\").    Weight as of this encounter: 81.2 kg (179 lb 0.2 oz)., PRESENT ON ADMISSION     # Financial/Environmental Concerns:           Social Drivers of Health    Tobacco Use: Medium Risk (8/15/2024)    Received from [facility]    Patient History     Smoking Tobacco Use: Former     Smokeless Tobacco Use: Never          Disposition Plan     Medically Ready for Discharge: Anticipated Tomorrow             Garfield Dixon MD  Hospitalist Service  [facility]  Securely message with [employer] (more info)  Text page via [employer] Paging/Directory   ______________________________________________________________________    Interval History   No acute events overnight, patient condition: Speech continues to harbor well how she dislikes her care facility and how there are new people only firings of she also reports that she has not had her doctors continue switching she does not know the staff at times gets anxious and fairly worked up when discussing this she also reports that they do not do anything for her dressings and is left vomiting for 12 hours once, she said need to make sure that the physicians write orders for discharge so they follow the wound care orders because they do not listen to the nurses, I reassured patient they will definitely write wound care orders for discharge him and if her dressing is soiled and recommendations are to change it.    Physical Exam   Vital Signs: Temp: " 99.1  F (37.3  C) Temp src: Oral BP: 105/57 Pulse: 85   Resp: 18 SpO2: 95 % O2 Device: None (Room air)    Weight: 179 lbs .22 oz    Gen: Appears well, NAD, on RA  Card:Warm well perfused, pulses assumed patient talking  Pulm: Normal I/E effort on RA  Abd:Non distended  Skin:Dry skin RLE, erythema improved some in medial thigh but overall improved from prior  Neuro AxOx4, S/S grossly intact, no obvious FND,   Psych:Pleasant, answering questions appropriately, insight okay, judgement fair, does not appear to be responding to I/E stimuli     Medical Decision Making       60 MINUTES SPENT BY ME on the date of service doing chart review, history, exam, documentation & further activities per the note.      Data   ------------------------- PAST 24 HR DATA REVIEWED -----------------------------------------------    I have personally reviewed the following data over the past 24 hrs:    7.2  \   8.4 (L)   / 273     N/A N/A N/A /  80   3.8 N/A 0.86 \       Imaging results reviewed over the past 24 hrs:   No results found for this or any previous visit (from the past 24 hours).

## 2025-03-25 NOTE — CONSULTS
[facility] ACUTE INPATIENT PAIN SERVICE    [facility], [facility], [facility], [facility], [place]   PAIN CONSULT          ASSESSMENT/ PLAN:    Pain is consistent with acute exacerbation of right lower extremity cellulitis    Admission has been complicated by oversedation, requiring sternal rubs. Will plan to adjust medication regimen to split dosing throughout the day. Currently managed on 60 morphine equivalents at baseline    Multimodal Medication Therapy:   Adjuvants:   - APAP 650mg q4h prn  - Topical Lidocaine   - Effexor 225mg daily  - Topamax 50mg daily  - Wellbutrin 150mg daily  - Baclofen 15mg tid decreased to 10mg qid   Opioids:   - MS Contin 30mg bid. CHANGE to 15mg bid  - Added MSIR 15mg bid prn. Hold parameters placed  Non-medication interventions- Ice, PT  Constipation Prophylaxis- Scheduled miralax, Senna-S  Follow up /Discharge Recommendations - We recommend prescribing the following at the time of discharge:  TBD    Subjective:  Tonya is seen today in her bed alert and oriented in no acute distress. She is a poor historian and has a difficult times recalling previous events. Reports her pain is currently a 2/10 located in her right lower extremity. Chronic back pain and lower extremity spacticity is controlled.    Reviewed medications. Will split dosing throughout the day to avoid oversedation concerns. She is agreeable to the plan.     Denies nausea, vomiting, constipation.      Reviewed continuing with current plan, all questions answered.      HPI:  Tonya Valera is a 59 year old female who was admitted on 3/23/2025.  I was asked by Dr. Garfield Arams to see the patient for management of her acute right lower extremity cellulitis in the setting of concerns with oversedation and chronic opioid use. Previously admitted last February for similar issues. History of HTN, T2DM, HLD, Vitamin D deficiency, Bipolar disorder, paraparesis.       PDMP RESULTS:   Last filled MS Contin 30mg tablets #60 on  "3/20.    History   Drug Use Not on file         Tobacco Use      Smoking status: Former        Types: Cigarettes      Smokeless tobacco: Never        Objective:  Vital signs in last 24 hours:  B/P: 108/59, T: 98.1, P: 82, R: 16   Blood pressure 108/59, pulse 82, temperature 98.1  F (36.7  C), temperature source Oral, resp. rate 16, height 1.6 m (5' 3\"), weight 81.2 kg (179 lb 0.2 oz), SpO2 97%.        Review of Systems:   As per subjective, all others negative.    Physical Exam    General: in no apparent distress and non-toxic   HEENT: Head normocephalic atraumatic, oral mucosa moist. Sclerae anicteric  CV: Regular rhythm, normal rate, no murmurs  Resp: No wheezes, no rales or rhonchi, no focal consolidations  GI: Non-tender; +Bs  Skin: No rashes or lesions  Extremities: Bilateral LE edema (R>L)  Psych: Normal affect, mood euthymic  Neuro: Grossly normal          Imaging:  Personally Reviewed.    Results for orders placed or performed during the hospital encounter of 03/23/25   CT Femur Thigh Right w Contrast    Impression    IMPRESSION:  1.  Moderate subcutaneous edema from the mid thigh to ankle, nonspecific, but likely corresponding with reported cellulitis. No abscess. No soft tissue gas or deep fascial edema.    2.  Tibial and fibular hardware, without evidence of hardware complication.     CT Tibia Fibula Lower Leg Right w Contrast    Impression    IMPRESSION:  1.  Moderate subcutaneous edema from the mid thigh to ankle, nonspecific, but likely corresponding with reported cellulitis. No abscess. No soft tissue gas or deep fascial edema.    2.  Tibial and fibular hardware, without evidence of hardware complication.     US Lower Extremity Venous Duplex Bilateral    Impression    IMPRESSION:  1.  No deep venous thrombosis in the bilateral lower extremities.        Lab Results:  Personally Reviewed.   Last Comprehensive Metabolic Panel:  Sodium   Date Value Ref Range Status   03/24/2025 138 135 - 145 mmol/L Final "     Potassium   Date Value Ref Range Status   03/25/2025 3.8 3.4 - 5.3 mmol/L Final   08/18/2022 3.8 3.5 - 5.0 mmol/L Final     Chloride   Date Value Ref Range Status   03/24/2025 108 (H) 98 - 107 mmol/L Final   08/16/2022 104 98 - 107 mmol/L Final     Carbon Dioxide (CO2)   Date Value Ref Range Status   03/24/2025 21 (L) 22 - 29 mmol/L Final   08/16/2022 34 (H) 22 - 31 mmol/L Final     Anion Gap   Date Value Ref Range Status   03/24/2025 9 7 - 15 mmol/L Final   08/16/2022 3 (L) 5 - 18 mmol/L Final     Glucose   Date Value Ref Range Status   03/25/2025 113 (H) 70 - 99 mg/dL Final   08/17/2022 137 (H) 70 - 125 mg/dL Final     GLUCOSE BY METER POCT   Date Value Ref Range Status   03/25/2025 446 (H) 70 - 99 mg/dL Final     Urea Nitrogen   Date Value Ref Range Status   03/24/2025 12.1 8.0 - 23.0 mg/dL Final   08/16/2022 13 8 - 22 mg/dL Final     Creatinine   Date Value Ref Range Status   03/25/2025 0.86 0.51 - 0.95 mg/dL Final     GFR Estimate   Date Value Ref Range Status   03/25/2025 77 >60 mL/min/1.73m2 Final     Comment:     eGFR calculated using 2021 CKD-EPI equation.   09/14/2019 54 (L) >60 mL/min/1.73m2 Final     Calcium   Date Value Ref Range Status   03/24/2025 7.9 (L) 8.8 - 10.4 mg/dL Final        UA:   Amphetamines Urine   Date Value Ref Range Status   08/16/2022 Screen Negative Screen Negative Final     Barbiturates Urine   Date Value Ref Range Status   08/16/2022 Screen Negative Screen Negative Final     Cannabinoids Urine   Date Value Ref Range Status   08/16/2022 Screen Negative Screen Negative Final     Cocaine Urine   Date Value Ref Range Status   08/16/2022 Screen Negative Screen Negative Final     Opiates Urine   Date Value Ref Range Status   08/16/2022 Screen Positive (A) Screen Negative Final     PCP Urine   Date Value Ref Range Status   08/16/2022 Screen Negative Screen Negative Final              Please see A&P for additional details of medical decision making.      Everett Mckoy PA-C  Acute Care  Pain Management  Team  Hours of pain coverage Mon-Fri 8-1600, afterhours please call the primary team   [facility] (ANITRA, Kristin, [place], RH)   Page via Vocera text web console -Click for [employer]

## 2025-03-26 VITALS
WEIGHT: 179.01 LBS | RESPIRATION RATE: 16 BRPM | SYSTOLIC BLOOD PRESSURE: 127 MMHG | BODY MASS INDEX: 31.72 KG/M2 | OXYGEN SATURATION: 96 % | DIASTOLIC BLOOD PRESSURE: 67 MMHG | TEMPERATURE: 98 F | HEIGHT: 63 IN | HEART RATE: 73 BPM

## 2025-03-26 LAB
ANION GAP SERPL CALCULATED.3IONS-SCNC: 8 MMOL/L (ref 7–15)
BUN SERPL-MCNC: 7.1 MG/DL (ref 8–23)
CALCIUM SERPL-MCNC: 8.3 MG/DL (ref 8.8–10.4)
CHLORIDE SERPL-SCNC: 110 MMOL/L (ref 98–107)
CREAT SERPL-MCNC: 0.85 MG/DL (ref 0.51–0.95)
EGFRCR SERPLBLD CKD-EPI 2021: 78 ML/MIN/1.73M2
ERYTHROCYTE [DISTWIDTH] IN BLOOD BY AUTOMATED COUNT: 16.5 % (ref 10–15)
GLUCOSE BLDC GLUCOMTR-MCNC: 132 MG/DL (ref 70–99)
GLUCOSE BLDC GLUCOMTR-MCNC: 94 MG/DL (ref 70–99)
GLUCOSE SERPL-MCNC: 146 MG/DL (ref 70–99)
HCO3 SERPL-SCNC: 23 MMOL/L (ref 22–29)
HCT VFR BLD AUTO: 29.4 % (ref 35–47)
HGB BLD-MCNC: 8.7 G/DL (ref 11.7–15.7)
MAGNESIUM SERPL-MCNC: 2.1 MG/DL (ref 1.7–2.3)
MCH RBC QN AUTO: 23.1 PG (ref 26.5–33)
MCHC RBC AUTO-ENTMCNC: 29.6 G/DL (ref 31.5–36.5)
MCV RBC AUTO: 78 FL (ref 78–100)
PLATELET # BLD AUTO: 309 10E3/UL (ref 150–450)
POTASSIUM SERPL-SCNC: 3.8 MMOL/L (ref 3.4–5.3)
RBC # BLD AUTO: 3.76 10E6/UL (ref 3.8–5.2)
SODIUM SERPL-SCNC: 141 MMOL/L (ref 135–145)
WBC # BLD AUTO: 7.2 10E3/UL (ref 4–11)

## 2025-03-26 PROCEDURE — 99232 SBSQ HOSP IP/OBS MODERATE 35: CPT | Performed by: PHYSICIAN ASSISTANT

## 2025-03-26 PROCEDURE — 80051 ELECTROLYTE PANEL: CPT | Performed by: STUDENT IN AN ORGANIZED HEALTH CARE EDUCATION/TRAINING PROGRAM

## 2025-03-26 PROCEDURE — 80048 BASIC METABOLIC PNL TOTAL CA: CPT | Performed by: STUDENT IN AN ORGANIZED HEALTH CARE EDUCATION/TRAINING PROGRAM

## 2025-03-26 PROCEDURE — 250N000013 HC RX MED GY IP 250 OP 250 PS 637: Performed by: INTERNAL MEDICINE

## 2025-03-26 PROCEDURE — 99239 HOSP IP/OBS DSCHRG MGMT >30: CPT | Performed by: STUDENT IN AN ORGANIZED HEALTH CARE EDUCATION/TRAINING PROGRAM

## 2025-03-26 PROCEDURE — 250N000013 HC RX MED GY IP 250 OP 250 PS 637: Performed by: STUDENT IN AN ORGANIZED HEALTH CARE EDUCATION/TRAINING PROGRAM

## 2025-03-26 PROCEDURE — 85027 COMPLETE CBC AUTOMATED: CPT | Performed by: STUDENT IN AN ORGANIZED HEALTH CARE EDUCATION/TRAINING PROGRAM

## 2025-03-26 PROCEDURE — 250N000013 HC RX MED GY IP 250 OP 250 PS 637: Performed by: PHYSICIAN ASSISTANT

## 2025-03-26 PROCEDURE — 250N000011 HC RX IP 250 OP 636: Performed by: INTERNAL MEDICINE

## 2025-03-26 PROCEDURE — 36415 COLL VENOUS BLD VENIPUNCTURE: CPT | Performed by: STUDENT IN AN ORGANIZED HEALTH CARE EDUCATION/TRAINING PROGRAM

## 2025-03-26 PROCEDURE — 83735 ASSAY OF MAGNESIUM: CPT | Performed by: INTERNAL MEDICINE

## 2025-03-26 RX ORDER — CEFPODOXIME PROXETIL 200 MG/1
200 TABLET, FILM COATED ORAL 2 TIMES DAILY
DISCHARGE
Start: 2025-03-26 | End: 2025-03-31

## 2025-03-26 RX ORDER — MORPHINE SULFATE 15 MG/1
15 TABLET, FILM COATED, EXTENDED RELEASE ORAL EVERY 12 HOURS
Qty: 6 TABLET | Refills: 0 | Status: SHIPPED | OUTPATIENT
Start: 2025-03-26 | End: 2025-03-29

## 2025-03-26 RX ORDER — DOXYCYCLINE 100 MG/1
100 CAPSULE ORAL 2 TIMES DAILY
DISCHARGE
Start: 2025-03-26 | End: 2025-03-31

## 2025-03-26 RX ORDER — BACLOFEN 10 MG/1
10 TABLET ORAL 4 TIMES DAILY
DISCHARGE
Start: 2025-03-26

## 2025-03-26 RX ORDER — MORPHINE SULFATE 15 MG/1
15 TABLET ORAL 2 TIMES DAILY PRN
Qty: 6 TABLET | Refills: 0 | Status: SHIPPED | OUTPATIENT
Start: 2025-03-26 | End: 2025-03-29

## 2025-03-26 RX ADMIN — MORPHINE SULFATE 15 MG: 15 TABLET, EXTENDED RELEASE ORAL at 06:54

## 2025-03-26 RX ADMIN — PIPERACILLIN AND TAZOBACTAM 3.38 G: 3; .375 INJECTION, POWDER, FOR SOLUTION INTRAVENOUS at 12:24

## 2025-03-26 RX ADMIN — POLYETHYLENE GLYCOL 3350 17 G: 17 POWDER, FOR SOLUTION ORAL at 08:25

## 2025-03-26 RX ADMIN — VENLAFAXINE HYDROCHLORIDE 75 MG: 75 CAPSULE, EXTENDED RELEASE ORAL at 08:25

## 2025-03-26 RX ADMIN — BUPROPION HYDROCHLORIDE 150 MG: 150 TABLET, EXTENDED RELEASE ORAL at 08:26

## 2025-03-26 RX ADMIN — FERROUS SULFATE TAB 325 MG (65 MG ELEMENTAL FE) 325 MG: 325 (65 FE) TAB at 12:24

## 2025-03-26 RX ADMIN — ENOXAPARIN SODIUM 40 MG: 40 INJECTION SUBCUTANEOUS at 00:12

## 2025-03-26 RX ADMIN — LEVOTHYROXINE SODIUM 125 MCG: 0.12 TABLET ORAL at 08:26

## 2025-03-26 RX ADMIN — BACLOFEN 10 MG: 10 TABLET ORAL at 08:25

## 2025-03-26 RX ADMIN — INSULIN DEGLUDEC 15 UNITS: 100 INJECTION, SOLUTION SUBCUTANEOUS at 08:27

## 2025-03-26 RX ADMIN — PANTOPRAZOLE SODIUM 40 MG: 40 TABLET, DELAYED RELEASE ORAL at 08:25

## 2025-03-26 RX ADMIN — BACLOFEN 10 MG: 10 TABLET ORAL at 12:24

## 2025-03-26 RX ADMIN — VENLAFAXINE HYDROCHLORIDE 150 MG: 150 CAPSULE, EXTENDED RELEASE ORAL at 08:26

## 2025-03-26 RX ADMIN — ACETAMINOPHEN 650 MG: 325 TABLET ORAL at 08:26

## 2025-03-26 RX ADMIN — SENNOSIDES AND DOCUSATE SODIUM 2 TABLET: 50; 8.6 TABLET ORAL at 08:26

## 2025-03-26 RX ADMIN — ASPIRIN 81 MG CHEWABLE TABLET 81 MG: 81 TABLET CHEWABLE at 08:25

## 2025-03-26 RX ADMIN — TOPIRAMATE 50 MG: 50 TABLET, FILM COATED ORAL at 08:26

## 2025-03-26 RX ADMIN — PIPERACILLIN AND TAZOBACTAM 3.38 G: 3; .375 INJECTION, POWDER, FOR SOLUTION INTRAVENOUS at 04:21

## 2025-03-26 ASSESSMENT — ACTIVITIES OF DAILY LIVING (ADL)
ADLS_ACUITY_SCORE: 70
ADLS_ACUITY_SCORE: 69
ADLS_ACUITY_SCORE: 70
ADLS_ACUITY_SCORE: 70
ADLS_ACUITY_SCORE: 69
ADLS_ACUITY_SCORE: 70
ADLS_ACUITY_SCORE: 69
ADLS_ACUITY_SCORE: 70
ADLS_ACUITY_SCORE: 69
ADLS_ACUITY_SCORE: 70
ADLS_ACUITY_SCORE: 69
ADLS_ACUITY_SCORE: 70

## 2025-03-26 NOTE — PROGRESS NOTES
Care Management Discharge Note    Discharge Date: 03/26/2025       Discharge Disposition: Long Term Care    Discharge Services: Transportation Services    Discharge DME: None    Discharge Transportation: agency    Private pay costs discussed: transportation costs    Does the patient's insurance plan have a 3 day qualifying hospital stay waiver?  No    PAS Confirmation Code:  N/A d/t returning to LTC    Patient/family educated on Medicare website which has current facility and service quality ratings: yes    Education Provided on the Discharge Plan: Yes AVS per bedside nurse     Persons Notified of Discharge Plans: patient    Patient/Family in Agreement with the Plan: yes    Handoff Referral Completed: No, handoff not indicated or clinically appropriate    Additional Information:    Pt discharging back to LT today.    Wheelchair transport (from [facility]) secured to [facility], today Wed 3/26, pickup at 1500.     Pt agreeable to this discharge plan.    Discharge plans confirmed with facility admissions staff [place].  Discharge orders sent to facility via [employer].  [facility]UC to fax to facility/send with patient: discharge orders, hard scripts, additional discharge paperwork, as per protocol.      No further care management intervention anticipated at this time.  Please re-consult if further needs arise.  Care management signing off.      Praneeth Carrero RN

## 2025-03-26 NOTE — PLAN OF CARE
Problem: Skin Injury Risk Increased  Goal: Skin Health and Integrity  Outcome: Progressing  Intervention: Plan: Nurse Driven Intervention: Moisture Management  Recent Flowsheet Documentation  Taken 3/26/2025 0000 by Xiomy Hill RN  Moisture Interventions:   Encourage regular toileting   Urinary collection device  Intervention: Plan: Nurse Driven Intervention: Friction and Shear  Recent Flowsheet Documentation  Taken 3/26/2025 0000 by Xiomy Hill RN  Friction/Shear Interventions: HOB 30 degrees or less  Intervention: Optimize Skin Protection  Recent Flowsheet Documentation  Taken 3/26/2025 0000 by Xiomy Hill RN  Skin Protection: adhesive use limited  Activity Management: activity adjusted per tolerance  Head of Bed (HOB) Positioning: HOB at 30-45 degrees     Problem: Adult Inpatient Plan of Care  Goal: Optimal Comfort and Wellbeing  Outcome: Progressing     Problem: Adult Inpatient Plan of Care  Goal: Readiness for Transition of Care  Outcome: Progressing     Goal Outcome Evaluation:  Note from [id number]. VSS on RA. Denies shortness of breath. NSR on tele. A&Ox4. Denies pain. No new skin issues noted. Dressing on right leg is CDI. Tingling present in lower extremities per pt. Ao2 with walker/gait belt for transferring. L PIV infusing zosyn. Pt up in chair. Education of medication administration and use of call-light to reduce risk for falls and injury. Plan to discharge to LTC once pain is under control.

## 2025-03-26 NOTE — PROGRESS NOTES
[facility] ACUTE INPATIENT PAIN SERVICE    [facility], [facility], [facility], [facility], [place]   PAIN PROGRESS          ASSESSMENT/ PLAN:    Pain is consistent with acute exacerbation of right lower extremity cellulitis     Admission has been complicated by concerns with oversedation. Medications have been adjusted (3/25) to split her morphine and baclofen dose throughout the day    Opioids received in the past 24h:  *(2) MS Contin 15mg tablets  TOTAL MME = 30     Multimodal Medication Therapy:   Adjuvants:   - APAP 650mg q4h prn  - Topical Lidocaine   - Effexor 225mg daily  - Topamax 50mg daily  - Wellbutrin 150mg daily  - Baclofen 15mg tid decreased to 10mg qid   Opioids:   - MS Contin 15mg bid  - MSIR 15mg bid prn. Hold parameters placed  Non-medication interventions- Ice, PT  Constipation Prophylaxis- Scheduled miralax, Senna-S  Follow up /Discharge Recommendations - We recommend prescribing the following at the time of discharge:    Discharge on MS Contin 15mg bid with morphine IR 15mg bid prn     Subjective:  Tonya is seen today in her bed alert and oriented in no acute distress. She is a poor historian and has a difficult times recalling previous events. Reports her pain is currently a 3/10 located in her right lower extremity. Medications were adjusted yesterday and reports pain as over tolerable. Chronic back pain and lower extremity spacticity is controlled.     Denies nausea, vomiting, constipation.      Reviewed continuing with current plan, all questions answered.      HPI:  Tonya Valera is a 59 year old female who was admitted on 3/23/2025.  I was asked by Dr. Garfield Armas to see the patient for management of her acute right lower extremity cellulitis in the setting of concerns with oversedation and chronic opioid use. Previously admitted last February for similar issues. History of HTN, T2DM, HLD, Vitamin D deficiency, Bipolar disorder, paraparesis.        PDMP RESULTS:   Last filled MS Contin  "30mg tablets #60 on 3/20.       History   Drug Use Not on file         Tobacco Use      Smoking status: Former        Types: Cigarettes      Smokeless tobacco: Never        Objective:  Vital signs in last 24 hours:  B/P: 166/73, T: 98.3, P: 81, R: 20   Blood pressure (!) 166/73, pulse 81, temperature 98.3  F (36.8  C), temperature source Oral, resp. rate 20, height 1.6 m (5' 3\"), weight 81.2 kg (179 lb 0.2 oz), SpO2 98%.        Review of Systems:   As per subjective, all others negative.    Physical Exam    General: in no apparent distress and non-toxic   HEENT: Head normocephalic atraumatic, oral mucosa moist. Sclerae anicteric  CV: Regular rhythm, normal rate, no murmurs  Resp: No wheezes, no rales or rhonchi, no focal consolidations  GI: Non-tender; +Bs  Skin: No rashes or lesions  Extremities: Bilateral LE edema (R>L)  Psych: Normal affect, mood euthymic  Neuro: Grossly normal          Imaging:  Personally Reviewed.    Results for orders placed or performed during the hospital encounter of 03/23/25   CT Femur Thigh Right w Contrast    Impression    IMPRESSION:  1.  Moderate subcutaneous edema from the mid thigh to ankle, nonspecific, but likely corresponding with reported cellulitis. No abscess. No soft tissue gas or deep fascial edema.    2.  Tibial and fibular hardware, without evidence of hardware complication.     CT Tibia Fibula Lower Leg Right w Contrast    Impression    IMPRESSION:  1.  Moderate subcutaneous edema from the mid thigh to ankle, nonspecific, but likely corresponding with reported cellulitis. No abscess. No soft tissue gas or deep fascial edema.    2.  Tibial and fibular hardware, without evidence of hardware complication.     US Lower Extremity Venous Duplex Bilateral    Impression    IMPRESSION:  1.  No deep venous thrombosis in the bilateral lower extremities.        Lab Results:  Personally Reviewed.   Last Comprehensive Metabolic Panel:  Sodium   Date Value Ref Range Status   03/26/2025 " 141 135 - 145 mmol/L Final     Potassium   Date Value Ref Range Status   03/26/2025 3.8 3.4 - 5.3 mmol/L Final   08/18/2022 3.8 3.5 - 5.0 mmol/L Final     Chloride   Date Value Ref Range Status   03/26/2025 110 (H) 98 - 107 mmol/L Final   08/16/2022 104 98 - 107 mmol/L Final     Carbon Dioxide (CO2)   Date Value Ref Range Status   03/26/2025 23 22 - 29 mmol/L Final   08/16/2022 34 (H) 22 - 31 mmol/L Final     Anion Gap   Date Value Ref Range Status   03/26/2025 8 7 - 15 mmol/L Final   08/16/2022 3 (L) 5 - 18 mmol/L Final     Glucose   Date Value Ref Range Status   03/26/2025 146 (H) 70 - 99 mg/dL Final   08/17/2022 137 (H) 70 - 125 mg/dL Final     GLUCOSE BY METER POCT   Date Value Ref Range Status   03/25/2025 115 (H) 70 - 99 mg/dL Final     Urea Nitrogen   Date Value Ref Range Status   03/26/2025 7.1 (L) 8.0 - 23.0 mg/dL Final   08/16/2022 13 8 - 22 mg/dL Final     Creatinine   Date Value Ref Range Status   03/26/2025 0.85 0.51 - 0.95 mg/dL Final     GFR Estimate   Date Value Ref Range Status   03/26/2025 78 >60 mL/min/1.73m2 Final     Comment:     eGFR calculated using 2021 CKD-EPI equation.   09/14/2019 54 (L) >60 mL/min/1.73m2 Final     Calcium   Date Value Ref Range Status   03/26/2025 8.3 (L) 8.8 - 10.4 mg/dL Final        UA:   Amphetamines Urine   Date Value Ref Range Status   08/16/2022 Screen Negative Screen Negative Final     Barbiturates Urine   Date Value Ref Range Status   08/16/2022 Screen Negative Screen Negative Final     Cannabinoids Urine   Date Value Ref Range Status   08/16/2022 Screen Negative Screen Negative Final     Cocaine Urine   Date Value Ref Range Status   08/16/2022 Screen Negative Screen Negative Final     Opiates Urine   Date Value Ref Range Status   08/16/2022 Screen Positive (A) Screen Negative Final     PCP Urine   Date Value Ref Range Status   08/16/2022 Screen Negative Screen Negative Final        Coordinated care with Dr. Garfield Armas via phone.      Please see A&P for  additional details of medical decision making.         Everett Mckoy PA-C  Acute Care Pain Management  Team  Hours of pain coverage Mon-Fri 8-1600, afterhours please call the primary team    [phone] (ANITRA, Kristin, SD, RH)   Page via Vocera text web console -Click for Film [employer]

## 2025-03-26 NOTE — PROGRESS NOTES
Care Management Follow Up    Length of Stay (days): 2    Expected Discharge Date: 03/26/2025     Concerns to be Addressed: discharge planning     Patient plan of care discussed at interdisciplinary rounds: Yes    Anticipated Discharge Disposition: Long Term Care      Anticipated Discharge Services: Transportation Services.      Anticipated Discharge DME: None    Patient/family educated on Medicare website which has current facility and service quality ratings: yes  Education Provided on the Discharge Plan: Yes  Patient/Family in Agreement with the Plan: yes    Referrals Placed by CM/SW: Post Acute Facilities  Private pay costs discussed: Transportation    Discussed  Partnership in Safe Discharge Planning  document with patient/family: No     Handoff Completed: No, handoff not indicated or clinically appropriate    Additional Information:    Per Dr. Dixon, anticipate discharge today.      Update given to bedside RN, Charge RN, [facility].  Update given to Poonam at [facility].  Update given to pt.  Pt agreeable to discharge plans.    PAS not needed d/t returning to [facility].    11:08 AM  Poonam at [facility] states they will provide transportation for pt's return.    Wheelchair transport (from [facility]) secured to [facility], today Wed 3/26, pickup at 1500.     Update given to bedside RN, Charge RN, [facility].  Update given to Poonam at [facility].    [facility] transport - CM called; spoke to [place] and cancelled [facility] transport.    Next Steps:   Discharge orders.    CM will continue to follow.     Praneeth Carrero RN

## 2025-03-26 NOTE — PLAN OF CARE
Problem: Adult Inpatient Plan of Care  Goal: Readiness for Transition of Care  Outcome: Met   Goal Outcome Evaluation:  Pt pending discharge this evening back to to [facility], today Wed 3/26, pickup at 1500 with wheel chair transport. Pt vitally stable, pain managed. Alarms on for pt safety, Contact precautions maintained. Uses call light appropriately for needs                        Vaginal Delivery

## 2025-03-26 NOTE — DISCHARGE SUMMARY
"[facility]  Hospitalist Discharge Summary      Date of Admission:  3/23/2025  Date of Discharge:  3/26/2025  2:57 PM  Discharging Provider: Garfield Dixon MD  Discharge Service: Hospitalist Service    Discharge Diagnoses   Cellulitis  Borderline personality disorder  Anxiety disorder  Major depressive disorder  Chronic pain  Oversedation    Clinically Significant Risk Factors     # DMII: A1C = 8.0 % (Ref range: <5.7 %) within past 6 months  # Obesity: Estimated body mass index is 31.71 kg/m  as calculated from the following:    Height as of this encounter: 1.6 m (5' 3\").    Weight as of this encounter: 81.2 kg (179 lb 0.2 oz).       Follow-ups Needed After Discharge   Follow-up Appointments       Follow Up and recommended labs and tests      Follow up with alf physician.  The following labs/tests are recommended: CBC/BMP        Hospital Follow-up with Existing Primary Care Provider (PCP)      Please see details below         Schedule Primary Care visit within: 14 Days           {Additional follow-up instructions/to-do's for PCP        Unresulted Labs Ordered in the Past 30 Days of this Admission       Date and Time Order Name Status Description    3/23/2025  9:15 PM Blood Culture Peripheral Blood Preliminary     3/23/2025  9:15 PM Blood Culture Peripheral Blood Preliminary         [place] [facility]    [place]charge Disposition   Discharged to the facility  Condition at discharge: Stable    Hospital Course   59-year-old female resides in a care facility mostly dependent on others presented with right lower extremity cellulitis.  She was treated with IV antibiotics transition to oral, WOC was consulted and placed orders.  There were concerns about oversedation patient was falling asleep with pills in her hand and required sternal rubs given this pain team was consulted and medication adjustments were made.  Her long-acting morphine was changed from 30 mg twice daily " "to 15 mg twice daily and 15 mg of instant release was added as needed twice daily.  On day of discharge patient reported that she was mad and wanted to stay another day to see if the pain medications were still working however she was quite comfortable appearing in bed and did not require any of her as needed doses.  Her mood remained labile throughout her hospital stay reporting displeasure with at times the pain medicine team and myself her TCU that wound care providers at TCU and the staff at her TCU.  She reported that she needs doctors orders for the wound care and the TCU does not fall, encouraged patient to be her own advocate and then I will write orders and make sure that she asked the nurses to change her dressings if they are soiled or per the orders.  At that time she seemed agitated that she was going to be discharged back to her facility only due to with the pain medication adjustments not due to her cellulitis of her leg which was the primary reason why she was here, reassuringly she was seen smiling and being wheeled out the door in the lobby and waved to me and said \"sayonara\" as she was leaving. Did not appear uncomfortable and was pleasant. I would recommend further titration of narcotics if over sedation continues to be a problem.  Baclofen dosing was also adjusted per pain medicine.     It should be noted that her MRSA nares were positive given this did discharge her with appropriate coverage that included MRSA    Consultations This Hospital Stay   PHARMACY TO DOSE VANCO  PHARMACY TO DOSE VANCO  WOUND OSTOMY CONTINENCE NURSE  IP CONSULT  CARE MANAGEMENT / SOCIAL WORK IP CONSULT  PAIN MANAGEMENT ADULT IP CONSULT  PHYSICAL THERAPY ADULT IP CONSULT  OCCUPATIONAL THERAPY ADULT IP CONSULT    Code Status   Prior    Time Spent on this Encounter   I, Garfield Dixon MD, personally saw the patient today and spent greater than 30 minutes discharging this patient.       Garfield Dixon MD  [facility] " [facility]  [address] [address]  Phone: [phone]  Fax: [phone]  ______________________________________________________________________    Physical Exam   Vital Signs: Temp: 98  F (36.7  C) Temp src: Oral BP: 127/67 Pulse: 73   Resp: 16 SpO2: 96 % O2 Device: None (Room air)    Weight: 179 lbs .22 oz  Gen: Appears well, NAD, on RA  Card:Warm well perfused, pulses assumed patient talking  Pulm: Normal I/E effort on RA  Abd:Non distended  Skin:No obvious rashes or lesions on exposed areas of skin, previously erythematous demarcated line which has grown has resolved no further erythema seen extending through the lines and actually has receded almost no erythema present on the leg.  Neuro AxOx4, S/S grossly intact, no obvious FND,   Psych:Pleasant, answering questions appropriately, insight good, judgement good, does not appear to be responding to I/E stimuli        Primary Care Physician   Annia Frias    Discharge Orders      Reason for your hospital stay    Cellulitis, oversedation     Activity    Your activity upon discharge: activity as tolerated     Wound care and dressings    RLE  1. Soak one kerlix roll with vashe, wrap around leg, soak for 20-30 minutes, wipe clean to help debride flaking skin  2. Apply sween cream to whole leg on intact skin  3. Cover open areas with xeroform then abd pads  4. Secure with kerlix rolls  5. Change daily + PRN is soiled     General info for SNF    Length of Stay Estimate: Long Term Care  Condition at Discharge: Improving  Level of care:skilled   Rehabilitation Potential: Good  Admission H&P remains valid and up-to-date: Yes  Recent Chemotherapy: N/A  Use Nursing Home Standing Orders: Yes     Mantoux instructions    Give two-step Mantoux (PPD) Per Facility Policy Yes     Follow Up and recommended labs and tests    Follow up with residential physician.  The following labs/tests are recommended: CBC/BMP     Reason for your  hospital stay    Cellulitis     Activity - Up ad agustina     Physical Therapy Adult Consult    Evaluate and treat as clinically indicated.    Reason:  weakness     Occupational Therapy Adult Consult    Evaluate and treat as clinically indicated.    Reason:  weakness     Contact Isolation    Hx of MRSA     Diet    Follow this diet upon discharge: Current Diet:Orders Placed This Encounter      Combination Diet Low Saturated Fat Na <2400mg Diet, No Caffeine Diet     Diet    Follow this diet upon discharge: Current Diet:Orders Placed This Encounter      Combination Diet Low Saturated Fat Na <2400mg Diet, No Caffeine Diet      Diet     Hospital Follow-up with Existing Primary Care Provider (PCP)    Please see details below            Significant Results and Procedures   Results for orders placed or performed during the hospital encounter of 03/23/25   CT Femur Thigh Right w Contrast    Narrative    EXAM: CT TIBIA FIBULA LOWER LEG RIGHT W CONTRAST, CT FEMUR THIGH RIGHT W CONTRAST  LOCATION: [facility]  DATE: 3/23/2025    INDICATION: Right lower extremity cellulitis. Evaluate for necrotizing fasciitis.  COMPARISON: None.  TECHNIQUE: IV contrast. Axial, sagittal and coronal thin-section reconstruction. Dose reduction techniques were used.   CONTRAST: 100ml Isovue 370    FINDINGS:     BONES:  -Diffuse osteopenia.  -No evidence of acute fracture or dislocation.  -Tibial intramedullary nail with proximal and distal interlocking screws. Plate and screw fixation of the distal fibula. 2 screws in the medial malleolus. Hardware intact and in satisfactory alignment. No evidence of hardware complication.  -Degenerative changes of the visualized lumbar spine.    SOFT TISSUES:  -Moderate subcutaneous edema of the mid to distal thigh. Moderate subcutaneous edema of the leg and ankle, most pronounced distally. Mild subcutaneous edema of the dorsal foot.  -No rim-enhancing fluid collection.  -No soft tissue gas. No  deep fascial edema.  -Visualized musculature appears unremarkable.  -No significant knee joint effusion.  -Enlarged right inguinal lymph nodes, measuring up to 1.5 cm in short axis, likely reactive.    OTHER FINDINGS:  -Status post hysterectomy.      Impression    IMPRESSION:  1.  Moderate subcutaneous edema from the mid thigh to ankle, nonspecific, but likely corresponding with reported cellulitis. No abscess. No soft tissue gas or deep fascial edema.    2.  Tibial and fibular hardware, without evidence of hardware complication.     CT Tibia Fibula Lower Leg Right w Contrast    Narrative    EXAM: CT TIBIA FIBULA LOWER LEG RIGHT W CONTRAST, CT FEMUR THIGH RIGHT W CONTRAST  LOCATION: [facility]  DATE: 3/23/2025    INDICATION: Right lower extremity cellulitis. Evaluate for necrotizing fasciitis.  COMPARISON: None.  TECHNIQUE: IV contrast. Axial, sagittal and coronal thin-section reconstruction. Dose reduction techniques were used.   CONTRAST: 100ml Isovue 370    FINDINGS:     BONES:  -Diffuse osteopenia.  -No evidence of acute fracture or dislocation.  -Tibial intramedullary nail with proximal and distal interlocking screws. Plate and screw fixation of the distal fibula. 2 screws in the medial malleolus. Hardware intact and in satisfactory alignment. No evidence of hardware complication.  -Degenerative changes of the visualized lumbar spine.    SOFT TISSUES:  -Moderate subcutaneous edema of the mid to distal thigh. Moderate subcutaneous edema of the leg and ankle, most pronounced distally. Mild subcutaneous edema of the dorsal foot.  -No rim-enhancing fluid collection.  -No soft tissue gas. No deep fascial edema.  -Visualized musculature appears unremarkable.  -No significant knee joint effusion.  -Enlarged right inguinal lymph nodes, measuring up to 1.5 cm in short axis, likely reactive.    OTHER FINDINGS:  -Status post hysterectomy.      Impression    IMPRESSION:  1.  Moderate subcutaneous  edema from the mid thigh to ankle, nonspecific, but likely corresponding with reported cellulitis. No abscess. No soft tissue gas or deep fascial edema.    2.  Tibial and fibular hardware, without evidence of hardware complication.     US Lower Extremity Venous Duplex Bilateral    Narrative    EXAM: US LOWER EXTREMITY VENOUS DUPLEX BILATERAL  LOCATION: [facility]  DATE: 3/24/2025    INDICATION: leg pain swelling hx of DVT  COMPARISON: None.  TECHNIQUE: Venous Duplex ultrasound of bilateral lower extremities with and without compression, augmentation and duplex. Color flow and spectral Doppler with waveform analysis performed.    FINDINGS: Exam includes the common femoral, femoral, popliteal veins as well as segmentally visualized deep calf veins and greater saphenous vein.     RIGHT: No deep vein thrombosis. No superficial thrombophlebitis. No popliteal cyst.    LEFT: No deep vein thrombosis. No superficial thrombophlebitis. No popliteal cyst.      Impression    IMPRESSION:  1.  No deep venous thrombosis in the bilateral lower extremities.       Discharge Medications   Discharge Medication List as of 3/26/2025  1:58 PM        START taking these medications    Details   cefpodoxime (VANTIN) 200 MG tablet Take 1 tablet (200 mg) by mouth 2 times daily for 5 days., Transitional      doxycycline hyclate (VIBRAMYCIN) 100 MG capsule Take 1 capsule (100 mg) by mouth 2 times daily for 5 days., Transitional      morphine (MSIR) 15 MG IR tablet Take 1 tablet (15 mg) by mouth 2 times daily as needed for moderate pain., Disp-6 tablet, R-0, Local Print           CONTINUE these medications which have CHANGED    Details   baclofen (LIORESAL) 10 MG tablet Take 1 tablet (10 mg) by mouth 4 times daily., TransitionalGive 1 hour apart from any medications that cause sedation      morphine (MS CONTIN) 15 MG CR tablet Take 1 tablet (15 mg) by mouth every 12 hours for 3 days., Disp-6 tablet, R-0, Local Print            CONTINUE these medications which have NOT CHANGED    Details   acetaminophen (TYLENOL) 325 MG tablet Take 650 mg by mouth every 6 hours as needed for mild pain., Historical      aspirin (ASA) 81 MG chewable tablet Take 81 mg by mouth daily, Historical      benzocaine (ORAJEL MAXIMUM STRENGTH) 20 % GEL gel Take by mouth every 4 hours as needed for mouth sores.Historical      bisacodyl (DULCOLAX) 10 MG suppository Place 10 mg rectally daily as needed for constipation, Historical      buPROPion (WELLBUTRIN XL) 150 MG 24 hr tablet Take 150 mg by mouth every morning., Historical      calcium carbonate 600 mg-vitamin D 400 units (CALTRATE) 600-400 MG-UNIT per tablet Take 1 tablet by mouth daily, Historical      camphor-menthol (DERMASARRA) 0.5-0.5 % external lotion Apply topically every 4 hours as needed for skin care (itching)., Historical      diphenhydrAMINE-zinc acetate (BENADRYL) 1-0.1 % external cream Apply topically 2 times daily as needed for itching.Historical      ferrous sulfate (FEROSUL) 325 (65 Fe) MG tablet Take 325 mg by mouth daily., Historical      furosemide (LASIX) 20 MG tablet Take 20 mg by mouth daily., Historical      insulin degludec (TRESIBA) 100 UNIT/ML pen Inject 15 Units subcutaneously every morning., Historical      !! levothyroxine (SYNTHROID/LEVOTHROID) 125 MCG tablet Take 125 mcg by mouth See Admin Instructions Daily on Tuesday, Wednesday, Thursday, Saturday, and Sunday, Historical      !! levothyroxine (SYNTHROID/LEVOTHROID) 125 MCG tablet Take 187.5 mcg by mouth See Admin Instructions On Mondays, Fridays, Historical      lisinopril (ZESTRIL) 2.5 MG tablet Take 2.5 mg by mouth daily, Historical      loperamide (IMODIUM) 2 MG capsule Take 2 mg by mouth 4 times daily as needed for diarrhea., Historical      magnesium hydroxide (MOM) 2400 MG/10ML SUSP Take 5 mLs by mouth daily as needed for constipation., Historical      mineral oil-white petrolatum (EUCERIN/MINERIN) cream Apply topically  2 times daily. Apply to legs topically two times a day for dry skin.Historical      Multiple Vitamins-Minerals (MULTIVITAMIN GUMMIES WOMENS) CHEW Take 1 chew tab by mouth daily, Historical      naloxone (NARCAN) 4 MG/0.1ML nasal spray Spray 4 mg into one nostril alternating nostrils as needed for opioid reversal. every 2-3 minutes until assistance arrives, Historical      Nutritional Supplements (SANTIAGO) PACK Take 1 packet by mouth 2 times daily., Historical      nystatin (MYCOSTATIN) 266816 UNIT/GM external powder Apply topically every 12 hours as needed. For rednessHistorical      omeprazole (PRILOSEC) 20 MG DR capsule Take 20 mg by mouth daily before breakfast, Historical      ondansetron (ZOFRAN) 4 MG tablet Take 4 mg by mouth every 6 hours as needed for nausea., Historical      polyethylene glycol (MIRALAX) 17 GM/Dose powder Take 17 g by mouth daily., Historical      rosuvastatin (CRESTOR) 20 MG tablet Take 20 mg by mouth At Bedtime, Historical      Semaglutide, 1 MG/DOSE, (OZEMPIC) 4 MG/3ML pen Inject 1 mg subcutaneously every 7 days. On Wednesdays., Historical      simethicone (MYLICON) 125 MG chewable tablet Take 125 mg by mouth every 6 hours as needed for intestinal gas., Historical      topiramate (TOPAMAX) 50 MG tablet Take 50 mg by mouth daily., Historical      !! venlafaxine (EFFEXOR-XR) 150 MG 24 hr capsule Take 150 mg by mouth daily Take with 75 mg capsule for a total dose of 225 mg., Historical      !! venlafaxine (EFFEXOR-XR) 75 MG 24 hr capsule Take 75 mg by mouth daily Take with 150 mg capsule for a total dose of 225 mg., Historical       !! - Potential duplicate medications found. Please discuss with provider.        Allergies   Allergies   Allergen Reactions    Compazine [Prochlorperazine]     Latex      Latex sensitive     Metformin     Statins [Statins]     Sulfa Antibiotics Itching

## 2025-03-26 NOTE — PLAN OF CARE
Pt A&O x4. Intermittent forgetfulness. VSS on RA. Pain managed with scheduled pain meds. 1 episode of N/V this afternoon with ambulation to the bathroom. Pt reports she is very sensitive to motion sickness. Episodes of nausea also possibly related to anxiety. PIV infusing zosyn and vanco. Tele NSR. Pt attempted to have BM but was not successful. Voiding using purewick. Blood sugars stable and no insulin correction needed. Pt ate bites of jello at dinner time as she was not hungry, began taking sips of soup at 2320.    Problem: Adult Inpatient Plan of Care  Goal: Optimal Comfort and Wellbeing  Outcome: Progressing     Problem: Skin Injury Risk Increased  Goal: Skin Health and Integrity  Outcome: Progressing   Goal Outcome Evaluation:

## 2025-03-27 ENCOUNTER — PATIENT OUTREACH (OUTPATIENT)
Dept: CARE COORDINATION | Facility: CLINIC | Age: 60
End: 2025-03-27
Payer: COMMERCIAL

## 2025-03-27 NOTE — PROGRESS NOTES
Connected Care Resource Center: [facility]    Background: Transitional Care Management program identified per system criteria and reviewed by [facility] Resource [place] team for possible outreach.    Assessment: Upon chart review, [facility] Team member will not proceed with patient outreach related to this episode of Transitional Care Management program due to reason below:    Patient has discharged to a Memory Care, Long-term Care, Assisted Living or Group Home where patient is receiving on-site support with their daily cares, including support with hospital follow up plan.    Plan: Transitional Care Management episode addressed appropriately per reason noted above.      Patricia Sousa RN  Connected Care Resource [place], [facility]    *Connected Care Resource Team does NOT follow patient ongoing. Referrals are identified based on internal discharge reports and the outreach is to ensure patient has an understanding of their discharge instructions.

## 2025-03-29 LAB
BACTERIA BLD CULT: NO GROWTH
BACTERIA BLD CULT: NO GROWTH

## 2025-04-01 ENCOUNTER — LAB REQUISITION (OUTPATIENT)
Dept: LAB | Facility: CLINIC | Age: 60
End: 2025-04-01
Payer: MEDICARE

## 2025-04-01 DIAGNOSIS — F41.1 GENERALIZED ANXIETY DISORDER: ICD-10-CM

## 2025-04-02 ENCOUNTER — DOCUMENTATION ONLY (OUTPATIENT)
Dept: OTHER | Facility: CLINIC | Age: 60
End: 2025-04-02
Payer: MEDICARE

## 2025-04-02 LAB — TSH SERPL DL<=0.005 MIU/L-ACNC: 1.5 UIU/ML (ref 0.3–4.2)

## 2025-04-02 PROCEDURE — P9604 ONE-WAY ALLOW PRORATED TRIP: HCPCS | Mod: ORL | Performed by: FAMILY MEDICINE

## 2025-04-02 PROCEDURE — 36415 COLL VENOUS BLD VENIPUNCTURE: CPT | Mod: ORL | Performed by: FAMILY MEDICINE

## 2025-04-02 PROCEDURE — 84443 ASSAY THYROID STIM HORMONE: CPT | Mod: ORL | Performed by: FAMILY MEDICINE

## 2025-06-03 ENCOUNTER — LAB REQUISITION (OUTPATIENT)
Dept: LAB | Facility: CLINIC | Age: 60
End: 2025-06-03
Payer: MEDICARE

## 2025-06-03 DIAGNOSIS — I25.10 ATHEROSCLEROTIC HEART DISEASE OF NATIVE CORONARY ARTERY WITHOUT ANGINA PECTORIS: ICD-10-CM

## 2025-06-03 DIAGNOSIS — D64.9 ANEMIA, UNSPECIFIED: ICD-10-CM

## 2025-06-03 DIAGNOSIS — R79.82 ELEVATED C-REACTIVE PROTEIN (CRP): ICD-10-CM

## 2025-06-04 LAB
ANION GAP SERPL CALCULATED.3IONS-SCNC: 11 MMOL/L (ref 7–15)
BASOPHILS # BLD AUTO: 0.1 10E3/UL (ref 0–0.2)
BASOPHILS NFR BLD AUTO: 1 %
BUN SERPL-MCNC: 28.2 MG/DL (ref 8–23)
CALCIUM SERPL-MCNC: 9.4 MG/DL (ref 8.8–10.4)
CHLORIDE SERPL-SCNC: 97 MMOL/L (ref 98–107)
CREAT SERPL-MCNC: 1.03 MG/DL (ref 0.51–0.95)
CRP SERPL-MCNC: 97.8 MG/L
EGFRCR SERPLBLD CKD-EPI 2021: 62 ML/MIN/1.73M2
EOSINOPHIL # BLD AUTO: 0.1 10E3/UL (ref 0–0.7)
EOSINOPHIL NFR BLD AUTO: 1 %
ERYTHROCYTE [DISTWIDTH] IN BLOOD BY AUTOMATED COUNT: 16.6 % (ref 10–15)
GLUCOSE SERPL-MCNC: 164 MG/DL (ref 70–99)
HCO3 SERPL-SCNC: 24 MMOL/L (ref 22–29)
HCT VFR BLD AUTO: 36.9 % (ref 35–47)
HGB BLD-MCNC: 10.7 G/DL (ref 11.7–15.7)
IMM GRANULOCYTES # BLD: 0.1 10E3/UL
IMM GRANULOCYTES NFR BLD: 2 %
LYMPHOCYTES # BLD AUTO: 1.4 10E3/UL (ref 0.8–5.3)
LYMPHOCYTES NFR BLD AUTO: 17 %
MCH RBC QN AUTO: 22.1 PG (ref 26.5–33)
MCHC RBC AUTO-ENTMCNC: 29 G/DL (ref 31.5–36.5)
MCV RBC AUTO: 76 FL (ref 78–100)
MONOCYTES # BLD AUTO: 0.6 10E3/UL (ref 0–1.3)
MONOCYTES NFR BLD AUTO: 7 %
NEUTROPHILS # BLD AUTO: 5.7 10E3/UL (ref 1.6–8.3)
NEUTROPHILS NFR BLD AUTO: 72 %
NRBC # BLD AUTO: 0 10E3/UL
NRBC BLD AUTO-RTO: 0 /100
PLATELET # BLD AUTO: 548 10E3/UL (ref 150–450)
POTASSIUM SERPL-SCNC: 4.8 MMOL/L (ref 3.4–5.3)
RBC # BLD AUTO: 4.84 10E6/UL (ref 3.8–5.2)
SODIUM SERPL-SCNC: 132 MMOL/L (ref 135–145)
WBC # BLD AUTO: 7.9 10E3/UL (ref 4–11)

## 2025-06-04 PROCEDURE — 85025 COMPLETE CBC W/AUTO DIFF WBC: CPT | Mod: ORL | Performed by: FAMILY MEDICINE

## 2025-06-04 PROCEDURE — P9604 ONE-WAY ALLOW PRORATED TRIP: HCPCS | Mod: ORL | Performed by: FAMILY MEDICINE

## 2025-06-04 PROCEDURE — 80048 BASIC METABOLIC PNL TOTAL CA: CPT | Mod: ORL | Performed by: FAMILY MEDICINE

## 2025-06-04 PROCEDURE — 36415 COLL VENOUS BLD VENIPUNCTURE: CPT | Mod: ORL | Performed by: FAMILY MEDICINE

## 2025-06-04 PROCEDURE — 86140 C-REACTIVE PROTEIN: CPT | Mod: ORL | Performed by: FAMILY MEDICINE

## 2025-06-20 ENCOUNTER — LAB REQUISITION (OUTPATIENT)
Dept: LAB | Facility: CLINIC | Age: 60
End: 2025-06-20
Payer: MEDICARE

## 2025-06-20 DIAGNOSIS — D64.9 ANEMIA, UNSPECIFIED: ICD-10-CM

## 2025-06-20 DIAGNOSIS — I50.82 BIVENTRICULAR HEART FAILURE (H): ICD-10-CM

## 2025-06-26 LAB
ANION GAP SERPL CALCULATED.3IONS-SCNC: 10 MMOL/L (ref 7–15)
BUN SERPL-MCNC: 12.4 MG/DL (ref 8–23)
CALCIUM SERPL-MCNC: 9 MG/DL (ref 8.8–10.4)
CHLORIDE SERPL-SCNC: 100 MMOL/L (ref 98–107)
CREAT SERPL-MCNC: 0.74 MG/DL (ref 0.51–0.95)
EGFRCR SERPLBLD CKD-EPI 2021: >90 ML/MIN/1.73M2
ERYTHROCYTE [DISTWIDTH] IN BLOOD BY AUTOMATED COUNT: 18.5 % (ref 10–15)
GLUCOSE SERPL-MCNC: 187 MG/DL (ref 70–99)
HCO3 SERPL-SCNC: 26 MMOL/L (ref 22–29)
HCT VFR BLD AUTO: 32.1 % (ref 35–47)
HGB BLD-MCNC: 9 G/DL (ref 11.7–15.7)
MCH RBC QN AUTO: 22.3 PG (ref 26.5–33)
MCHC RBC AUTO-ENTMCNC: 28 G/DL (ref 31.5–36.5)
MCV RBC AUTO: 80 FL (ref 78–100)
PLATELET # BLD AUTO: 575 10E3/UL (ref 150–450)
POTASSIUM SERPL-SCNC: 4 MMOL/L (ref 3.4–5.3)
RBC # BLD AUTO: 4.03 10E6/UL (ref 3.8–5.2)
SODIUM SERPL-SCNC: 136 MMOL/L (ref 135–145)
WBC # BLD AUTO: 6 10E3/UL (ref 4–11)

## 2025-06-26 PROCEDURE — P9604 ONE-WAY ALLOW PRORATED TRIP: HCPCS | Mod: ORL | Performed by: FAMILY MEDICINE

## 2025-06-26 PROCEDURE — 36415 COLL VENOUS BLD VENIPUNCTURE: CPT | Mod: ORL | Performed by: FAMILY MEDICINE

## 2025-06-26 PROCEDURE — 85027 COMPLETE CBC AUTOMATED: CPT | Mod: ORL | Performed by: FAMILY MEDICINE

## 2025-06-26 PROCEDURE — 80048 BASIC METABOLIC PNL TOTAL CA: CPT | Mod: ORL | Performed by: FAMILY MEDICINE

## 2025-07-22 ENCOUNTER — LAB REQUISITION (OUTPATIENT)
Dept: LAB | Facility: CLINIC | Age: 60
End: 2025-07-22
Payer: MEDICARE

## 2025-07-22 DIAGNOSIS — D64.9 ANEMIA, UNSPECIFIED: ICD-10-CM

## 2025-07-23 ENCOUNTER — LAB REQUISITION (OUTPATIENT)
Dept: LAB | Facility: CLINIC | Age: 60
End: 2025-07-23
Payer: MEDICARE

## 2025-07-23 DIAGNOSIS — R79.82 ELEVATED C-REACTIVE PROTEIN (CRP): ICD-10-CM

## 2025-07-23 DIAGNOSIS — R79.89 OTHER SPECIFIED ABNORMAL FINDINGS OF BLOOD CHEMISTRY: ICD-10-CM

## 2025-07-24 ENCOUNTER — LAB REQUISITION (OUTPATIENT)
Dept: LAB | Facility: CLINIC | Age: 60
End: 2025-07-24
Payer: COMMERCIAL

## 2025-07-24 DIAGNOSIS — R53.1 WEAKNESS: ICD-10-CM

## 2025-07-24 LAB
ALBUMIN SERPL BCG-MCNC: 3.3 G/DL (ref 3.5–5.2)
ALBUMIN UR-MCNC: NEGATIVE MG/DL
ALP SERPL-CCNC: 70 U/L (ref 40–150)
ALT SERPL W P-5'-P-CCNC: <5 U/L (ref 0–50)
ANION GAP SERPL CALCULATED.3IONS-SCNC: 12 MMOL/L (ref 7–15)
APPEARANCE UR: CLEAR
AST SERPL W P-5'-P-CCNC: 15 U/L (ref 0–45)
BASOPHILS # BLD AUTO: 0.1 10E3/UL (ref 0–0.2)
BASOPHILS NFR BLD AUTO: 1 %
BILIRUB SERPL-MCNC: 0.3 MG/DL
BILIRUB UR QL STRIP: NEGATIVE
BUN SERPL-MCNC: 21.3 MG/DL (ref 8–23)
CALCIUM SERPL-MCNC: 8.9 MG/DL (ref 8.8–10.4)
CHLORIDE SERPL-SCNC: 97 MMOL/L (ref 98–107)
COLOR UR AUTO: ABNORMAL
CREAT SERPL-MCNC: 1.03 MG/DL (ref 0.51–0.95)
CRP SERPL-MCNC: 130 MG/L
EGFRCR SERPLBLD CKD-EPI 2021: 62 ML/MIN/1.73M2
EOSINOPHIL # BLD AUTO: 0.2 10E3/UL (ref 0–0.7)
EOSINOPHIL NFR BLD AUTO: 3 %
ERYTHROCYTE [DISTWIDTH] IN BLOOD BY AUTOMATED COUNT: 17.1 % (ref 10–15)
GLUCOSE SERPL-MCNC: 100 MG/DL (ref 70–99)
GLUCOSE UR STRIP-MCNC: NEGATIVE MG/DL
HCO3 SERPL-SCNC: 26 MMOL/L (ref 22–29)
HCT VFR BLD AUTO: 32.6 % (ref 35–47)
HGB BLD-MCNC: 9.1 G/DL (ref 11.7–15.7)
HGB UR QL STRIP: NEGATIVE
IMM GRANULOCYTES # BLD: 0.1 10E3/UL
IMM GRANULOCYTES NFR BLD: 1 %
KETONES UR STRIP-MCNC: NEGATIVE MG/DL
LEUKOCYTE ESTERASE UR QL STRIP: NEGATIVE
LYMPHOCYTES # BLD AUTO: 1 10E3/UL (ref 0.8–5.3)
LYMPHOCYTES NFR BLD AUTO: 14 %
MCH RBC QN AUTO: 21.5 PG (ref 26.5–33)
MCHC RBC AUTO-ENTMCNC: 27.9 G/DL (ref 31.5–36.5)
MCV RBC AUTO: 77 FL (ref 78–100)
MONOCYTES # BLD AUTO: 0.8 10E3/UL (ref 0–1.3)
MONOCYTES NFR BLD AUTO: 11 %
MUCOUS THREADS #/AREA URNS LPF: PRESENT /LPF
NEUTROPHILS # BLD AUTO: 5.1 10E3/UL (ref 1.6–8.3)
NEUTROPHILS NFR BLD AUTO: 70 %
NITRATE UR QL: NEGATIVE
NRBC # BLD AUTO: 0 10E3/UL
NRBC BLD AUTO-RTO: 0 /100
PH UR STRIP: 5.5 [PH] (ref 5–7)
PLATELET # BLD AUTO: 471 10E3/UL (ref 150–450)
POTASSIUM SERPL-SCNC: 3.9 MMOL/L (ref 3.4–5.3)
PROT SERPL-MCNC: 7.7 G/DL (ref 6.4–8.3)
RBC # BLD AUTO: 4.23 10E6/UL (ref 3.8–5.2)
RBC URINE: <1 /HPF
SODIUM SERPL-SCNC: 135 MMOL/L (ref 135–145)
SP GR UR STRIP: 1.01 (ref 1–1.03)
SQUAMOUS EPITHELIAL: <1 /HPF
UROBILINOGEN UR STRIP-MCNC: NORMAL MG/DL
WBC # BLD AUTO: 7.3 10E3/UL (ref 4–11)
WBC URINE: <1 /HPF

## 2025-07-24 PROCEDURE — 36415 COLL VENOUS BLD VENIPUNCTURE: CPT | Mod: ORL | Performed by: FAMILY MEDICINE

## 2025-07-24 PROCEDURE — 86140 C-REACTIVE PROTEIN: CPT | Mod: ORL | Performed by: FAMILY MEDICINE

## 2025-07-24 PROCEDURE — 81001 URINALYSIS AUTO W/SCOPE: CPT | Mod: ORL | Performed by: FAMILY MEDICINE

## 2025-07-24 PROCEDURE — 85025 COMPLETE CBC W/AUTO DIFF WBC: CPT | Mod: ORL | Performed by: FAMILY MEDICINE

## 2025-07-24 PROCEDURE — 80053 COMPREHEN METABOLIC PANEL: CPT | Mod: ORL | Performed by: FAMILY MEDICINE

## 2025-07-25 LAB
BASOPHILS # BLD AUTO: 0.1 10E3/UL (ref 0–0.2)
BASOPHILS NFR BLD AUTO: 1 %
EOSINOPHIL # BLD AUTO: 0.1 10E3/UL (ref 0–0.7)
EOSINOPHIL NFR BLD AUTO: 2 %
ERYTHROCYTE [DISTWIDTH] IN BLOOD BY AUTOMATED COUNT: 16.9 % (ref 10–15)
HCT VFR BLD AUTO: 26.8 % (ref 35–47)
HGB BLD-MCNC: 7.8 G/DL (ref 11.7–15.7)
IMM GRANULOCYTES # BLD: 0.1 10E3/UL
IMM GRANULOCYTES NFR BLD: 1 %
LYMPHOCYTES # BLD AUTO: 0.7 10E3/UL (ref 0.8–5.3)
LYMPHOCYTES NFR BLD AUTO: 9 %
MCH RBC QN AUTO: 21.8 PG (ref 26.5–33)
MCHC RBC AUTO-ENTMCNC: 29.1 G/DL (ref 31.5–36.5)
MCV RBC AUTO: 75 FL (ref 78–100)
MONOCYTES # BLD AUTO: 0.7 10E3/UL (ref 0–1.3)
MONOCYTES NFR BLD AUTO: 8 %
NEUTROPHILS # BLD AUTO: 6.5 10E3/UL (ref 1.6–8.3)
NEUTROPHILS NFR BLD AUTO: 79 %
NRBC # BLD AUTO: 0 10E3/UL
NRBC BLD AUTO-RTO: 0 /100
PLATELET # BLD AUTO: 355 10E3/UL (ref 150–450)
RBC # BLD AUTO: 3.58 10E6/UL (ref 3.8–5.2)
WBC # BLD AUTO: 8.2 10E3/UL (ref 4–11)

## 2025-07-25 PROCEDURE — 36415 COLL VENOUS BLD VENIPUNCTURE: CPT | Mod: ORL | Performed by: NURSE PRACTITIONER

## 2025-07-25 PROCEDURE — P9604 ONE-WAY ALLOW PRORATED TRIP: HCPCS | Mod: ORL | Performed by: NURSE PRACTITIONER

## 2025-07-25 PROCEDURE — 85025 COMPLETE CBC W/AUTO DIFF WBC: CPT | Mod: ORL | Performed by: NURSE PRACTITIONER
